# Patient Record
Sex: FEMALE | Race: ASIAN | NOT HISPANIC OR LATINO | URBAN - METROPOLITAN AREA
[De-identification: names, ages, dates, MRNs, and addresses within clinical notes are randomized per-mention and may not be internally consistent; named-entity substitution may affect disease eponyms.]

---

## 2017-02-08 ENCOUNTER — EMERGENCY (EMERGENCY)
Facility: HOSPITAL | Age: 77
LOS: 1 days | Discharge: HOME | End: 2017-02-08
Attending: EMERGENCY MEDICINE | Admitting: EMERGENCY MEDICINE
Payer: MEDICARE

## 2017-02-08 VITALS
HEART RATE: 102 BPM | DIASTOLIC BLOOD PRESSURE: 83 MMHG | TEMPERATURE: 99 F | SYSTOLIC BLOOD PRESSURE: 145 MMHG | OXYGEN SATURATION: 100 % | RESPIRATION RATE: 18 BRPM

## 2017-02-08 VITALS
HEART RATE: 66 BPM | DIASTOLIC BLOOD PRESSURE: 74 MMHG | RESPIRATION RATE: 18 BRPM | OXYGEN SATURATION: 98 % | TEMPERATURE: 101 F | SYSTOLIC BLOOD PRESSURE: 146 MMHG

## 2017-02-08 LAB
ALBUMIN SERPL ELPH-MCNC: 4.3 G/DL — SIGNIFICANT CHANGE UP (ref 3.3–5)
ALP SERPL-CCNC: 67 U/L — SIGNIFICANT CHANGE UP (ref 40–120)
ALT FLD-CCNC: 16 U/L — SIGNIFICANT CHANGE UP (ref 4–33)
APPEARANCE UR: SIGNIFICANT CHANGE UP
APTT BLD: 30.8 SEC — SIGNIFICANT CHANGE UP (ref 27.5–37.4)
AST SERPL-CCNC: 18 U/L — SIGNIFICANT CHANGE UP (ref 4–32)
BACTERIA # UR AUTO: SIGNIFICANT CHANGE UP
BASE EXCESS BLDV CALC-SCNC: 5.6 MMOL/L — SIGNIFICANT CHANGE UP
BASOPHILS # BLD AUTO: 0.08 K/UL — SIGNIFICANT CHANGE UP (ref 0–0.2)
BASOPHILS NFR BLD AUTO: 1.2 % — SIGNIFICANT CHANGE UP (ref 0–2)
BILIRUB SERPL-MCNC: 0.3 MG/DL — SIGNIFICANT CHANGE UP (ref 0.2–1.2)
BILIRUB UR-MCNC: NEGATIVE — SIGNIFICANT CHANGE UP
BLOOD GAS VENOUS - CREATININE: 1.22 MG/DL — SIGNIFICANT CHANGE UP (ref 0.5–1.3)
BLOOD UR QL VISUAL: NEGATIVE — SIGNIFICANT CHANGE UP
BUN SERPL-MCNC: 15 MG/DL — SIGNIFICANT CHANGE UP (ref 7–23)
CALCIUM SERPL-MCNC: 9.5 MG/DL — SIGNIFICANT CHANGE UP (ref 8.4–10.5)
CHLORIDE BLDV-SCNC: 105 MMOL/L — SIGNIFICANT CHANGE UP (ref 96–108)
CHLORIDE SERPL-SCNC: 100 MMOL/L — SIGNIFICANT CHANGE UP (ref 98–107)
CK MB BLD-MCNC: 1 NG/ML — SIGNIFICANT CHANGE UP (ref 1–4.7)
CK MB BLD-MCNC: SIGNIFICANT CHANGE UP (ref 0–2.5)
CK SERPL-CCNC: 38 U/L — SIGNIFICANT CHANGE UP (ref 25–170)
CO2 SERPL-SCNC: 27 MMOL/L — SIGNIFICANT CHANGE UP (ref 22–31)
COLOR SPEC: SIGNIFICANT CHANGE UP
CREAT SERPL-MCNC: 1.22 MG/DL — SIGNIFICANT CHANGE UP (ref 0.5–1.3)
EOSINOPHIL # BLD AUTO: 0.04 K/UL — SIGNIFICANT CHANGE UP (ref 0–0.5)
EOSINOPHIL NFR BLD AUTO: 0.6 % — SIGNIFICANT CHANGE UP (ref 0–6)
GAS PNL BLDV: 136 MMOL/L — SIGNIFICANT CHANGE UP (ref 136–146)
GLUCOSE BLDV-MCNC: 134 — HIGH (ref 70–99)
GLUCOSE SERPL-MCNC: 136 MG/DL — HIGH (ref 70–99)
GLUCOSE UR-MCNC: NEGATIVE — SIGNIFICANT CHANGE UP
HCO3 BLDV-SCNC: 28 MMOL/L — HIGH (ref 20–27)
HCT VFR BLD CALC: 38.7 % — SIGNIFICANT CHANGE UP (ref 34.5–45)
HCT VFR BLDV CALC: 40.5 % — SIGNIFICANT CHANGE UP (ref 34.5–45)
HGB BLD-MCNC: 12.8 G/DL — SIGNIFICANT CHANGE UP (ref 11.5–15.5)
HGB BLDV-MCNC: 13.2 G/DL — SIGNIFICANT CHANGE UP (ref 11.5–15.5)
IMM GRANULOCYTES NFR BLD AUTO: 0.3 % — SIGNIFICANT CHANGE UP (ref 0–1.5)
INR BLD: 1 — SIGNIFICANT CHANGE UP (ref 0.87–1.18)
KETONES UR-MCNC: NEGATIVE — SIGNIFICANT CHANGE UP
LACTATE BLDV-MCNC: 1.9 MMOL/L — SIGNIFICANT CHANGE UP (ref 0.5–2)
LEUKOCYTE ESTERASE UR-ACNC: HIGH
LYMPHOCYTES # BLD AUTO: 1.26 K/UL — SIGNIFICANT CHANGE UP (ref 1–3.3)
LYMPHOCYTES # BLD AUTO: 19.5 % — SIGNIFICANT CHANGE UP (ref 13–44)
MCHC RBC-ENTMCNC: 29.2 PG — SIGNIFICANT CHANGE UP (ref 27–34)
MCHC RBC-ENTMCNC: 33.1 % — SIGNIFICANT CHANGE UP (ref 32–36)
MCV RBC AUTO: 88.4 FL — SIGNIFICANT CHANGE UP (ref 80–100)
MONOCYTES # BLD AUTO: 0.72 K/UL — SIGNIFICANT CHANGE UP (ref 0–0.9)
MONOCYTES NFR BLD AUTO: 11.1 % — SIGNIFICANT CHANGE UP (ref 2–14)
MUCOUS THREADS # UR AUTO: SIGNIFICANT CHANGE UP
NEUTROPHILS # BLD AUTO: 4.35 K/UL — SIGNIFICANT CHANGE UP (ref 1.8–7.4)
NEUTROPHILS NFR BLD AUTO: 67.3 % — SIGNIFICANT CHANGE UP (ref 43–77)
NITRITE UR-MCNC: NEGATIVE — SIGNIFICANT CHANGE UP
PCO2 BLDV: 46 MMHG — SIGNIFICANT CHANGE UP (ref 41–51)
PH BLDV: 7.43 PH — SIGNIFICANT CHANGE UP (ref 7.32–7.43)
PH UR: 7.5 — SIGNIFICANT CHANGE UP (ref 4.6–8)
PLATELET # BLD AUTO: 182 K/UL — SIGNIFICANT CHANGE UP (ref 150–400)
PMV BLD: 11.8 FL — SIGNIFICANT CHANGE UP (ref 7–13)
PO2 BLDV: 29 MMHG — LOW (ref 35–40)
POTASSIUM BLDV-SCNC: 3.5 MMOL/L — SIGNIFICANT CHANGE UP (ref 3.4–4.5)
POTASSIUM SERPL-MCNC: 3.8 MMOL/L — SIGNIFICANT CHANGE UP (ref 3.5–5.3)
POTASSIUM SERPL-SCNC: 3.8 MMOL/L — SIGNIFICANT CHANGE UP (ref 3.5–5.3)
PROT SERPL-MCNC: 7.6 G/DL — SIGNIFICANT CHANGE UP (ref 6–8.3)
PROT UR-MCNC: 20 — SIGNIFICANT CHANGE UP
PROTHROM AB SERPL-ACNC: 11.4 SEC — SIGNIFICANT CHANGE UP (ref 10–13.1)
RBC # BLD: 4.38 M/UL — SIGNIFICANT CHANGE UP (ref 3.8–5.2)
RBC # FLD: 14.1 % — SIGNIFICANT CHANGE UP (ref 10.3–14.5)
RBC CASTS # UR COMP ASSIST: SIGNIFICANT CHANGE UP (ref 0–?)
SAO2 % BLDV: 53.9 % — LOW (ref 60–85)
SODIUM SERPL-SCNC: 143 MMOL/L — SIGNIFICANT CHANGE UP (ref 135–145)
SP GR SPEC: 1.01 — SIGNIFICANT CHANGE UP (ref 1–1.03)
SQUAMOUS # UR AUTO: SIGNIFICANT CHANGE UP
TROPONIN T SERPL-MCNC: < 0.06 NG/ML — SIGNIFICANT CHANGE UP (ref 0–0.06)
TSH SERPL-MCNC: 1.05 UIU/ML — SIGNIFICANT CHANGE UP (ref 0.27–4.2)
UROBILINOGEN FLD QL: 1 E.U. — SIGNIFICANT CHANGE UP (ref 0.1–0.2)
WBC # BLD: 6.47 K/UL — SIGNIFICANT CHANGE UP (ref 3.8–10.5)
WBC # FLD AUTO: 6.47 K/UL — SIGNIFICANT CHANGE UP (ref 3.8–10.5)
WBC UR QL: SIGNIFICANT CHANGE UP (ref 0–?)

## 2017-02-08 PROCEDURE — 72125 CT NECK SPINE W/O DYE: CPT | Mod: 26

## 2017-02-08 PROCEDURE — 71020: CPT | Mod: 26

## 2017-02-08 PROCEDURE — 99285 EMERGENCY DEPT VISIT HI MDM: CPT | Mod: 25

## 2017-02-08 PROCEDURE — 70450 CT HEAD/BRAIN W/O DYE: CPT | Mod: 26

## 2017-02-08 PROCEDURE — 93010 ELECTROCARDIOGRAM REPORT: CPT

## 2017-02-08 RX ORDER — CEPHALEXIN 500 MG
1 CAPSULE ORAL
Qty: 20 | Refills: 0
Start: 2017-02-08 | End: 2017-02-18

## 2017-02-08 RX ORDER — ACETAMINOPHEN 500 MG
650 TABLET ORAL ONCE
Qty: 0 | Refills: 0 | Status: COMPLETED | OUTPATIENT
Start: 2017-02-08 | End: 2017-02-08

## 2017-02-08 RX ORDER — CEFTRIAXONE 500 MG/1
1 INJECTION, POWDER, FOR SOLUTION INTRAMUSCULAR; INTRAVENOUS ONCE
Qty: 0 | Refills: 0 | Status: COMPLETED | OUTPATIENT
Start: 2017-02-08 | End: 2017-02-08

## 2017-02-08 RX ORDER — SODIUM CHLORIDE 9 MG/ML
500 INJECTION INTRAMUSCULAR; INTRAVENOUS; SUBCUTANEOUS ONCE
Qty: 0 | Refills: 0 | Status: COMPLETED | OUTPATIENT
Start: 2017-02-08 | End: 2017-02-08

## 2017-02-08 RX ADMIN — SODIUM CHLORIDE 500 MILLILITER(S): 9 INJECTION INTRAMUSCULAR; INTRAVENOUS; SUBCUTANEOUS at 17:04

## 2017-02-08 RX ADMIN — Medication 650 MILLIGRAM(S): at 17:03

## 2017-02-08 RX ADMIN — CEFTRIAXONE 100 GRAM(S): 500 INJECTION, POWDER, FOR SOLUTION INTRAMUSCULAR; INTRAVENOUS at 18:29

## 2017-02-08 NOTE — ED PROVIDER NOTE - ATTENDING CONTRIBUTION TO CARE
ED Attending (Joellen SPENCER): I have personally performed a face to face bedside history and physical examination of this patient. I have discussed the history, examination, assessment and plan of management with the Physician Assistant. My findings include: 75 y/o F PMH HTN, DM c/o unwitnessed fall  this am after feeling lightheaded, +head trauma, denies LOC. Daughter who is at bedside notes pt was confused after fall, stating she was not answering questions appropriately - currently states back to baseline. Pt currently has b/l mild knee pain s/p fall. Also notes recent urinary frequency. Denies fever, chills, HA, changes in vision, CP, SOB, abdominal pain, N/V/D, dysuria, hematuria, h/o CVA.. On exam, awake alert in no distress, + low grade fever 100.9 MO. Slightly tachycardic. No evidence of head neck or spine injury, lungs clear heart sounds normal abdomen soft non tender No CVAT, skin normal neuro non focal No other significant injury noted. Able to ambulate unassisted. Plan: labs and cultures due to fever, UA CXR EKG CT head. ED Attending (Joellen SPENCER): I have personally performed a face to face bedside history and physical examination of this patient. I have discussed the history, examination, assessment and plan of management with the Physician Assistant. My findings include: 77 y/o F PMH HTN, DM a fib not on AC,  c/o unwitnessed fall  this am after feeling lightheaded, +head trauma, denies LOC. Daughter who is at bedside notes pt was confused after fall, stating she was not answering questions appropriately - currently states back to baseline. Pt currently has b/l mild knee pain s/p fall. Also notes recent urinary frequency. Denies fever, chills, HA, changes in vision, CP, SOB, abdominal pain, N/V/D, dysuria, hematuria, h/o CVA.. On exam, awake alert in no distress, + low grade fever 100.9 MD. Slightly tachycardic. No evidence of head neck or spine injury, lungs clear heart sounds normal abdomen soft non tender No CVAT, skin normal neuro non focal No other significant injury noted. Able to ambulate unassisted. Plan: labs and cultures due to fever, UA CXR EKG CT head.

## 2017-02-08 NOTE — ED ADULT NURSE NOTE - OBJECTIVE STATEMENT
Pt rec'd in 4, A&Ox3, accompanied by family. Pt states she fell this morning while getting up from chair, felt dizzy and fell down, hit head, no LOC. Pt denies headache, denies N/V. Pt c/o pain to left knee, no bruising noted. Per family pt has been acting confused since her fall, not at her baseline. Pt noted to be febrile, denies cough/runny nose, denies urinary complaints

## 2017-02-08 NOTE — ED PROVIDER NOTE - OBJECTIVE STATEMENT
77 y/o F PMH HTN, DM c/o unwitnessed fall from height this am after feeling lightheaded, +head trauma, denies LOC. Daughter who is at bedside notes pt was confused after fall, stating she was not answering questions appropriately - currently states back to baseline. Pt currently has b/l mild knee pain s/p fall. Also notes recent urinary frequency. Denies fever, chills, HA, changes in vision, CP, SOB, abdominal pain, N/V/D, dysuria, hematuria, h/o CVA. 77 y/o F PMH HTN, DM c/o unwitnessed fall  this am after feeling lightheaded, +head trauma, denies LOC. Daughter who is at bedside notes pt was confused after fall, stating she was not answering questions appropriately - currently states back to baseline. Pt currently has b/l mild knee pain s/p fall. Also notes recent urinary frequency. Denies fever, chills, HA, changes in vision, CP, SOB, abdominal pain, N/V/D, dysuria, hematuria, h/o CVA. 77 y/o F PMH HTN, DM, Afib, not on AC, c/o unwitnessed fall  this am after feeling lightheaded, +head trauma, denies LOC. Daughter who is at bedside notes pt was confused after fall, stating she was not answering questions appropriately - currently states back to baseline. Pt currently has b/l mild knee pain s/p fall. Also notes recent urinary frequency. Denies fever, chills, HA, changes in vision, CP, SOB, abdominal pain, N/V/D, dysuria, hematuria, h/o CVA.

## 2017-02-08 NOTE — ED ADULT TRIAGE NOTE - CHIEF COMPLAINT QUOTE
pt presents s/p fall at 5am this morning. per pt's daughter-in law pt was feeling dizzy prior to fall and hit her head and was unable to get up so she tried to crawl into another room for help. pt also c/o left knee pain s/p fall. denies loc, reports taking asa 81mg daily. pt ambulatory into triage. daughter in-law also reports that patient is experiencing episodes of confusion and forgetfulness s/p fall, which is not her baseline. pt appropriately answering questions in triage but appears forgetful. PMHX: dm, htn, arthritis

## 2017-02-08 NOTE — ED PROVIDER NOTE - MEDICAL DECISION MAKING DETAILS
77 y/o F with possible syncope/unwitnessed fall; afib/a flutter on EKG; febrile in ED  -cbc, cmp, CE, TSH, EKG, cxr, CT head/c spine, UA/UCx, blood cx x 2, vbg, tylenol, IVF 75 y/o F with unwitnessed fall; afib/a flutter on EKG; febrile in ED  -cbc, cmp, CE, TSH, EKG, cxr, CT head/c spine, UA/UCx, blood cx x 2, vbg, tylenol, IVF

## 2017-02-08 NOTE — ED PROVIDER NOTE - CARE PLAN
Principal Discharge DX:	UTI (urinary tract infection)  Instructions for follow-up, activity and diet:	Follow up with your PMD within 48-72 hours.  Take Keflex 500 mg 1 tab 2x/day for 10 days.  Take tylenol 650mg every 6 hours as needed for fever. Increase fluids. Worsening pain, new fever, chills, nausea, vomiting return to ER  Secondary Diagnosis:	Fall

## 2017-02-08 NOTE — ED PROVIDER NOTE - MUSCULOSKELETAL, MLM
Spine appears normal, range of motion is not limited, no muscle or joint tenderness; no TTP to b/l knees without swelling, FROM

## 2017-02-09 LAB
SPECIMEN SOURCE: SIGNIFICANT CHANGE UP
SPECIMEN SOURCE: SIGNIFICANT CHANGE UP

## 2017-02-10 LAB
BACTERIA UR CULT: SIGNIFICANT CHANGE UP
SPECIMEN SOURCE: SIGNIFICANT CHANGE UP

## 2017-02-13 LAB
BACTERIA BLD CULT: SIGNIFICANT CHANGE UP
BACTERIA BLD CULT: SIGNIFICANT CHANGE UP

## 2019-06-26 NOTE — ED PROVIDER NOTE - PLAN OF CARE
4 3 Follow up with your PMD within 48-72 hours.  Take Keflex 500 mg 1 tab 2x/day for 10 days.  Take tylenol 650mg every 6 hours as needed for fever. Increase fluids. Worsening pain, new fever, chills, nausea, vomiting return to ER

## 2020-02-26 ENCOUNTER — INPATIENT (INPATIENT)
Facility: HOSPITAL | Age: 80
LOS: 5 days | Discharge: HOME HEALTH | End: 2020-03-03
Attending: INTERNAL MEDICINE | Admitting: INTERNAL MEDICINE
Payer: MEDICARE

## 2020-02-26 VITALS
DIASTOLIC BLOOD PRESSURE: 96 MMHG | HEART RATE: 126 BPM | RESPIRATION RATE: 16 BRPM | OXYGEN SATURATION: 99 % | TEMPERATURE: 99 F | SYSTOLIC BLOOD PRESSURE: 170 MMHG

## 2020-02-26 DIAGNOSIS — R55 SYNCOPE AND COLLAPSE: ICD-10-CM

## 2020-02-26 DIAGNOSIS — E11.9 TYPE 2 DIABETES MELLITUS WITHOUT COMPLICATIONS: ICD-10-CM

## 2020-02-26 DIAGNOSIS — I10 ESSENTIAL (PRIMARY) HYPERTENSION: ICD-10-CM

## 2020-02-26 DIAGNOSIS — I48.91 UNSPECIFIED ATRIAL FIBRILLATION: ICD-10-CM

## 2020-02-26 LAB
ALBUMIN SERPL ELPH-MCNC: 4 G/DL — SIGNIFICANT CHANGE UP (ref 3.3–5)
ALP SERPL-CCNC: 87 U/L — SIGNIFICANT CHANGE UP (ref 40–120)
ALT FLD-CCNC: 23 U/L — SIGNIFICANT CHANGE UP (ref 4–33)
ANION GAP SERPL CALC-SCNC: 13 MMO/L — SIGNIFICANT CHANGE UP (ref 7–14)
ANION GAP SERPL CALC-SCNC: 17 MMO/L — HIGH (ref 7–14)
APPEARANCE UR: SIGNIFICANT CHANGE UP
APTT BLD: 28.6 SEC — SIGNIFICANT CHANGE UP (ref 27.5–36.3)
AST SERPL-CCNC: 51 U/L — HIGH (ref 4–32)
BACTERIA # UR AUTO: HIGH
BASE EXCESS BLDV CALC-SCNC: 4.2 MMOL/L — SIGNIFICANT CHANGE UP
BASOPHILS # BLD AUTO: 0.08 K/UL — SIGNIFICANT CHANGE UP (ref 0–0.2)
BASOPHILS NFR BLD AUTO: 1 % — SIGNIFICANT CHANGE UP (ref 0–2)
BILIRUB SERPL-MCNC: 0.4 MG/DL — SIGNIFICANT CHANGE UP (ref 0.2–1.2)
BILIRUB UR-MCNC: NEGATIVE — SIGNIFICANT CHANGE UP
BLOOD GAS VENOUS - CREATININE: 1.14 MG/DL — SIGNIFICANT CHANGE UP (ref 0.5–1.3)
BLOOD UR QL VISUAL: SIGNIFICANT CHANGE UP
BUN SERPL-MCNC: 14 MG/DL — SIGNIFICANT CHANGE UP (ref 7–23)
BUN SERPL-MCNC: 15 MG/DL — SIGNIFICANT CHANGE UP (ref 7–23)
CALCIUM SERPL-MCNC: 9 MG/DL — SIGNIFICANT CHANGE UP (ref 8.4–10.5)
CALCIUM SERPL-MCNC: 9.9 MG/DL — SIGNIFICANT CHANGE UP (ref 8.4–10.5)
CHLORIDE BLDV-SCNC: 101 MMOL/L — SIGNIFICANT CHANGE UP (ref 96–108)
CHLORIDE SERPL-SCNC: 96 MMOL/L — LOW (ref 98–107)
CHLORIDE SERPL-SCNC: 99 MMOL/L — SIGNIFICANT CHANGE UP (ref 98–107)
CO2 SERPL-SCNC: 21 MMOL/L — LOW (ref 22–31)
CO2 SERPL-SCNC: 22 MMOL/L — SIGNIFICANT CHANGE UP (ref 22–31)
COLOR SPEC: SIGNIFICANT CHANGE UP
CREAT SERPL-MCNC: 1.05 MG/DL — SIGNIFICANT CHANGE UP (ref 0.5–1.3)
CREAT SERPL-MCNC: 1.19 MG/DL — SIGNIFICANT CHANGE UP (ref 0.5–1.3)
EOSINOPHIL # BLD AUTO: 0.09 K/UL — SIGNIFICANT CHANGE UP (ref 0–0.5)
EOSINOPHIL NFR BLD AUTO: 1.2 % — SIGNIFICANT CHANGE UP (ref 0–6)
GAS PNL BLDV: 135 MMOL/L — LOW (ref 136–146)
GLUCOSE BLDC GLUCOMTR-MCNC: 241 MG/DL — HIGH (ref 70–99)
GLUCOSE BLDV-MCNC: 364 MG/DL — HIGH (ref 70–99)
GLUCOSE SERPL-MCNC: 354 MG/DL — HIGH (ref 70–99)
GLUCOSE SERPL-MCNC: 355 MG/DL — HIGH (ref 70–99)
GLUCOSE UR-MCNC: >1000 — HIGH
HCO3 BLDV-SCNC: 27 MMOL/L — SIGNIFICANT CHANGE UP (ref 20–27)
HCT VFR BLD CALC: 45.4 % — HIGH (ref 34.5–45)
HCT VFR BLDV CALC: 45.7 % — HIGH (ref 34.5–45)
HGB BLD-MCNC: 15 G/DL — SIGNIFICANT CHANGE UP (ref 11.5–15.5)
HGB BLDV-MCNC: 14.9 G/DL — SIGNIFICANT CHANGE UP (ref 11.5–15.5)
HYALINE CASTS # UR AUTO: HIGH
IMM GRANULOCYTES NFR BLD AUTO: 0.1 % — SIGNIFICANT CHANGE UP (ref 0–1.5)
INR BLD: 0.98 — SIGNIFICANT CHANGE UP (ref 0.88–1.17)
KETONES UR-MCNC: NEGATIVE — SIGNIFICANT CHANGE UP
LACTATE BLDV-MCNC: 2.1 MMOL/L — HIGH (ref 0.5–2)
LEUKOCYTE ESTERASE UR-ACNC: SIGNIFICANT CHANGE UP
LYMPHOCYTES # BLD AUTO: 1.86 K/UL — SIGNIFICANT CHANGE UP (ref 1–3.3)
LYMPHOCYTES # BLD AUTO: 24 % — SIGNIFICANT CHANGE UP (ref 13–44)
MCHC RBC-ENTMCNC: 27.5 PG — SIGNIFICANT CHANGE UP (ref 27–34)
MCHC RBC-ENTMCNC: 33 % — SIGNIFICANT CHANGE UP (ref 32–36)
MCV RBC AUTO: 83.3 FL — SIGNIFICANT CHANGE UP (ref 80–100)
MONOCYTES # BLD AUTO: 0.61 K/UL — SIGNIFICANT CHANGE UP (ref 0–0.9)
MONOCYTES NFR BLD AUTO: 7.9 % — SIGNIFICANT CHANGE UP (ref 2–14)
NEUTROPHILS # BLD AUTO: 5.1 K/UL — SIGNIFICANT CHANGE UP (ref 1.8–7.4)
NEUTROPHILS NFR BLD AUTO: 65.8 % — SIGNIFICANT CHANGE UP (ref 43–77)
NITRITE UR-MCNC: NEGATIVE — SIGNIFICANT CHANGE UP
NRBC # FLD: 0 K/UL — SIGNIFICANT CHANGE UP (ref 0–0)
PCO2 BLDV: 49 MMHG — SIGNIFICANT CHANGE UP (ref 41–51)
PH BLDV: 7.39 PH — SIGNIFICANT CHANGE UP (ref 7.32–7.43)
PH UR: 7 — SIGNIFICANT CHANGE UP (ref 5–8)
PLATELET # BLD AUTO: 186 K/UL — SIGNIFICANT CHANGE UP (ref 150–400)
PMV BLD: 13.1 FL — HIGH (ref 7–13)
PO2 BLDV: 39 MMHG — SIGNIFICANT CHANGE UP (ref 35–40)
POTASSIUM BLDV-SCNC: 5.3 MMOL/L — HIGH (ref 3.4–4.5)
POTASSIUM SERPL-MCNC: SIGNIFICANT CHANGE UP MMOL/L (ref 3.5–5.3)
POTASSIUM SERPL-MCNC: SIGNIFICANT CHANGE UP MMOL/L (ref 3.5–5.3)
POTASSIUM SERPL-SCNC: SIGNIFICANT CHANGE UP MMOL/L (ref 3.5–5.3)
POTASSIUM SERPL-SCNC: SIGNIFICANT CHANGE UP MMOL/L (ref 3.5–5.3)
PROT SERPL-MCNC: 8.1 G/DL — SIGNIFICANT CHANGE UP (ref 6–8.3)
PROT UR-MCNC: 30 — SIGNIFICANT CHANGE UP
PROTHROM AB SERPL-ACNC: 11.3 SEC — SIGNIFICANT CHANGE UP (ref 9.8–13.1)
RBC # BLD: 5.45 M/UL — HIGH (ref 3.8–5.2)
RBC # FLD: 13 % — SIGNIFICANT CHANGE UP (ref 10.3–14.5)
RBC CASTS # UR COMP ASSIST: SIGNIFICANT CHANGE UP (ref 0–?)
SAO2 % BLDV: 73.2 % — SIGNIFICANT CHANGE UP (ref 60–85)
SODIUM SERPL-SCNC: 134 MMOL/L — LOW (ref 135–145)
SODIUM SERPL-SCNC: 134 MMOL/L — LOW (ref 135–145)
SP GR SPEC: 1.02 — SIGNIFICANT CHANGE UP (ref 1–1.04)
SQUAMOUS # UR AUTO: SIGNIFICANT CHANGE UP
TROPONIN T, HIGH SENSITIVITY: 7 NG/L — SIGNIFICANT CHANGE UP (ref ?–14)
TROPONIN T, HIGH SENSITIVITY: 7 NG/L — SIGNIFICANT CHANGE UP (ref ?–14)
UROBILINOGEN FLD QL: NORMAL — SIGNIFICANT CHANGE UP
WBC # BLD: 7.75 K/UL — SIGNIFICANT CHANGE UP (ref 3.8–10.5)
WBC # FLD AUTO: 7.75 K/UL — SIGNIFICANT CHANGE UP (ref 3.8–10.5)
WBC UR QL: >50 — HIGH (ref 0–?)

## 2020-02-26 PROCEDURE — 71046 X-RAY EXAM CHEST 2 VIEWS: CPT | Mod: 26

## 2020-02-26 PROCEDURE — 99223 1ST HOSP IP/OBS HIGH 75: CPT

## 2020-02-26 RX ORDER — HEPARIN SODIUM 5000 [USP'U]/ML
INJECTION INTRAVENOUS; SUBCUTANEOUS
Qty: 25000 | Refills: 0 | Status: DISCONTINUED | OUTPATIENT
Start: 2020-02-26 | End: 2020-02-27

## 2020-02-26 RX ORDER — HEPARIN SODIUM 5000 [USP'U]/ML
3000 INJECTION INTRAVENOUS; SUBCUTANEOUS EVERY 6 HOURS
Refills: 0 | Status: DISCONTINUED | OUTPATIENT
Start: 2020-02-26 | End: 2020-03-02

## 2020-02-26 RX ORDER — INSULIN LISPRO 100/ML
VIAL (ML) SUBCUTANEOUS
Refills: 0 | Status: DISCONTINUED | OUTPATIENT
Start: 2020-02-26 | End: 2020-02-27

## 2020-02-26 RX ORDER — DEXTROSE 50 % IN WATER 50 %
25 SYRINGE (ML) INTRAVENOUS ONCE
Refills: 0 | Status: DISCONTINUED | OUTPATIENT
Start: 2020-02-26 | End: 2020-03-03

## 2020-02-26 RX ORDER — METOPROLOL TARTRATE 50 MG
25 TABLET ORAL ONCE
Refills: 0 | Status: COMPLETED | OUTPATIENT
Start: 2020-02-26 | End: 2020-02-26

## 2020-02-26 RX ORDER — DEXTROSE 50 % IN WATER 50 %
12.5 SYRINGE (ML) INTRAVENOUS ONCE
Refills: 0 | Status: DISCONTINUED | OUTPATIENT
Start: 2020-02-26 | End: 2020-03-03

## 2020-02-26 RX ORDER — SODIUM CHLORIDE 9 MG/ML
1000 INJECTION INTRAMUSCULAR; INTRAVENOUS; SUBCUTANEOUS ONCE
Refills: 0 | Status: COMPLETED | OUTPATIENT
Start: 2020-02-26 | End: 2020-02-26

## 2020-02-26 RX ORDER — GLUCAGON INJECTION, SOLUTION 0.5 MG/.1ML
1 INJECTION, SOLUTION SUBCUTANEOUS ONCE
Refills: 0 | Status: DISCONTINUED | OUTPATIENT
Start: 2020-02-26 | End: 2020-03-03

## 2020-02-26 RX ORDER — SODIUM CHLORIDE 9 MG/ML
1000 INJECTION, SOLUTION INTRAVENOUS
Refills: 0 | Status: DISCONTINUED | OUTPATIENT
Start: 2020-02-26 | End: 2020-03-03

## 2020-02-26 RX ORDER — INSULIN LISPRO 100/ML
VIAL (ML) SUBCUTANEOUS AT BEDTIME
Refills: 0 | Status: DISCONTINUED | OUTPATIENT
Start: 2020-02-26 | End: 2020-03-03

## 2020-02-26 RX ORDER — HEPARIN SODIUM 5000 [USP'U]/ML
6500 INJECTION INTRAVENOUS; SUBCUTANEOUS EVERY 6 HOURS
Refills: 0 | Status: DISCONTINUED | OUTPATIENT
Start: 2020-02-26 | End: 2020-03-02

## 2020-02-26 RX ORDER — METOPROLOL TARTRATE 50 MG
25 TABLET ORAL
Refills: 0 | Status: DISCONTINUED | OUTPATIENT
Start: 2020-02-27 | End: 2020-03-03

## 2020-02-26 RX ORDER — DEXTROSE 50 % IN WATER 50 %
15 SYRINGE (ML) INTRAVENOUS ONCE
Refills: 0 | Status: DISCONTINUED | OUTPATIENT
Start: 2020-02-26 | End: 2020-03-03

## 2020-02-26 RX ADMIN — HEPARIN SODIUM 1500 UNIT(S)/HR: 5000 INJECTION INTRAVENOUS; SUBCUTANEOUS at 21:22

## 2020-02-26 RX ADMIN — Medication 25 MILLIGRAM(S): at 19:59

## 2020-02-26 RX ADMIN — SODIUM CHLORIDE 1000 MILLILITER(S): 9 INJECTION INTRAMUSCULAR; INTRAVENOUS; SUBCUTANEOUS at 19:40

## 2020-02-26 NOTE — H&P ADULT - NSHPPHYSICALEXAM_GEN_ALL_CORE
T(C): 36.9 (02-26-20 @ 19:55), Max: 37 (02-26-20 @ 12:14)  HR: 94 (02-26-20 @ 21:06) (94 - 126)  BP: 151/96 (02-26-20 @ 21:06) (151/96 - 170/96)  RR: 18 (02-26-20 @ 21:06) (13 - 18)  SpO2: 100% (02-26-20 @ 21:06) (99% - 100%)    GENERAL: No acute distress, well-developed  HEAD:  Atraumatic, Normocephalic  ENT: EOMI, PERRLA, conjunctiva and sclera clear, Neck supple, No JVD, moist mucosa, no pharyngeal erythema, no tonsillar enlargement or exudate  CHEST/LUNG: Clear to auscultation bilaterally; No wheeze, equal breath sounds bilaterally   HEART: Irregularly irregular; No murmurs, rubs, or gallops  ABDOMEN: Soft, Nontender, Nondistended; Bowel sounds present, no organomegaly  EXTREMITIES:  2+ Peripheral Pulses, No clubbing, cyanosis, or edema  PSYCH: AAOx3  NEUROLOGY: non-focal, cranial nerves intact  SKIN: Normal color, No rashes or lesions

## 2020-02-26 NOTE — H&P ADULT - PROBLEM SELECTOR PLAN 1
- Pt presenting with near syncope in setting of A fib with RVR  - Trop neg, no ischemic changes noted on EKG and pt denies any anginal symptoms.   - Monitor on tele  - Continue B blocker  - Started on hep gtt in ED. Will continue hep gtt for now and transition to PO in AM  - Check echo  - cardiology f/u in AM  - Consider EP consult in AM

## 2020-02-26 NOTE — ED PROVIDER NOTE - CLINICAL SUMMARY MEDICAL DECISION MAKING FREE TEXT BOX
77 y/o F PMH HTN, DM, Afib no on AC coming in with episode of dizziness/lightheadedness, found to be tachycardic at PMD and sent in for eval- Will check labs, EKG, xray chest and admit.

## 2020-02-26 NOTE — ED ADULT TRIAGE NOTE - CHIEF COMPLAINT QUOTE
Patient brought to ER by EMS from MD office for dizziness since this morning. . Pt is tachycardic. Pt has hx of afib.

## 2020-02-26 NOTE — H&P ADULT - NSHPLABSRESULTS_GEN_ALL_CORE
.  LABS:                         15.0   7.75  )-----------( 186      ( 26 Feb 2020 15:06 )             45.4     02-26    134<L>  |  99  |  14  ----------------------------<  354<H>  Test not performed SPECIMEN SEVERELY HEMOLYZED   |  22  |  1.05    Ca    9.0      26 Feb 2020 19:34    TPro  8.1  /  Alb  4.0  /  TBili  0.4  /  DBili  x   /  AST  51<H>  /  ALT  23  /  AlkPhos  87  02-26    PT/INR - ( 26 Feb 2020 15:06 )   PT: 11.3 SEC;   INR: 0.98          PTT - ( 26 Feb 2020 15:06 )  PTT:28.6 SEC        EKG reviewed personally: A krista with RVR      RADIOLOGY, EKG & ADDITIONAL TESTS: Reviewed.

## 2020-02-26 NOTE — ED PROVIDER NOTE - OBJECTIVE STATEMENT
75 y/o F PMH HTN, DM, Afib no on AC coming in with episode of dizziness , states she was going down the stairs started to feeling lightheaded and dizzy, sat herself on the stairs and family called EMS. She was feeling better but was told her HR was elevated and told to go be evaluated. was feeling better so went to clinic ( PMD  Nurse) was found to be in Afib and was told to go to the ED. Pt is currently asymptomatic. Denies fevers, chills, chest pain, sob, cough, n/v/d, abdominal pain, urinary symptoms.

## 2020-02-26 NOTE — H&P ADULT - NSHPREVIEWOFSYSTEMS_GEN_ALL_CORE
REVIEW OF SYSTEMS:    CONSTITUTIONAL: No weakness, fevers or chills, no weight loss  EYES/ENT: No visual changes;  No dysphagia or odynophagia, no tinnitus  NECK: No pain or stiffness  RESPIRATORY: No cough, wheezing, hemoptysis; No shortness of breath  CARDIOVASCULAR: No chest pain or palpitations; No lower extremity edema  GASTROINTESTINAL: No abdominal or epigastric pain. No nausea, vomiting, or hematemesis; No diarrhea or constipation. No melena or hematochezia.  MUSCULOSKELETAL: No joint pain, swelling, erythema or warmth, no back pain  GENITOURINARY: No dysuria, frequency or hematuria, no suprapubic pain  NEUROLOGICAL: + dizziness, No numbness or weakness, no headache, no syncope, no gait abnormalities   SKIN: No itching, burning, rashes, or lesions   All other review of systems is negative unless indicated above.

## 2020-02-26 NOTE — H&P ADULT - HISTORY OF PRESENT ILLNESS
77 yo F with h/o HTN, DM presenting with near syncope. Pt poor historian, most of history obtained from son at bedside. This morning while climbing stairs at home, pt started feeling dizzy and then was about to lose consciousness. Her sone was with her and prevented her fall. Pt did not have any chest pain at the time. EMS was called and upon there arrival pt was feeling better but her heart rate was noted to be high so she was advised to go to her doctor. Pt went to her clinic where again her HR was noted to be elevated (family unsure how high) so she was sent to the ED. At baseline pt ambulates on her own. Has not had any recent chest pain, shortness of breath or palpitations.    In ED pt was given Metoprolol 25mg PO, 1L NS and started on hep gtt  VS: 151/96  94  98.6  18  100% on RA

## 2020-02-26 NOTE — H&P ADULT - PROBLEM SELECTOR PLAN 3
- No longer taking insulin, per family  - Check HbA1C - No longer taking insulin, per family. However Glucose persistently elevate >300  - Monitor finger sticks. Start humalog sliding scale  - Check HbA1C in AM

## 2020-02-26 NOTE — ED ADULT NURSE NOTE - INTERVENTIONS DEFINITIONS
Room bathroom lighting operational/Stretcher in lowest position, wheels locked, appropriate side rails in place/Non-slip footwear when patient is off stretcher/Call bell, personal items and telephone within reach/Physically safe environment: no spills, clutter or unnecessary equipment

## 2020-02-26 NOTE — ED PROVIDER NOTE - ATTENDING CONTRIBUTION TO CARE
Attending Statement: I have reviewed and agree with all pertinent clinical information, including history and physical exam and agree with treatment plan of the PA, except as noted.  75yo F hx of HTN, DM, hx of atrial fib not on AC from home  co dizziness. pt was on the stairs felt dizzy almost passed out. no cp or sob. EMS noted atrial fib. Family denied hx of atrial fib. However chart notes hx of atrial fib. Currently pt denies any hx of cp or sob. no nausea or vomit. no fever/chills. no dysuria. no hematuria no trauma. no focal weakness or numbness. pt poor historian  Vital signs noted. +tachycardiac irregular HR, no slurred speech EOMI PERRLA no facial asymmetry No resp distress. able to speak in full and clear sentences. no wheeze, rales or stridor. soft nontender abdomen. no  rebound. no guarding. no sign of trauma. no CVAT nl  bl hands no pedal edema. no calf tenderness. normal pulses bilateral feet.   plan monitor, labs, ekg, cxr, urine, admit

## 2020-02-26 NOTE — ED ADULT NURSE REASSESSMENT NOTE - NS ED NURSE REASSESS COMMENT FT1
Patient's repeat lab work sent. Patient with no complaints of dizziness at this time. Respirations remain even and unlabored, chest rise equal b/l. Patient in NAD. Family at bedside, beds set in lowest position. Will continue to monitor.

## 2020-02-26 NOTE — ED ADULT NURSE NOTE - HOW OFTEN DO YOU HAVE A DRINK CONTAINING ALCOHOL?
Subjective:      Patient ID: Solo Cid is a 64 y.o. male.    Chief Complaint: Hospital Follow Up; Diabetes; and Hypertension    HPI  He reports being admitted to [facility] on 3/14-3/15 due to cp, ACS was r/o with Von and EKG; CP was thought to be 2/2 a combination of his chemo-port, DM2 and colostomy and chemo.     He reports his CP lasted 3 hours, but now has completely resolved and never recurred.     He reports missing his ophthalmology appointment, which was yesterday due to the hospital discharge process and continues to have blurry vision once per week, lasting 15 minutes in his left eye. He notes pain in both eyes when trying to focus on objects close by i.e. reading small print.    He requested to his oncologist, Dr. Siddiqui, to hold chemo for a while.    Past Medical History:   Diagnosis Date    Abnormal LFTs     Arthritis     Back pain     Chronic bronchitis     Diabetes mellitus without complication     Gastrostomy status     Hypertension     Obesity     Peripheral neuropathy 03/08/2017    Noted by NP during preventive visit; oncologist, Dr. Siddiqui, is aware    Rectal cancer     s/p XRT/CTX (Oncologist: Dr. Gonzalez Siddiqui: [phone]/Abdominal Perineal Resection (Proctologist: Dr. Shivam Reynaga)    Visual disturbance 03/08/2017    bilateral blurry vision reported to NP on prevention visit     Past Surgical History:   Procedure Laterality Date    CERVICAL FUSION      COLOSTOMY  2015    MEDIPORT INSERTION, SINGLE  2015     Social History     Social History    Marital status: Single     Spouse name: N/A    Number of children: N/A    Years of education: N/A     Occupational History    Not on file.     Social History Main Topics    Smoking status: Former Smoker     Quit date: 1/21/2015    Smokeless tobacco: Never Used    Alcohol use No    Drug use: No    Sexual activity: Not Currently     Other Topics Concern    Not on file     Social History Narrative     Family History   Problem  "Relation Age of Onset    Hypertension Mother     Diabetes Mother     Kidney disease Father      Dialysis    Hypertension Father     Stroke Father     Hypertension Sister     Heart attack Brother     Heart attack Brother     Cancer Neg Hx        Current Outpatient Prescriptions:     hydrochlorothiazide (HYDRODIURIL) 12.5 MG Tab, Take 1 tablet (12.5 mg total) by mouth once daily., Disp: 30 tablet, Rfl: 11    hydrocodone-acetaminophen 10-325mg (NORCO)  mg Tab, , Disp: , Rfl: 0    metformin (GLUCOPHAGE) 500 MG tablet, Take 1 tablet (500 mg total) by mouth daily with breakfast., Disp: 30 tablet, Rfl: 11    omeprazole (PRILOSEC) 40 MG capsule, , Disp: , Rfl: 3    polyethylene glycol (GLYCOLAX) 17 gram/dose powder, Take 17 g by mouth once daily., Disp: , Rfl:     tizanidine 4 mg Cap, Take 4 mg by mouth., Disp: , Rfl:     Review of patient's allergies indicates:  No Known Allergies    Lab Results   Component Value Date    WBC 3.72 (L) 11/07/2016    HGB 14.1 11/07/2016    HCT 42.5 11/07/2016     11/07/2016    CHOL 153 02/01/2016    TRIG 96 02/01/2016    HDL 46 02/01/2016    ALT 82 (H) 11/07/2016    AST 49 (H) 11/07/2016     11/07/2016    K 4.4 11/07/2016     11/07/2016    CREATININE 1.0 01/13/2017    BUN 13 11/07/2016    CO2 27 11/07/2016    TSH 1.175 02/01/2016    HGBA1C 4.8 08/31/2016     /74 (BP Location: Left arm, Patient Position: Sitting, BP Method: Manual)  Pulse 99  Temp 98.7 °F (37.1 °C) (Tympanic)   Resp 16  Ht 5' 4" (1.626 m)  Wt 106.8 kg (235 lb 7.2 oz)  SpO2 97%  BMI 40.42 kg/m2    Review of Systems   Constitutional: Negative.   Cardiovascular: Negative.   Respiratory: Negative.   Gastrointestinal: Negative.   Genitourinary: Negative.   Musculoskeletal: Negative.   Derm: Negative.  Allergic/Immunologic: Negative.   Endocrine: Negative.   Hematological: Negative.   Neurological: Negative.   Psychiatric/Behavioral: Negative.     Objective:     Physical " Exam  GEN: A&O fully, NAD  PSYC: Normal affect  HEENT: OP: Clear, no LAD, no thyroid masses  CV: RRR, no M/G/R; chest port in place (bandade removed and replaced): c/d/i.  PULM: CTA bilaterally, no wheezes, rales  GI: New colostomy bag in place. No apparent abnormality. Small amount of fecal material. No gross blood.  EXT: No C/C/E  SKIN: No rash.    Lab Results   Component Value Date    HGBA1C 4.8 08/31/2016       Assessment:      1. Visual disturbance: Will have ophtho visit rescheduled today.    2. Rectal cancer: Holding chemo for now. Will obtain records of latest treatment plan from [facility].   3. Diabetes mellitus without complication: Perfectly controlled. Unlikely contributing to his now resolved cp. Will decrease dose of his metformin from 500 mg BID to QD.    4. Gastrostomy status:    5. Other chest pain    6. Fatigue, unspecified type    7. Urinary retention    8. Vitamin D deficiency    9. Morbid obesity due to excess calories    10. Encounter for screening for malignant neoplasm of prostate      Plan:   Visual disturbance  -     Ambulatory Referral to Ophthalmology    Rectal cancer  -     CBC auto differential; Future; Expected date: 3/17/17  -     Comprehensive metabolic panel; Future; Expected date: 3/17/17  -     TSH; Future; Expected date: 3/17/17  -     Vitamin D; Future; Expected date: 3/17/17    Diabetes mellitus without complication  -     metformin (GLUCOPHAGE) 500 MG tablet; Take 1 tablet (500 mg total) by mouth daily with breakfast.  Dispense: 30 tablet; Refill: 11  -     Hemoglobin A1c; Future; Expected date: 3/17/17    Gastrostomy status    Other chest pain  -     CBC auto differential; Future; Expected date: 3/17/17  -     Comprehensive metabolic panel; Future; Expected date: 3/17/17    Fatigue, unspecified type  -     CBC auto differential; Future; Expected date: 3/17/17  -     Comprehensive metabolic panel; Future; Expected date: 3/17/17  -     TSH; Future; Expected date: 3/17/17  -      Vitamin D; Future; Expected date: 3/17/17    Urinary retention  -     PSA, Screening; Future; Expected date: 3/17/17    Vitamin D deficiency  -     Vitamin D; Future; Expected date: 3/17/17    Morbid obesity due to excess calories  -     PSA, Screening; Future; Expected date: 3/17/17    Encounter for screening for malignant neoplasm of prostate  -     PSA, Screening; Future; Expected date: 3/17/17      RTC in 2 month for annual    Never

## 2020-02-26 NOTE — ED ADULT NURSE NOTE - OBJECTIVE STATEMENT
Pt presents to rm 18, A&Ox2-3 (as per family at bedside this is patients baseline), ambulatory w/o assistance, pmhx of early- onset dementia, HTN and DM, here for evaluation of dizziness and new onset afib. Denies chest pain, shortness of breath, palpitations, diaphoresis, headaches, fevers, nausea, vomiting, diarrhea, or urinary symptoms at this time. IV established in right arm with a 20G, labs drawn and sent, call bell in reach, warm blanket provided, bed in lowest position, side rails up x2, MD evaluation in progress, pt on telemetry- afib. Will continue to monitor.

## 2020-02-27 DIAGNOSIS — R55 SYNCOPE AND COLLAPSE: ICD-10-CM

## 2020-02-27 DIAGNOSIS — I10 ESSENTIAL (PRIMARY) HYPERTENSION: ICD-10-CM

## 2020-02-27 LAB
ALBUMIN SERPL ELPH-MCNC: 3.8 G/DL — SIGNIFICANT CHANGE UP (ref 3.3–5)
ALP SERPL-CCNC: 81 U/L — SIGNIFICANT CHANGE UP (ref 40–120)
ALT FLD-CCNC: 13 U/L — SIGNIFICANT CHANGE UP (ref 4–33)
ANION GAP SERPL CALC-SCNC: 12 MMO/L — SIGNIFICANT CHANGE UP (ref 7–14)
APTT BLD: 30.8 SEC — SIGNIFICANT CHANGE UP (ref 27.5–36.3)
APTT BLD: > 200 SEC — CRITICAL HIGH (ref 27.5–36.3)
AST SERPL-CCNC: 13 U/L — SIGNIFICANT CHANGE UP (ref 4–32)
BASOPHILS # BLD AUTO: 0.09 K/UL — SIGNIFICANT CHANGE UP (ref 0–0.2)
BASOPHILS NFR BLD AUTO: 1.1 % — SIGNIFICANT CHANGE UP (ref 0–2)
BILIRUB SERPL-MCNC: 0.7 MG/DL — SIGNIFICANT CHANGE UP (ref 0.2–1.2)
BUN SERPL-MCNC: 13 MG/DL — SIGNIFICANT CHANGE UP (ref 7–23)
CALCIUM SERPL-MCNC: 9.2 MG/DL — SIGNIFICANT CHANGE UP (ref 8.4–10.5)
CHLORIDE SERPL-SCNC: 102 MMOL/L — SIGNIFICANT CHANGE UP (ref 98–107)
CO2 SERPL-SCNC: 24 MMOL/L — SIGNIFICANT CHANGE UP (ref 22–31)
CREAT SERPL-MCNC: 1.05 MG/DL — SIGNIFICANT CHANGE UP (ref 0.5–1.3)
EOSINOPHIL # BLD AUTO: 0.24 K/UL — SIGNIFICANT CHANGE UP (ref 0–0.5)
EOSINOPHIL NFR BLD AUTO: 3 % — SIGNIFICANT CHANGE UP (ref 0–6)
GLUCOSE BLDC GLUCOMTR-MCNC: 101 MG/DL — HIGH (ref 70–99)
GLUCOSE BLDC GLUCOMTR-MCNC: 156 MG/DL — HIGH (ref 70–99)
GLUCOSE BLDC GLUCOMTR-MCNC: 172 MG/DL — HIGH (ref 70–99)
GLUCOSE BLDC GLUCOMTR-MCNC: 178 MG/DL — HIGH (ref 70–99)
GLUCOSE BLDC GLUCOMTR-MCNC: 218 MG/DL — HIGH (ref 70–99)
GLUCOSE BLDC GLUCOMTR-MCNC: 258 MG/DL — HIGH (ref 70–99)
GLUCOSE BLDC GLUCOMTR-MCNC: 69 MG/DL — LOW (ref 70–99)
GLUCOSE BLDC GLUCOMTR-MCNC: 69 MG/DL — LOW (ref 70–99)
GLUCOSE BLDC GLUCOMTR-MCNC: 72 MG/DL — SIGNIFICANT CHANGE UP (ref 70–99)
GLUCOSE SERPL-MCNC: 288 MG/DL — HIGH (ref 70–99)
HBA1C BLD-MCNC: 10.5 % — HIGH (ref 4–5.6)
HCT VFR BLD CALC: 41.1 % — SIGNIFICANT CHANGE UP (ref 34.5–45)
HCT VFR BLD CALC: 42.4 % — SIGNIFICANT CHANGE UP (ref 34.5–45)
HCT VFR BLD CALC: 42.8 % — SIGNIFICANT CHANGE UP (ref 34.5–45)
HGB BLD-MCNC: 13.6 G/DL — SIGNIFICANT CHANGE UP (ref 11.5–15.5)
HGB BLD-MCNC: 13.6 G/DL — SIGNIFICANT CHANGE UP (ref 11.5–15.5)
HGB BLD-MCNC: 14.1 G/DL — SIGNIFICANT CHANGE UP (ref 11.5–15.5)
IMM GRANULOCYTES NFR BLD AUTO: 0.1 % — SIGNIFICANT CHANGE UP (ref 0–1.5)
LYMPHOCYTES # BLD AUTO: 2.48 K/UL — SIGNIFICANT CHANGE UP (ref 1–3.3)
LYMPHOCYTES # BLD AUTO: 30.6 % — SIGNIFICANT CHANGE UP (ref 13–44)
MAGNESIUM SERPL-MCNC: 1.9 MG/DL — SIGNIFICANT CHANGE UP (ref 1.6–2.6)
MCHC RBC-ENTMCNC: 27.4 PG — SIGNIFICANT CHANGE UP (ref 27–34)
MCHC RBC-ENTMCNC: 27.7 PG — SIGNIFICANT CHANGE UP (ref 27–34)
MCHC RBC-ENTMCNC: 27.9 PG — SIGNIFICANT CHANGE UP (ref 27–34)
MCHC RBC-ENTMCNC: 32.1 % — SIGNIFICANT CHANGE UP (ref 32–36)
MCHC RBC-ENTMCNC: 32.9 % — SIGNIFICANT CHANGE UP (ref 32–36)
MCHC RBC-ENTMCNC: 33.1 % — SIGNIFICANT CHANGE UP (ref 32–36)
MCV RBC AUTO: 83.7 FL — SIGNIFICANT CHANGE UP (ref 80–100)
MCV RBC AUTO: 84.8 FL — SIGNIFICANT CHANGE UP (ref 80–100)
MCV RBC AUTO: 85.3 FL — SIGNIFICANT CHANGE UP (ref 80–100)
MONOCYTES # BLD AUTO: 0.8 K/UL — SIGNIFICANT CHANGE UP (ref 0–0.9)
MONOCYTES NFR BLD AUTO: 9.9 % — SIGNIFICANT CHANGE UP (ref 2–14)
NEUTROPHILS # BLD AUTO: 4.48 K/UL — SIGNIFICANT CHANGE UP (ref 1.8–7.4)
NEUTROPHILS NFR BLD AUTO: 55.3 % — SIGNIFICANT CHANGE UP (ref 43–77)
NRBC # FLD: 0 K/UL — SIGNIFICANT CHANGE UP (ref 0–0)
PHOSPHATE SERPL-MCNC: 3.1 MG/DL — SIGNIFICANT CHANGE UP (ref 2.5–4.5)
PLATELET # BLD AUTO: 116 K/UL — LOW (ref 150–400)
PLATELET # BLD AUTO: 122 K/UL — LOW (ref 150–400)
PLATELET # BLD AUTO: 175 K/UL — SIGNIFICANT CHANGE UP (ref 150–400)
PMV BLD: 12.8 FL — SIGNIFICANT CHANGE UP (ref 7–13)
PMV BLD: 13.3 FL — HIGH (ref 7–13)
PMV BLD: 13.8 FL — HIGH (ref 7–13)
POTASSIUM SERPL-MCNC: 4.4 MMOL/L — SIGNIFICANT CHANGE UP (ref 3.5–5.3)
POTASSIUM SERPL-SCNC: 4.4 MMOL/L — SIGNIFICANT CHANGE UP (ref 3.5–5.3)
PROT SERPL-MCNC: 7 G/DL — SIGNIFICANT CHANGE UP (ref 6–8.3)
RBC # BLD: 4.91 M/UL — SIGNIFICANT CHANGE UP (ref 3.8–5.2)
RBC # BLD: 4.97 M/UL — SIGNIFICANT CHANGE UP (ref 3.8–5.2)
RBC # BLD: 5.05 M/UL — SIGNIFICANT CHANGE UP (ref 3.8–5.2)
RBC # FLD: 12.7 % — SIGNIFICANT CHANGE UP (ref 10.3–14.5)
RBC # FLD: 13 % — SIGNIFICANT CHANGE UP (ref 10.3–14.5)
RBC # FLD: 13.1 % — SIGNIFICANT CHANGE UP (ref 10.3–14.5)
SODIUM SERPL-SCNC: 138 MMOL/L — SIGNIFICANT CHANGE UP (ref 135–145)
WBC # BLD: 7.44 K/UL — SIGNIFICANT CHANGE UP (ref 3.8–10.5)
WBC # BLD: 8 K/UL — SIGNIFICANT CHANGE UP (ref 3.8–10.5)
WBC # BLD: 8.1 K/UL — SIGNIFICANT CHANGE UP (ref 3.8–10.5)
WBC # FLD AUTO: 7.44 K/UL — SIGNIFICANT CHANGE UP (ref 3.8–10.5)
WBC # FLD AUTO: 8 K/UL — SIGNIFICANT CHANGE UP (ref 3.8–10.5)
WBC # FLD AUTO: 8.1 K/UL — SIGNIFICANT CHANGE UP (ref 3.8–10.5)

## 2020-02-27 PROCEDURE — 70450 CT HEAD/BRAIN W/O DYE: CPT | Mod: 26

## 2020-02-27 RX ORDER — DIVALPROEX SODIUM 500 MG/1
125 TABLET, DELAYED RELEASE ORAL
Refills: 0 | Status: DISCONTINUED | OUTPATIENT
Start: 2020-02-27 | End: 2020-03-03

## 2020-02-27 RX ORDER — HEPARIN SODIUM 5000 [USP'U]/ML
1200 INJECTION INTRAVENOUS; SUBCUTANEOUS
Qty: 25000 | Refills: 0 | Status: DISCONTINUED | OUTPATIENT
Start: 2020-02-27 | End: 2020-02-28

## 2020-02-27 RX ORDER — INFLUENZA VIRUS VACCINE 15; 15; 15; 15 UG/.5ML; UG/.5ML; UG/.5ML; UG/.5ML
0.5 SUSPENSION INTRAMUSCULAR ONCE
Refills: 0 | Status: DISCONTINUED | OUTPATIENT
Start: 2020-02-27 | End: 2020-03-03

## 2020-02-27 RX ORDER — INSULIN LISPRO 100/ML
VIAL (ML) SUBCUTANEOUS
Refills: 0 | Status: DISCONTINUED | OUTPATIENT
Start: 2020-02-27 | End: 2020-03-03

## 2020-02-27 RX ORDER — INSULIN LISPRO 100/ML
6 VIAL (ML) SUBCUTANEOUS
Refills: 0 | Status: DISCONTINUED | OUTPATIENT
Start: 2020-02-27 | End: 2020-02-29

## 2020-02-27 RX ORDER — INSULIN GLARGINE 100 [IU]/ML
10 INJECTION, SOLUTION SUBCUTANEOUS AT BEDTIME
Refills: 0 | Status: DISCONTINUED | OUTPATIENT
Start: 2020-02-27 | End: 2020-02-29

## 2020-02-27 RX ADMIN — Medication 25 MILLIGRAM(S): at 05:50

## 2020-02-27 RX ADMIN — HEPARIN SODIUM 1200 UNIT(S)/HR: 5000 INJECTION INTRAVENOUS; SUBCUTANEOUS at 18:32

## 2020-02-27 RX ADMIN — HEPARIN SODIUM 0 UNIT(S)/HR: 5000 INJECTION INTRAVENOUS; SUBCUTANEOUS at 06:35

## 2020-02-27 RX ADMIN — Medication 6 UNIT(S): at 10:04

## 2020-02-27 RX ADMIN — Medication 6 UNIT(S): at 14:14

## 2020-02-27 RX ADMIN — Medication 2: at 10:03

## 2020-02-27 RX ADMIN — Medication 1: at 14:13

## 2020-02-27 RX ADMIN — INSULIN GLARGINE 10 UNIT(S): 100 INJECTION, SOLUTION SUBCUTANEOUS at 21:54

## 2020-02-27 RX ADMIN — HEPARIN SODIUM 1200 UNIT(S)/HR: 5000 INJECTION INTRAVENOUS; SUBCUTANEOUS at 17:07

## 2020-02-27 RX ADMIN — Medication 25 MILLIGRAM(S): at 18:32

## 2020-02-27 NOTE — CONSULT NOTE ADULT - ATTENDING COMMENTS
Patient seen and examined.  Agree with above.   Admitted with new onset AF  CTH negative  Pt. with ? dementia - neuro eval called  Hep gtt for cva prevention  Check TTE    Yosef Bush MD

## 2020-02-27 NOTE — CONSULT NOTE ADULT - ASSESSMENT
Assessment  DMT2: 79y Female with DM T2 with hyperglycemia, A1C 10.5%, blood sugars running high, no hypoglycemic episode,  eating meals, compliant with low carb diet.  Syncope: Being worked up, no chest pain, stable, monitored.  HTN: Controlled,  on antihypertensive medications.        Abhi Becker MD  Cell: [phone]  Office: [phone]

## 2020-02-27 NOTE — CONSULT NOTE ADULT - SUBJECTIVE AND OBJECTIVE BOX
HPI:  75 yo F with h/o HTN, DM presenting with near syncope. Pt poor historian, most of history obtained from son at bedside. This morning while climbing stairs at home, pt started feeling dizzy and then was about to lose consciousness. Her sone was with her and prevented her fall. Pt did not have any chest pain at the time. EMS was called and upon there arrival pt was feeling better but her heart rate was noted to be high so she was advised to go to her doctor. Pt went to her clinic where again her HR was noted to be elevated (family unsure how high) so she was sent to the ED. At baseline pt ambulates on her own. Has not had any recent chest pain, shortness of breath or palpitations.    In ED pt was given Metoprolol 25mg PO, 1L NS and started on hep gtt  VS: 151/96  94  98.6  18  100% on RA (26 Feb 2020 22:14)  Patient has history of diabetes, poor historian, no recent hypoglycemic episodes, no polyuria polydipsia. Patient follows up with PCP for diabetes management.    PAST MEDICAL & SURGICAL HISTORY:  Benign Hypertension  Diabetes Mellitus  No significant past surgical history      FAMILY HISTORY:  No pertinent family history in first degree relatives      Social History:    Outpatient Medications:    MEDICATIONS  (STANDING):  dextrose 5%. 1000 milliLiter(s) (50 mL/Hr) IV Continuous <Continuous>  dextrose 50% Injectable 12.5 Gram(s) IV Push once  dextrose 50% Injectable 25 Gram(s) IV Push once  dextrose 50% Injectable 25 Gram(s) IV Push once  heparin  Infusion.  Unit(s)/Hr (15 mL/Hr) IV Continuous <Continuous>  insulin glargine Injectable (LANTUS) 10 Unit(s) SubCutaneous at bedtime  insulin lispro (HumaLOG) corrective regimen sliding scale   SubCutaneous three times a day before meals  insulin lispro (HumaLOG) corrective regimen sliding scale   SubCutaneous at bedtime  insulin lispro Injectable (HumaLOG) 6 Unit(s) SubCutaneous three times a day before meals  metoprolol tartrate 25 milliGRAM(s) Oral two times a day    MEDICATIONS  (PRN):  dextrose 40% Gel 15 Gram(s) Oral once PRN Blood Glucose LESS THAN 70 milliGRAM(s)/deciliter  glucagon  Injectable 1 milliGRAM(s) IntraMuscular once PRN Glucose LESS THAN 70 milligrams/deciliter  heparin  Injectable 6500 Unit(s) IV Push every 6 hours PRN For aPTT less than 40  heparin  Injectable 3000 Unit(s) IV Push every 6 hours PRN For aPTT between 40 - 57      Allergies    No Known Allergies    Intolerances      Review of Systems:  Constitutional: No fever, no chills  Eyes: No blurry vision  Neuro: No tremors  HEENT: No pain, no neck swelling  Cardiovascular: No chest pain, no palpitations  Respiratory: No SOB, no cough  GI: No nausea, vomiting, abdominal pain  : No dysuria  Skin: no rash  MSK: Has leg swelling.  Psych: no depression  Endocrine: no polyuria, polydipsia    UNABLE TO OBTAIN    ALL OTHER SYSTEMS REVIEWED AND NEGATIVE        PHYSICAL EXAM:  VITALS: T(C): 36.7 (02-27-20 @ 06:58)  T(F): 98 (02-27-20 @ 06:58), Max: 98.6 (02-26-20 @ 12:14)  HR: 88 (02-27-20 @ 06:58) (82 - 126)  BP: 145/56 (02-27-20 @ 06:58) (145/56 - 170/96)  RR:  (13 - 18)  SpO2:  (99% - 100%)  Wt(kg): --  GENERAL: NAD, well-groomed, well-developed  EYES: No proptosis, no lid lag  HEENT:  Atraumatic, Normocephalic  THYROID: Normal size, no palpable nodules  RESPIRATORY: Clear to auscultation bilaterally; No rales, rhonchi, wheezing  CARDIOVASCULAR: Si S2, No murmurs;  GI: Soft, non distended, normal bowel sounds  SKIN: Dry, intact, No rashes or lesions  MUSCULOSKELETAL: Has BL lower extremity edema.  NEURO:  no tremor, sensation decreased in feet BL,    POCT Blood Glucose.: 218 mg/dL (02-27-20 @ 09:34)  POCT Blood Glucose.: 258 mg/dL (02-27-20 @ 05:38)  POCT Blood Glucose.: 241 mg/dL (02-26-20 @ 23:00)                            13.6   8.10  )-----------( 122      ( 27 Feb 2020 05:37 )             41.1       02-27    138  |  102  |  13  ----------------------------<  288<H>  4.4   |  24  |  1.05    EGFR if : 58  EGFR if non : 50    Ca    9.2      02-27  Mg     1.9     02-27  Phos  3.1     02-27    TPro  7.0  /  Alb  3.8  /  TBili  0.7  /  DBili  x   /  AST  13  /  ALT  13  /  AlkPhos  81  02-27      Thyroid Function Tests:      Hemoglobin A1C, Whole Blood: 10.5 % <H> [4.0 - 5.6] (02-27-20 @ 05:37)          Radiology:

## 2020-02-27 NOTE — CONSULT NOTE ADULT - SUBJECTIVE AND OBJECTIVE BOX
HISTORY OF PRESENT ILLNESS: HPI:  77 yo F with h/o HTN, DM presenting with near syncope and found with new onset AFib    Pt poor historian, most of history obtained from chart.  This morning while climbing stairs at home, pt started feeling dizzy and then was about to lose consciousness. Her son was with her and prevented her fall. At baseline pt ambulates on her own. Has not had any recent chest pain, shortness of breath or palpitations. Per son, she is more confused than her baselione    PAST MEDICAL & SURGICAL HISTORY:  Benign Hypertension  Diabetes Mellitus  No significant past surgical history      MEDICATIONS  (STANDING):  dextrose 5%. 1000 milliLiter(s) (50 mL/Hr) IV Continuous <Continuous>  dextrose 50% Injectable 12.5 Gram(s) IV Push once  dextrose 50% Injectable 25 Gram(s) IV Push once  dextrose 50% Injectable 25 Gram(s) IV Push once  heparin  Infusion.  Unit(s)/Hr (15 mL/Hr) IV Continuous <Continuous>  insulin glargine Injectable (LANTUS) 10 Unit(s) SubCutaneous at bedtime  insulin lispro (HumaLOG) corrective regimen sliding scale   SubCutaneous three times a day before meals  insulin lispro (HumaLOG) corrective regimen sliding scale   SubCutaneous at bedtime  insulin lispro Injectable (HumaLOG) 6 Unit(s) SubCutaneous three times a day before meals  metoprolol tartrate 25 milliGRAM(s) Oral two times a day      Allergies  No Known Allergies    FAMILY HISTORY:  No pertinent family history in first degree relatives  Noncontributory for premature coronary disease or sudden cardiac death    SOCIAL HISTORY:    [x ] Non-smoker  [ ] Smoker  [ ] Alcohol    FLU VACCINE THIS YEAR STARTS IN AUGUST:  [ ] Yes    [ ] No    IF OVER 65 HAVE YOU EVER HAD A PNA VACCINE:  [ ] Yes    [ ] No       [ ] N/A      REVIEW OF SYSTEMS:  [ ]chest pain  [  ]shortness of breath  [ x ]palpitations  [  ]syncope  [x ]near syncope [ ]upper extremity weakness   [ ] lower extremity weakness  [  ]diplopia  [  ]altered mental status   [  ]fevers  [ ]chills [ ]nausea  [ ]vomitting  [  ]dysphagia    [ ]abdominal pain  [ ]melena  [ ]BRBPR    [  ]epistaxis  [  ]rash    [ ]lower extremity edema        [x ] All others negative	  [ ] Unable to obtain      LABS:	 	    CARDIAC MARKERS:    Trop T 7,7  TSH pending                        13.6   8.10  )-----------( 122      ( 27 Feb 2020 05:37 )             41.1     138  |  102  |  13  ----------------------------<  288<H>  4.4   |  24  |  1.05    Ca    9.2      27 Feb 2020 05:37  Phos  3.1     02-27  Mg     1.9     02-27    TPro  7.0  /  Alb  3.8  /  TBili  0.7  /  DBili  x   /  AST  13  /  ALT  13  /  AlkPhos  81  02-27    Creatinine Trend: 1.05<--, 1.05<--, 1.19<--    Coags:  PTT - ( 27 Feb 2020 04:50 )  PTT:> 200.0 SEC  HgA1c: Hemoglobin A1C, Whole Blood: 10.5 % (02-27 @ 05:37)      PHYSICAL EXAM:  T(C): 36.5 (02-27-20 @ 14:35), Max: 36.9 (02-26-20 @ 19:55)  HR: 78 (02-27-20 @ 14:35) (78 - 115)  BP: 110/96 (02-27-20 @ 14:35) (110/96 - 161/95)  RR: 18 (02-27-20 @ 14:35) (16 - 18)  SpO2: 100% (02-27-20 @ 14:35) (99% - 100%)    Gen: Appears well in NAD  HEENT:  (-)icterus (-)pallor  CV: N S1 S2 1/6 GEORGES (+)2 Pulses B/l  Resp:  Clear to ausculatation B/L, normal effort  GI: (+) BS Soft, NT, ND  Lymph:  (-)Edema, (-)obvious lymphadenopathy  Skin: Warm to touch, Normal turgor  Psych: Appropriate mood and affect	      ECG:  	AF, RVR, vrate 120bpm    < from: CT Head No Cont (02.27.20 @ 11:41) >  Impression: This exam somewhat limited by motion though grosslyunremarkable    < end of copied text >      ASSESSMENT/PLAN: 77 yo F with h/o HTN, DM, ?dementia presenting with near syncope and found with new onset AFib, increased confusion    --ACS ruled out  --CTH negative  --Neuro eval called  --Endo eval called for A1C of 10.5  --given elevated [id number], Heparin GTT started  --Check TTE  --will need to discuss GOC with son pending above results

## 2020-02-27 NOTE — CONSULT NOTE ADULT - SUBJECTIVE AND OBJECTIVE BOX
[facility]        Patient is a 79y old  Female who presents with a chief complaint of Near syncope (2020 16:02)    Excerpt from H&P,75 yo F with h/o HTN, DM presenting with near syncope. Pt poor historian, most of history obtained from son at bedside. This morning while climbing stairs at home, pt started feeling dizzy and then was about to lose consciousness. Her sone was with her and prevented her fall. Pt did not have any chest pain at the time. EMS was called and upon there arrival pt was feeling better but her heart rate was noted to be high so she was advised to go to her doctor. Pt went to her clinic where again her HR was noted to be elevated (family unsure how high) so she was sent to the ED. At baseline pt ambulates on her own. Has not had any recent chest pain, shortness of breath or palpitations.    In ED pt was given Metoprolol 25mg PO, 1L NS and started on hep gtt  VS: 151/96  94  98.6  18  100% on RA (2020 22:14)           *****PAST MEDICAL / Surgical  HISTORY:  PAST MEDICAL & SURGICAL HISTORY:  Benign Hypertension  Diabetes Mellitus  No significant past surgical history           *****FAMILY HISTORY:  FAMILY HISTORY:  No pertinent family history in first degree relatives           *****SOCIAL HISTORY:  Alcohol: None  Smoking: None         *****ALLERGIES:   Allergies    No Known Allergies    Intolerances             *****MEDICATIONS: current medication reviewed and documented.   MEDICATIONS  (STANDING):  dextrose 5%. 1000 milliLiter(s) (50 mL/Hr) IV Continuous <Continuous>  dextrose 50% Injectable 12.5 Gram(s) IV Push once  dextrose 50% Injectable 25 Gram(s) IV Push once  dextrose 50% Injectable 25 Gram(s) IV Push once  heparin  Infusion. 1200 Unit(s)/Hr (12 mL/Hr) IV Continuous <Continuous>  influenza   Vaccine 0.5 milliLiter(s) IntraMuscular once  insulin glargine Injectable (LANTUS) 10 Unit(s) SubCutaneous at bedtime  insulin lispro (HumaLOG) corrective regimen sliding scale   SubCutaneous three times a day before meals  insulin lispro (HumaLOG) corrective regimen sliding scale   SubCutaneous at bedtime  insulin lispro Injectable (HumaLOG) 6 Unit(s) SubCutaneous three times a day before meals  metoprolol tartrate 25 milliGRAM(s) Oral two times a day    MEDICATIONS  (PRN):  artificial tears (preservative free) Ophthalmic Solution 1 Drop(s) Both EYES two times a day PRN Dry Eyes  dextrose 40% Gel 15 Gram(s) Oral once PRN Blood Glucose LESS THAN 70 milliGRAM(s)/deciliter  diVALproex  milliGRAM(s) Oral two times a day PRN agitation  glucagon  Injectable 1 milliGRAM(s) IntraMuscular once PRN Glucose LESS THAN 70 milligrams/deciliter  heparin  Injectable 6500 Unit(s) IV Push every 6 hours PRN For aPTT less than 40  heparin  Injectable 3000 Unit(s) IV Push every 6 hours PRN For aPTT between 40 - 57           *****REVIEW OF SYSTEM:  GEN: no fever, no chills, no pain  RESP: no SOB, no cough, no sputum  CVS: no chest pain, no palpitations, no edema  GI: no abdominal pain, no nausea, no vomiting, no constipation, no diarrhea  : no dysurea, no frequency, no hematurea  Neuro: no headache, no dizziness  PSYCH: no anxiety, no depression  Derm : no itching, no rash         *****VITAL SIGNS:  T(C): 36.3 (20 @ 05:36), Max: 36.7 (20 @ 18:52)  HR: 80 (20 @ 05:36) (77 - 89)  BP: 118/65 (20 @ 05:36) (110/96 - 133/75)  RR: 18 (20 @ 05:36) (16 - 18)  SpO2: 100% (20 @ 05:36) (100% - 100%)  Wt(kg): --           *****PHYSICAL EXAM:   Alert oriented x1 limited exam as pt is not cooperative   seems to be responding to external stimuli at times  speech is somewhat incomprehensible   unable to tell me the name or relation of family member at bedside.   daughter in law reports that mental status is baseline      EOMI fundi not visualized,  VFF to confrontration  No facial asymmetry   Tongue is midline   Palate elevates symmetrically   Moving all 4 ext symmetrically no pronator drift   Reflexes are symmetric throughout   sensation is grossly symmetric  Gait : not assessed.  B/L down going toes               *****LAB AND IMAGIN.2   7.19  )-----------( 153      ( 2020 01:35 )             39.1                   136  |  100  |  14  ----------------------------<  114<H>  4.0   |  24  |  0.94    Ca    8.7      2020 01:36  Phos  3.7       Mg     1.9         TPro  7.0  /  Alb  3.8  /  TBili  0.7  /  DBili  x   /  AST  13  /  ALT  13  /  AlkPhos  81      PT/INR - ( 2020 15:06 )   PT: 11.3 SEC;   INR: 0.98          PTT - ( 2020 01:36 )  PTT:40.7 SEC                        Urinalysis Basic - ( 2020 23:20 )    Color: LIGHT ORANGE / Appearance: Lt TURBID / S.018 / pH: 7.0  Gluc: >1000 / Ketone: NEGATIVE  / Bili: NEGATIVE / Urobili: NORMAL   Blood: TRACE / Protein: 30 / Nitrite: NEGATIVE   Leuk Esterase: LARGE / RBC: 3-5 / WBC >50   Sq Epi: MANY / Non Sq Epi: x / Bacteria: MANY        [All pertinent recent Imaging reports reviewed]         *****A S S E S S M E N T   A N D   P L A N :        Excerpt from H&P,75 yo F with h/o HTN, DM presenting with near syncope. Pt poor historian, most of history obtained from son at bedside. This morning while climbing stairs at home, pt started feeling dizzy and then was about to lose consciousness. Her sone was with her and prevented her fall. Pt did not have any chest pain at the time. EMS was called and upon there arrival pt was feeling better but her heart rate was noted to be high so she was advised to go to her doctor. Pt went to her clinic where again her HR was noted to be elevated (family unsure how high) so she was sent to the ED. At baseline pt ambulates on her own. Has not had any recent chest pain, shortness of breath or palpitations.    Problem/Recommendations 1:   agitation Dementia with delirium   will give depakote 125 for sleep augmentation and agitation       Problem/Recommendations 2: fall of unclear etiology   possibly related to hyperglycemia hyponatremai   pt eval   would get orthostatic bp        ___________________________  Will follow with you.  Thank you,  Eugenie Shaikh MD  Diplomate of the American Board of Neurology and Psychiatry.  Diplomate of the American Board of Vascular Neurology.   [facility] ([facility]), [facility]   Ph: [phone]    Differential diagnosis and plan of care discussed with patient after the evaluation.   Advanced care planning options discussed.   Pain assessed and judicious use of narcotics when appropriate was discussed.  Importance of Fall prevention discussed.  Counseling on Smoking and Alcohol cessation was offered when appropriate.  Counseling on Diet, exercise, and medication compliance was done.   83 minutes spent on the total encounter;  more than 50 % of the visit was spent on counseling  and or coordinating care by the attending physician.    Thank you for allowing me to participate in the care of this dhruv patient. Please do not hesitate to call me if you have any questions.     This and subsequent notes were partially created using voice recognition software and will  inherently be subject to errors including those of syntax and sound alike substitutions which may escape proofreading. In such instances original meaning may be extrapolated by contextual derivation.

## 2020-02-27 NOTE — PHARMACOTHERAPY INTERVENTION NOTE - COMMENTS
Medication history is incomplete. Unable to verify patient's medication list with two sources. Discrepancies noted in provider handoff. Please call spectra [phone] if you have any questions.

## 2020-02-28 LAB
ANION GAP SERPL CALC-SCNC: 12 MMO/L — SIGNIFICANT CHANGE UP (ref 7–14)
APTT BLD: 40.7 SEC — HIGH (ref 27.5–36.3)
APTT BLD: > 200 SEC — CRITICAL HIGH (ref 27.5–36.3)
APTT BLD: > 200 SEC — CRITICAL HIGH (ref 27.5–36.3)
BUN SERPL-MCNC: 14 MG/DL — SIGNIFICANT CHANGE UP (ref 7–23)
CALCIUM SERPL-MCNC: 8.7 MG/DL — SIGNIFICANT CHANGE UP (ref 8.4–10.5)
CHLORIDE SERPL-SCNC: 100 MMOL/L — SIGNIFICANT CHANGE UP (ref 98–107)
CO2 SERPL-SCNC: 24 MMOL/L — SIGNIFICANT CHANGE UP (ref 22–31)
CREAT SERPL-MCNC: 0.94 MG/DL — SIGNIFICANT CHANGE UP (ref 0.5–1.3)
GLUCOSE BLDC GLUCOMTR-MCNC: 105 MG/DL — HIGH (ref 70–99)
GLUCOSE BLDC GLUCOMTR-MCNC: 110 MG/DL — HIGH (ref 70–99)
GLUCOSE BLDC GLUCOMTR-MCNC: 117 MG/DL — HIGH (ref 70–99)
GLUCOSE BLDC GLUCOMTR-MCNC: 135 MG/DL — HIGH (ref 70–99)
GLUCOSE BLDC GLUCOMTR-MCNC: 215 MG/DL — HIGH (ref 70–99)
GLUCOSE BLDC GLUCOMTR-MCNC: 62 MG/DL — LOW (ref 70–99)
GLUCOSE SERPL-MCNC: 114 MG/DL — HIGH (ref 70–99)
HCT VFR BLD CALC: 39.1 % — SIGNIFICANT CHANGE UP (ref 34.5–45)
HCT VFR BLD CALC: 45.6 % — HIGH (ref 34.5–45)
HGB BLD-MCNC: 13.2 G/DL — SIGNIFICANT CHANGE UP (ref 11.5–15.5)
HGB BLD-MCNC: 14.3 G/DL — SIGNIFICANT CHANGE UP (ref 11.5–15.5)
MAGNESIUM SERPL-MCNC: 1.9 MG/DL — SIGNIFICANT CHANGE UP (ref 1.6–2.6)
MCHC RBC-ENTMCNC: 27.2 PG — SIGNIFICANT CHANGE UP (ref 27–34)
MCHC RBC-ENTMCNC: 28 PG — SIGNIFICANT CHANGE UP (ref 27–34)
MCHC RBC-ENTMCNC: 31.4 % — LOW (ref 32–36)
MCHC RBC-ENTMCNC: 33.8 % — SIGNIFICANT CHANGE UP (ref 32–36)
MCV RBC AUTO: 83 FL — SIGNIFICANT CHANGE UP (ref 80–100)
MCV RBC AUTO: 86.9 FL — SIGNIFICANT CHANGE UP (ref 80–100)
NRBC # FLD: 0 K/UL — SIGNIFICANT CHANGE UP (ref 0–0)
NRBC # FLD: 0 K/UL — SIGNIFICANT CHANGE UP (ref 0–0)
PHOSPHATE SERPL-MCNC: 3.7 MG/DL — SIGNIFICANT CHANGE UP (ref 2.5–4.5)
PLATELET # BLD AUTO: 153 K/UL — SIGNIFICANT CHANGE UP (ref 150–400)
PLATELET # BLD AUTO: 173 K/UL — SIGNIFICANT CHANGE UP (ref 150–400)
PMV BLD: 12.1 FL — SIGNIFICANT CHANGE UP (ref 7–13)
PMV BLD: 12.8 FL — SIGNIFICANT CHANGE UP (ref 7–13)
POTASSIUM SERPL-MCNC: 4 MMOL/L — SIGNIFICANT CHANGE UP (ref 3.5–5.3)
POTASSIUM SERPL-SCNC: 4 MMOL/L — SIGNIFICANT CHANGE UP (ref 3.5–5.3)
RBC # BLD: 4.71 M/UL — SIGNIFICANT CHANGE UP (ref 3.8–5.2)
RBC # BLD: 5.25 M/UL — HIGH (ref 3.8–5.2)
RBC # FLD: 12.9 % — SIGNIFICANT CHANGE UP (ref 10.3–14.5)
RBC # FLD: 13 % — SIGNIFICANT CHANGE UP (ref 10.3–14.5)
SODIUM SERPL-SCNC: 136 MMOL/L — SIGNIFICANT CHANGE UP (ref 135–145)
WBC # BLD: 7.19 K/UL — SIGNIFICANT CHANGE UP (ref 3.8–10.5)
WBC # BLD: 9.67 K/UL — SIGNIFICANT CHANGE UP (ref 3.8–10.5)
WBC # FLD AUTO: 7.19 K/UL — SIGNIFICANT CHANGE UP (ref 3.8–10.5)
WBC # FLD AUTO: 9.67 K/UL — SIGNIFICANT CHANGE UP (ref 3.8–10.5)

## 2020-02-28 PROCEDURE — 93306 TTE W/DOPPLER COMPLETE: CPT | Mod: 26

## 2020-02-28 RX ORDER — HEPARIN SODIUM 5000 [USP'U]/ML
1100 INJECTION INTRAVENOUS; SUBCUTANEOUS
Qty: 25000 | Refills: 0 | Status: DISCONTINUED | OUTPATIENT
Start: 2020-02-28 | End: 2020-03-02

## 2020-02-28 RX ADMIN — HEPARIN SODIUM 1400 UNIT(S)/HR: 5000 INJECTION INTRAVENOUS; SUBCUTANEOUS at 03:28

## 2020-02-28 RX ADMIN — HEPARIN SODIUM 0 UNIT(S)/HR: 5000 INJECTION INTRAVENOUS; SUBCUTANEOUS at 22:40

## 2020-02-28 RX ADMIN — Medication 25 MILLIGRAM(S): at 17:58

## 2020-02-28 RX ADMIN — Medication 6 UNIT(S): at 17:58

## 2020-02-28 RX ADMIN — Medication 6 UNIT(S): at 13:25

## 2020-02-28 RX ADMIN — HEPARIN SODIUM 3000 UNIT(S): 5000 INJECTION INTRAVENOUS; SUBCUTANEOUS at 03:31

## 2020-02-28 RX ADMIN — HEPARIN SODIUM 800 UNIT(S)/HR: 5000 INJECTION INTRAVENOUS; SUBCUTANEOUS at 23:48

## 2020-02-28 RX ADMIN — Medication 2: at 13:25

## 2020-02-28 RX ADMIN — Medication 25 MILLIGRAM(S): at 05:38

## 2020-02-28 RX ADMIN — HEPARIN SODIUM 1100 UNIT(S)/HR: 5000 INJECTION INTRAVENOUS; SUBCUTANEOUS at 13:53

## 2020-02-28 NOTE — PROGRESS NOTE ADULT - ASSESSMENT
Assessment  DMT2: 79y Female with DM T2 with hyperglycemia, A1C 10.5%, blood sugars trending down, no new hypoglycemic episode,  eating meals, compliant with low carb diet.  Syncope: Being worked up, no chest pain, stable, monitored.  HTN: Controlled,  on antihypertensive medications.        Abhi Becker MD  Cell: [phone]  Office: [phone] Assessment  DMT2: 79y Female with DM T2 with hyperglycemia, A1C 10.5%, blood sugars trending down, had hypoglycemic episode in evening,  eating meals, compliant with low carb diet.  Syncope: Being worked up, no chest pain, stable, monitored.  HTN: Controlled,  on antihypertensive medications.        Abhi Becker MD  Cell: [phone]  Office: [phone]

## 2020-02-28 NOTE — PROGRESS NOTE ADULT - SUBJECTIVE AND OBJECTIVE BOX
Patient denies chest pain or shortness of breath.   Review of systems otherwise (-)  Patient's daughter in law at bedside. 	    MEDICATIONS  (STANDING):    heparin  Infusion. 1100 Unit(s)/Hr (11 mL/Hr) IV Continuous <Continuous>  influenza   Vaccine 0.5 milliLiter(s) IntraMuscular once  insulin glargine Injectable (LANTUS) 10 Unit(s) SubCutaneous at bedtime  insulin lispro (HumaLOG) corrective regimen sliding scale   SubCutaneous three times a day before meals  insulin lispro (HumaLOG) corrective regimen sliding scale   SubCutaneous at bedtime  insulin lispro Injectable (HumaLOG) 6 Unit(s) SubCutaneous three times a day before meals  metoprolol tartrate 25 milliGRAM(s) Oral two times a day      LABS:	 	                          13.2   7.19  )-----------( 153      ( 28 Feb 2020 01:35 )             39.1     Hemoglobin: 13.2 g/dL (02-28 @ 01:35)  Hemoglobin: 14.1 g/dL (02-27 @ 14:50)  Hemoglobin: 13.6 g/dL (02-27 @ 05:37)  Hemoglobin: 13.6 g/dL (02-27 @ 03:49)  Hemoglobin: 15.0 g/dL (02-26 @ 15:06)    02-28    136  |  100  |  14  ----------------------------<  114<H>  4.0   |  24  |  0.94    Ca    8.7      28 Feb 2020 01:36  Phos  3.7     02-28  Mg     1.9     02-28    TPro  7.0  /  Alb  3.8  /  TBili  0.7  /  DBili  x   /  AST  13  /  ALT  13  /  AlkPhos  81  02-27    Creatinine Trend: 0.94<--, 1.05<--, 1.05<--, 1.19<--  COAGS:   PTT - ( 28 Feb 2020 10:50 )  PTT:> 200.0 SEC    proBNP:   Lipid Profile:   HgA1c:   TSH: Thyroid Stimulating Hormone, Serum: 1.05 uIU/mL (02.08.17 @ 16:10)        PHYSICAL EXAM:  T(C): 36.6 (02-28-20 @ 11:57), Max: 36.7 (02-27-20 @ 18:52)  HR: 76 (02-28-20 @ 11:57) (76 - 85)  BP: 133/83 (02-28-20 @ 11:57) (118/65 - 133/83)  RR: 17 (02-28-20 @ 11:57) (16 - 18)  SpO2: 98% (02-28-20 @ 11:57) (98% - 100%)  Wt(kg): --  I&O's Summary    Height (cm): 167.6 (02-27 @ 18:52)  Weight (kg): 97.7 (02-27 @ 18:52)  BMI (kg/m2): 34.8 (02-27 @ 18:52)  BSA (m2): 2.06 (02-27 @ 18:52)    Gen: Appears well in NAD  HEENT:  (-)icterus (-)pallor  CV: N S1 S2 1/6 GEORGES (+)2 Pulses B/l  Resp:  Clear to ausculatation B/L, normal effort  GI: (+) BS Soft, NT, ND  Lymph:  (-)Edema, (-)obvious lymphadenopathy  Skin: Warm to touch, Normal turgor  Psych: Appropriate mood and affect      TELEMETRY: 	  AF 70s    DATA  < from: CT Head No Cont (02.27.20 @ 11:41) >  Multiple axial sections were performed from base of skull to vertex for contrast enhancement. Coronal and sagittal instructions were performed as well.    This exam is compared with prior noncontrast head CT performed on February 8, 2017.    This exam somewhat limited by motion.    Parenchymal volume loss and chronic microvascular ischemic changes are identified    Grossly, there is no evidence acute hemorrhage mass or mass effect in the posterior fossa or supratentorial region.    Evaluation of the osseous structures with the appropriate window appears unremarkable.    Impression: This exam somewhat limited by motion though grosslyunremarkable    < end of copied text >      < from: Transthoracic Echocardiogram (02.28.20 @ 08:37) >  Ejection Fraction (Teicholtz): 67 %  ------------------------------------------------------------------------  OBSERVATIONS:  Mitral Valve: Mitral annular calcification, otherwise  normal mitral valve. Mild-moderate mitral regurgitation.  Aortic Root: Normal aortic root.  Aortic Valve: Calcified trileaflet aortic valve with normal  opening. Moderate aortic regurgitation.  Left Atrium: Moderately dilated left atrium.  LA volume  index = 43 cc/m2.  Left Ventricle: Endocardium not well visualized; grossly  normal left ventricular systolic function. Normal left  ventricular internal dimensions and wall thicknesses.  Right Heart: Normal right atrium. The right ventricle is  not well visualized; grossly normal right ventricular  systolic function. Normal tricuspid valve. Mild tricuspid  regurgitation. Normal pulmonic valve. Minimal pulmonic  regurgitation.  Pericardium/PleuraNormal pericardium with no pericardial  effusion.  ------------------------------------------------------------------------  CONCLUSIONS:  1. Mitral annular calcification, otherwise normal mitral  valve. Mild-moderate mitral regurgitation.  2. Calcified trileaflet aortic valve with normal opening.  Moderate aortic regurgitation.  3. Moderately dilated left atrium.  LA volume index = 43  cc/m2.  4. Normal left ventricular internal dimensions and wall  thicknesses.  5. Endocardium not well visualized; grossly normal left  ventricular systolic function.  6. The right ventricle is not well visualized; grossly  normal right ventricular systolic function.    < end of copied text >    ASSESSMENT/PLAN: 	75 yo F with h/o HTN, DM, ?dementia presenting with near syncope and found with new onset AFib, increased confusion    -- The patient has ruled out for acute coronary syndrome with negative serial cardiac enzymes  --TTE noted - normal LV function mod AI;  mild -mod MR  --Neuro appreciated -  CT head unremarkable - dementia with occasional delirium     - orthostatic BP pending  -- AF - rate controlled - c/w hep gtt for now for CVA prevention  ; TSH WNL  -- d/w patient's son Jose () who will come in tomorrow to discuss long term AC/GOC   --PT appreciated - home with home PT  -- endo appreciated   -- final recs pending above

## 2020-02-28 NOTE — PROGRESS NOTE ADULT - SUBJECTIVE AND OBJECTIVE BOX
Chief complaint  Patient is a 79y old  Female who presents with a chief complaint of Near syncope (27 Feb 2020 16:02)   Review of systems  Patient in bed, looks comfortable, no fever, no hypoglycemia.    Labs and Fingersticks  CAPILLARY BLOOD GLUCOSE      POCT Blood Glucose.: 215 mg/dL (28 Feb 2020 12:36)  POCT Blood Glucose.: 135 mg/dL (28 Feb 2020 08:36)  POCT Blood Glucose.: 172 mg/dL (27 Feb 2020 21:20)  POCT Blood Glucose.: 156 mg/dL (27 Feb 2020 19:35)  POCT Blood Glucose.: 101 mg/dL (27 Feb 2020 19:08)  POCT Blood Glucose.: 72 mg/dL (27 Feb 2020 18:51)  POCT Blood Glucose.: 69 mg/dL (27 Feb 2020 18:36)  POCT Blood Glucose.: 69 mg/dL (27 Feb 2020 18:33)      Anion Gap, Serum: 12 (02-28 @ 01:36)  Anion Gap, Serum: 12 (02-27 @ 05:37)  Anion Gap, Serum: 13 (02-26 @ 19:34)    Hemoglobin A1C, Whole Blood: 10.5 <H> (02-27 @ 05:37)    Calcium, Total Serum: 8.7 (02-28 @ 01:36)  Calcium, Total Serum: 9.2 (02-27 @ 05:37)  Calcium, Total Serum: 9.0 (02-26 @ 19:34)  Albumin, Serum: 3.8 (02-27 @ 05:37)    Alanine Aminotransferase (ALT/SGPT): 13 (02-27 @ 05:37)  Alkaline Phosphatase, Serum: 81 (02-27 @ 05:37)  Aspartate Aminotransferase (AST/SGOT): 13 (02-27 @ 05:37)        02-28    136  |  100  |  14  ----------------------------<  114<H>  4.0   |  24  |  0.94    Ca    8.7      28 Feb 2020 01:36  Phos  3.7     02-28  Mg     1.9     02-28    TPro  7.0  /  Alb  3.8  /  TBili  0.7  /  DBili  x   /  AST  13  /  ALT  13  /  AlkPhos  81  02-27                        13.2   7.19  )-----------( 153      ( 28 Feb 2020 01:35 )             39.1     Medications  MEDICATIONS  (STANDING):  dextrose 5%. 1000 milliLiter(s) (50 mL/Hr) IV Continuous <Continuous>  dextrose 50% Injectable 12.5 Gram(s) IV Push once  dextrose 50% Injectable 25 Gram(s) IV Push once  dextrose 50% Injectable 25 Gram(s) IV Push once  heparin  Infusion. 1100 Unit(s)/Hr (11 mL/Hr) IV Continuous <Continuous>  influenza   Vaccine 0.5 milliLiter(s) IntraMuscular once  insulin glargine Injectable (LANTUS) 10 Unit(s) SubCutaneous at bedtime  insulin lispro (HumaLOG) corrective regimen sliding scale   SubCutaneous three times a day before meals  insulin lispro (HumaLOG) corrective regimen sliding scale   SubCutaneous at bedtime  insulin lispro Injectable (HumaLOG) 6 Unit(s) SubCutaneous three times a day before meals  metoprolol tartrate 25 milliGRAM(s) Oral two times a day      Physical Exam  General: Patient comfortable in bed  Vital Signs Last 12 Hrs  T(F): 97.8 (02-28-20 @ 11:57), Max: 97.8 (02-28-20 @ 11:57)  HR: 76 (02-28-20 @ 11:57) (76 - 80)  BP: 133/83 (02-28-20 @ 11:57) (118/65 - 133/83)  BP(mean): --  RR: 17 (02-28-20 @ 11:57) (17 - 18)  SpO2: 98% (02-28-20 @ 11:57) (98% - 100%)  Neck: No palpable thyroid nodules.  CVS: S1S2, No murmurs  Respiratory: No wheezing, no crepitations  GI: Abdomen soft, bowel sounds positive  Musculoskeletal:  edema lower extremities.   Skin: No skin rashes, no ecchymosis    Diagnostics Chief complaint  Patient is a 79y old  Female who presents with a chief complaint of Near syncope (27 Feb 2020 16:02)   Review of systems  Patient in bed, looks comfortable, no fever, had hypoglycemia.    Labs and Fingersticks  CAPILLARY BLOOD GLUCOSE      POCT Blood Glucose.: 215 mg/dL (28 Feb 2020 12:36)  POCT Blood Glucose.: 135 mg/dL (28 Feb 2020 08:36)  POCT Blood Glucose.: 172 mg/dL (27 Feb 2020 21:20)  POCT Blood Glucose.: 156 mg/dL (27 Feb 2020 19:35)  POCT Blood Glucose.: 101 mg/dL (27 Feb 2020 19:08)  POCT Blood Glucose.: 72 mg/dL (27 Feb 2020 18:51)  POCT Blood Glucose.: 69 mg/dL (27 Feb 2020 18:36)  POCT Blood Glucose.: 69 mg/dL (27 Feb 2020 18:33)      Anion Gap, Serum: 12 (02-28 @ 01:36)  Anion Gap, Serum: 12 (02-27 @ 05:37)  Anion Gap, Serum: 13 (02-26 @ 19:34)    Hemoglobin A1C, Whole Blood: 10.5 <H> (02-27 @ 05:37)    Calcium, Total Serum: 8.7 (02-28 @ 01:36)  Calcium, Total Serum: 9.2 (02-27 @ 05:37)  Calcium, Total Serum: 9.0 (02-26 @ 19:34)  Albumin, Serum: 3.8 (02-27 @ 05:37)    Alanine Aminotransferase (ALT/SGPT): 13 (02-27 @ 05:37)  Alkaline Phosphatase, Serum: 81 (02-27 @ 05:37)  Aspartate Aminotransferase (AST/SGOT): 13 (02-27 @ 05:37)        02-28    136  |  100  |  14  ----------------------------<  114<H>  4.0   |  24  |  0.94    Ca    8.7      28 Feb 2020 01:36  Phos  3.7     02-28  Mg     1.9     02-28    TPro  7.0  /  Alb  3.8  /  TBili  0.7  /  DBili  x   /  AST  13  /  ALT  13  /  AlkPhos  81  02-27                        13.2   7.19  )-----------( 153      ( 28 Feb 2020 01:35 )             39.1     Medications  MEDICATIONS  (STANDING):  dextrose 5%. 1000 milliLiter(s) (50 mL/Hr) IV Continuous <Continuous>  dextrose 50% Injectable 12.5 Gram(s) IV Push once  dextrose 50% Injectable 25 Gram(s) IV Push once  dextrose 50% Injectable 25 Gram(s) IV Push once  heparin  Infusion. 1100 Unit(s)/Hr (11 mL/Hr) IV Continuous <Continuous>  influenza   Vaccine 0.5 milliLiter(s) IntraMuscular once  insulin glargine Injectable (LANTUS) 10 Unit(s) SubCutaneous at bedtime  insulin lispro (HumaLOG) corrective regimen sliding scale   SubCutaneous three times a day before meals  insulin lispro (HumaLOG) corrective regimen sliding scale   SubCutaneous at bedtime  insulin lispro Injectable (HumaLOG) 6 Unit(s) SubCutaneous three times a day before meals  metoprolol tartrate 25 milliGRAM(s) Oral two times a day      Physical Exam  General: Patient comfortable in bed  Vital Signs Last 12 Hrs  T(F): 97.8 (02-28-20 @ 11:57), Max: 97.8 (02-28-20 @ 11:57)  HR: 76 (02-28-20 @ 11:57) (76 - 80)  BP: 133/83 (02-28-20 @ 11:57) (118/65 - 133/83)  BP(mean): --  RR: 17 (02-28-20 @ 11:57) (17 - 18)  SpO2: 98% (02-28-20 @ 11:57) (98% - 100%)  Neck: No palpable thyroid nodules.  CVS: S1S2, No murmurs  Respiratory: No wheezing, no crepitations  GI: Abdomen soft, bowel sounds positive  Musculoskeletal:  edema lower extremities.   Skin: No skin rashes, no ecchymosis    Diagnostics

## 2020-02-28 NOTE — PROGRESS NOTE ADULT - ATTENDING COMMENTS
Patient seen and examined.  Agree with above.   Admitted with new onset Afib  TTE with normal LV function and moderate AI and mild-mod MR  Pt. with no clinical heart failure or anginal symptoms  Discussed with patient's son and daughter in law at length - family reports that the patient has advanced dementia and they want conservative care  Given above, no further inpatient cardiac workup needed at this time.   Start whichever NOAC is covered by insurance  PT shanell Bush MD

## 2020-02-28 NOTE — PHYSICAL THERAPY INITIAL EVALUATION ADULT - DISCHARGE DISPOSITION, PT EVAL
home w/ home PT Home with Home Physical Therapy to address current impairments and improve functional mobility.

## 2020-02-28 NOTE — PHYSICAL THERAPY INITIAL EVALUATION ADULT - CRITERIA FOR SKILLED THERAPEUTIC INTERVENTIONS
predicted duration of therapy intervention/anticipated discharge recommendation/rehab potential/therapy frequency/impairments found/functional limitations in following categories

## 2020-02-28 NOTE — PHYSICAL THERAPY INITIAL EVALUATION ADULT - ADDITIONAL COMMENTS
Pt unable to provide information, social history, and previous level of function secondary cognitive status. Per documentation, pt lives in a house with her son and family. Pt needs assistance for basic care needs. Pt's family looking into options for aide.

## 2020-02-28 NOTE — PROVIDER CONTACT NOTE (MEDICATION) - ACTION/TREATMENT ORDERED:
4oz of apple juice given as per protocol. Pt's repeat FS is 105, 117. Pt then ate 4oz of salmon. Family educated on the importance of lantus; will continue to monitor

## 2020-02-29 LAB
ANION GAP SERPL CALC-SCNC: 13 MMO/L — SIGNIFICANT CHANGE UP (ref 7–14)
APTT BLD: 144.3 SEC — CRITICAL HIGH (ref 27.5–36.3)
APTT BLD: 49.1 SEC — HIGH (ref 27.5–36.3)
APTT BLD: > 200 SEC — CRITICAL HIGH (ref 27.5–36.3)
BUN SERPL-MCNC: 18 MG/DL — SIGNIFICANT CHANGE UP (ref 7–23)
CALCIUM SERPL-MCNC: 8.9 MG/DL — SIGNIFICANT CHANGE UP (ref 8.4–10.5)
CHLORIDE SERPL-SCNC: 102 MMOL/L — SIGNIFICANT CHANGE UP (ref 98–107)
CO2 SERPL-SCNC: 23 MMOL/L — SIGNIFICANT CHANGE UP (ref 22–31)
CREAT SERPL-MCNC: 1.18 MG/DL — SIGNIFICANT CHANGE UP (ref 0.5–1.3)
GLUCOSE BLDC GLUCOMTR-MCNC: 145 MG/DL — HIGH (ref 70–99)
GLUCOSE BLDC GLUCOMTR-MCNC: 152 MG/DL — HIGH (ref 70–99)
GLUCOSE BLDC GLUCOMTR-MCNC: 154 MG/DL — HIGH (ref 70–99)
GLUCOSE BLDC GLUCOMTR-MCNC: 257 MG/DL — HIGH (ref 70–99)
GLUCOSE SERPL-MCNC: 187 MG/DL — HIGH (ref 70–99)
HCT VFR BLD CALC: 40 % — SIGNIFICANT CHANGE UP (ref 34.5–45)
HGB BLD-MCNC: 13.4 G/DL — SIGNIFICANT CHANGE UP (ref 11.5–15.5)
MAGNESIUM SERPL-MCNC: 1.8 MG/DL — SIGNIFICANT CHANGE UP (ref 1.6–2.6)
MCHC RBC-ENTMCNC: 28 PG — SIGNIFICANT CHANGE UP (ref 27–34)
MCHC RBC-ENTMCNC: 33.5 % — SIGNIFICANT CHANGE UP (ref 32–36)
MCV RBC AUTO: 83.5 FL — SIGNIFICANT CHANGE UP (ref 80–100)
NRBC # FLD: 0 K/UL — SIGNIFICANT CHANGE UP (ref 0–0)
PLATELET # BLD AUTO: 166 K/UL — SIGNIFICANT CHANGE UP (ref 150–400)
PMV BLD: 12.8 FL — SIGNIFICANT CHANGE UP (ref 7–13)
POTASSIUM SERPL-MCNC: 4.3 MMOL/L — SIGNIFICANT CHANGE UP (ref 3.5–5.3)
POTASSIUM SERPL-SCNC: 4.3 MMOL/L — SIGNIFICANT CHANGE UP (ref 3.5–5.3)
RBC # BLD: 4.79 M/UL — SIGNIFICANT CHANGE UP (ref 3.8–5.2)
RBC # FLD: 13 % — SIGNIFICANT CHANGE UP (ref 10.3–14.5)
SODIUM SERPL-SCNC: 138 MMOL/L — SIGNIFICANT CHANGE UP (ref 135–145)
WBC # BLD: 7.48 K/UL — SIGNIFICANT CHANGE UP (ref 3.8–10.5)
WBC # FLD AUTO: 7.48 K/UL — SIGNIFICANT CHANGE UP (ref 3.8–10.5)

## 2020-02-29 RX ADMIN — Medication 25 MILLIGRAM(S): at 17:19

## 2020-02-29 RX ADMIN — HEPARIN SODIUM 500 UNIT(S)/HR: 5000 INJECTION INTRAVENOUS; SUBCUTANEOUS at 08:47

## 2020-02-29 RX ADMIN — Medication 1: at 13:03

## 2020-02-29 RX ADMIN — Medication 6 UNIT(S): at 09:09

## 2020-02-29 RX ADMIN — HEPARIN SODIUM 200 UNIT(S)/HR: 5000 INJECTION INTRAVENOUS; SUBCUTANEOUS at 17:14

## 2020-02-29 RX ADMIN — HEPARIN SODIUM 0 UNIT(S)/HR: 5000 INJECTION INTRAVENOUS; SUBCUTANEOUS at 16:09

## 2020-02-29 RX ADMIN — Medication 25 MILLIGRAM(S): at 05:05

## 2020-02-29 RX ADMIN — Medication 1: at 09:09

## 2020-02-29 RX ADMIN — Medication 2: at 21:00

## 2020-02-29 RX ADMIN — HEPARIN SODIUM 400 UNIT(S)/HR: 5000 INJECTION INTRAVENOUS; SUBCUTANEOUS at 23:47

## 2020-02-29 RX ADMIN — HEPARIN SODIUM 0 UNIT(S)/HR: 5000 INJECTION INTRAVENOUS; SUBCUTANEOUS at 07:46

## 2020-02-29 NOTE — PROVIDER CONTACT NOTE (OTHER) - BACKGROUND
(Admit Diagnosis) Syncope and collapse  (PMH) Benign Hypertension  (PMH) Diabetes Mellitus  (Principal DC/DX) Near syncope

## 2020-02-29 NOTE — PROGRESS NOTE ADULT - SUBJECTIVE AND OBJECTIVE BOX
Patient denies chest pain or shortness of breath.   Review of systems otherwise (-)  Patient's daughter in law at bedside. 	      artificial tears (preservative free) Ophthalmic Solution 1 Drop(s) Both EYES two times a day PRN  dextrose 40% Gel 15 Gram(s) Oral once PRN  dextrose 5%. 1000 milliLiter(s) IV Continuous <Continuous>  dextrose 50% Injectable 12.5 Gram(s) IV Push once  dextrose 50% Injectable 25 Gram(s) IV Push once  dextrose 50% Injectable 25 Gram(s) IV Push once  diVALproex  milliGRAM(s) Oral two times a day PRN  glucagon  Injectable 1 milliGRAM(s) IntraMuscular once PRN  heparin  Infusion. 1100 Unit(s)/Hr IV Continuous <Continuous>  heparin  Injectable 6500 Unit(s) IV Push every 6 hours PRN  heparin  Injectable 3000 Unit(s) IV Push every 6 hours PRN  influenza   Vaccine 0.5 milliLiter(s) IntraMuscular once  insulin glargine Injectable (LANTUS) 10 Unit(s) SubCutaneous at bedtime  insulin lispro (HumaLOG) corrective regimen sliding scale   SubCutaneous three times a day before meals  insulin lispro (HumaLOG) corrective regimen sliding scale   SubCutaneous at bedtime  insulin lispro Injectable (HumaLOG) 6 Unit(s) SubCutaneous three times a day before meals  metoprolol tartrate 25 milliGRAM(s) Oral two times a day                            13.4   7.48  )-----------( 166      ( 29 Feb 2020 06:00 )             40.0       Hemoglobin: 13.4 g/dL (02-29 @ 06:00)  Hemoglobin: 14.3 g/dL (02-28 @ 21:00)  Hemoglobin: 13.2 g/dL (02-28 @ 01:35)  Hemoglobin: 14.1 g/dL (02-27 @ 14:50)  Hemoglobin: 13.6 g/dL (02-27 @ 05:37)      02-29    138  |  102  |  18  ----------------------------<  187<H>  4.3   |  23  |  1.18    Ca    8.9      29 Feb 2020 06:00  Phos  3.7     02-28  Mg     1.8     02-29      Creatinine Trend: 1.18<--, 0.94<--, 1.05<--, 1.05<--, 1.19<--    COAGS: PTT - ( 29 Feb 2020 06:00 )  PTT:> 200.0 SEC      PHYSICAL EXAM:  Vital Signs last 24 Hours   T(C): 36.6 (02-29-20 @ 05:16), Max: 36.6 (02-29-20 @ 05:16)  HR: 82 (02-29-20 @ 05:16) (82 - 88)  BP: 135/78 (02-29-20 @ 05:16) (119/74 - 135/78)  RR: 16 (02-29-20 @ 05:16) (16 - 18)  SpO2: 100% (02-29-20 @ 05:16) (95% - 100%)  Wt(kg): --    I&O's Summary    28 Feb 2020 07:01  -  29 Feb 2020 07:00  --------------------------------------------------------  IN: 500 mL / OUT: 0 mL / NET: 500 mL    29 Feb 2020 07:01  -  29 Feb 2020 12:40  --------------------------------------------------------  IN: 245 mL / OUT: 0 mL / NET: 245 mL        Gen: Appears well in NAD  HEENT:  (-)icterus (-)pallor  CV: N S1 S2 1/6 GEORGES (+)2 Pulses B/l  Resp:  Clear to auscultation B/L, normal effort  GI: (+) BS Soft, NT, ND  Lymph:  (-)Edema, (-)obvious lymphadenopathy  Skin: Warm to touch, Normal turgor  Psych: Appropriate mood and affect      TELEMETRY: 	  AF 70s    DATA  < from: CT Head No Cont (02.27.20 @ 11:41) >  Multiple axial sections were performed from base of skull to vertex for contrast enhancement. Coronal and sagittal instructions were performed as well.    This exam is compared with prior noncontrast head CT performed on February 8, 2017.    This exam somewhat limited by motion.    Parenchymal volume loss and chronic microvascular ischemic changes are identified    Grossly, there is no evidence acute hemorrhage mass or mass effect in the posterior fossa or supratentorial region.    Evaluation of the osseous structures with the appropriate window appears unremarkable.    Impression: This exam somewhat limited by motion though grosslyunremarkable    < end of copied text >      < from: Transthoracic Echocardiogram (02.28.20 @ 08:37) >  Ejection Fraction (Teicholtz): 67 %  ------------------------------------------------------------------------  OBSERVATIONS:  Mitral Valve: Mitral annular calcification, otherwise  normal mitral valve. Mild-moderate mitral regurgitation.  Aortic Root: Normal aortic root.  Aortic Valve: Calcified trileaflet aortic valve with normal  opening. Moderate aortic regurgitation.  Left Atrium: Moderately dilated left atrium.  LA volume  index = 43 cc/m2.  Left Ventricle: Endocardium not well visualized; grossly  normal left ventricular systolic function. Normal left  ventricular internal dimensions and wall thicknesses.  Right Heart: Normal right atrium. The right ventricle is  not well visualized; grossly normal right ventricular  systolic function. Normal tricuspid valve. Mild tricuspid  regurgitation. Normal pulmonic valve. Minimal pulmonic  regurgitation.  Pericardium/PleuraNormal pericardium with no pericardial  effusion.  ------------------------------------------------------------------------  CONCLUSIONS:  1. Mitral annular calcification, otherwise normal mitral  valve. Mild-moderate mitral regurgitation.  2. Calcified trileaflet aortic valve with normal opening.  Moderate aortic regurgitation.  3. Moderately dilated left atrium.  LA volume index = 43  cc/m2.  4. Normal left ventricular internal dimensions and wall  thicknesses.  5. Endocardium not well visualized; grossly normal left  ventricular systolic function.  6. The right ventricle is not well visualized; grossly  normal right ventricular systolic function.    < end of copied text >    ASSESSMENT/PLAN: 	75 yo F with h/o HTN, DM, ?dementia presenting with near syncope and found with new onset AFib, increased confusion    -- The patient has ruled out for acute coronary syndrome with negative serial cardiac enzymes  --TTE noted - normal LV function mod AI;  mild -mod MR  --Neuro appreciated -  CT head unremarkable - dementia with occasional delirium   --orthostatics are negative, hemodynamically stable   -- AF - rate controlled - c/w hep gtt for now for CVA prevention  ; TSH WNL  --d/w patient's son Jose () who will come in tomorrow to discuss long term AC   --PT appreciated - home with home PT  -- endo appreciated   -- final recs pending above

## 2020-02-29 NOTE — PROGRESS NOTE ADULT - SUBJECTIVE AND OBJECTIVE BOX
Chief complaint  Patient is a 79y old  Female who presents with a chief complaint of Near syncope (29 Feb 2020 12:39)   Review of systems  Patient in bed, looks comfortable, no fever, had hypoglycemia.    Labs and Fingersticks  CAPILLARY BLOOD GLUCOSE      POCT Blood Glucose.: 152 mg/dL (29 Feb 2020 12:52)  POCT Blood Glucose.: 154 mg/dL (29 Feb 2020 09:04)  POCT Blood Glucose.: 117 mg/dL (28 Feb 2020 22:15)  POCT Blood Glucose.: 105 mg/dL (28 Feb 2020 21:59)  POCT Blood Glucose.: 62 mg/dL (28 Feb 2020 21:32)  POCT Blood Glucose.: 110 mg/dL (28 Feb 2020 17:32)      Anion Gap, Serum: 13 (02-29 @ 06:00)  Anion Gap, Serum: 12 (02-28 @ 01:36)      Calcium, Total Serum: 8.9 (02-29 @ 06:00)  Calcium, Total Serum: 8.7 (02-28 @ 01:36)          02-29    138  |  102  |  18  ----------------------------<  187<H>  4.3   |  23  |  1.18    Ca    8.9      29 Feb 2020 06:00  Phos  3.7     02-28  Mg     1.8     02-29                          13.4   7.48  )-----------( 166      ( 29 Feb 2020 06:00 )             40.0     Medications  MEDICATIONS  (STANDING):  dextrose 5%. 1000 milliLiter(s) (50 mL/Hr) IV Continuous <Continuous>  dextrose 50% Injectable 12.5 Gram(s) IV Push once  dextrose 50% Injectable 25 Gram(s) IV Push once  dextrose 50% Injectable 25 Gram(s) IV Push once  heparin  Infusion. 1100 Unit(s)/Hr (11 mL/Hr) IV Continuous <Continuous>  influenza   Vaccine 0.5 milliLiter(s) IntraMuscular once  insulin lispro (HumaLOG) corrective regimen sliding scale   SubCutaneous three times a day before meals  insulin lispro (HumaLOG) corrective regimen sliding scale   SubCutaneous at bedtime  metoprolol tartrate 25 milliGRAM(s) Oral two times a day      Physical Exam  General: Patient comfortable in bed  Vital Signs Last 12 Hrs  T(F): 97.3 (02-29-20 @ 14:32), Max: 97.8 (02-29-20 @ 05:16)  HR: 74 (02-29-20 @ 14:32) (74 - 82)  BP: 129/77 (02-29-20 @ 14:32) (129/77 - 135/78)  BP(mean): --  RR: 16 (02-29-20 @ 14:32) (16 - 16)  SpO2: 96% (02-29-20 @ 14:32) (96% - 100%)  Neck: No palpable thyroid nodules.  CVS: S1S2, No murmurs  Respiratory: No wheezing, no crepitations  GI: Abdomen soft, bowel sounds positive  Musculoskeletal:  edema lower extremities.   Skin: No skin rashes, no ecchymosis    Diagnostics    Thyroid Stimulating Hormone, Serum: AM Sched. Collection: 01-Mar-2020 04:00 (02-29 @ 09:50)  Free Thyroxine, Serum: AM Sched. Collection: 01-Mar-2020 04:00 (02-29 @ 09:50)

## 2020-02-29 NOTE — PROGRESS NOTE ADULT - ASSESSMENT
Assessment  DMT2: 79y Female with DM T2 with hyperglycemia, A1C 10.5%, blood sugars improving, had hypoglycemic,  FS in acceptable range, eating meals, compliant with low carb diet.  Syncope: Being worked up, no chest pain, stable, monitored.  HTN: Controlled,  on antihypertensive medications.        Abhi Becker MD  Cell: [phone]  Office: [phone]

## 2020-02-29 NOTE — PROGRESS NOTE ADULT - SUBJECTIVE AND OBJECTIVE BOX
[facility]      Seen earlier today, and examined.  - Today, patient is without complaints.           *****MEDICATIONS: Current medication reviewed and documented.    MEDICATIONS  (STANDING):  dextrose 5%. 1000 milliLiter(s) (50 mL/Hr) IV Continuous <Continuous>  dextrose 50% Injectable 12.5 Gram(s) IV Push once  dextrose 50% Injectable 25 Gram(s) IV Push once  dextrose 50% Injectable 25 Gram(s) IV Push once  heparin  Infusion. 1100 Unit(s)/Hr (11 mL/Hr) IV Continuous <Continuous>  influenza   Vaccine 0.5 milliLiter(s) IntraMuscular once  insulin lispro (HumaLOG) corrective regimen sliding scale   SubCutaneous three times a day before meals  insulin lispro (HumaLOG) corrective regimen sliding scale   SubCutaneous at bedtime  metoprolol tartrate 25 milliGRAM(s) Oral two times a day    MEDICATIONS  (PRN):  artificial tears (preservative free) Ophthalmic Solution 1 Drop(s) Both EYES two times a day PRN Dry Eyes  dextrose 40% Gel 15 Gram(s) Oral once PRN Blood Glucose LESS THAN 70 milliGRAM(s)/deciliter  diVALproex  milliGRAM(s) Oral two times a day PRN agitation  glucagon  Injectable 1 milliGRAM(s) IntraMuscular once PRN Glucose LESS THAN 70 milligrams/deciliter  heparin  Injectable 6500 Unit(s) IV Push every 6 hours PRN For aPTT less than 40  heparin  Injectable 3000 Unit(s) IV Push every 6 hours PRN For aPTT between 40 - 57          ***** VITAL SIGNS:  T(F): 98.1 (20 @ 20:27), Max: 98.1 (20 @ 20:27)  HR: 78 (20 @ 20:27) (74 - 89)  BP: 111/79 (20 @ 20:27) (111/79 - 135/78)  RR: 16 (20 @ 20:27) (16 - 16)  SpO2: 95% (20 @ 20:27) (95% - 100%)  Wt(kg): --  ,   I&O's Summary    2020 07:01  -  2020 07:00  --------------------------------------------------------  IN: 500 mL / OUT: 0 mL / NET: 500 mL    2020 07:01  -  2020 22:55  --------------------------------------------------------  IN: 630 mL / OUT: 0 mL / NET: 630 mL             *****PHYSICAL EXAM: Alert oriented x1 limited exam as pt is not cooperative      speech is better  does not recognize son at bedside.      EOMI fundi not visualized,  VFF to confrontration  No facial asymmetry   Tongue is midline   Palate elevates symmetrically   Moving all 4 ext symmetrically antigravity      sensation is grossly symmetric  Gait : not assessed.  B/L down going toes          *****LAB AND IMAGIN.4   7.48  )-----------( 166      ( 2020 06:00 )             40.0               02-    138  |  102  |  18  ----------------------------<  187<H>  4.3   |  23  |  1.18    Ca    8.9      2020 06:00  Phos  3.7     02-  Mg     1.8     -      PTT - ( 2020 14:30 )  PTT:144.3 SEC                     [All pertinent recent Imaging/Reports reviewed]           *****A S S E S S M E N T   A N D   P L A N :        Excerpt from H&P,75 yo F with h/o HTN, DM presenting with near syncope. Pt poor historian, most of history obtained from son at bedside. This morning while climbing stairs at home, pt started feeling dizzy and then was about to lose consciousness. Her sone was with her and prevented her fall. Pt did not have any chest pain at the time. EMS was called and upon there arrival pt was feeling better but her heart rate was noted to be high so she was advised to go to her doctor. Pt went to her clinic where again her HR was noted to be elevated (family unsure how high) so she was sent to the ED. At baseline pt ambulates on her own. Has not had any recent chest pain, shortness of breath or palpitations.    Problem/Recommendations 1:   agitation Dementia with delirium   will give depakote 125 for sleep augmentation and agitation       Problem/Recommendations 2: fall of unclear etiology   possibly related to hyperglycemia hyponatremia   pt eval    Thank you for allowing me to participate in the care of this patient. Please do not hesitate to call me if you have any  questions.        ________________  Eugenie Shaikh MD  [facility] ([facility]  [phone]      33 minutes spent on total encounter; more than 50 % of the visit was  spent counseling about plan of care, compliance to diet/exercise and medication regimen and or  coordinating care by the attending physician.      It is advised that stroke patients follow up with NP Jovanna Jimenez @ [phone] in 1- 2 weeks.   Others please follow up with Dr. Michael Nissenbaum [phone]

## 2020-02-29 NOTE — PROVIDER CONTACT NOTE (OTHER) - SITUATION
son reports mother's glucose dropped to the 60's at night when she received pre meal insulin for dinner yesterday

## 2020-02-29 NOTE — PROVIDER CONTACT NOTE (CRITICAL VALUE NOTIFICATION) - BACKGROUND
Patient on heparin gtt.
female, dm2, Afib
(Admit Diagnosis) Syncope and collapse  (PMH) Benign Hypertension  (PMH) Diabetes Mellitus  (Principal DC/DX) Near syncope

## 2020-02-29 NOTE — PROVIDER CONTACT NOTE (CRITICAL VALUE NOTIFICATION) - SITUATION
Patient's aPTT is greater than 200.
ptt elevated, on heparin drip for A Fib
increased aptt greater than 200

## 2020-02-29 NOTE — PROVIDER CONTACT NOTE (CRITICAL VALUE NOTIFICATION) - ASSESSMENT
pt asymptomatic; no sign/symptom of bleeding
Patient asymptomatic, no signs of bleeding present.
alert, confused
vitals stable   no acute distress noted  fall safety mainlined

## 2020-02-29 NOTE — PROVIDER CONTACT NOTE (OTHER) - ACTION/TREATMENT ORDERED:
4oz of apple juice given as per protocol. Pt's repeat FS is 105, 117. Pt then ate 4oz of salmon. JUANI Goldstein made aware; will continue to monitor
currently eating dinner, FS now stable.
follow nomogram
md will cancel pre meal insulin
repeat fingerstick      currently eating dinner
repeat fingerstick   15grams of carbohydrates given plus    currently eating dinner
repeat fingerstick   apple juice given   currently eating dinner

## 2020-03-01 ENCOUNTER — TRANSCRIPTION ENCOUNTER (OUTPATIENT)
Age: 80
End: 2020-03-01

## 2020-03-01 LAB
ANION GAP SERPL CALC-SCNC: 14 MMO/L — SIGNIFICANT CHANGE UP (ref 7–14)
APTT BLD: 47.1 SEC — HIGH (ref 27.5–36.3)
APTT BLD: 59.6 SEC — HIGH (ref 27.5–36.3)
APTT BLD: 74.2 SEC — HIGH (ref 27.5–36.3)
BUN SERPL-MCNC: 16 MG/DL — SIGNIFICANT CHANGE UP (ref 7–23)
CALCIUM SERPL-MCNC: 8.8 MG/DL — SIGNIFICANT CHANGE UP (ref 8.4–10.5)
CHLORIDE SERPL-SCNC: 103 MMOL/L — SIGNIFICANT CHANGE UP (ref 98–107)
CO2 SERPL-SCNC: 22 MMOL/L — SIGNIFICANT CHANGE UP (ref 22–31)
CREAT SERPL-MCNC: 1.04 MG/DL — SIGNIFICANT CHANGE UP (ref 0.5–1.3)
GLUCOSE BLDC GLUCOMTR-MCNC: 141 MG/DL — HIGH (ref 70–99)
GLUCOSE BLDC GLUCOMTR-MCNC: 161 MG/DL — HIGH (ref 70–99)
GLUCOSE BLDC GLUCOMTR-MCNC: 198 MG/DL — HIGH (ref 70–99)
GLUCOSE BLDC GLUCOMTR-MCNC: 242 MG/DL — HIGH (ref 70–99)
GLUCOSE SERPL-MCNC: 154 MG/DL — HIGH (ref 70–99)
HCT VFR BLD CALC: 41.8 % — SIGNIFICANT CHANGE UP (ref 34.5–45)
HGB BLD-MCNC: 13.9 G/DL — SIGNIFICANT CHANGE UP (ref 11.5–15.5)
MAGNESIUM SERPL-MCNC: 1.8 MG/DL — SIGNIFICANT CHANGE UP (ref 1.6–2.6)
MCHC RBC-ENTMCNC: 28 PG — SIGNIFICANT CHANGE UP (ref 27–34)
MCHC RBC-ENTMCNC: 33.3 % — SIGNIFICANT CHANGE UP (ref 32–36)
MCV RBC AUTO: 84.1 FL — SIGNIFICANT CHANGE UP (ref 80–100)
NRBC # FLD: 0 K/UL — SIGNIFICANT CHANGE UP (ref 0–0)
PHOSPHATE SERPL-MCNC: 3.6 MG/DL — SIGNIFICANT CHANGE UP (ref 2.5–4.5)
PLATELET # BLD AUTO: 165 K/UL — SIGNIFICANT CHANGE UP (ref 150–400)
PMV BLD: 13 FL — SIGNIFICANT CHANGE UP (ref 7–13)
POTASSIUM SERPL-MCNC: 4.3 MMOL/L — SIGNIFICANT CHANGE UP (ref 3.5–5.3)
POTASSIUM SERPL-SCNC: 4.3 MMOL/L — SIGNIFICANT CHANGE UP (ref 3.5–5.3)
RBC # BLD: 4.97 M/UL — SIGNIFICANT CHANGE UP (ref 3.8–5.2)
RBC # FLD: 13.2 % — SIGNIFICANT CHANGE UP (ref 10.3–14.5)
SODIUM SERPL-SCNC: 139 MMOL/L — SIGNIFICANT CHANGE UP (ref 135–145)
T4 FREE SERPL-MCNC: 1.37 NG/DL — SIGNIFICANT CHANGE UP (ref 0.9–1.8)
TSH SERPL-MCNC: 1.6 UIU/ML — SIGNIFICANT CHANGE UP (ref 0.27–4.2)
WBC # BLD: 5.77 K/UL — SIGNIFICANT CHANGE UP (ref 3.8–10.5)
WBC # FLD AUTO: 5.77 K/UL — SIGNIFICANT CHANGE UP (ref 3.8–10.5)

## 2020-03-01 RX ORDER — APIXABAN 2.5 MG/1
1 TABLET, FILM COATED ORAL
Qty: 60 | Refills: 0
Start: 2020-03-01 | End: 2020-03-30

## 2020-03-01 RX ORDER — RIVAROXABAN 15 MG-20MG
1 KIT ORAL
Qty: 30 | Refills: 0
Start: 2020-03-01 | End: 2020-03-30

## 2020-03-01 RX ADMIN — HEPARIN SODIUM 600 UNIT(S)/HR: 5000 INJECTION INTRAVENOUS; SUBCUTANEOUS at 07:41

## 2020-03-01 RX ADMIN — HEPARIN SODIUM 600 UNIT(S)/HR: 5000 INJECTION INTRAVENOUS; SUBCUTANEOUS at 22:44

## 2020-03-01 RX ADMIN — Medication 25 MILLIGRAM(S): at 17:34

## 2020-03-01 RX ADMIN — HEPARIN SODIUM 600 UNIT(S)/HR: 5000 INJECTION INTRAVENOUS; SUBCUTANEOUS at 14:39

## 2020-03-01 RX ADMIN — Medication 25 MILLIGRAM(S): at 05:58

## 2020-03-01 RX ADMIN — Medication 1: at 12:47

## 2020-03-01 RX ADMIN — HEPARIN SODIUM 3000 UNIT(S): 5000 INJECTION INTRAVENOUS; SUBCUTANEOUS at 07:42

## 2020-03-01 RX ADMIN — Medication 2: at 17:34

## 2020-03-01 NOTE — DISCHARGE NOTE PROVIDER - NSDCFUADDAPPT_GEN_ALL_CORE_FT
follow up with Dr Rose on 3/12 at 11:15 am at [address] [place] [address], [place], [place],  [phone]

## 2020-03-01 NOTE — DIETITIAN INITIAL EVALUATION ADULT. - PERTINENT MEDS FT
MEDICATIONS  (STANDING):  dextrose 5%. 1000 milliLiter(s) (50 mL/Hr) IV Continuous <Continuous>  dextrose 50% Injectable 12.5 Gram(s) IV Push once  dextrose 50% Injectable 25 Gram(s) IV Push once  dextrose 50% Injectable 25 Gram(s) IV Push once  heparin  Infusion. 1100 Unit(s)/Hr (11 mL/Hr) IV Continuous <Continuous>  influenza   Vaccine 0.5 milliLiter(s) IntraMuscular once  insulin lispro (HumaLOG) corrective regimen sliding scale   SubCutaneous three times a day before meals  insulin lispro (HumaLOG) corrective regimen sliding scale   SubCutaneous at bedtime  metoprolol tartrate 25 milliGRAM(s) Oral two times a day

## 2020-03-01 NOTE — DISCHARGE NOTE PROVIDER - CARE PROVIDER_API CALL
Tirso Rodrigues)  Cardiovascular Disease  [address] [facility] [address]  [place] [address]  Phone: [phone]  Fax: [phone]  Follow Up Time: Abhi Becker)  EndocrinologyMetabDiabetes; Internal Medicine  [id number] [place] [address]  Phone: [phone]  Fax: [phone]  Follow Up Time:

## 2020-03-01 NOTE — DISCHARGE NOTE PROVIDER - NSDCMRMEDTOKEN_GEN_ALL_CORE_FT
Eliquis 5 mg oral tablet: 1 tab(s) orally 2 times     FOR PRICING ONLY DO NOT FILL   rivaroxaban 20 mg oral tablet: 1 tab(s) orally once a day     FOR PRICING PURPOSE ONLY    DO NOT FILL metoprolol tartrate 25 mg oral tablet: 1 tab(s) orally 2 times a day  rivaroxaban 20 mg oral tablet: 1 tab(s) orally once a day     Tradjenta 5 mg oral tablet: 1 tab(s) orally once a day

## 2020-03-01 NOTE — DIETITIAN INITIAL EVALUATION ADULT. - OTHER INFO
80 y/o F, A&Ox2 and confused, admitted with near syncope and found to be in afib with RVR. Pt is not a candidate for education at this time. Hx DM2 with HbA1c 10.5 and not taking insulin at home. Pt is eating well. No GI distress (nausea/vomiting/diarrhea/constipation.) No chewing or swallowing difficulties at this time. NKFA per chart. Unable to obtain intake/weight history.     Type 2 Diabetes Nutrition Handout left at bedside for pt's caregivers.

## 2020-03-01 NOTE — DISCHARGE NOTE PROVIDER - NSDCCPCAREPLAN_GEN_ALL_CORE_FT
PRINCIPAL DISCHARGE DIAGNOSIS  Diagnosis: Atrial fibrillation with rapid ventricular response  Assessment and Plan of Treatment: You were admittied after you had lost conscienous. This was due ot an arrhytmia known as atrial fibrilation. You are now on medication that has helped to normalize your heart rhythm. Please continue to take Metoprolol as prescribed to keep your heart rhythm under control. Also please continue to take _____ as well. This medication is a blood thinner which helps to prevent blood clots and strokes that can be caused by atrial fibrillation. Please follow up with your cardiologist in 1-2 weeks for a post discharge follow up. If you have chest pain, shortness of breath, dizziness, or faint, call your doctor or go to the emergency room to be evaluated PRINCIPAL DISCHARGE DIAGNOSIS  Diagnosis: Atrial fibrillation with rapid ventricular response  Assessment and Plan of Treatment: You were admittied after you had lost conscienous. This was possibly due to an arrhytmia known as atrial fibrilation. You are now on medication that has helped to normalize your heart rhythm. Please continue to take Metoprolol as prescribed to keep your heart rhythm under control. Also please continue to take xarelto as well. This medication is a blood thinner which helps to prevent blood clots and strokes that can be caused by atrial fibrillation. Please follow up with your cardiologist in 1-2 weeks for a post discharge follow up. If you have chest pain, shortness of breath, dizziness, or faint, call your doctor or go to the emergency room to be evaluated      SECONDARY DISCHARGE DIAGNOSES  Diagnosis: Type 2 diabetes mellitus without complication, without long-term current use of insulin  Assessment and Plan of Treatment: Monitor finger sticks pre-meal and bedtime, carbohydrate diet, minimize glucose intake. You were started on a medication called Tradjenta 5mg daily to help control your blood sugars. please follow up with your PCP or Endocrinologist Dr. Becker in 1-2 weeks.       Diagnosis: Benign Hypertension  Assessment and Plan of Treatment: Low sodium diet, continue anti-hypertensive medications Metoprolol, and follow up with primary care physician.

## 2020-03-01 NOTE — DIETITIAN INITIAL EVALUATION ADULT. - PERTINENT LABORATORY DATA
03-01 Na 139 mmol/L Glu 154 mg/dL<H> K+ 4.3 mmol/L Cr 1.04 mg/dL BUN 16 mg/dL Phos 3.6 mg/dL  02-27-20 HbA1c 10.5 %  03-01 @ 08:27 POCT 141 mg/dL  02-29 @ 20:53 POCT 257 mg/dL  02-29 @ 17:14 POCT 145 mg/dL  02-29 @ 12:52 POCT 152 mg/dL

## 2020-03-01 NOTE — DISCHARGE NOTE PROVIDER - HOSPITAL COURSE
76 y.o. female with HTN, DM2 admitted for new onset atrial fibrillation with RVR. The patient achieved rhythm control with amiodarone load and is now maintained on metoprolol  25mg bid. Intially was on a heparin gtt for anticoagulation given high CHADSVASC score, the patient was then transitioned to ________ on discharge. 76 y.o. female with HTN, DM2 admitted for new onset atrial fibrillation with RVR. The patient achieved rhythm control with amiodarone load and is now maintained on metoprolol  25mg bid.         The patient has ruled out for acute coronary syndrome with negative serial cardiac enzymes    --TTE noted - normal LV function mod AI;  mild -mod MR    --Neuro appreciated -  CT head unremarkable - dementia with occasional delirium     --orthostatics are negative, hemodynamically stable         Atrial fibrillation     - rate controlled - TSH WNL - c/w Lopressor . Started on heparin drip for now for CVA prevention - start whichever NOAC is covered by insuranc            - Patient will need lifelong anticoagulation .    --Cardiology has discussed patient's son Jose with whom she lives at bedside at length; he agrees to NOAC for CVA prevention ; otherwise as the patient has advanced dementia, he requests conservative care     --PT appreciated - home with home PT    -- follow up with Dr Rose at our [id number] [facility]  location in 1-2 weeks 76 y.o. female with HTN, DM2 admitted for new onset atrial fibrillation with RVR. The patient achieved rhythm control on metoprolol  25mg bid.         The patient has ruled out for acute coronary syndrome with negative serial cardiac enzymes    --TTE noted - normal LV function mod AI;  mild -mod MR    --Neuro appreciated -  CT head unremarkable - dementia with occasional delirium     --orthostatics are negative, hemodynamically stable         Atrial fibrillation     - rate controlled - TSH WNL - c/w Lopressor . Started on heparin drip for now for CVA prevention - Patient was started on Xarelto 20mg qd for oral anticoagulation.             - Patient will need lifelong anticoagulation .    --Cardiology has discussed patient's son Jose with whom she lives at bedside at length; he agrees to NOAC for CVA prevention ; otherwise as the patient has advanced dementia, he requests conservative care     --PT appreciated - home with home PT    -- follow up with Dr Rose on 3/12 at 11:15 am at [address] [place] [address],  [phone]         Uncontrolled DM type 2     -Patient was noted to have a A1C 10.5 on admission and endo c/s was obtained, fingerstick was monitored and insulin was held in setting of hypoglycemic episode. Now for discharge patient was started on Tradjenta 5mg daily.         Patient stable cleared for discharge d/w Cardiologist Dr. Bush.

## 2020-03-01 NOTE — DIETITIAN INITIAL EVALUATION ADULT. - PROBLEM SELECTOR PLAN 3
- No longer taking insulin, per family. However Glucose persistently elevate >300  - Monitor finger sticks. Start humalog sliding scale  - Check HbA1C in AM

## 2020-03-01 NOTE — PROGRESS NOTE ADULT - SUBJECTIVE AND OBJECTIVE BOX
Patient denies chest pain or shortness of breath.   Review of systems otherwise (-)        MEDICATIONS  (STANDING):  dextrose 5%. 1000 milliLiter(s) (50 mL/Hr) IV Continuous <Continuous>  dextrose 50% Injectable 12.5 Gram(s) IV Push once  dextrose 50% Injectable 25 Gram(s) IV Push once  dextrose 50% Injectable 25 Gram(s) IV Push once  heparin  Infusion. 1100 Unit(s)/Hr (11 mL/Hr) IV Continuous <Continuous>  influenza   Vaccine 0.5 milliLiter(s) IntraMuscular once  insulin lispro (HumaLOG) corrective regimen sliding scale   SubCutaneous three times a day before meals  insulin lispro (HumaLOG) corrective regimen sliding scale   SubCutaneous at bedtime  metoprolol tartrate 25 milliGRAM(s) Oral two times a day    MEDICATIONS  (PRN):  artificial tears (preservative free) Ophthalmic Solution 1 Drop(s) Both EYES two times a day PRN Dry Eyes  dextrose 40% Gel 15 Gram(s) Oral once PRN Blood Glucose LESS THAN 70 milliGRAM(s)/deciliter  diVALproex  milliGRAM(s) Oral two times a day PRN agitation  glucagon  Injectable 1 milliGRAM(s) IntraMuscular once PRN Glucose LESS THAN 70 milligrams/deciliter  heparin  Injectable 6500 Unit(s) IV Push every 6 hours PRN For aPTT less than 40  heparin  Injectable 3000 Unit(s) IV Push every 6 hours PRN For aPTT between 40 - 57      LABS:                            13.9   5.77  )-----------( 165      ( 01 Mar 2020 06:20 )             41.8     Hemoglobin: 13.9 g/dL (03-01 @ 06:20)  Hemoglobin: 13.4 g/dL (02-29 @ 06:00)  Hemoglobin: 14.3 g/dL (02-28 @ 21:00)  Hemoglobin: 13.2 g/dL (02-28 @ 01:35)  Hemoglobin: 14.1 g/dL (02-27 @ 14:50)    03-01    139  |  103  |  16  ----------------------------<  154<H>  4.3   |  22  |  1.04    Ca    8.8      01 Mar 2020 06:20  Phos  3.6     03-01  Mg     1.8     03-01      Creatinine Trend: 1.04<--, 1.18<--, 0.94<--, 1.05<--, 1.05<--, 1.19<--   PTT - ( 01 Mar 2020 06:20 )  PTT:47.1 SEC        PHYSICAL EXAM  Vital Signs Last 24 Hrs  T(C): 36.7 (29 Feb 2020 20:27), Max: 36.7 (29 Feb 2020 20:27)  T(F): 98.1 (29 Feb 2020 20:27), Max: 98.1 (29 Feb 2020 20:27)  HR: 81 (01 Mar 2020 05:51) (74 - 89)  BP: 138/80 (01 Mar 2020 05:51) (111/79 - 138/80)  BP(mean): --  RR: 17 (01 Mar 2020 05:51) (16 - 17)  SpO2: 96% (01 Mar 2020 05:51) (95% - 96%)      Gen: Appears well in NAD  HEENT:  (-)icterus (-)pallor  CV: N S1 S2 1/6 GEORGES (+)2 Pulses B/l  Resp:  Clear to auscultation B/L, normal effort  GI: (+) BS Soft, NT, ND  Lymph:  (-)Edema, (-)obvious lymphadenopathy  Skin: Warm to touch, Normal turgor  Psych: Appropriate mood and affect      TELEMETRY: 	  AF 70s    DATA  < from: CT Head No Cont (02.27.20 @ 11:41) >  Multiple axial sections were performed from base of skull to vertex for contrast enhancement. Coronal and sagittal instructions were performed as well.    This exam is compared with prior noncontrast head CT performed on February 8, 2017.    This exam somewhat limited by motion.    Parenchymal volume loss and chronic microvascular ischemic changes are identified    Grossly, there is no evidence acute hemorrhage mass or mass effect in the posterior fossa or supratentorial region.    Evaluation of the osseous structures with the appropriate window appears unremarkable.    Impression: This exam somewhat limited by motion though grosslyunremarkable    < end of copied text >      < from: Transthoracic Echocardiogram (02.28.20 @ 08:37) >  Ejection Fraction (Teicholtz): 67 %  ------------------------------------------------------------------------  OBSERVATIONS:  Mitral Valve: Mitral annular calcification, otherwise  normal mitral valve. Mild-moderate mitral regurgitation.  Aortic Root: Normal aortic root.  Aortic Valve: Calcified trileaflet aortic valve with normal  opening. Moderate aortic regurgitation.  Left Atrium: Moderately dilated left atrium.  LA volume  index = 43 cc/m2.  Left Ventricle: Endocardium not well visualized; grossly  normal left ventricular systolic function. Normal left  ventricular internal dimensions and wall thicknesses.  Right Heart: Normal right atrium. The right ventricle is  not well visualized; grossly normal right ventricular  systolic function. Normal tricuspid valve. Mild tricuspid  regurgitation. Normal pulmonic valve. Minimal pulmonic  regurgitation.  Pericardium/PleuraNormal pericardium with no pericardial  effusion.  ------------------------------------------------------------------------  CONCLUSIONS:  1. Mitral annular calcification, otherwise normal mitral  valve. Mild-moderate mitral regurgitation.  2. Calcified trileaflet aortic valve with normal opening.  Moderate aortic regurgitation.  3. Moderately dilated left atrium.  LA volume index = 43  cc/m2.  4. Normal left ventricular internal dimensions and wall  thicknesses.  5. Endocardium not well visualized; grossly normal left  ventricular systolic function.  6. The right ventricle is not well visualized; grossly  normal right ventricular systolic function.    < end of copied text >    ASSESSMENT/PLAN: 	77 yo F with h/o HTN, DM, ?dementia presenting with near syncope and found with new onset AFib, increased confusion    -- The patient has ruled out for acute coronary syndrome with negative serial cardiac enzymes  --TTE noted - normal LV function mod AI;  mild -mod MR  --Neuro appreciated -  CT head unremarkable - dementia with occasional delirium   --orthostatics are negative, hemodynamically stable   -- AF - rate controlled - c/w hep gtt for now for CVA prevention - start whichever NOAC is covered by insurance          -  TSH WNL  --Per Dr Bush previous d/w  patient's son and daughter in law,  the patient has advanced dementia and they want conservative care   --PT appreciated - home with home PT  -- endo appreciated   -- dc planning pending above Patient denies chest pain or shortness of breath.   Review of systems otherwise (-)        MEDICATIONS  (STANDING):  dextrose 5%. 1000 milliLiter(s) (50 mL/Hr) IV Continuous <Continuous>  dextrose 50% Injectable 12.5 Gram(s) IV Push once  dextrose 50% Injectable 25 Gram(s) IV Push once  dextrose 50% Injectable 25 Gram(s) IV Push once  heparin  Infusion. 1100 Unit(s)/Hr (11 mL/Hr) IV Continuous <Continuous>  influenza   Vaccine 0.5 milliLiter(s) IntraMuscular once  insulin lispro (HumaLOG) corrective regimen sliding scale   SubCutaneous three times a day before meals  insulin lispro (HumaLOG) corrective regimen sliding scale   SubCutaneous at bedtime  metoprolol tartrate 25 milliGRAM(s) Oral two times a day    MEDICATIONS  (PRN):  artificial tears (preservative free) Ophthalmic Solution 1 Drop(s) Both EYES two times a day PRN Dry Eyes  dextrose 40% Gel 15 Gram(s) Oral once PRN Blood Glucose LESS THAN 70 milliGRAM(s)/deciliter  diVALproex  milliGRAM(s) Oral two times a day PRN agitation  glucagon  Injectable 1 milliGRAM(s) IntraMuscular once PRN Glucose LESS THAN 70 milligrams/deciliter  heparin  Injectable 6500 Unit(s) IV Push every 6 hours PRN For aPTT less than 40  heparin  Injectable 3000 Unit(s) IV Push every 6 hours PRN For aPTT between 40 - 57      LABS:                            13.9   5.77  )-----------( 165      ( 01 Mar 2020 06:20 )             41.8     Hemoglobin: 13.9 g/dL (03-01 @ 06:20)  Hemoglobin: 13.4 g/dL (02-29 @ 06:00)  Hemoglobin: 14.3 g/dL (02-28 @ 21:00)  Hemoglobin: 13.2 g/dL (02-28 @ 01:35)  Hemoglobin: 14.1 g/dL (02-27 @ 14:50)    03-01    139  |  103  |  16  ----------------------------<  154<H>  4.3   |  22  |  1.04    Ca    8.8      01 Mar 2020 06:20  Phos  3.6     03-01  Mg     1.8     03-01      Creatinine Trend: 1.04<--, 1.18<--, 0.94<--, 1.05<--, 1.05<--, 1.19<--   PTT - ( 01 Mar 2020 06:20 )  PTT:47.1 SEC        PHYSICAL EXAM  Vital Signs Last 24 Hrs  T(C): 36.7 (29 Feb 2020 20:27), Max: 36.7 (29 Feb 2020 20:27)  T(F): 98.1 (29 Feb 2020 20:27), Max: 98.1 (29 Feb 2020 20:27)  HR: 81 (01 Mar 2020 05:51) (74 - 89)  BP: 138/80 (01 Mar 2020 05:51) (111/79 - 138/80)  BP(mean): --  RR: 17 (01 Mar 2020 05:51) (16 - 17)  SpO2: 96% (01 Mar 2020 05:51) (95% - 96%)      Gen: Appears well in NAD  HEENT:  (-)icterus (-)pallor  CV: N S1 S2 1/6 GEORGES (+)2 Pulses B/l  Resp:  Clear to auscultation B/L, normal effort  GI: (+) BS Soft, NT, ND  Lymph:  (-)Edema, (-)obvious lymphadenopathy  Skin: Warm to touch, Normal turgor  Psych: Appropriate mood and affect      TELEMETRY: 	  AF 70s    DATA  < from: CT Head No Cont (02.27.20 @ 11:41) >  Multiple axial sections were performed from base of skull to vertex for contrast enhancement. Coronal and sagittal instructions were performed as well.    This exam is compared with prior noncontrast head CT performed on February 8, 2017.    This exam somewhat limited by motion.    Parenchymal volume loss and chronic microvascular ischemic changes are identified    Grossly, there is no evidence acute hemorrhage mass or mass effect in the posterior fossa or supratentorial region.    Evaluation of the osseous structures with the appropriate window appears unremarkable.    Impression: This exam somewhat limited by motion though grosslyunremarkable    < end of copied text >      < from: Transthoracic Echocardiogram (02.28.20 @ 08:37) >  Ejection Fraction (Teicholtz): 67 %  ------------------------------------------------------------------------  OBSERVATIONS:  Mitral Valve: Mitral annular calcification, otherwise  normal mitral valve. Mild-moderate mitral regurgitation.  Aortic Root: Normal aortic root.  Aortic Valve: Calcified trileaflet aortic valve with normal  opening. Moderate aortic regurgitation.  Left Atrium: Moderately dilated left atrium.  LA volume  index = 43 cc/m2.  Left Ventricle: Endocardium not well visualized; grossly  normal left ventricular systolic function. Normal left  ventricular internal dimensions and wall thicknesses.  Right Heart: Normal right atrium. The right ventricle is  not well visualized; grossly normal right ventricular  systolic function. Normal tricuspid valve. Mild tricuspid  regurgitation. Normal pulmonic valve. Minimal pulmonic  regurgitation.  Pericardium/PleuraNormal pericardium with no pericardial  effusion.  ------------------------------------------------------------------------  CONCLUSIONS:  1. Mitral annular calcification, otherwise normal mitral  valve. Mild-moderate mitral regurgitation.  2. Calcified trileaflet aortic valve with normal opening.  Moderate aortic regurgitation.  3. Moderately dilated left atrium.  LA volume index = 43  cc/m2.  4. Normal left ventricular internal dimensions and wall  thicknesses.  5. Endocardium not well visualized; grossly normal left  ventricular systolic function.  6. The right ventricle is not well visualized; grossly  normal right ventricular systolic function.    < end of copied text >    ASSESSMENT/PLAN: 	77 yo F with h/o HTN, DM, ?dementia presenting with near syncope and found with new onset AFib, increased confusion    -- The patient has ruled out for acute coronary syndrome with negative serial cardiac enzymes  --TTE noted - normal LV function mod AI;  mild -mod MR  --Neuro appreciated -  CT head unremarkable - dementia with occasional delirium   --orthostatics are negative, hemodynamically stable   -- AF - rate controlled - TSH WNL - c/w Lopressor           - c/w hep gtt for now for CVA prevention - start whichever NOAC is covered by insuranc          -  d/w ACP team covering  --d/w patient's son Jose with whom she lives at bedside at length; he agrees to NOAC for CVA prevention ; otherwise as the patient has advanced dementia, he requests conservative care   --PT appreciated - home with home PT  -- endo appreciated   -- follow up with Dr Rose at our [id number] [facility]  location in 1-2 weeks   -- dc planning pending above

## 2020-03-02 LAB
ANION GAP SERPL CALC-SCNC: 12 MMO/L — SIGNIFICANT CHANGE UP (ref 7–14)
APTT BLD: 70.6 SEC — HIGH (ref 27.5–36.3)
BASOPHILS NFR SPEC: 1.8 % — SIGNIFICANT CHANGE UP (ref 0–2)
BLASTS # FLD: 0 % — SIGNIFICANT CHANGE UP (ref 0–0)
BUN SERPL-MCNC: 16 MG/DL — SIGNIFICANT CHANGE UP (ref 7–23)
CALCIUM SERPL-MCNC: 8.9 MG/DL — SIGNIFICANT CHANGE UP (ref 8.4–10.5)
CHLORIDE SERPL-SCNC: 103 MMOL/L — SIGNIFICANT CHANGE UP (ref 98–107)
CO2 SERPL-SCNC: 22 MMOL/L — SIGNIFICANT CHANGE UP (ref 22–31)
CREAT SERPL-MCNC: 1.05 MG/DL — SIGNIFICANT CHANGE UP (ref 0.5–1.3)
EOSINOPHIL NFR FLD: 7.1 % — HIGH (ref 0–6)
GIANT PLATELETS BLD QL SMEAR: PRESENT — SIGNIFICANT CHANGE UP
GLUCOSE BLDC GLUCOMTR-MCNC: 149 MG/DL — HIGH (ref 70–99)
GLUCOSE BLDC GLUCOMTR-MCNC: 193 MG/DL — HIGH (ref 70–99)
GLUCOSE BLDC GLUCOMTR-MCNC: 228 MG/DL — HIGH (ref 70–99)
GLUCOSE BLDC GLUCOMTR-MCNC: 287 MG/DL — HIGH (ref 70–99)
GLUCOSE SERPL-MCNC: 195 MG/DL — HIGH (ref 70–99)
HCT VFR BLD CALC: 41.7 % — SIGNIFICANT CHANGE UP (ref 34.5–45)
HCT VFR BLD CALC: 42.1 % — SIGNIFICANT CHANGE UP (ref 34.5–45)
HGB BLD-MCNC: 13.4 G/DL — SIGNIFICANT CHANGE UP (ref 11.5–15.5)
HGB BLD-MCNC: 13.4 G/DL — SIGNIFICANT CHANGE UP (ref 11.5–15.5)
LYMPHOCYTES NFR SPEC AUTO: 23 % — SIGNIFICANT CHANGE UP (ref 13–44)
MACROCYTES BLD QL: SLIGHT — SIGNIFICANT CHANGE UP
MAGNESIUM SERPL-MCNC: 1.8 MG/DL — SIGNIFICANT CHANGE UP (ref 1.6–2.6)
MCHC RBC-ENTMCNC: 27.2 PG — SIGNIFICANT CHANGE UP (ref 27–34)
MCHC RBC-ENTMCNC: 27.7 PG — SIGNIFICANT CHANGE UP (ref 27–34)
MCHC RBC-ENTMCNC: 31.8 % — LOW (ref 32–36)
MCHC RBC-ENTMCNC: 32.1 % — SIGNIFICANT CHANGE UP (ref 32–36)
MCV RBC AUTO: 85.6 FL — SIGNIFICANT CHANGE UP (ref 80–100)
MCV RBC AUTO: 86.2 FL — SIGNIFICANT CHANGE UP (ref 80–100)
METAMYELOCYTES # FLD: 0 % — SIGNIFICANT CHANGE UP (ref 0–1)
MICROCYTES BLD QL: SIGNIFICANT CHANGE UP
MONOCYTES NFR BLD: 6.2 % — SIGNIFICANT CHANGE UP (ref 2–9)
MYELOCYTES NFR BLD: 0 % — SIGNIFICANT CHANGE UP (ref 0–0)
NEUTROPHIL AB SER-ACNC: 47.8 % — SIGNIFICANT CHANGE UP (ref 43–77)
NEUTS BAND # BLD: 0 % — SIGNIFICANT CHANGE UP (ref 0–6)
NRBC # FLD: 0 K/UL — SIGNIFICANT CHANGE UP (ref 0–0)
NRBC # FLD: 0 K/UL — SIGNIFICANT CHANGE UP (ref 0–0)
OTHER - HEMATOLOGY %: 0 — SIGNIFICANT CHANGE UP
OVALOCYTES BLD QL SMEAR: SLIGHT — SIGNIFICANT CHANGE UP
PHOSPHATE SERPL-MCNC: 3.7 MG/DL — SIGNIFICANT CHANGE UP (ref 2.5–4.5)
PLATELET # BLD AUTO: 179 K/UL — SIGNIFICANT CHANGE UP (ref 150–400)
PLATELET # BLD AUTO: 99 K/UL — LOW (ref 150–400)
PLATELET COUNT - ESTIMATE: SIGNIFICANT CHANGE UP
PMV BLD: 12.6 FL — SIGNIFICANT CHANGE UP (ref 7–13)
PMV BLD: 13.5 FL — HIGH (ref 7–13)
POTASSIUM SERPL-MCNC: 4.3 MMOL/L — SIGNIFICANT CHANGE UP (ref 3.5–5.3)
POTASSIUM SERPL-SCNC: 4.3 MMOL/L — SIGNIFICANT CHANGE UP (ref 3.5–5.3)
PROMYELOCYTES # FLD: 0 % — SIGNIFICANT CHANGE UP (ref 0–0)
RBC # BLD: 4.84 M/UL — SIGNIFICANT CHANGE UP (ref 3.8–5.2)
RBC # BLD: 4.92 M/UL — SIGNIFICANT CHANGE UP (ref 3.8–5.2)
RBC # FLD: 12.9 % — SIGNIFICANT CHANGE UP (ref 10.3–14.5)
RBC # FLD: 13.1 % — SIGNIFICANT CHANGE UP (ref 10.3–14.5)
SMUDGE CELLS # BLD: PRESENT — SIGNIFICANT CHANGE UP
SODIUM SERPL-SCNC: 137 MMOL/L — SIGNIFICANT CHANGE UP (ref 135–145)
VARIANT LYMPHS # BLD: 14.1 % — SIGNIFICANT CHANGE UP
WBC # BLD: 5.51 K/UL — SIGNIFICANT CHANGE UP (ref 3.8–10.5)
WBC # BLD: 6.16 K/UL — SIGNIFICANT CHANGE UP (ref 3.8–10.5)
WBC # FLD AUTO: 5.51 K/UL — SIGNIFICANT CHANGE UP (ref 3.8–10.5)
WBC # FLD AUTO: 6.16 K/UL — SIGNIFICANT CHANGE UP (ref 3.8–10.5)

## 2020-03-02 RX ORDER — RIVAROXABAN 15 MG-20MG
20 KIT ORAL
Refills: 0 | Status: DISCONTINUED | OUTPATIENT
Start: 2020-03-02 | End: 2020-03-03

## 2020-03-02 RX ADMIN — Medication 25 MILLIGRAM(S): at 05:17

## 2020-03-02 RX ADMIN — RIVAROXABAN 20 MILLIGRAM(S): KIT at 14:26

## 2020-03-02 RX ADMIN — Medication 1: at 09:33

## 2020-03-02 RX ADMIN — Medication 3: at 18:56

## 2020-03-02 RX ADMIN — Medication 25 MILLIGRAM(S): at 18:56

## 2020-03-02 RX ADMIN — Medication 2: at 13:02

## 2020-03-02 RX ADMIN — HEPARIN SODIUM 600 UNIT(S)/HR: 5000 INJECTION INTRAVENOUS; SUBCUTANEOUS at 08:30

## 2020-03-02 NOTE — PROGRESS NOTE ADULT - ASSESSMENT
Assessment  DMT2: 79y Female with DM T2 with hyperglycemia, A1C 10.5%, blood sugars improving, no new hypoglycemic episodes,  FS in acceptable range, eating meals, compliant with low carb diet, family at bed side.  Syncope: Being worked up, no chest pain, stable, monitored.  HTN: Controlled,  on antihypertensive medications.        Abhi Becker MD  Cell: [phone]  Office: [phone]

## 2020-03-02 NOTE — PROGRESS NOTE ADULT - SUBJECTIVE AND OBJECTIVE BOX
[facility]      Seen earlier today, and examined.  - Today, patient is without complaints.           *****MEDICATIONS: Current medication reviewed and documented.    MEDICATIONS  (STANDING):  dextrose 5%. 1000 milliLiter(s) (50 mL/Hr) IV Continuous <Continuous>  dextrose 50% Injectable 12.5 Gram(s) IV Push once  dextrose 50% Injectable 25 Gram(s) IV Push once  dextrose 50% Injectable 25 Gram(s) IV Push once  influenza   Vaccine 0.5 milliLiter(s) IntraMuscular once  insulin lispro (HumaLOG) corrective regimen sliding scale   SubCutaneous three times a day before meals  insulin lispro (HumaLOG) corrective regimen sliding scale   SubCutaneous at bedtime  metoprolol tartrate 25 milliGRAM(s) Oral two times a day  rivaroxaban 20 milliGRAM(s) Oral with dinner    MEDICATIONS  (PRN):  artificial tears (preservative free) Ophthalmic Solution 1 Drop(s) Both EYES two times a day PRN Dry Eyes  dextrose 40% Gel 15 Gram(s) Oral once PRN Blood Glucose LESS THAN 70 milliGRAM(s)/deciliter  diVALproex  milliGRAM(s) Oral two times a day PRN agitation  glucagon  Injectable 1 milliGRAM(s) IntraMuscular once PRN Glucose LESS THAN 70 milligrams/deciliter          ***** VITAL SIGNS:  T(F): 97.8 (20 @ 04:06), Max: 97.8 (20 @ 21:03)  HR: 70 (20 @ 04:06) (70 - 86)  BP: 145/80 (20 @ 04:06) (130/85 - 153/86)  RR: 18 (20 @ 04:06) (17 - 18)  SpO2: 98% (20 @ 04:06) (96% - 100%)  Wt(kg): --  ,   I&O's Summary    01 Mar 2020 07:01  -  02 Mar 2020 07:00  --------------------------------------------------------  IN: 312 mL / OUT: 0 mL / NET: 312 mL    02 Mar 2020 07:01  -  03 Mar 2020 05:05  --------------------------------------------------------  IN: 480 mL / OUT: 350 mL / NET: 130 mL             *****PHYSICAL EXAM: Alert oriented x1 limited exam as pt is not cooperative      speech is better  does not recognize son at bedside.      EOMI fundi not visualized,  VFF to confrontration  No facial asymmetry   Tongue is midline   Palate elevates symmetrically   Moving all 4 ext symmetrically antigravity      sensation is grossly symmetric  Gait : not assessed.  B/L down going toes          *****LAB AND IMAGIN.4   6.16  )-----------( 179      ( 02 Mar 2020 17:29 )             41.7               03-02    137  |  103  |  16  ----------------------------<  195<H>  4.3   |  22  |  1.05    Ca    8.9      02 Mar 2020 07:03  Phos  3.7     03-02  Mg     1.8     03-02      PTT - ( 02 Mar 2020 07:03 )  PTT:70.6 SEC       < from: CT Head No Cont (20 @ 11:41) >  Parenchymal volume loss and chronic microvascular ischemic changes are identified    Grossly, there is no evidence acute hemorrhage mass or mass effect in the posterior fossa or supratentorial region.    Evaluation of the osseous structures with the appropriate window appears unremarkable.    Impression: This exam somewhat limited by motion though grosslyunremarkable        < end of copied text >                [All pertinent recent Imaging/Reports reviewed]           *****A S S E S S M E N T   A N D   P L A N :        Excerpt from H&P,75 yo F with h/o HTN, DM presenting with near syncope. Pt poor historian, most of history obtained from son at bedside. This morning while climbing stairs at home, pt started feeling dizzy and then was about to lose consciousness. Her sone was with her and prevented her fall. Pt did not have any chest pain at the time. EMS was called and upon there arrival pt was feeling better but her heart rate was noted to be high so she was advised to go to her doctor. Pt went to her clinic where again her HR was noted to be elevated (family unsure how high) so she was sent to the ED. At baseline pt ambulates on her own. Has not had any recent chest pain, shortness of breath or palpitations.    Problem/Recommendations 1:   agitation resolved.   continue depakote 125 for sleep augmentation and agitation as needed.       Problem/Recommendations 2: fall of unclear etiology   possibly related to hyperglycemia hyponatremia   pt shanell  appreciated home with home care     Thank you for allowing me to participate in the care of this patient. Please do not hesitate to call me if you have any  questions.        ________________  Eugenie Shaikh MD  [facility] ([facility]  [phone]      33 minutes spent on total encounter; more than 50 % of the visit was  spent counseling about plan of care, compliance to diet/exercise and medication regimen and or  coordinating care by the attending physician.      It is advised that stroke patients follow up with TREVON Jimenez @ [phone] in 1- 2 weeks.   Others please follow up with Dr. Michael Nissenbaum [phone]

## 2020-03-02 NOTE — PROGRESS NOTE ADULT - ATTENDING COMMENTS
Patient seen and examined.  Agree with above.   Repeat CBC to assess plt count  Lifelong ac if no contraindications    Yosef Bush MD

## 2020-03-02 NOTE — PROGRESS NOTE ADULT - SUBJECTIVE AND OBJECTIVE BOX
Chief complaint  Patient is a 79y old  Female who presents with a chief complaint of Near syncope (02 Mar 2020 13:13)   Review of systems  Patient in bed, looks comfortable, no fever, no hypoglycemia.    Labs and Fingersticks  CAPILLARY BLOOD GLUCOSE      POCT Blood Glucose.: 228 mg/dL (02 Mar 2020 12:28)  POCT Blood Glucose.: 193 mg/dL (02 Mar 2020 08:49)  POCT Blood Glucose.: 161 mg/dL (01 Mar 2020 22:43)  POCT Blood Glucose.: 242 mg/dL (01 Mar 2020 17:18)      Anion Gap, Serum: 12 (03-02 @ 07:03)  Anion Gap, Serum: 14 (03-01 @ 06:20)      Calcium, Total Serum: 8.9 (03-02 @ 07:03)  Calcium, Total Serum: 8.8 (03-01 @ 06:20)          03-02    137  |  103  |  16  ----------------------------<  195<H>  4.3   |  22  |  1.05    Ca    8.9      02 Mar 2020 07:03  Phos  3.7     03-02  Mg     1.8     03-02                          13.4   5.51  )-----------( 99       ( 02 Mar 2020 07:03 )             42.1     Medications  MEDICATIONS  (STANDING):  dextrose 5%. 1000 milliLiter(s) (50 mL/Hr) IV Continuous <Continuous>  dextrose 50% Injectable 12.5 Gram(s) IV Push once  dextrose 50% Injectable 25 Gram(s) IV Push once  dextrose 50% Injectable 25 Gram(s) IV Push once  influenza   Vaccine 0.5 milliLiter(s) IntraMuscular once  insulin lispro (HumaLOG) corrective regimen sliding scale   SubCutaneous three times a day before meals  insulin lispro (HumaLOG) corrective regimen sliding scale   SubCutaneous at bedtime  metoprolol tartrate 25 milliGRAM(s) Oral two times a day  rivaroxaban 20 milliGRAM(s) Oral with dinner      Physical Exam  General: Patient comfortable in bed  Vital Signs Last 12 Hrs  T(F): 97.5 (03-02-20 @ 12:20), Max: 97.5 (03-02-20 @ 12:20)  HR: 86 (03-02-20 @ 12:20) (79 - 86)  BP: 130/85 (03-02-20 @ 12:20) (130/85 - 132/73)  BP(mean): --  RR: 18 (03-02-20 @ 12:20) (17 - 18)  SpO2: 100% (03-02-20 @ 12:20) (96% - 100%)  Neck: No palpable thyroid nodules.  CVS: S1S2, No murmurs  Respiratory: No wheezing, no crepitations  GI: Abdomen soft, bowel sounds positive  Musculoskeletal:  edema lower extremities.   Skin: No skin rashes, no ecchymosis    Diagnostics    Thyroid Stimulating Hormone, Serum: AM Sched. Collection: 01-Mar-2020 04:00 (02-29 @ 09:50)  Free Thyroxine, Serum: AM Sched. Collection: 01-Mar-2020 04:00 (02-29 @ 09:50)

## 2020-03-02 NOTE — PROGRESS NOTE ADULT - SUBJECTIVE AND OBJECTIVE BOX
Patient denies chest pain or shortness of breath.   Review of systems otherwise (-)        artificial tears (preservative free) Ophthalmic Solution 1 Drop(s) Both EYES two times a day PRN  dextrose 40% Gel 15 Gram(s) Oral once PRN  dextrose 5%. 1000 milliLiter(s) IV Continuous <Continuous>  dextrose 50% Injectable 12.5 Gram(s) IV Push once  dextrose 50% Injectable 25 Gram(s) IV Push once  dextrose 50% Injectable 25 Gram(s) IV Push once  diVALproex  milliGRAM(s) Oral two times a day PRN  glucagon  Injectable 1 milliGRAM(s) IntraMuscular once PRN  heparin  Infusion. 1100 Unit(s)/Hr IV Continuous <Continuous>  heparin  Injectable 6500 Unit(s) IV Push every 6 hours PRN  heparin  Injectable 3000 Unit(s) IV Push every 6 hours PRN  influenza   Vaccine 0.5 milliLiter(s) IntraMuscular once  insulin lispro (HumaLOG) corrective regimen sliding scale   SubCutaneous three times a day before meals  insulin lispro (HumaLOG) corrective regimen sliding scale   SubCutaneous at bedtime  metoprolol tartrate 25 milliGRAM(s) Oral two times a day                          13.4   5.51  )-----------( 99       ( 02 Mar 2020 07:03 )             42.1       Hemoglobin: 13.4 g/dL (03-02 @ 07:03)  Hemoglobin: 13.9 g/dL (03-01 @ 06:20)  Hemoglobin: 13.4 g/dL (02-29 @ 06:00)  Hemoglobin: 14.3 g/dL (02-28 @ 21:00)  Hemoglobin: 13.2 g/dL (02-28 @ 01:35)      03-02    137  |  103  |  16  ----------------------------<  195<H>  4.3   |  22  |  1.05    Ca    8.9      02 Mar 2020 07:03  Phos  3.7     03-02  Mg     1.8     03-02      Creatinine Trend: 1.05<--, 1.04<--, 1.18<--, 0.94<--, 1.05<--, 1.05<--    COAGS: PTT - ( 02 Mar 2020 07:03 )  PTT:70.6 SEC        PHYSICAL EXAM:  Vital Signs last 24 Hours   T(C): 36.4 (03-02-20 @ 12:20), Max: 36.4 (03-02-20 @ 12:20)  HR: 86 (03-02-20 @ 12:20) (79 - 89)  BP: 130/85 (03-02-20 @ 12:20) (115/62 - 132/73)  RR: 18 (03-02-20 @ 12:20) (17 - 18)  SpO2: 100% (03-02-20 @ 12:20) (95% - 100%)  Wt(kg): --    I&O's Summary    01 Mar 2020 07:01  -  02 Mar 2020 07:00  --------------------------------------------------------  IN: 312 mL / OUT: 0 mL / NET: 312 mL        Gen: Appears well in NAD  HEENT:  (-)icterus (-)pallor  CV: N S1 S2 1/6 GEORGES (+)2 Pulses B/l  Resp:  Clear to auscultation B/L, normal effort  GI: (+) BS Soft, NT, ND  Lymph:  (-)Edema, (-)obvious lymphadenopathy  Skin: Warm to touch, Normal turgor  Psych: Appropriate mood and affect      TELEMETRY: 	  AF 70s    DATA  < from: CT Head No Cont (02.27.20 @ 11:41) >  Multiple axial sections were performed from base of skull to vertex for contrast enhancement. Coronal and sagittal instructions were performed as well.    This exam is compared with prior noncontrast head CT performed on February 8, 2017.    This exam somewhat limited by motion.    Parenchymal volume loss and chronic microvascular ischemic changes are identified    Grossly, there is no evidence acute hemorrhage mass or mass effect in the posterior fossa or supratentorial region.    Evaluation of the osseous structures with the appropriate window appears unremarkable.    Impression: This exam somewhat limited by motion though grosslyunremarkable    < end of copied text >      < from: Transthoracic Echocardiogram (02.28.20 @ 08:37) >  Ejection Fraction (Teicholtz): 67 %  ------------------------------------------------------------------------  OBSERVATIONS:  Mitral Valve: Mitral annular calcification, otherwise  normal mitral valve. Mild-moderate mitral regurgitation.  Aortic Root: Normal aortic root.  Aortic Valve: Calcified trileaflet aortic valve with normal  opening. Moderate aortic regurgitation.  Left Atrium: Moderately dilated left atrium.  LA volume  index = 43 cc/m2.  Left Ventricle: Endocardium not well visualized; grossly  normal left ventricular systolic function. Normal left  ventricular internal dimensions and wall thicknesses.  Right Heart: Normal right atrium. The right ventricle is  not well visualized; grossly normal right ventricular  systolic function. Normal tricuspid valve. Mild tricuspid  regurgitation. Normal pulmonic valve. Minimal pulmonic  regurgitation.  Pericardium/PleuraNormal pericardium with no pericardial  effusion.  ------------------------------------------------------------------------  CONCLUSIONS:  1. Mitral annular calcification, otherwise normal mitral  valve. Mild-moderate mitral regurgitation.  2. Calcified trileaflet aortic valve with normal opening.  Moderate aortic regurgitation.  3. Moderately dilated left atrium.  LA volume index = 43  cc/m2.  4. Normal left ventricular internal dimensions and wall  thicknesses.  5. Endocardium not well visualized; grossly normal left  ventricular systolic function.  6. The right ventricle is not well visualized; grossly  normal right ventricular systolic function.    < end of copied text >      ASSESSMENT/PLAN: 	77 yo F with h/o HTN, DM, ?dementia presenting with near syncope and found with new onset AFib, increased confusion    -- The patient has ruled out for acute coronary syndrome with negative serial cardiac enzymes  --TTE noted - normal LV function mod AI;  mild -mod MR  --Neuro appreciated -  CT head unremarkable - dementia with occasional delirium   --orthostatics are negative, hemodynamically stable   -- AF c/w hep gtt for now for CVA prevention - start whichever NOAC is covered by insurance (pt has deductible, PA to speak to song Garcia re findings)   --pts song Garcia with whom she lives is aware of need for AC; was educated on NOAC for CVA prevention and is in agreement;       otherwise as the patient has advanced dementia, he requests conservative care   --PT appreciated - home with home PT  -- endo appreciated   -- follow up with Dr Rose at our [phone] [facility]  location    -- dc planning pending above Patient denies chest pain or shortness of breath.   Review of systems otherwise (-)        artificial tears (preservative free) Ophthalmic Solution 1 Drop(s) Both EYES two times a day PRN  dextrose 40% Gel 15 Gram(s) Oral once PRN  dextrose 5%. 1000 milliLiter(s) IV Continuous <Continuous>  dextrose 50% Injectable 12.5 Gram(s) IV Push once  dextrose 50% Injectable 25 Gram(s) IV Push once  dextrose 50% Injectable 25 Gram(s) IV Push once  diVALproex  milliGRAM(s) Oral two times a day PRN  glucagon  Injectable 1 milliGRAM(s) IntraMuscular once PRN  heparin  Infusion. 1100 Unit(s)/Hr IV Continuous <Continuous>  heparin  Injectable 6500 Unit(s) IV Push every 6 hours PRN  heparin  Injectable 3000 Unit(s) IV Push every 6 hours PRN  influenza   Vaccine 0.5 milliLiter(s) IntraMuscular once  insulin lispro (HumaLOG) corrective regimen sliding scale   SubCutaneous three times a day before meals  insulin lispro (HumaLOG) corrective regimen sliding scale   SubCutaneous at bedtime  metoprolol tartrate 25 milliGRAM(s) Oral two times a day                          13.4   5.51  )-----------( 99       ( 02 Mar 2020 07:03 )             42.1       Hemoglobin: 13.4 g/dL (03-02 @ 07:03)  Hemoglobin: 13.9 g/dL (03-01 @ 06:20)  Hemoglobin: 13.4 g/dL (02-29 @ 06:00)  Hemoglobin: 14.3 g/dL (02-28 @ 21:00)  Hemoglobin: 13.2 g/dL (02-28 @ 01:35)      03-02    137  |  103  |  16  ----------------------------<  195<H>  4.3   |  22  |  1.05    Ca    8.9      02 Mar 2020 07:03  Phos  3.7     03-02  Mg     1.8     03-02      Creatinine Trend: 1.05<--, 1.04<--, 1.18<--, 0.94<--, 1.05<--, 1.05<--    COAGS: PTT - ( 02 Mar 2020 07:03 )  PTT:70.6 SEC        PHYSICAL EXAM:  Vital Signs last 24 Hours   T(C): 36.4 (03-02-20 @ 12:20), Max: 36.4 (03-02-20 @ 12:20)  HR: 86 (03-02-20 @ 12:20) (79 - 89)  BP: 130/85 (03-02-20 @ 12:20) (115/62 - 132/73)  RR: 18 (03-02-20 @ 12:20) (17 - 18)  SpO2: 100% (03-02-20 @ 12:20) (95% - 100%)  Wt(kg): --    I&O's Summary    01 Mar 2020 07:01  -  02 Mar 2020 07:00  --------------------------------------------------------  IN: 312 mL / OUT: 0 mL / NET: 312 mL        Gen: Appears well in NAD  HEENT:  (-)icterus (-)pallor  CV: N S1 S2 1/6 GEORGES (+)2 Pulses B/l  Resp:  Clear to auscultation B/L, normal effort  GI: (+) BS Soft, NT, ND  Lymph:  (-)Edema, (-)obvious lymphadenopathy  Skin: Warm to touch, Normal turgor  Psych: Appropriate mood and affect      TELEMETRY: 	  AF 70s    DATA  < from: CT Head No Cont (02.27.20 @ 11:41) >  Multiple axial sections were performed from base of skull to vertex for contrast enhancement. Coronal and sagittal instructions were performed as well.    This exam is compared with prior noncontrast head CT performed on February 8, 2017.    This exam somewhat limited by motion.    Parenchymal volume loss and chronic microvascular ischemic changes are identified    Grossly, there is no evidence acute hemorrhage mass or mass effect in the posterior fossa or supratentorial region.    Evaluation of the osseous structures with the appropriate window appears unremarkable.    Impression: This exam somewhat limited by motion though grosslyunremarkable    < end of copied text >      < from: Transthoracic Echocardiogram (02.28.20 @ 08:37) >  Ejection Fraction (Teicholtz): 67 %  ------------------------------------------------------------------------  OBSERVATIONS:  Mitral Valve: Mitral annular calcification, otherwise  normal mitral valve. Mild-moderate mitral regurgitation.  Aortic Root: Normal aortic root.  Aortic Valve: Calcified trileaflet aortic valve with normal  opening. Moderate aortic regurgitation.  Left Atrium: Moderately dilated left atrium.  LA volume  index = 43 cc/m2.  Left Ventricle: Endocardium not well visualized; grossly  normal left ventricular systolic function. Normal left  ventricular internal dimensions and wall thicknesses.  Right Heart: Normal right atrium. The right ventricle is  not well visualized; grossly normal right ventricular  systolic function. Normal tricuspid valve. Mild tricuspid  regurgitation. Normal pulmonic valve. Minimal pulmonic  regurgitation.  Pericardium/PleuraNormal pericardium with no pericardial  effusion.  ------------------------------------------------------------------------  CONCLUSIONS:  1. Mitral annular calcification, otherwise normal mitral  valve. Mild-moderate mitral regurgitation.  2. Calcified trileaflet aortic valve with normal opening.  Moderate aortic regurgitation.  3. Moderately dilated left atrium.  LA volume index = 43  cc/m2.  4. Normal left ventricular internal dimensions and wall  thicknesses.  5. Endocardium not well visualized; grossly normal left  ventricular systolic function.  6. The right ventricle is not well visualized; grossly  normal right ventricular systolic function.    < end of copied text >      ASSESSMENT/PLAN: 	75 yo F with h/o HTN, DM, ?dementia presenting with near syncope and found with new onset AFib, increased confusion    -- The patient has ruled out for acute coronary syndrome with negative serial cardiac enzymes  --TTE noted - normal LV function mod AI;  mild -mod MR  --Neuro appreciated -  CT head unremarkable - dementia with occasional delirium   --orthostatics are negative, hemodynamically stable   -- AF c/w hep gtt for now for CVA prevention - start whichever NOAC is covered by insurance (pt has deductible, PA to speak to song Garcia re findings)   --pts song Garcia with whom she lives is aware of need for AC; was educated on NOAC for CVA prevention and is in agreement;       otherwise as the patient has advanced dementia, he requests conservative care   --PT appreciated - home with home PT  -- endo appreciated   -- follow up with Dr Rose at our [phone] [facility]  location    -- dc planning pending above    ADDENDUM:  Pt to start Xarelto for Afib. Patient denies chest pain or shortness of breath.   Review of systems otherwise (-)        artificial tears (preservative free) Ophthalmic Solution 1 Drop(s) Both EYES two times a day PRN  dextrose 40% Gel 15 Gram(s) Oral once PRN  dextrose 5%. 1000 milliLiter(s) IV Continuous <Continuous>  dextrose 50% Injectable 12.5 Gram(s) IV Push once  dextrose 50% Injectable 25 Gram(s) IV Push once  dextrose 50% Injectable 25 Gram(s) IV Push once  diVALproex  milliGRAM(s) Oral two times a day PRN  glucagon  Injectable 1 milliGRAM(s) IntraMuscular once PRN  heparin  Infusion. 1100 Unit(s)/Hr IV Continuous <Continuous>  heparin  Injectable 6500 Unit(s) IV Push every 6 hours PRN  heparin  Injectable 3000 Unit(s) IV Push every 6 hours PRN  influenza   Vaccine 0.5 milliLiter(s) IntraMuscular once  insulin lispro (HumaLOG) corrective regimen sliding scale   SubCutaneous three times a day before meals  insulin lispro (HumaLOG) corrective regimen sliding scale   SubCutaneous at bedtime  metoprolol tartrate 25 milliGRAM(s) Oral two times a day                          13.4   5.51  )-----------( 99       ( 02 Mar 2020 07:03 )             42.1       Hemoglobin: 13.4 g/dL (03-02 @ 07:03)  Hemoglobin: 13.9 g/dL (03-01 @ 06:20)  Hemoglobin: 13.4 g/dL (02-29 @ 06:00)  Hemoglobin: 14.3 g/dL (02-28 @ 21:00)  Hemoglobin: 13.2 g/dL (02-28 @ 01:35)      03-02    137  |  103  |  16  ----------------------------<  195<H>  4.3   |  22  |  1.05    Ca    8.9      02 Mar 2020 07:03  Phos  3.7     03-02  Mg     1.8     03-02      Creatinine Trend: 1.05<--, 1.04<--, 1.18<--, 0.94<--, 1.05<--, 1.05<--    COAGS: PTT - ( 02 Mar 2020 07:03 )  PTT:70.6 SEC        PHYSICAL EXAM:  Vital Signs last 24 Hours   T(C): 36.4 (03-02-20 @ 12:20), Max: 36.4 (03-02-20 @ 12:20)  HR: 86 (03-02-20 @ 12:20) (79 - 89)  BP: 130/85 (03-02-20 @ 12:20) (115/62 - 132/73)  RR: 18 (03-02-20 @ 12:20) (17 - 18)  SpO2: 100% (03-02-20 @ 12:20) (95% - 100%)  Wt(kg): --    I&O's Summary    01 Mar 2020 07:01  -  02 Mar 2020 07:00  --------------------------------------------------------  IN: 312 mL / OUT: 0 mL / NET: 312 mL        Gen: Appears well in NAD  HEENT:  (-)icterus (-)pallor  CV: N S1 S2 1/6 GEORGES (+)2 Pulses B/l  Resp:  Clear to auscultation B/L, normal effort  GI: (+) BS Soft, NT, ND  Lymph:  (-)Edema, (-)obvious lymphadenopathy  Skin: Warm to touch, Normal turgor  Psych: Appropriate mood and affect      TELEMETRY: 	  AF 70s    DATA  < from: CT Head No Cont (02.27.20 @ 11:41) >  Multiple axial sections were performed from base of skull to vertex for contrast enhancement. Coronal and sagittal instructions were performed as well.    This exam is compared with prior noncontrast head CT performed on February 8, 2017.    This exam somewhat limited by motion.    Parenchymal volume loss and chronic microvascular ischemic changes are identified    Grossly, there is no evidence acute hemorrhage mass or mass effect in the posterior fossa or supratentorial region.    Evaluation of the osseous structures with the appropriate window appears unremarkable.    Impression: This exam somewhat limited by motion though grosslyunremarkable    < end of copied text >      < from: Transthoracic Echocardiogram (02.28.20 @ 08:37) >  Ejection Fraction (Teicholtz): 67 %  ------------------------------------------------------------------------  OBSERVATIONS:  Mitral Valve: Mitral annular calcification, otherwise  normal mitral valve. Mild-moderate mitral regurgitation.  Aortic Root: Normal aortic root.  Aortic Valve: Calcified trileaflet aortic valve with normal  opening. Moderate aortic regurgitation.  Left Atrium: Moderately dilated left atrium.  LA volume  index = 43 cc/m2.  Left Ventricle: Endocardium not well visualized; grossly  normal left ventricular systolic function. Normal left  ventricular internal dimensions and wall thicknesses.  Right Heart: Normal right atrium. The right ventricle is  not well visualized; grossly normal right ventricular  systolic function. Normal tricuspid valve. Mild tricuspid  regurgitation. Normal pulmonic valve. Minimal pulmonic  regurgitation.  Pericardium/PleuraNormal pericardium with no pericardial  effusion.  ------------------------------------------------------------------------  CONCLUSIONS:  1. Mitral annular calcification, otherwise normal mitral  valve. Mild-moderate mitral regurgitation.  2. Calcified trileaflet aortic valve with normal opening.  Moderate aortic regurgitation.  3. Moderately dilated left atrium.  LA volume index = 43  cc/m2.  4. Normal left ventricular internal dimensions and wall  thicknesses.  5. Endocardium not well visualized; grossly normal left  ventricular systolic function.  6. The right ventricle is not well visualized; grossly  normal right ventricular systolic function.    < end of copied text >      ASSESSMENT/PLAN: 	77 yo F with h/o HTN, DM, ?dementia presenting with near syncope and found with new onset AFib, increased confusion    -- The patient has ruled out for acute coronary syndrome with negative serial cardiac enzymes  --TTE noted - normal LV function mod AI;  mild -mod MR  --Neuro appreciated -  CT head unremarkable - dementia with occasional delirium   --orthostatics are negative, hemodynamically stable   -- AF c/w hep gtt for now for CVA prevention - start whichever NOAC Xarelto covered by insurance (pt has deductible, PA to speak to song Garcia re findings)   --pts son Jose with whom she lives is aware of need for AC; was educated on NOAC for CVA prevention and is in agreement;       otherwise as the patient has advanced dementia, he requests conservative care   --PT appreciated - home with home PT  -- endo appreciated   -- follow up with Dr Rose on 3/12 at 11:15 am at [address] [place] [address],  [phone]       ADDENDUM:  Pt to start Xarelto for Afib.

## 2020-03-03 ENCOUNTER — TRANSCRIPTION ENCOUNTER (OUTPATIENT)
Age: 80
End: 2020-03-03

## 2020-03-03 VITALS
TEMPERATURE: 98 F | DIASTOLIC BLOOD PRESSURE: 78 MMHG | RESPIRATION RATE: 16 BRPM | SYSTOLIC BLOOD PRESSURE: 131 MMHG | HEART RATE: 74 BPM | OXYGEN SATURATION: 96 %

## 2020-03-03 LAB
ANION GAP SERPL CALC-SCNC: 12 MMO/L — SIGNIFICANT CHANGE UP (ref 7–14)
APTT BLD: 35.6 SEC — SIGNIFICANT CHANGE UP (ref 27.5–36.3)
BUN SERPL-MCNC: 15 MG/DL — SIGNIFICANT CHANGE UP (ref 7–23)
CALCIUM SERPL-MCNC: 9.5 MG/DL — SIGNIFICANT CHANGE UP (ref 8.4–10.5)
CHLORIDE SERPL-SCNC: 102 MMOL/L — SIGNIFICANT CHANGE UP (ref 98–107)
CO2 SERPL-SCNC: 22 MMOL/L — SIGNIFICANT CHANGE UP (ref 22–31)
CREAT SERPL-MCNC: 1.07 MG/DL — SIGNIFICANT CHANGE UP (ref 0.5–1.3)
GLUCOSE BLDC GLUCOMTR-MCNC: 175 MG/DL — HIGH (ref 70–99)
GLUCOSE BLDC GLUCOMTR-MCNC: 263 MG/DL — HIGH (ref 70–99)
GLUCOSE SERPL-MCNC: 164 MG/DL — HIGH (ref 70–99)
HCT VFR BLD CALC: 45 % — SIGNIFICANT CHANGE UP (ref 34.5–45)
HGB BLD-MCNC: 14.9 G/DL — SIGNIFICANT CHANGE UP (ref 11.5–15.5)
MAGNESIUM SERPL-MCNC: 1.9 MG/DL — SIGNIFICANT CHANGE UP (ref 1.6–2.6)
MCHC RBC-ENTMCNC: 28.1 PG — SIGNIFICANT CHANGE UP (ref 27–34)
MCHC RBC-ENTMCNC: 33.1 % — SIGNIFICANT CHANGE UP (ref 32–36)
MCV RBC AUTO: 84.9 FL — SIGNIFICANT CHANGE UP (ref 80–100)
NRBC # FLD: 0 K/UL — SIGNIFICANT CHANGE UP (ref 0–0)
PHOSPHATE SERPL-MCNC: 4 MG/DL — SIGNIFICANT CHANGE UP (ref 2.5–4.5)
PLATELET # BLD AUTO: 143 K/UL — LOW (ref 150–400)
PMV BLD: 12.7 FL — SIGNIFICANT CHANGE UP (ref 7–13)
POTASSIUM SERPL-MCNC: 4.2 MMOL/L — SIGNIFICANT CHANGE UP (ref 3.5–5.3)
POTASSIUM SERPL-SCNC: 4.2 MMOL/L — SIGNIFICANT CHANGE UP (ref 3.5–5.3)
RBC # BLD: 5.3 M/UL — HIGH (ref 3.8–5.2)
RBC # FLD: 13.3 % — SIGNIFICANT CHANGE UP (ref 10.3–14.5)
SODIUM SERPL-SCNC: 136 MMOL/L — SIGNIFICANT CHANGE UP (ref 135–145)
WBC # BLD: 7.03 K/UL — SIGNIFICANT CHANGE UP (ref 3.8–10.5)
WBC # FLD AUTO: 7.03 K/UL — SIGNIFICANT CHANGE UP (ref 3.8–10.5)

## 2020-03-03 PROCEDURE — 99238 HOSP IP/OBS DSCHRG MGMT 30/<: CPT | Mod: GC

## 2020-03-03 RX ORDER — METOPROLOL TARTRATE 50 MG
1 TABLET ORAL
Qty: 60 | Refills: 0
Start: 2020-03-03 | End: 2020-04-01

## 2020-03-03 RX ORDER — METOPROLOL TARTRATE 50 MG
1 TABLET ORAL
Qty: 0 | Refills: 0 | DISCHARGE
Start: 2020-03-03 | End: 2020-04-01

## 2020-03-03 RX ORDER — RIVAROXABAN 15 MG-20MG
1 KIT ORAL
Qty: 30 | Refills: 0
Start: 2020-03-03 | End: 2020-04-01

## 2020-03-03 RX ORDER — LINAGLIPTIN 5 MG/1
1 TABLET, FILM COATED ORAL
Qty: 30 | Refills: 0
Start: 2020-03-03 | End: 2020-04-01

## 2020-03-03 RX ADMIN — Medication 25 MILLIGRAM(S): at 04:10

## 2020-03-03 RX ADMIN — Medication 1: at 08:44

## 2020-03-03 RX ADMIN — Medication 3: at 12:48

## 2020-03-03 NOTE — PROGRESS NOTE ADULT - SUBJECTIVE AND OBJECTIVE BOX
Patient denies chest pain or shortness of breath.   Review of systems otherwise (-)        MEDICATIONS  (STANDING):  dextrose 5%. 1000 milliLiter(s) (50 mL/Hr) IV Continuous <Continuous>  dextrose 50% Injectable 12.5 Gram(s) IV Push once  dextrose 50% Injectable 25 Gram(s) IV Push once  dextrose 50% Injectable 25 Gram(s) IV Push once  influenza   Vaccine 0.5 milliLiter(s) IntraMuscular once  insulin lispro (HumaLOG) corrective regimen sliding scale   SubCutaneous three times a day before meals  insulin lispro (HumaLOG) corrective regimen sliding scale   SubCutaneous at bedtime  metoprolol tartrate 25 milliGRAM(s) Oral two times a day  rivaroxaban 20 milliGRAM(s) Oral with dinner    MEDICATIONS  (PRN):  artificial tears (preservative free) Ophthalmic Solution 1 Drop(s) Both EYES two times a day PRN Dry Eyes  dextrose 40% Gel 15 Gram(s) Oral once PRN Blood Glucose LESS THAN 70 milliGRAM(s)/deciliter  diVALproex  milliGRAM(s) Oral two times a day PRN agitation  glucagon  Injectable 1 milliGRAM(s) IntraMuscular once PRN Glucose LESS THAN 70 milligrams/deciliter      LABS:                            14.9   7.03  )-----------( 143      ( 03 Mar 2020 06:00 )             45.0     Hemoglobin: 14.9 g/dL (03-03 @ 06:00)  Hemoglobin: 13.4 g/dL (03-02 @ 17:29)  Hemoglobin: 13.4 g/dL (03-02 @ 07:03)  Hemoglobin: 13.9 g/dL (03-01 @ 06:20)  Hemoglobin: 13.4 g/dL (02-29 @ 06:00)    03-03    136  |  102  |  15  ----------------------------<  164<H>  4.2   |  22  |  1.07    Ca    9.5      03 Mar 2020 06:00  Phos  4.0     03-03  Mg     1.9     03-03      Creatinine Trend: 1.07<--, 1.05<--, 1.04<--, 1.18<--, 0.94<--, 1.05<--   PTT - ( 03 Mar 2020 06:00 )  PTT:35.6 SEC        PHYSICAL EXAM  Vital Signs Last 24 Hrs  T(C): 36.4 (03 Mar 2020 12:07), Max: 36.6 (02 Mar 2020 21:03)  T(F): 97.5 (03 Mar 2020 12:07), Max: 97.8 (02 Mar 2020 21:03)  HR: 74 (03 Mar 2020 12:07) (70 - 80)  BP: 131/78 (03 Mar 2020 12:07) (131/78 - 153/86)  BP(mean): --  RR: 16 (03 Mar 2020 12:07) (16 - 18)  SpO2: 96% (03 Mar 2020 12:07) (96% - 99%)        Gen: Appears well in NAD  HEENT:  (-)icterus (-)pallor  CV: N S1 S2 1/6 GEORGES (+)2 Pulses B/l  Resp:  Clear to auscultation B/L, normal effort  GI: (+) BS Soft, NT, ND  Lymph:  (-)Edema, (-)obvious lymphadenopathy  Skin: Warm to touch, Normal turgor  Psych: Appropriate mood and affect      TELEMETRY: 	  AF 70s    DATA  < from: CT Head No Cont (02.27.20 @ 11:41) >  Multiple axial sections were performed from base of skull to vertex for contrast enhancement. Coronal and sagittal instructions were performed as well.    This exam is compared with prior noncontrast head CT performed on February 8, 2017.    This exam somewhat limited by motion.    Parenchymal volume loss and chronic microvascular ischemic changes are identified    Grossly, there is no evidence acute hemorrhage mass or mass effect in the posterior fossa or supratentorial region.    Evaluation of the osseous structures with the appropriate window appears unremarkable.    Impression: This exam somewhat limited by motion though grosslyunremarkable    < end of copied text >      < from: Transthoracic Echocardiogram (02.28.20 @ 08:37) >  Ejection Fraction (Teicholtz): 67 %  ------------------------------------------------------------------------  OBSERVATIONS:  Mitral Valve: Mitral annular calcification, otherwise  normal mitral valve. Mild-moderate mitral regurgitation.  Aortic Root: Normal aortic root.  Aortic Valve: Calcified trileaflet aortic valve with normal  opening. Moderate aortic regurgitation.  Left Atrium: Moderately dilated left atrium.  LA volume  index = 43 cc/m2.  Left Ventricle: Endocardium not well visualized; grossly  normal left ventricular systolic function. Normal left  ventricular internal dimensions and wall thicknesses.  Right Heart: Normal right atrium. The right ventricle is  not well visualized; grossly normal right ventricular  systolic function. Normal tricuspid valve. Mild tricuspid  regurgitation. Normal pulmonic valve. Minimal pulmonic  regurgitation.  Pericardium/PleuraNormal pericardium with no pericardial  effusion.  ------------------------------------------------------------------------  CONCLUSIONS:  1. Mitral annular calcification, otherwise normal mitral  valve. Mild-moderate mitral regurgitation.  2. Calcified trileaflet aortic valve with normal opening.  Moderate aortic regurgitation.  3. Moderately dilated left atrium.  LA volume index = 43  cc/m2.  4. Normal left ventricular internal dimensions and wall  thicknesses.  5. Endocardium not well visualized; grossly normal left  ventricular systolic function.  6. The right ventricle is not well visualized; grossly  normal right ventricular systolic function.    < end of copied text >      ASSESSMENT/PLAN: 	77 yo F with h/o HTN, DM, ?dementia presenting with near syncope and found with new onset AFib, increased confusion    -- The patient has ruled out for acute coronary syndrome with negative serial cardiac enzymes  --TTE noted - normal LV function mod AI;  mild -mod MR  --Neuro appreciated -  CT head unremarkable - dementia with occasional delirium   --orthostatics are negative, hemodynamically stable   -- AF - covered for Xarelto - continue lifelong AC for CVA prevention      - son Jose with whom she lives is aware of need for AC; was educated on NOAC for CVA prevention and is in agreement;       otherwise as the patient has advanced dementia, he requests conservative care   --PT appreciated - home with home PT  -- endo appreciated   -- follow up with Dr Rose on 3/12 at 11:15 am at [address] [place] [address],  [phone]   -- dc home today

## 2020-03-03 NOTE — DISCHARGE NOTE NURSING/CASE MANAGEMENT/SOCIAL WORK - NSDCFUADDAPPT_GEN_ALL_CORE_FT
follow up with Dr Rose on 3/12 at 11:15 am at [address] [place] [address], [place], [place],  [phone]

## 2020-03-03 NOTE — PROGRESS NOTE ADULT - SUBJECTIVE AND OBJECTIVE BOX
[facility]      Seen earlier today, and examined.  - Today, patient is without complaints.           *****MEDICATIONS: Current medication reviewed and documented.    MEDICATIONS  (STANDING):  dextrose 5%. 1000 milliLiter(s) (50 mL/Hr) IV Continuous <Continuous>  dextrose 50% Injectable 12.5 Gram(s) IV Push once  dextrose 50% Injectable 25 Gram(s) IV Push once  dextrose 50% Injectable 25 Gram(s) IV Push once  influenza   Vaccine 0.5 milliLiter(s) IntraMuscular once  insulin lispro (HumaLOG) corrective regimen sliding scale   SubCutaneous three times a day before meals  insulin lispro (HumaLOG) corrective regimen sliding scale   SubCutaneous at bedtime  metoprolol tartrate 25 milliGRAM(s) Oral two times a day  rivaroxaban 20 milliGRAM(s) Oral with dinner    MEDICATIONS  (PRN):  artificial tears (preservative free) Ophthalmic Solution 1 Drop(s) Both EYES two times a day PRN Dry Eyes  dextrose 40% Gel 15 Gram(s) Oral once PRN Blood Glucose LESS THAN 70 milliGRAM(s)/deciliter  diVALproex  milliGRAM(s) Oral two times a day PRN agitation  glucagon  Injectable 1 milliGRAM(s) IntraMuscular once PRN Glucose LESS THAN 70 milligrams/deciliter          ***** VITAL SIGNS:  T(F): 97.5 (20 @ 12:07), Max: 97.8 (20 @ 21:03)  HR: 74 (20 @ 12:07) (70 - 80)  BP: 131/78 (20 @ 12:07) (131/78 - 153/86)  RR: 16 (20 @ 12:07) (16 - 18)  SpO2: 96% (20 @ 12:07) (96% - 99%)  Wt(kg): --  ,   I&O's Summary    02 Mar 2020 07:01  -  03 Mar 2020 07:00  --------------------------------------------------------  IN: 480 mL / OUT: 350 mL / NET: 130 mL             *****PHYSICAL EXAM:   Alert oriented x1 limited exam as pt is not cooperative      speech is better  does not recognize son at bedside.      EOMI fundi not visualized,  VFF to confrontration  No facial asymmetry   Tongue is midline   Palate elevates symmetrically   Moving all 4 ext symmetrically antigravity      sensation is grossly symmetric  Gait : not assessed.  B/L down going toes            *****LAB AND IMAGIN.9   7.03  )-----------( 143      ( 03 Mar 2020 06:00 )             45.0               03-03    136  |  102  |  15  ----------------------------<  164<H>  4.2   |  22  |  1.07    Ca    9.5      03 Mar 2020 06:00  Phos  4.0     03-03  Mg     1.9     03-03      PTT - ( 03 Mar 2020 06:00 )  PTT:35.6 SEC                     [All pertinent recent Imaging/Reports reviewed]           *****A S S E S S M E N T   A N D   P L A N :      Excerpt from H&P,75 yo F with h/o HTN, DM presenting with near syncope. Pt poor historian, most of history obtained from son at bedside. This morning while climbing stairs at home, pt started feeling dizzy and then was about to lose consciousness. Her sone was with her and prevented her fall. Pt did not have any chest pain at the time. EMS was called and upon there arrival pt was feeling better but her heart rate was noted to be high so she was advised to go to her doctor. Pt went to her clinic where again her HR was noted to be elevated (family unsure how high) so she was sent to the ED. At baseline pt ambulates on her own. Has not had any recent chest pain, shortness of breath or palpitations.    Problem/Recommendations 1:   agitation resolved.   continue depakote 125 for sleep augmentation and agitation as needed.       Problem/Recommendations 2: fall of unclear etiology   possibly related to hyperglycemia hyponatremia   pt shanell  appreciated home with home care       Thank you for allowing me to participate in the care of this patient. Please do not hesitate to call me if you have any  questions.        ________________  Eugenie Shaikh MD  [facility] ([facility]  [phone]      33 minutes spent on total encounter; more than 50 % of the visit was  spent counseling about plan of care, compliance to diet/exercise and medication regimen and or  coordinating care by the attending physician.      It is advised that stroke patients follow up with TREVON Jimenez @ [phone] in 1- 2 weeks.   Others please follow up with Dr. Michael Nissenbaum [phone]

## 2020-03-03 NOTE — PROGRESS NOTE ADULT - REASON FOR ADMISSION
Near syncope

## 2020-03-03 NOTE — DISCHARGE NOTE NURSING/CASE MANAGEMENT/SOCIAL WORK - PATIENT PORTAL LINK FT
You can access the FollowMyHealth Patient Portal offered by [facility] by registering at the following website: [email or web address]. By joining [facility]’s FollowMyHealth portal, you will also be able to view your health information using other applications (apps) compatible with our system.

## 2020-03-03 NOTE — DISCHARGE NOTE NURSING/CASE MANAGEMENT/SOCIAL WORK - NSSCNAMETXT_GEN_ALL_CORE
[facility]. This agency will send a nurse to your home to evaluate your care.

## 2020-03-03 NOTE — PROGRESS NOTE ADULT - ATTENDING COMMENTS
Patient seen and examined.  Agree with above.   No further inpatient cardiac workup needed at this time.   DC planning    Yosef Bush MD

## 2020-05-19 ENCOUNTER — EMERGENCY (EMERGENCY)
Facility: HOSPITAL | Age: 80
LOS: 1 days | Discharge: HOME | End: 2020-05-19
Attending: EMERGENCY MEDICINE | Admitting: EMERGENCY MEDICINE
Payer: MEDICARE

## 2020-05-19 VITALS
DIASTOLIC BLOOD PRESSURE: 86 MMHG | RESPIRATION RATE: 18 BRPM | HEART RATE: 78 BPM | OXYGEN SATURATION: 98 % | SYSTOLIC BLOOD PRESSURE: 133 MMHG | TEMPERATURE: 98 F

## 2020-05-19 VITALS
HEART RATE: 84 BPM | TEMPERATURE: 98 F | OXYGEN SATURATION: 100 % | DIASTOLIC BLOOD PRESSURE: 68 MMHG | RESPIRATION RATE: 16 BRPM | SYSTOLIC BLOOD PRESSURE: 178 MMHG

## 2020-05-19 LAB
ALBUMIN SERPL ELPH-MCNC: 4.1 G/DL — SIGNIFICANT CHANGE UP (ref 3.3–5)
ALP SERPL-CCNC: 88 U/L — SIGNIFICANT CHANGE UP (ref 40–120)
ALT FLD-CCNC: 24 U/L — SIGNIFICANT CHANGE UP (ref 4–33)
ANION GAP SERPL CALC-SCNC: 12 MMO/L — SIGNIFICANT CHANGE UP (ref 7–14)
APPEARANCE UR: CLEAR — SIGNIFICANT CHANGE UP
AST SERPL-CCNC: 36 U/L — HIGH (ref 4–32)
BASE EXCESS BLDV CALC-SCNC: 3.5 MMOL/L — SIGNIFICANT CHANGE UP
BASOPHILS # BLD AUTO: 0.1 K/UL — SIGNIFICANT CHANGE UP (ref 0–0.2)
BASOPHILS NFR BLD AUTO: 1.4 % — SIGNIFICANT CHANGE UP (ref 0–2)
BILIRUB SERPL-MCNC: 0.3 MG/DL — SIGNIFICANT CHANGE UP (ref 0.2–1.2)
BILIRUB UR-MCNC: NEGATIVE — SIGNIFICANT CHANGE UP
BLOOD GAS VENOUS - CREATININE: 1.03 MG/DL — SIGNIFICANT CHANGE UP (ref 0.5–1.3)
BLOOD GAS VENOUS - FIO2: 21 — SIGNIFICANT CHANGE UP
BLOOD UR QL VISUAL: NEGATIVE — SIGNIFICANT CHANGE UP
BUN SERPL-MCNC: 18 MG/DL — SIGNIFICANT CHANGE UP (ref 7–23)
CALCIUM SERPL-MCNC: 9.4 MG/DL — SIGNIFICANT CHANGE UP (ref 8.4–10.5)
CHLORIDE BLDV-SCNC: 103 MMOL/L — SIGNIFICANT CHANGE UP (ref 96–108)
CHLORIDE SERPL-SCNC: 101 MMOL/L — SIGNIFICANT CHANGE UP (ref 98–107)
CO2 SERPL-SCNC: 25 MMOL/L — SIGNIFICANT CHANGE UP (ref 22–31)
COLOR SPEC: COLORLESS — SIGNIFICANT CHANGE UP
CREAT SERPL-MCNC: 0.99 MG/DL — SIGNIFICANT CHANGE UP (ref 0.5–1.3)
EOSINOPHIL # BLD AUTO: 0.2 K/UL — SIGNIFICANT CHANGE UP (ref 0–0.5)
EOSINOPHIL NFR BLD AUTO: 2.8 % — SIGNIFICANT CHANGE UP (ref 0–6)
GAS PNL BLDV: 140 MMOL/L — SIGNIFICANT CHANGE UP (ref 136–146)
GLUCOSE BLDV-MCNC: 172 MG/DL — HIGH (ref 70–99)
GLUCOSE SERPL-MCNC: 180 MG/DL — HIGH (ref 70–99)
GLUCOSE UR-MCNC: NEGATIVE — SIGNIFICANT CHANGE UP
HCO3 BLDV-SCNC: 25 MMOL/L — SIGNIFICANT CHANGE UP (ref 20–27)
HCT VFR BLD CALC: 38.5 % — SIGNIFICANT CHANGE UP (ref 34.5–45)
HCT VFR BLDV CALC: 42.1 % — SIGNIFICANT CHANGE UP (ref 34.5–45)
HGB BLD-MCNC: 12.8 G/DL — SIGNIFICANT CHANGE UP (ref 11.5–15.5)
HGB BLDV-MCNC: 13.7 G/DL — SIGNIFICANT CHANGE UP (ref 11.5–15.5)
IMM GRANULOCYTES NFR BLD AUTO: 0.1 % — SIGNIFICANT CHANGE UP (ref 0–1.5)
KETONES UR-MCNC: NEGATIVE — SIGNIFICANT CHANGE UP
LACTATE BLDV-MCNC: 1.5 MMOL/L — SIGNIFICANT CHANGE UP (ref 0.5–2)
LEUKOCYTE ESTERASE UR-ACNC: NEGATIVE — SIGNIFICANT CHANGE UP
LYMPHOCYTES # BLD AUTO: 1.8 K/UL — SIGNIFICANT CHANGE UP (ref 1–3.3)
LYMPHOCYTES # BLD AUTO: 25.6 % — SIGNIFICANT CHANGE UP (ref 13–44)
MAGNESIUM SERPL-MCNC: 1.8 MG/DL — SIGNIFICANT CHANGE UP (ref 1.6–2.6)
MCHC RBC-ENTMCNC: 28 PG — SIGNIFICANT CHANGE UP (ref 27–34)
MCHC RBC-ENTMCNC: 33.2 % — SIGNIFICANT CHANGE UP (ref 32–36)
MCV RBC AUTO: 84.2 FL — SIGNIFICANT CHANGE UP (ref 80–100)
MONOCYTES # BLD AUTO: 0.68 K/UL — SIGNIFICANT CHANGE UP (ref 0–0.9)
MONOCYTES NFR BLD AUTO: 9.7 % — SIGNIFICANT CHANGE UP (ref 2–14)
NEUTROPHILS # BLD AUTO: 4.25 K/UL — SIGNIFICANT CHANGE UP (ref 1.8–7.4)
NEUTROPHILS NFR BLD AUTO: 60.4 % — SIGNIFICANT CHANGE UP (ref 43–77)
NITRITE UR-MCNC: NEGATIVE — SIGNIFICANT CHANGE UP
NRBC # FLD: 0 K/UL — SIGNIFICANT CHANGE UP (ref 0–0)
PCO2 BLDV: 55 MMHG — HIGH (ref 41–51)
PH BLDV: 7.34 PH — SIGNIFICANT CHANGE UP (ref 7.32–7.43)
PH UR: 6.5 — SIGNIFICANT CHANGE UP (ref 5–8)
PHOSPHATE SERPL-MCNC: 2.9 MG/DL — SIGNIFICANT CHANGE UP (ref 2.5–4.5)
PLATELET # BLD AUTO: 153 K/UL — SIGNIFICANT CHANGE UP (ref 150–400)
PMV BLD: 12.7 FL — SIGNIFICANT CHANGE UP (ref 7–13)
PO2 BLDV: 27 MMHG — LOW (ref 35–40)
POTASSIUM BLDV-SCNC: 4.5 MMOL/L — SIGNIFICANT CHANGE UP (ref 3.4–4.5)
POTASSIUM SERPL-MCNC: 4.8 MMOL/L — SIGNIFICANT CHANGE UP (ref 3.5–5.3)
POTASSIUM SERPL-SCNC: 4.8 MMOL/L — SIGNIFICANT CHANGE UP (ref 3.5–5.3)
PROT SERPL-MCNC: 8 G/DL — SIGNIFICANT CHANGE UP (ref 6–8.3)
PROT UR-MCNC: NEGATIVE — SIGNIFICANT CHANGE UP
RBC # BLD: 4.57 M/UL — SIGNIFICANT CHANGE UP (ref 3.8–5.2)
RBC # FLD: 13.4 % — SIGNIFICANT CHANGE UP (ref 10.3–14.5)
SAO2 % BLDV: 44.1 % — LOW (ref 60–85)
SODIUM SERPL-SCNC: 138 MMOL/L — SIGNIFICANT CHANGE UP (ref 135–145)
SP GR SPEC: 1 — LOW (ref 1–1.04)
UROBILINOGEN FLD QL: NORMAL — SIGNIFICANT CHANGE UP
WBC # BLD: 7.04 K/UL — SIGNIFICANT CHANGE UP (ref 3.8–10.5)
WBC # FLD AUTO: 7.04 K/UL — SIGNIFICANT CHANGE UP (ref 3.8–10.5)

## 2020-05-19 PROCEDURE — 99283 EMERGENCY DEPT VISIT LOW MDM: CPT | Mod: GC

## 2020-05-19 PROCEDURE — 71045 X-RAY EXAM CHEST 1 VIEW: CPT | Mod: 26

## 2020-05-19 RX ORDER — SODIUM CHLORIDE 9 MG/ML
1000 INJECTION INTRAMUSCULAR; INTRAVENOUS; SUBCUTANEOUS ONCE
Refills: 0 | Status: DISCONTINUED | OUTPATIENT
Start: 2020-05-19 | End: 2020-05-19

## 2020-05-19 NOTE — ED PROVIDER NOTE - NSFOLLOWUPINSTRUCTIONS_ED_ALL_ED_FT
Continue to monitor your blood sugar and hypertension. Follow up with your primary care doctor.     You may also benefit from seeing a geriatric neurologist.

## 2020-05-19 NOTE — ED ADULT NURSE NOTE - CHIEF COMPLAINT QUOTE
brought from precinct after found wandering for several hours outside by a bystander. Ems called to evaluate patient at station and found patient to be hypertensive with fs 384 in the field. As per family patient is altered at baseline and recognizes only family members. Patient is smiling and pleasant in triage and calm.  Hx HTN, DM Son - gemini [phone]

## 2020-05-19 NOTE — ED ADULT NURSE NOTE - CAS EDN DISCHARGE INTERVENTIONS
Is This A New Presentation, Or A Follow-Up?: Basal Cell Carcinoma
Location From Outside Provider (Will Override Previously Chosen Location): R upper back/ posterior shoulder
When Was Basal Cell Biopsied? (Optional): 12/27/2018
Accession # (Optional): [id number]
IV discontinued, cath removed intact

## 2020-05-19 NOTE — ED PROVIDER NOTE - OBJECTIVE STATEMENT
79F Dementia, DM on metformin, Afib on xarelto, HTN, brought in by EMS after found wandering outside. Per son Jose ([phone]), patient has dementia and lives at home with family. Patient normally independent with some ADLs (toileting, feeding) but requires supervision. Patient left home while unsupervised and was found by police. Per report, FS >300, BP>180 systolic in field. Patient AAOx1-2 at baseline per son. Patient has been in normal state of health with remote follow up with primary care provider. 79F Dementia, DM on metformin, Afib on xarelto, HTN, brought in by EMS after found wandering outside. Per son Jose ([phone]), patient has dementia and lives at home with family. Patient normally independent with some ADLs (toileting, feeding) but requires supervision. Patient left home while unsupervised and was found by police. Per report, FS >300, BP>180 systolic in field. Patient AAOx1-2 at baseline per son. Patient has been in normal state of health with remote follow up with primary care provider. Patient without cough/fever/chills per son, son notes whole family has been quarantining and denies sick contacts, COVID+ contacts.

## 2020-05-19 NOTE — ED PROVIDER NOTE - CLINICAL SUMMARY MEDICAL DECISION MAKING FREE TEXT BOX
79F dementia DM HTN BIB police EMS found wandering, labs WNL, at mental baseline, will release to son (retirement caregiver)

## 2020-05-19 NOTE — ED PROVIDER NOTE - PATIENT PORTAL LINK FT
You can access the FollowMyHealth Patient Portal offered by [facility] by registering at the following website: [email or web address]. By joining [facility]’s FollowMyHealth portal, you will also be able to view your health information using other applications (apps) compatible with our system.

## 2020-05-19 NOTE — ED ADULT TRIAGE NOTE - CHIEF COMPLAINT QUOTE
brought from precinct after found wandering for several hours outside by a bystander. Ems called to evaluate patient at station and found patient to be hypertensive with fs 384 in the field. As per family patient is altered at baseline and recognizes only family members. Patient is smiling and pleasant in triage and calm.  Hx HTN, DM Son - gemini [phone]

## 2020-05-19 NOTE — ED PROVIDER NOTE - ATTENDING CONTRIBUTION TO CARE
Dr. Romero:  I have personally performed a face to face bedside history and physical examination of this patient. I have discussed the history, examination, review of systems, assessment and plan of management with the resident. I have reviewed the electronic medical record and amended it to reflect my history, review of systems, physical exam, assessment and plan.    79F h/o HTN, DM, afib on Xarelto, dementia, escaped from home and was found wandering outside.  Picked up by police/EMS.  's with EMS.  Per family, mental status is actually at baseline a&o x 1.  Pt with no acute complaints    Exam:  - nad  - rrr  - ctab  - abd soft ntnd  - no focal neuro deficits    A/P  - found wandering, dementia, per family at baseline mental status  - basic labs, vbg, ua, cxr

## 2020-05-19 NOTE — ED PROVIDER NOTE - PHYSICAL EXAMINATION
PHYSICAL EXAM:  GENERAL: NAD, lying in bed comfortably  HEAD:  Atraumatic, Normocephalic  EYES: EOMI, PERRLA, conjunctiva and sclera clear  NECK: Supple, No JVD  CHEST/LUNG: Clear to auscultation bilaterally; No rales, rhonchi, wheezing, or rubs. Unlabored respirations  HEART: Regular rate and rhythm; No murmurs, rubs, or gallops  ABDOMEN: Bowel sounds present; Soft, Nontender, Nondistended. No hepatomegally  EXTREMITIES:  + Peripheral Pulses, 1+ GHAZALA LE edema   NERVOUS SYSTEM:  Alert & Oriented X1   MSK: FROM all 4 extremities, full and equal strength  SKIN: No rashes or lesions

## 2020-05-19 NOTE — ED PROVIDER NOTE - CARE PROVIDER_API CALL
Anirudh Arreaga  NEUROLOGY  [address] [place] [address]  Phone: [phone]  Fax: [phone]  Follow Up Time: Routine

## 2020-05-20 LAB
CULTURE RESULTS: SIGNIFICANT CHANGE UP
SPECIMEN SOURCE: SIGNIFICANT CHANGE UP

## 2020-11-30 NOTE — PROVIDER CONTACT NOTE (OTHER) - ASSESSMENT
pt asymptomatic
pleasantly confused female with dm2
vitals stable   no acut edistress ntoed
vitals stable   no acute distress noted   fall safety maintained
vitals stable   no acute distress noted   fall safety maintained  previous FS was 69, 72, then 101. FS is now stable
no

## 2021-01-22 ENCOUNTER — INPATIENT (INPATIENT)
Facility: HOSPITAL | Age: 81
LOS: 13 days | Discharge: NURSING, REHAB OR LONG-TERM CARE | DRG: 871 | End: 2021-02-05
Attending: HOSPITALIST | Admitting: STUDENT IN AN ORGANIZED HEALTH CARE EDUCATION/TRAINING PROGRAM
Payer: COMMERCIAL

## 2021-01-22 VITALS
DIASTOLIC BLOOD PRESSURE: 85 MMHG | WEIGHT: 160.06 LBS | HEART RATE: 55 BPM | SYSTOLIC BLOOD PRESSURE: 120 MMHG | RESPIRATION RATE: 18 BRPM | OXYGEN SATURATION: 99 %

## 2021-01-22 LAB
ALBUMIN SERPL ELPH-MCNC: 4 G/DL — SIGNIFICANT CHANGE UP (ref 3.3–5)
ALP SERPL-CCNC: 123 U/L — HIGH (ref 40–120)
ALT FLD-CCNC: 43 U/L — SIGNIFICANT CHANGE UP (ref 10–45)
ANION GAP SERPL CALC-SCNC: 11 MMOL/L — SIGNIFICANT CHANGE UP (ref 5–17)
ANISOCYTOSIS BLD QL: SLIGHT — SIGNIFICANT CHANGE UP
APPEARANCE UR: CLEAR — SIGNIFICANT CHANGE UP
APTT BLD: 58 SEC — HIGH (ref 27.5–35.5)
AST SERPL-CCNC: 51 U/L — HIGH (ref 10–40)
BACTERIA # UR AUTO: NEGATIVE — SIGNIFICANT CHANGE UP
BASE EXCESS BLDV CALC-SCNC: 0.4 MMOL/L — SIGNIFICANT CHANGE UP (ref -2–2)
BASE EXCESS BLDV CALC-SCNC: 2.6 MMOL/L — HIGH (ref -2–2)
BASOPHILS # BLD AUTO: 0.05 K/UL — SIGNIFICANT CHANGE UP (ref 0–0.2)
BASOPHILS NFR BLD AUTO: 0.9 % — SIGNIFICANT CHANGE UP (ref 0–2)
BILIRUB SERPL-MCNC: 0.5 MG/DL — SIGNIFICANT CHANGE UP (ref 0.2–1.2)
BILIRUB UR-MCNC: NEGATIVE — SIGNIFICANT CHANGE UP
BUN SERPL-MCNC: 30 MG/DL — HIGH (ref 7–23)
CA-I SERPL-SCNC: 1.15 MMOL/L — SIGNIFICANT CHANGE UP (ref 1.12–1.3)
CA-I SERPL-SCNC: 1.24 MMOL/L — SIGNIFICANT CHANGE UP (ref 1.12–1.3)
CALCIUM SERPL-MCNC: 10.2 MG/DL — SIGNIFICANT CHANGE UP (ref 8.4–10.5)
CHLORIDE BLDV-SCNC: 105 MMOL/L — SIGNIFICANT CHANGE UP (ref 96–108)
CHLORIDE BLDV-SCNC: 108 MMOL/L — SIGNIFICANT CHANGE UP (ref 96–108)
CHLORIDE SERPL-SCNC: 99 MMOL/L — SIGNIFICANT CHANGE UP (ref 96–108)
CO2 BLDV-SCNC: 28 MMOL/L — SIGNIFICANT CHANGE UP (ref 22–30)
CO2 BLDV-SCNC: 33 MMOL/L — HIGH (ref 22–30)
CO2 SERPL-SCNC: 27 MMOL/L — SIGNIFICANT CHANGE UP (ref 22–31)
COLOR SPEC: SIGNIFICANT CHANGE UP
CREAT SERPL-MCNC: 1.26 MG/DL — SIGNIFICANT CHANGE UP (ref 0.5–1.3)
DIFF PNL FLD: ABNORMAL
EOSINOPHIL # BLD AUTO: 0 K/UL — SIGNIFICANT CHANGE UP (ref 0–0.5)
EOSINOPHIL NFR BLD AUTO: 0 % — SIGNIFICANT CHANGE UP (ref 0–6)
EPI CELLS # UR: 1 /HPF — SIGNIFICANT CHANGE UP
GAS PNL BLDV: 135 MMOL/L — SIGNIFICANT CHANGE UP (ref 135–145)
GAS PNL BLDV: 138 MMOL/L — SIGNIFICANT CHANGE UP (ref 135–145)
GAS PNL BLDV: SIGNIFICANT CHANGE UP
GAS PNL BLDV: SIGNIFICANT CHANGE UP
GLUCOSE BLDV-MCNC: 140 MG/DL — HIGH (ref 70–99)
GLUCOSE BLDV-MCNC: 189 MG/DL — HIGH (ref 70–99)
GLUCOSE SERPL-MCNC: 140 MG/DL — HIGH (ref 70–99)
GLUCOSE UR QL: NEGATIVE — SIGNIFICANT CHANGE UP
HCO3 BLDV-SCNC: 26 MMOL/L — SIGNIFICANT CHANGE UP (ref 21–29)
HCO3 BLDV-SCNC: 31 MMOL/L — HIGH (ref 21–29)
HCT VFR BLD CALC: 43.4 % — SIGNIFICANT CHANGE UP (ref 34.5–45)
HCT VFR BLDA CALC: 36 % — LOW (ref 39–50)
HCT VFR BLDA CALC: 46 % — SIGNIFICANT CHANGE UP (ref 39–50)
HGB BLD CALC-MCNC: 11.8 G/DL — SIGNIFICANT CHANGE UP (ref 11.5–15.5)
HGB BLD CALC-MCNC: 14.8 G/DL — SIGNIFICANT CHANGE UP (ref 11.5–15.5)
HGB BLD-MCNC: 14.9 G/DL — SIGNIFICANT CHANGE UP (ref 11.5–15.5)
HYALINE CASTS # UR AUTO: 3 /LPF — HIGH (ref 0–2)
HYPOCHROMIA BLD QL: SLIGHT — SIGNIFICANT CHANGE UP
INR BLD: 2.29 RATIO — HIGH (ref 0.88–1.16)
KETONES UR-MCNC: SIGNIFICANT CHANGE UP
LACTATE BLDV-MCNC: 2.4 MMOL/L — HIGH (ref 0.7–2)
LACTATE BLDV-MCNC: 2.8 MMOL/L — HIGH (ref 0.7–2)
LEUKOCYTE ESTERASE UR-ACNC: NEGATIVE — SIGNIFICANT CHANGE UP
LYMPHOCYTES # BLD AUTO: 0.4 K/UL — LOW (ref 1–3.3)
LYMPHOCYTES # BLD AUTO: 6.9 % — LOW (ref 13–44)
MACROCYTES BLD QL: SLIGHT — SIGNIFICANT CHANGE UP
MANUAL SMEAR VERIFICATION: SIGNIFICANT CHANGE UP
MCHC RBC-ENTMCNC: 28.3 PG — SIGNIFICANT CHANGE UP (ref 27–34)
MCHC RBC-ENTMCNC: 34.3 GM/DL — SIGNIFICANT CHANGE UP (ref 32–36)
MCV RBC AUTO: 82.5 FL — SIGNIFICANT CHANGE UP (ref 80–100)
METAMYELOCYTES # FLD: 0.9 % — HIGH (ref 0–0)
MONOCYTES # BLD AUTO: 0.2 K/UL — SIGNIFICANT CHANGE UP (ref 0–0.9)
MONOCYTES NFR BLD AUTO: 3.5 % — SIGNIFICANT CHANGE UP (ref 2–14)
MYELOCYTES NFR BLD: 0.9 % — HIGH (ref 0–0)
NEUTROPHILS # BLD AUTO: 4.86 K/UL — SIGNIFICANT CHANGE UP (ref 1.8–7.4)
NEUTROPHILS NFR BLD AUTO: 81.7 % — HIGH (ref 43–77)
NEUTS BAND # BLD: 2.6 % — SIGNIFICANT CHANGE UP (ref 0–8)
NITRITE UR-MCNC: NEGATIVE — SIGNIFICANT CHANGE UP
NRBC # BLD: 2 /100 — HIGH (ref 0–0)
OTHER CELLS CSF MANUAL: 14 ML/DL — LOW (ref 18–22)
OVALOCYTES BLD QL SMEAR: SLIGHT — SIGNIFICANT CHANGE UP
PCO2 BLDV: 52 MMHG — HIGH (ref 35–50)
PCO2 BLDV: 68 MMHG — HIGH (ref 35–50)
PH BLDV: 7.28 — LOW (ref 7.35–7.45)
PH BLDV: 7.33 — LOW (ref 7.35–7.45)
PH UR: 5.5 — SIGNIFICANT CHANGE UP (ref 5–8)
PLAT MORPH BLD: ABNORMAL
PLATELET # BLD AUTO: 126 K/UL — LOW (ref 150–400)
PO2 BLDV: 32 MMHG — SIGNIFICANT CHANGE UP (ref 25–45)
PO2 BLDV: 53 MMHG — HIGH (ref 25–45)
POIKILOCYTOSIS BLD QL AUTO: SLIGHT — SIGNIFICANT CHANGE UP
POTASSIUM BLDV-SCNC: 4.4 MMOL/L — SIGNIFICANT CHANGE UP (ref 3.5–5.3)
POTASSIUM BLDV-SCNC: 4.8 MMOL/L — SIGNIFICANT CHANGE UP (ref 3.5–5.3)
POTASSIUM SERPL-MCNC: 4.8 MMOL/L — SIGNIFICANT CHANGE UP (ref 3.5–5.3)
POTASSIUM SERPL-SCNC: 4.8 MMOL/L — SIGNIFICANT CHANGE UP (ref 3.5–5.3)
PROT SERPL-MCNC: 8 G/DL — SIGNIFICANT CHANGE UP (ref 6–8.3)
PROT UR-MCNC: ABNORMAL
PROTHROM AB SERPL-ACNC: 26.4 SEC — HIGH (ref 10.6–13.6)
RBC # BLD: 5.26 M/UL — HIGH (ref 3.8–5.2)
RBC # FLD: 14.7 % — HIGH (ref 10.3–14.5)
RBC BLD AUTO: ABNORMAL
RBC CASTS # UR COMP ASSIST: 4 /HPF — SIGNIFICANT CHANGE UP (ref 0–4)
SAO2 % BLDV: 51 % — LOW (ref 67–88)
SAO2 % BLDV: 86 % — SIGNIFICANT CHANGE UP (ref 67–88)
SODIUM SERPL-SCNC: 137 MMOL/L — SIGNIFICANT CHANGE UP (ref 135–145)
SP GR SPEC: 1.01 — SIGNIFICANT CHANGE UP (ref 1.01–1.02)
TARGETS BLD QL SMEAR: SLIGHT — SIGNIFICANT CHANGE UP
UROBILINOGEN FLD QL: NEGATIVE — SIGNIFICANT CHANGE UP
VARIANT LYMPHS # BLD: 2.6 % — SIGNIFICANT CHANGE UP (ref 0–6)
WBC # BLD: 5.77 K/UL — SIGNIFICANT CHANGE UP (ref 3.8–10.5)
WBC # FLD AUTO: 5.77 K/UL — SIGNIFICANT CHANGE UP (ref 3.8–10.5)
WBC UR QL: 0 /HPF — SIGNIFICANT CHANGE UP (ref 0–5)

## 2021-01-22 PROCEDURE — 71045 X-RAY EXAM CHEST 1 VIEW: CPT | Mod: 26

## 2021-01-22 PROCEDURE — 99291 CRITICAL CARE FIRST HOUR: CPT

## 2021-01-22 PROCEDURE — 93010 ELECTROCARDIOGRAM REPORT: CPT

## 2021-01-22 RX ORDER — PIPERACILLIN AND TAZOBACTAM 4; .5 G/20ML; G/20ML
3.38 INJECTION, POWDER, LYOPHILIZED, FOR SOLUTION INTRAVENOUS EVERY 6 HOURS
Refills: 0 | Status: DISCONTINUED | OUTPATIENT
Start: 2021-01-22 | End: 2021-01-22

## 2021-01-22 RX ORDER — SODIUM CHLORIDE 9 MG/ML
2000 INJECTION, SOLUTION INTRAVENOUS ONCE
Refills: 0 | Status: COMPLETED | OUTPATIENT
Start: 2021-01-22 | End: 2021-01-22

## 2021-01-22 RX ORDER — NOREPINEPHRINE BITARTRATE/D5W 8 MG/250ML
0.05 PLASTIC BAG, INJECTION (ML) INTRAVENOUS
Qty: 8 | Refills: 0 | Status: DISCONTINUED | OUTPATIENT
Start: 2021-01-22 | End: 2021-01-23

## 2021-01-22 RX ORDER — SODIUM CHLORIDE 9 MG/ML
500 INJECTION INTRAMUSCULAR; INTRAVENOUS; SUBCUTANEOUS ONCE
Refills: 0 | Status: COMPLETED | OUTPATIENT
Start: 2021-01-22 | End: 2021-01-22

## 2021-01-22 RX ORDER — VANCOMYCIN HCL 1 G
1000 VIAL (EA) INTRAVENOUS ONCE
Refills: 0 | Status: COMPLETED | OUTPATIENT
Start: 2021-01-22 | End: 2021-01-22

## 2021-01-22 RX ORDER — PIPERACILLIN AND TAZOBACTAM 4; .5 G/20ML; G/20ML
3.38 INJECTION, POWDER, LYOPHILIZED, FOR SOLUTION INTRAVENOUS ONCE
Refills: 0 | Status: COMPLETED | OUTPATIENT
Start: 2021-01-22 | End: 2021-01-22

## 2021-01-22 RX ADMIN — Medication 1000 MILLIGRAM(S): at 23:11

## 2021-01-22 RX ADMIN — PIPERACILLIN AND TAZOBACTAM 200 GRAM(S): 4; .5 INJECTION, POWDER, LYOPHILIZED, FOR SOLUTION INTRAVENOUS at 21:08

## 2021-01-22 RX ADMIN — SODIUM CHLORIDE 2000 MILLILITER(S): 9 INJECTION, SOLUTION INTRAVENOUS at 22:55

## 2021-01-22 RX ADMIN — SODIUM CHLORIDE 2000 MILLILITER(S): 9 INJECTION, SOLUTION INTRAVENOUS at 23:36

## 2021-01-22 RX ADMIN — SODIUM CHLORIDE 500 MILLILITER(S): 9 INJECTION INTRAMUSCULAR; INTRAVENOUS; SUBCUTANEOUS at 21:08

## 2021-01-22 RX ADMIN — SODIUM CHLORIDE 500 MILLILITER(S): 9 INJECTION INTRAMUSCULAR; INTRAVENOUS; SUBCUTANEOUS at 22:55

## 2021-01-22 RX ADMIN — PIPERACILLIN AND TAZOBACTAM 3.38 GRAM(S): 4; .5 INJECTION, POWDER, LYOPHILIZED, FOR SOLUTION INTRAVENOUS at 22:55

## 2021-01-22 RX ADMIN — Medication 6.81 MICROGRAM(S)/KG/MIN: at 23:14

## 2021-01-22 RX ADMIN — Medication 250 MILLIGRAM(S): at 22:26

## 2021-01-22 NOTE — ED PROCEDURE NOTE - ATTENDING CONTRIBUTION TO CARE
I have participated in and supervised all key portions of the above procedures and agree with the above documentation. - Jesus SPENCER

## 2021-01-22 NOTE — ED PROVIDER NOTE - PROGRESS NOTE DETAILS
Milo, PGY2: spoke with son Jose who is health care proxy [phone] discussed about code status. was not ready make a decision on DNR/DNI, will talk with family members and readdress during admission. For now full code Milo, PGY2: called in room by RN for low BP. US of extremity with arm showed small fluid in arm suggesting IV line blew. will pull line and put in other arm. Repeat BP in low 80s systolic. Will bolus fluid and reassess. Milo, PGY2: micu consulted for hypotension and hypothermia. agree with plan will see in ED Attending Masom:  Evaluated pt. received s/o pt. hypothermic, hypotensive, being rewarmed and treated for sepsis, vanc zosyn given, pt given 2400 of fluid which is roughly 30 cc/kg, now on levophed bp 105, w/ maps of 65, no crackles on physical exam, 97% on 02. No tongue swelling or edema Milo, PGY2: attempted to wean pressors and blood pressure dropped to 60s systolic. up to 0.07 levo now MAP at 65.

## 2021-01-22 NOTE — ED ADULT NURSE REASSESSMENT NOTE - NS ED NURSE REASSESS COMMENT FT1
MD notified and aware about hypotensive BP as per MD grimm to start norepi at 0.05mg/kg/min. Pt pressure responding well most recent pressure at 102/69. pt received about 2200cc of fluid total. as per MD stop LR.

## 2021-01-22 NOTE — ED PROVIDER NOTE - ATTENDING CONTRIBUTION TO CARE
Patient is an 81 yo F with history of DM, HTN, afib on xarelto, dementia here for evaluation of altered mental status. Per EMS, patient has been altered for 2 days. Patient is unable to provide any history but does not appear toxic and laughs during evaluation. Per collateral information obtained by Dr. Pedraza, patient's son states that patient has had decreased appetite over the past 2 weeks, has not been able to stand or ambulate.     VS noted - 88.7 rectally, cool to touch  Gen. no acute distress, Non toxic   HEENT: EOMI, mmm  Lungs: CTAB/L no C/ W /R   CVS: irregularly irregular  Abd; Soft non tender, non distended   Ext: no edema  Skin: no rash  Neuro: awake, can move all extremities, limited exam  a/p: AMS, hypothermic - plan for cristina hugger, sepsis protocol, plan for vanc/ zosyn, cultures, CT Head, CT A/P. Will continue to monitor in the ED. Patient may need MICU evaluation.   - Jesus SPENCER

## 2021-01-22 NOTE — ED PROVIDER NOTE - OBJECTIVE STATEMENT
81y/o F w/ h/o dementia p/w AMS. Pt has h/o dementia as per EMS unknown baseline mental status. Pt cooperative following commands intermittently but non verbal. Unable to obtain rectal temperature orally and rectally x2. 81y/o F w/ h/o dementia (baseline AAOx1), T2DM, Afib (on Xeralto), HTN p/w AMS. Pt has h/o dementia as per EMS unknown baseline mental status. Pt cooperative following commands intermittently but non verbal. Unable to obtain rectal temperature orally and rectally x2.     @8:47pm additional history obtained: over the past 2 wks, has been more sedentary, appetite decreased, this AM woke up disoriented, speech slurred unable to stand or ambulate, normally can ambulate without assistance. Pt lives w/ son who has had hard time taking care of mother.     PMD: [facility]  Nurse

## 2021-01-22 NOTE — ED ADULT NURSE REASSESSMENT NOTE - NS ED NURSE REASSESS COMMENT FT1
Pt remains in the ED. Resting comfortably in bed. Pt awake. Breathing spontaneously and unlabored. temperature martinez placed. Sterile technique maintained. 2 RN present at the bedside. Urine collected and sent to lab. Tolerated well. PT core temp 88.8 F. Pt on blanketrol and bear hugger. Pt safety maintained. Will continue to monitor. Awaiting dispo. Pt remains in the ED. Resting comfortably in bed. Pt awake. Breathing spontaneously and unlabored. temperature martinez placed. Sterile technique maintained. 2 RN present at the bedside. Urine collected and sent to lab. Tolerated well. PT core temp 88.8 F. Pt on blanketrol and bear hugger. Pt given warm fluids. Pt safety maintained. Will continue to monitor. Awaiting dispo.

## 2021-01-22 NOTE — ED ADULT NURSE NOTE - NSIMPLEMENTINTERV_GEN_ALL_ED
Implemented All Fall with Harm Risk Interventions:  [place] to call system. Call bell, personal items and telephone within reach. Instruct patient to call for assistance. Room bathroom lighting operational. Non-slip footwear when patient is off stretcher. Physically safe environment: no spills, clutter or unnecessary equipment. Stretcher in lowest position, wheels locked, appropriate side rails in place. Provide visual cue, wrist band, yellow gown, etc. Monitor gait and stability. Monitor for mental status changes and reorient to person, place, and time. Review medications for side effects contributing to fall risk. Reinforce activity limits and safety measures with patient and family. Provide visual clues: red socks.

## 2021-01-22 NOTE — ED ADULT NURSE REASSESSMENT NOTE - NS ED NURSE REASSESS COMMENT FT1
pt became hypotensive Md notified and aware, Md ordered 2000 cc of lactated ringers. will continue to reassess BP and update status to MD

## 2021-01-22 NOTE — ED PROVIDER NOTE - CLINICAL SUMMARY MEDICAL DECISION MAKING FREE TEXT BOX
Elderly F w/ baseline dementia (unknown baseline) BIBEMS for AMS, following commands, feels cold. Will r/o organic causes of AMS sepsis, electrolyte abnormalities, TSH. Admission for AMS. Elderly F w/ dementia (AAOx1 baseline), AFib (on AC), T2DM BIBEMS for AMS, following commands, feels cold. DDx possible stroke 2wks ago and sepsis from aspiration pna?. Will r/o organic causes of AMS, sepsis, electrolyte abnormalities, TSH, troponin, CTAP, CTH, and admit.

## 2021-01-22 NOTE — ED ADULT NURSE REASSESSMENT NOTE - NS ED NURSE REASSESS COMMENT FT1
Lab called to confirm blood cultures, troponin and BNP, were received. Per Tarik, all lab work received and will show as received in sunrise momentarily.

## 2021-01-22 NOTE — ED ADULT NURSE NOTE - OBJECTIVE STATEMENT
81y/o female history of dementia and Afib on Xarelto presents to the ED via EMS from home as per EMS worsening AMS x2 days and increased weakness. as per EMS pt ambulates at home and was able to walk to stretcher with unsteady gait. Pt is AOx1, skins is cool to touch, patent airway, soft no tender abdomen, strong peripheral pulses, skin is warm dry and intact. as per EMS pt had no complaints. Patient safety provided with call bell within reach and bed in the lowest position. upon exam with MD at bedside we were unable to get a rectal temperature, pt was hypothermic to the point the thermometer wouldn't register the temperature. pt placed on bear hugger and temperature sensing Donohue catheter

## 2021-01-23 DIAGNOSIS — N17.9 ACUTE KIDNEY FAILURE, UNSPECIFIED: ICD-10-CM

## 2021-01-23 DIAGNOSIS — G93.40 ENCEPHALOPATHY, UNSPECIFIED: ICD-10-CM

## 2021-01-23 DIAGNOSIS — E11.9 TYPE 2 DIABETES MELLITUS WITHOUT COMPLICATIONS: ICD-10-CM

## 2021-01-23 DIAGNOSIS — I48.91 UNSPECIFIED ATRIAL FIBRILLATION: ICD-10-CM

## 2021-01-23 DIAGNOSIS — A41.9 SEPSIS, UNSPECIFIED ORGANISM: ICD-10-CM

## 2021-01-23 DIAGNOSIS — K52.9 NONINFECTIVE GASTROENTERITIS AND COLITIS, UNSPECIFIED: ICD-10-CM

## 2021-01-23 DIAGNOSIS — I10 ESSENTIAL (PRIMARY) HYPERTENSION: ICD-10-CM

## 2021-01-23 DIAGNOSIS — Z29.9 ENCOUNTER FOR PROPHYLACTIC MEASURES, UNSPECIFIED: ICD-10-CM

## 2021-01-23 DIAGNOSIS — T68.XXXA HYPOTHERMIA, INITIAL ENCOUNTER: ICD-10-CM

## 2021-01-23 LAB
-  STAPHYLOCOCCUS EPIDERMIDIS: SIGNIFICANT CHANGE UP
A1C WITH ESTIMATED AVERAGE GLUCOSE RESULT: 7.8 % — HIGH (ref 4–5.6)
ANION GAP SERPL CALC-SCNC: 11 MMOL/L — SIGNIFICANT CHANGE UP (ref 5–17)
APTT BLD: 47.2 SEC — HIGH (ref 27.5–35.5)
BUN SERPL-MCNC: 22 MG/DL — SIGNIFICANT CHANGE UP (ref 7–23)
BUN SERPL-MCNC: 27 MG/DL — HIGH (ref 7–23)
BUN SERPL-MCNC: 27 MG/DL — HIGH (ref 7–23)
CALCIUM SERPL-MCNC: 8.4 MG/DL — SIGNIFICANT CHANGE UP (ref 8.4–10.5)
CALCIUM SERPL-MCNC: 8.6 MG/DL — SIGNIFICANT CHANGE UP (ref 8.4–10.5)
CALCIUM SERPL-MCNC: 8.7 MG/DL — SIGNIFICANT CHANGE UP (ref 8.4–10.5)
CHLORIDE SERPL-SCNC: 100 MMOL/L — SIGNIFICANT CHANGE UP (ref 96–108)
CHLORIDE SERPL-SCNC: 102 MMOL/L — SIGNIFICANT CHANGE UP (ref 96–108)
CHLORIDE SERPL-SCNC: 103 MMOL/L — SIGNIFICANT CHANGE UP (ref 96–108)
CK MB BLD-MCNC: 6.3 % — HIGH (ref 0–3.5)
CK MB CFR SERPL CALC: 6.7 NG/ML — HIGH (ref 0–3.8)
CK SERPL-CCNC: 106 U/L — SIGNIFICANT CHANGE UP (ref 25–170)
CO2 SERPL-SCNC: 24 MMOL/L — SIGNIFICANT CHANGE UP (ref 22–31)
CO2 SERPL-SCNC: 24 MMOL/L — SIGNIFICANT CHANGE UP (ref 22–31)
CO2 SERPL-SCNC: 25 MMOL/L — SIGNIFICANT CHANGE UP (ref 22–31)
CREAT SERPL-MCNC: 1.33 MG/DL — HIGH (ref 0.5–1.3)
CREAT SERPL-MCNC: 1.41 MG/DL — HIGH (ref 0.5–1.3)
CREAT SERPL-MCNC: 1.55 MG/DL — HIGH (ref 0.5–1.3)
CULTURE RESULTS: SIGNIFICANT CHANGE UP
ESTIMATED AVERAGE GLUCOSE: 177 MG/DL — HIGH (ref 68–114)
GLUCOSE BLDC GLUCOMTR-MCNC: 119 MG/DL — HIGH (ref 70–99)
GLUCOSE BLDC GLUCOMTR-MCNC: 130 MG/DL — HIGH (ref 70–99)
GLUCOSE BLDC GLUCOMTR-MCNC: 147 MG/DL — HIGH (ref 70–99)
GLUCOSE BLDC GLUCOMTR-MCNC: 220 MG/DL — HIGH (ref 70–99)
GLUCOSE SERPL-MCNC: 137 MG/DL — HIGH (ref 70–99)
GLUCOSE SERPL-MCNC: 144 MG/DL — HIGH (ref 70–99)
GLUCOSE SERPL-MCNC: 178 MG/DL — HIGH (ref 70–99)
GRAM STN FLD: SIGNIFICANT CHANGE UP
HCT VFR BLD CALC: 36.2 % — SIGNIFICANT CHANGE UP (ref 34.5–45)
HCT VFR BLD CALC: 37 % — SIGNIFICANT CHANGE UP (ref 34.5–45)
HGB BLD-MCNC: 12.8 G/DL — SIGNIFICANT CHANGE UP (ref 11.5–15.5)
HGB BLD-MCNC: 12.9 G/DL — SIGNIFICANT CHANGE UP (ref 11.5–15.5)
INR BLD: 1.87 RATIO — HIGH (ref 0.88–1.16)
LACTATE SERPL-SCNC: 1.4 MMOL/L — SIGNIFICANT CHANGE UP (ref 0.7–2)
LEGIONELLA AG UR QL: NEGATIVE — SIGNIFICANT CHANGE UP
MAGNESIUM SERPL-MCNC: 1.6 MG/DL — SIGNIFICANT CHANGE UP (ref 1.6–2.6)
MAGNESIUM SERPL-MCNC: 1.7 MG/DL — SIGNIFICANT CHANGE UP (ref 1.6–2.6)
MCHC RBC-ENTMCNC: 27.9 PG — SIGNIFICANT CHANGE UP (ref 27–34)
MCHC RBC-ENTMCNC: 28.5 PG — SIGNIFICANT CHANGE UP (ref 27–34)
MCHC RBC-ENTMCNC: 34.6 GM/DL — SIGNIFICANT CHANGE UP (ref 32–36)
MCHC RBC-ENTMCNC: 35.6 GM/DL — SIGNIFICANT CHANGE UP (ref 32–36)
MCV RBC AUTO: 79.9 FL — LOW (ref 80–100)
MCV RBC AUTO: 80.8 FL — SIGNIFICANT CHANGE UP (ref 80–100)
METHOD TYPE: SIGNIFICANT CHANGE UP
MRSA PCR RESULT.: SIGNIFICANT CHANGE UP
NRBC # BLD: 0 /100 WBCS — SIGNIFICANT CHANGE UP (ref 0–0)
NRBC # BLD: 0 /100 WBCS — SIGNIFICANT CHANGE UP (ref 0–0)
PHOSPHATE SERPL-MCNC: 2.6 MG/DL — SIGNIFICANT CHANGE UP (ref 2.5–4.5)
PHOSPHATE SERPL-MCNC: 2.8 MG/DL — SIGNIFICANT CHANGE UP (ref 2.5–4.5)
PLATELET # BLD AUTO: 113 K/UL — LOW (ref 150–400)
PLATELET # BLD AUTO: 116 K/UL — LOW (ref 150–400)
POTASSIUM SERPL-MCNC: 5 MMOL/L — SIGNIFICANT CHANGE UP (ref 3.5–5.3)
POTASSIUM SERPL-MCNC: 5.8 MMOL/L — HIGH (ref 3.5–5.3)
POTASSIUM SERPL-MCNC: 6.3 MMOL/L — CRITICAL HIGH (ref 3.5–5.3)
POTASSIUM SERPL-SCNC: 5 MMOL/L — SIGNIFICANT CHANGE UP (ref 3.5–5.3)
POTASSIUM SERPL-SCNC: 5.8 MMOL/L — HIGH (ref 3.5–5.3)
POTASSIUM SERPL-SCNC: 6.3 MMOL/L — CRITICAL HIGH (ref 3.5–5.3)
PROCALCITONIN SERPL-MCNC: 0.05 NG/ML — SIGNIFICANT CHANGE UP (ref 0.02–0.1)
PROTHROM AB SERPL-ACNC: 21.8 SEC — HIGH (ref 10.6–13.6)
RBC # BLD: 4.53 M/UL — SIGNIFICANT CHANGE UP (ref 3.8–5.2)
RBC # BLD: 4.58 M/UL — SIGNIFICANT CHANGE UP (ref 3.8–5.2)
RBC # FLD: 14.2 % — SIGNIFICANT CHANGE UP (ref 10.3–14.5)
RBC # FLD: 14.5 % — SIGNIFICANT CHANGE UP (ref 10.3–14.5)
S AUREUS DNA NOSE QL NAA+PROBE: SIGNIFICANT CHANGE UP
SARS-COV-2 RNA SPEC QL NAA+PROBE: SIGNIFICANT CHANGE UP
SARS-COV-2 RNA SPEC QL NAA+PROBE: SIGNIFICANT CHANGE UP
SODIUM SERPL-SCNC: 136 MMOL/L — SIGNIFICANT CHANGE UP (ref 135–145)
SODIUM SERPL-SCNC: 137 MMOL/L — SIGNIFICANT CHANGE UP (ref 135–145)
SODIUM SERPL-SCNC: 138 MMOL/L — SIGNIFICANT CHANGE UP (ref 135–145)
SPECIMEN SOURCE: SIGNIFICANT CHANGE UP
SPECIMEN SOURCE: SIGNIFICANT CHANGE UP
TROPONIN T, HIGH SENSITIVITY RESULT: 18 NG/L — SIGNIFICANT CHANGE UP (ref 0–51)
TROPONIN T, HIGH SENSITIVITY RESULT: 26 NG/L — SIGNIFICANT CHANGE UP (ref 0–51)
TROPONIN T, HIGH SENSITIVITY RESULT: 90 NG/L — HIGH (ref 0–51)
TSH SERPL-MCNC: 1.75 UIU/ML — SIGNIFICANT CHANGE UP (ref 0.27–4.2)
WBC # BLD: 4.29 K/UL — SIGNIFICANT CHANGE UP (ref 3.8–10.5)
WBC # BLD: 4.68 K/UL — SIGNIFICANT CHANGE UP (ref 3.8–10.5)
WBC # FLD AUTO: 4.29 K/UL — SIGNIFICANT CHANGE UP (ref 3.8–10.5)
WBC # FLD AUTO: 4.68 K/UL — SIGNIFICANT CHANGE UP (ref 3.8–10.5)

## 2021-01-23 PROCEDURE — 99291 CRITICAL CARE FIRST HOUR: CPT

## 2021-01-23 PROCEDURE — 70450 CT HEAD/BRAIN W/O DYE: CPT | Mod: 26

## 2021-01-23 PROCEDURE — 71045 X-RAY EXAM CHEST 1 VIEW: CPT | Mod: 26

## 2021-01-23 PROCEDURE — 93010 ELECTROCARDIOGRAM REPORT: CPT

## 2021-01-23 PROCEDURE — 99233 SBSQ HOSP IP/OBS HIGH 50: CPT | Mod: GC

## 2021-01-23 PROCEDURE — 74177 CT ABD & PELVIS W/CONTRAST: CPT | Mod: 26

## 2021-01-23 RX ORDER — METOPROLOL TARTRATE 50 MG
12.5 TABLET ORAL EVERY 8 HOURS
Refills: 0 | Status: DISCONTINUED | OUTPATIENT
Start: 2021-01-23 | End: 2021-02-05

## 2021-01-23 RX ORDER — SENNA PLUS 8.6 MG/1
2 TABLET ORAL AT BEDTIME
Refills: 0 | Status: DISCONTINUED | OUTPATIENT
Start: 2021-01-23 | End: 2021-01-23

## 2021-01-23 RX ORDER — INSULIN LISPRO 100/ML
VIAL (ML) SUBCUTANEOUS EVERY 6 HOURS
Refills: 0 | Status: DISCONTINUED | OUTPATIENT
Start: 2021-01-23 | End: 2021-01-25

## 2021-01-23 RX ORDER — POLYETHYLENE GLYCOL 3350 17 G/17G
17 POWDER, FOR SOLUTION ORAL
Refills: 0 | Status: DISCONTINUED | OUTPATIENT
Start: 2021-01-23 | End: 2021-01-23

## 2021-01-23 RX ORDER — PIPERACILLIN AND TAZOBACTAM 4; .5 G/20ML; G/20ML
3.38 INJECTION, POWDER, LYOPHILIZED, FOR SOLUTION INTRAVENOUS EVERY 8 HOURS
Refills: 0 | Status: DISCONTINUED | OUTPATIENT
Start: 2021-01-23 | End: 2021-01-26

## 2021-01-23 RX ORDER — ENOXAPARIN SODIUM 100 MG/ML
40 INJECTION SUBCUTANEOUS DAILY
Refills: 0 | Status: DISCONTINUED | OUTPATIENT
Start: 2021-01-23 | End: 2021-01-23

## 2021-01-23 RX ORDER — VANCOMYCIN HCL 1 G
1000 VIAL (EA) INTRAVENOUS EVERY 24 HOURS
Refills: 0 | Status: DISCONTINUED | OUTPATIENT
Start: 2021-01-23 | End: 2021-01-26

## 2021-01-23 RX ORDER — POLYETHYLENE GLYCOL 3350 17 G/17G
17 POWDER, FOR SOLUTION ORAL
Refills: 0 | Status: DISCONTINUED | OUTPATIENT
Start: 2021-01-23 | End: 2021-01-31

## 2021-01-23 RX ORDER — SENNA PLUS 8.6 MG/1
2 TABLET ORAL AT BEDTIME
Refills: 0 | Status: DISCONTINUED | OUTPATIENT
Start: 2021-01-23 | End: 2021-01-31

## 2021-01-23 RX ORDER — SODIUM CHLORIDE 9 MG/ML
1000 INJECTION, SOLUTION INTRAVENOUS
Refills: 0 | Status: DISCONTINUED | OUTPATIENT
Start: 2021-01-23 | End: 2021-01-24

## 2021-01-23 RX ORDER — AZITHROMYCIN 500 MG/1
500 TABLET, FILM COATED ORAL ONCE
Refills: 0 | Status: COMPLETED | OUTPATIENT
Start: 2021-01-23 | End: 2021-01-23

## 2021-01-23 RX ORDER — RIVAROXABAN 15 MG-20MG
15 KIT ORAL
Refills: 0 | Status: DISCONTINUED | OUTPATIENT
Start: 2021-01-23 | End: 2021-02-05

## 2021-01-23 RX ORDER — AZITHROMYCIN 500 MG/1
TABLET, FILM COATED ORAL
Refills: 0 | Status: DISCONTINUED | OUTPATIENT
Start: 2021-01-23 | End: 2021-01-23

## 2021-01-23 RX ORDER — POLYETHYLENE GLYCOL 3350 17 G/17G
17 POWDER, FOR SOLUTION ORAL DAILY
Refills: 0 | Status: DISCONTINUED | OUTPATIENT
Start: 2021-01-23 | End: 2021-01-23

## 2021-01-23 RX ORDER — SODIUM CHLORIDE 9 MG/ML
1000 INJECTION INTRAMUSCULAR; INTRAVENOUS; SUBCUTANEOUS ONCE
Refills: 0 | Status: COMPLETED | OUTPATIENT
Start: 2021-01-23 | End: 2021-01-23

## 2021-01-23 RX ADMIN — PIPERACILLIN AND TAZOBACTAM 25 GRAM(S): 4; .5 INJECTION, POWDER, LYOPHILIZED, FOR SOLUTION INTRAVENOUS at 10:22

## 2021-01-23 RX ADMIN — Medication 10 MILLIGRAM(S): at 06:00

## 2021-01-23 RX ADMIN — SENNA PLUS 2 TABLET(S): 8.6 TABLET ORAL at 22:05

## 2021-01-23 RX ADMIN — POLYETHYLENE GLYCOL 3350 17 GRAM(S): 17 POWDER, FOR SOLUTION ORAL at 18:24

## 2021-01-23 RX ADMIN — PIPERACILLIN AND TAZOBACTAM 25 GRAM(S): 4; .5 INJECTION, POWDER, LYOPHILIZED, FOR SOLUTION INTRAVENOUS at 03:07

## 2021-01-23 RX ADMIN — PIPERACILLIN AND TAZOBACTAM 25 GRAM(S): 4; .5 INJECTION, POWDER, LYOPHILIZED, FOR SOLUTION INTRAVENOUS at 18:28

## 2021-01-23 RX ADMIN — RIVAROXABAN 15 MILLIGRAM(S): KIT at 18:28

## 2021-01-23 RX ADMIN — AZITHROMYCIN 500 MILLIGRAM(S): 500 TABLET, FILM COATED ORAL at 02:04

## 2021-01-23 RX ADMIN — SODIUM CHLORIDE 333.33 MILLILITER(S): 9 INJECTION INTRAMUSCULAR; INTRAVENOUS; SUBCUTANEOUS at 08:09

## 2021-01-23 RX ADMIN — Medication 250 MILLIGRAM(S): at 22:05

## 2021-01-23 RX ADMIN — SODIUM CHLORIDE 75 MILLILITER(S): 9 INJECTION, SOLUTION INTRAVENOUS at 18:58

## 2021-01-23 RX ADMIN — Medication 12.5 MILLIGRAM(S): at 22:05

## 2021-01-23 NOTE — PROGRESS NOTE ADULT - ASSESSMENT
79y/o F w/ h/o dementia (baseline AAOx1), T2DM, Afib (on Xeralto), HTN p/w AMS, found to be hypothermic to 88, w/ possible TIA at home, CXR showing possible LLL atelectasis vs PNA, hypotensive s/p warming blanket requiring levo, admitted to MICU for further monitoring.      NEURO  #TIA  - Questionable TIA episode at home   - AxOx1 at baseline, now more nonverbal but responding to few commands  - f/u CT head  - Aspiration precautions    RESPIRATORY  #LML opacity  - concerning for possible PNA vs atelectasis  - saturating well on RA  - Monitor on zosyn, azithro  - f/u urine legionella    CARDIO  #Shock  - Hypotensive possibly 2/2 vasodilatory effects from warming from profound hypothermia but also 2/2 to sepsis vs hypothyroidism, adrenal insufficiency  - c/w warming until temperature normalized  - Titrate down on levo  - Monitor on broad spectrum abx  - f/u TSH, f/u Cortisol  #Atrial Fibrillation  - currently rate controlled, in Afib  - C/w Xarelto  - hold metoprolol in setting of hypotension    GI  - Aspiration precaution  - Speech and swallow eval  - Stercoral colitis noted on CT Abd/pelvis, s/p manual disimpaction  - Will give suppository and enema from below. Stool softeners from above  - NPO except meds for now    RENAL  - GFR low for weight and age possibly 2/2 to shock state but voiding appropriately for now  - Trend BMP and urine outputs    ENDO  - H/o T2DM  - Monitor on ISS    ID  #Sepsis  - Concern for sepsis 2/2 to possible Asp PNA in setting of recent ?TIA, vs stercoral colitis given high stool burden in CTAP  - Monitor on zosyn for now, azithro until legionella neg  - f/u Legionella Ag  - f/u RVP  - encourage bowel regimen w/ miralax, senna, dulculax prn for constipation  - f/u Bcx, Ucx,    HEME  - no active issues for now  - lovenox sqd    GOC  - Full Code, spoke w/ son

## 2021-01-23 NOTE — H&P ADULT - NSHPPHYSICALEXAM_GEN_ALL_CORE
Vital Signs (24 Hrs):  T(C): 35.1 (01-23-21 @ 00:08), Max: 35.1 (01-23-21 @ 00:08)  HR: 78 (01-23-21 @ 00:08) (53 - 78)  BP: 89/60 (01-23-21 @ 00:08) (60/44 - 126/80)  RR: 12 (01-23-21 @ 00:08) (12 - 25)  SpO2: 98% (01-23-21 @ 00:08) (96% - 100%)  Wt(kg): --  PHYSICAL EXAM:  GENERAL: NAD, lying in bed comfortably  HEAD:  Atraumatic, Normocephalic  EYES: EOMI, PERRLA, conjunctiva and sclera clear  ENT: Moist mucous membranes  NECK: Supple, No JVD  CHEST/LUNG: Clear to auscultation bilaterally; No rales, rhonchi, wheezing, or rubs. Unlabored respirations  HEART: Regular rate and rhythm; No murmurs, rubs, or gallops  ABDOMEN: Bowel sounds present; Soft, Nontender, Nondistended   EXTREMITIES:  2+ Peripheral Pulses, brisk capillary refill. No clubbing, cyanosis, or edema  NERVOUS SYSTEM:  AxO x0, but responsive to vocal stimuli, follows some commands   MSK: FROM all 4 extremities, full and equal strength  SKIN: No rashes or lesions

## 2021-01-23 NOTE — PROGRESS NOTE ADULT - PROBLEM SELECTOR PLAN 1
- pt presented with hypothermia and tachycardia with hypotension unresponsive to fluids which required brief use of pressors w/ LORIE and elevated lactate  - likely 2/2 to aspiration PNA (as seen on x-ray) vs stercoral colitis (as seen on CTAP)  - s/p vancomycin and azithromycin  - currently on zosyn  - f/u Bcx/Ucx

## 2021-01-23 NOTE — PROGRESS NOTE ADULT - PROBLEM SELECTOR PLAN 5
- pt w/ hx of atrial fibrillation w/ xarelto at home  - c/w xarelto   - start on 12.5mg lopressor bid - pt w/ hx of atrial fibrillation w/ xarelto at home  - c/w xarelto   - start on 12.5mg lopressor tid

## 2021-01-23 NOTE — CHART NOTE - NSCHARTNOTEFT_GEN_A_CORE
[facility] MICU TRANSFER NOTE    Please refer to MICU transfer note for full details.    Briefly, this is a 80-yo F w/ PMH of dementia (baseline A&Ox1), T2DM, Afib on Xarelto, and HTN, p/w AMS, found to have hypothermia and hypotension, admitted to MICU. S/p IVF for SBP 60s. Started on Levo. Found to have stercoral colitis on CTAP, s/p manual disimpaction. S/p vanc/Zosyn, currently on Zosyn only. Pressor was weaned off. Had 2 BMs today. Currently hemodynamically stable for floor transfer.      Vital Signs Last 24 Hrs  T(C): 36.9 (23 Jan 2021 12:00), Max: 36.9 (23 Jan 2021 01:40)  T(F): 98.4 (23 Jan 2021 12:00), Max: 98.5 (23 Jan 2021 01:40)  HR: 99 (23 Jan 2021 15:00) (53 - 123)  BP: 126/57 (23 Jan 2021 15:00) (60/44 - 171/75)  BP(mean): 82 (23 Jan 2021 15:00) (7 - 108)  RR: 13 (23 Jan 2021 15:00) (7 - 25)  SpO2: 98% (23 Jan 2021 15:00) (96% - 100%)  I&O's Summary    22 Jan 2021 07:01  -  23 Jan 2021 07:00  --------------------------------------------------------  IN: 410 mL / OUT: 155 mL / NET: 255 mL    23 Jan 2021 07:01  -  23 Jan 2021 15:33  --------------------------------------------------------  IN: 1228 mL / OUT: 235 mL / NET: 993 mL      Allergies    No Known Allergies    Intolerances      MEDICATIONS  (STANDING):  insulin lispro (ADMELOG) corrective regimen sliding scale   SubCutaneous every 6 hours  metoprolol tartrate 12.5 milliGRAM(s) Oral every 8 hours  piperacillin/tazobactam IVPB.. 3.375 Gram(s) IV Intermittent every 8 hours  polyethylene glycol 3350 17 Gram(s) Oral two times a day  rivaroxaban 15 milliGRAM(s) Oral with dinner  senna 2 Tablet(s) Oral at bedtime    MEDICATIONS  (PRN):  bisacodyl Suppository 10 milliGRAM(s) Rectal daily PRN Constipation        CARDIAC MARKERS ( 23 Jan 2021 03:03 )  x     / x     / 106 U/L / x     / 6.7 ng/mL                            12.9   4.68  )-----------( 113      ( 23 Jan 2021 10:51 )             36.2     01-23    138  |  103  |  22  ----------------------------<  137<H>  5.0   |  24  |  1.55<H>    Ca    8.4      23 Jan 2021 10:51  Phos  2.6     01-23  Mg     1.6     01-23    TPro  8.0  /  Alb  4.0  /  TBili  0.5  /  DBili  x   /  AST  51<H>  /  ALT  43  /  AlkPhos  123<H>  01-22    PT/INR - ( 23 Jan 2021 03:03 )   PT: 21.8 sec;   INR: 1.87 ratio         PTT - ( 23 Jan 2021 03:03 )  PTT:47.2 sec        ASSESSMENT & PLAN:   80-yo F w/ PMH of dementia (baseline A&Ox1), T2DM, Afib on Xarelto, and HTN, p/w AMS, found to have hypothermia and hypotension, admitted to MICU, s/p weaned off IV pressors, found to have stercoral colitis on CTAP, s/p manual disimpaction, LML opacity on CXR, currently on Zosyn.    NEURO  #TIA  - Questionable TIA episode at home   - AxOx1 at baseline, now more nonverbal but responding to few commands  - f/u CT head  - Aspiration precautions    RESPIRATORY  #LML opacity  - concerning for possible PNA vs atelectasis  - saturating well on RA  - Monitor on zosyn     CARDIO  #Shock  - Hypotensive possibly 2/2 vasodilatory effects from warming from profound hypothermia but also 2/2 to sepsis vs hypothyroidism, adrenal insufficiency  - c/w warming until temperature normalized  - Titrate down on levo  - Monitor on broad spectrum abx  - f/u TSH, f/u Cortisol  #Atrial Fibrillation  - currently rate controlled, in Afib  - BB restarted  - C/w Xarelto    GI  - Aspiration precaution  - Speech and swallow eval  - Stercoral colitis noted on CT Abd/pelvis, s/p manual disimpaction  - Will give suppository and enema from below. Stool softeners from above  - NPO except meds for now, NGT placed    RENAL  - GFR low for weight and age possibly 2/2 to shock state but voiding appropriately for now  - Trend BMP and urine outputs    ENDO  - H/o T2DM  - Monitor on ISS    ID  #Sepsis  - Concern for sepsis 2/2 to possible Asp PNA in setting of recent ?TIA, vs stercoral colitis given high stool burden in CTAP  - Monitor on zosyn for now, azithro until legionella neg  - encourage bowel regimen w/ miralax, senna, dulcolax prn for constipation  - f/u Bcx, Ucx    HEME  - lovenox sqd    GOC  - Full Code, spoke w/ son    FOR FOLLOW UP:  [ ] aggressive bowel regimen  [ ] c/w zosyn.  [ ] F/u BCx, UCx, COVID, MRSA swab  [ ] F/u AM cortisol. If low, may pursue AI workup. [facility] MICU TRANSFER NOTE    Please refer to MICU transfer note for full details.    Briefly, this is a 80-yo F w/ PMH of dementia (baseline A&Ox1), T2DM, Afib on Xarelto, and HTN, p/w AMS, found to have hypothermia and hypotension, admitted to MICU. S/p IVF for SBP 60s. Started on Levo. Found to have stercoral colitis on CTAP, s/p manual disimpaction. S/p vanc/Zosyn, currently on Zosyn only. Pressor was weaned off. Had 2 BMs today. Currently hemodynamically stable for floor transfer.      Vital Signs Last 24 Hrs  T(C): 36.9 (23 Jan 2021 12:00), Max: 36.9 (23 Jan 2021 01:40)  T(F): 98.4 (23 Jan 2021 12:00), Max: 98.5 (23 Jan 2021 01:40)  HR: 99 (23 Jan 2021 15:00) (53 - 123)  BP: 126/57 (23 Jan 2021 15:00) (60/44 - 171/75)  BP(mean): 82 (23 Jan 2021 15:00) (7 - 108)  RR: 13 (23 Jan 2021 15:00) (7 - 25)  SpO2: 98% (23 Jan 2021 15:00) (96% - 100%)  I&O's Summary    22 Jan 2021 07:01  -  23 Jan 2021 07:00  --------------------------------------------------------  IN: 410 mL / OUT: 155 mL / NET: 255 mL    23 Jan 2021 07:01  -  23 Jan 2021 15:33  --------------------------------------------------------  IN: 1228 mL / OUT: 235 mL / NET: 993 mL      Allergies    No Known Allergies    Intolerances      MEDICATIONS  (STANDING):  insulin lispro (ADMELOG) corrective regimen sliding scale   SubCutaneous every 6 hours  metoprolol tartrate 12.5 milliGRAM(s) Oral every 8 hours  piperacillin/tazobactam IVPB.. 3.375 Gram(s) IV Intermittent every 8 hours  polyethylene glycol 3350 17 Gram(s) Oral two times a day  rivaroxaban 15 milliGRAM(s) Oral with dinner  senna 2 Tablet(s) Oral at bedtime    MEDICATIONS  (PRN):  bisacodyl Suppository 10 milliGRAM(s) Rectal daily PRN Constipation        CARDIAC MARKERS ( 23 Jan 2021 03:03 )  x     / x     / 106 U/L / x     / 6.7 ng/mL                            12.9   4.68  )-----------( 113      ( 23 Jan 2021 10:51 )             36.2     01-23    138  |  103  |  22  ----------------------------<  137<H>  5.0   |  24  |  1.55<H>    Ca    8.4      23 Jan 2021 10:51  Phos  2.6     01-23  Mg     1.6     01-23    TPro  8.0  /  Alb  4.0  /  TBili  0.5  /  DBili  x   /  AST  51<H>  /  ALT  43  /  AlkPhos  123<H>  01-22    PT/INR - ( 23 Jan 2021 03:03 )   PT: 21.8 sec;   INR: 1.87 ratio         PTT - ( 23 Jan 2021 03:03 )  PTT:47.2 sec        ASSESSMENT & PLAN:   80-yo F w/ PMH of dementia (baseline A&Ox1), T2DM, Afib on Xarelto, and HTN, p/w AMS, found to have hypothermia and hypotension, admitted to MICU, s/p weaned off IV pressors, found to have stercoral colitis on CTAP, s/p manual disimpaction, LML opacity on CXR, currently on Zosyn.    NEURO  #TIA  - Questionable TIA episode at home   - AxOx1 at baseline, now more nonverbal but responding to few commands  - f/u CT head  - Aspiration precautions    RESPIRATORY  #LML opacity  - concerning for possible PNA vs atelectasis  - saturating well on RA  - Monitor on zosyn     CARDIO  #Shock  - Hypotensive possibly 2/2 vasodilatory effects from warming from profound hypothermia but also 2/2 to sepsis vs hypothyroidism, adrenal insufficiency  - c/w warming until temperature normalized  - Titrate down on levo  - Monitor on broad spectrum abx  - f/u TSH, f/u Cortisol  #Atrial Fibrillation  - currently rate controlled, in Afib  - BB restarted  - C/w Xarelto    GI  - Aspiration precaution  - Speech and swallow eval  - Stercoral colitis noted on CT Abd/pelvis, s/p manual disimpaction  - Will give suppository and enema from below. Stool softeners from above  - NPO except meds for now, NGT placed    RENAL  - GFR low for weight and age possibly 2/2 to shock state but voiding appropriately for now  - Trend BMP and urine outputs    ENDO  - H/o T2DM  - Monitor on ISS    ID  #Sepsis  - Concern for sepsis 2/2 to possible Asp PNA in setting of recent ?TIA, vs stercoral colitis given high stool burden in CTAP  - Monitor on zosyn for now, azithro until legionella neg  - encourage bowel regimen w/ miralax, senna, dulcolax prn for constipation  - f/u Bcx, Ucx    HEME  - Xarelto via NGT    GOC  - Full Code, spoke w/ son    FOR FOLLOW UP:  [ ] aggressive bowel regimen  [ ] c/w zosyn.  [ ] F/u BCx, UCx, COVID, MRSA swab  [ ] F/u AM cortisol. If low, may pursue AI workup. [facility] MICU TRANSFER NOTE    Please refer to MICU transfer note for full details.    Briefly, this is a 80-yo F w/ PMH of dementia (baseline A&Ox1), T2DM, Afib on Xarelto, and HTN, p/w AMS, found to have hypothermia and hypotension, admitted to MICU. S/p IVF for SBP 60s. Started on Levo. Found to have stercoral colitis on CTAP, s/p manual disimpaction. CXR w/ LML opacity - infection vs. atelectasis. S/p vanc/Zosyn, currently on Zosyn only. Pressor was weaned off. Had 2 BMs today. Currently hemodynamically stable for floor transfer.      Vital Signs Last 24 Hrs  T(C): 36.9 (23 Jan 2021 12:00), Max: 36.9 (23 Jan 2021 01:40)  T(F): 98.4 (23 Jan 2021 12:00), Max: 98.5 (23 Jan 2021 01:40)  HR: 99 (23 Jan 2021 15:00) (53 - 123)  BP: 126/57 (23 Jan 2021 15:00) (60/44 - 171/75)  BP(mean): 82 (23 Jan 2021 15:00) (7 - 108)  RR: 13 (23 Jan 2021 15:00) (7 - 25)  SpO2: 98% (23 Jan 2021 15:00) (96% - 100%)  I&O's Summary    22 Jan 2021 07:01  -  23 Jan 2021 07:00  --------------------------------------------------------  IN: 410 mL / OUT: 155 mL / NET: 255 mL    23 Jan 2021 07:01  -  23 Jan 2021 15:33  --------------------------------------------------------  IN: 1228 mL / OUT: 235 mL / NET: 993 mL      Allergies    No Known Allergies    Intolerances      MEDICATIONS  (STANDING):  insulin lispro (ADMELOG) corrective regimen sliding scale   SubCutaneous every 6 hours  metoprolol tartrate 12.5 milliGRAM(s) Oral every 8 hours  piperacillin/tazobactam IVPB.. 3.375 Gram(s) IV Intermittent every 8 hours  polyethylene glycol 3350 17 Gram(s) Oral two times a day  rivaroxaban 15 milliGRAM(s) Oral with dinner  senna 2 Tablet(s) Oral at bedtime    MEDICATIONS  (PRN):  bisacodyl Suppository 10 milliGRAM(s) Rectal daily PRN Constipation        CARDIAC MARKERS ( 23 Jan 2021 03:03 )  x     / x     / 106 U/L / x     / 6.7 ng/mL                            12.9   4.68  )-----------( 113      ( 23 Jan 2021 10:51 )             36.2     01-23    138  |  103  |  22  ----------------------------<  137<H>  5.0   |  24  |  1.55<H>    Ca    8.4      23 Jan 2021 10:51  Phos  2.6     01-23  Mg     1.6     01-23    TPro  8.0  /  Alb  4.0  /  TBili  0.5  /  DBili  x   /  AST  51<H>  /  ALT  43  /  AlkPhos  123<H>  01-22    PT/INR - ( 23 Jan 2021 03:03 )   PT: 21.8 sec;   INR: 1.87 ratio         PTT - ( 23 Jan 2021 03:03 )  PTT:47.2 sec        ASSESSMENT & PLAN:   80-yo F w/ PMH of dementia (baseline A&Ox1), T2DM, Afib on Xarelto, and HTN, p/w AMS, found to have hypothermia and hypotension, admitted to MICU, s/p weaned off IV pressors, found to have stercoral colitis on CTAP, s/p manual disimpaction, LML opacity on CXR, currently on Zosyn.    NEURO  #TIA  - Questionable TIA episode at home   - AxOx1 at baseline, now more nonverbal but responding to few commands  - f/u CT head  - Aspiration precautions    RESPIRATORY  #LML opacity  - concerning for possible PNA vs atelectasis  - saturating well on RA  - Monitor on zosyn     CARDIO  #Shock  - Hypotensive possibly 2/2 vasodilatory effects from warming from profound hypothermia but also 2/2 to sepsis vs hypothyroidism, adrenal insufficiency  - c/w warming until temperature normalized  - Titrate down on levo  - Monitor on broad spectrum abx  - f/u TSH, f/u Cortisol  #Atrial Fibrillation  - currently rate controlled, in Afib  - BB restarted  - C/w Xarelto    GI  - Aspiration precaution  - Speech and swallow eval  - Stercoral colitis noted on CT Abd/pelvis, s/p manual disimpaction  - Will give suppository and enema from below. Stool softeners from above  - NPO except meds for now, NGT placed    RENAL  - GFR low for weight and age possibly 2/2 to shock state but voiding appropriately for now  - Trend BMP and urine outputs    ENDO  - H/o T2DM  - Monitor on ISS    ID  #Sepsis  - Concern for sepsis 2/2 to possible Asp PNA in setting of recent ?TIA, vs stercoral colitis given high stool burden in CTAP  - Monitor on zosyn for now, azithro until legionella neg  - encourage bowel regimen w/ miralax, senna, dulcolax prn for constipation  - f/u Bcx, Ucx    HEME  - Xarelto via NGT    GOC  - Full Code, spoke w/ son    FOR FOLLOW UP:  [ ] Aggressive bowel regimen  [ ] C/w zosyn.  [ ] F/u BCx, UCx, COVID, MRSA swab, urine legionella  [ ] F/u AM cortisol. If low, may pursue AI workup.

## 2021-01-23 NOTE — H&P ADULT - ASSESSMENT
81y/o F w/ h/o dementia (baseline AAOx1), T2DM, Afib (on Xeralto), HTN p/w AMS, found to be hypothermic to 88, w/ possible TIA at home, CXR showing possible L middle lobe atelectasis vs PNA, hypotensive s/p warming blanket requiring levo, admitted to MICU for further monitoring.      NEURO  #TIA  - Questionable TIA episode at home   - AxOx1 at baseline, now more nonverbal but responding to few commands  - f/u CT head  - Aspiration precautions    RESPIRATORY  #LML opacity  - concerning for possible PNA vs atelectasis  - saturating well on RA  - Monitor on zosyn    CARDIO  #Shock  - Hypotensive possibly 2/2 vasodilatory effects from warming from profound hypothermia but also 2/2 to sepsis vs hypothyroidism, adrenal insufficiency  - c/w warming until temperature normalized  - Titrate down on levo  - Monitor on broad spectrum abx  - f/u TSH, f/u Cortisol  #Atrial Fibrillation  - currently rate controlled, in Afib  - C/w Xarelto  - hold metoprolol in setting of hypotension    GI  - Aspiration precaution  - Speech and swallow eval  - NPO except meds for now    RENAL  - GFR low for weight and age possibly 2/2 to shock state but voiding appropriately for now  - Trend BMP and urine outputs    ENDO  - H/o T2DM  - Monitor on ISS    ID  #Sepsis  - Concern for sepsis 2/2 to possible Asp PNA in setting of recent ?TIA  - Monitor on zosyn for now, azithro until legionella neg  - f/u Legionella Ag  - f/u RVP  - f/u CTAP for other infectious sources    HEME  - no active issues for now    GOC  - Full Code, spoke w/ son         79y/o F w/ h/o dementia (baseline AAOx1), T2DM, Afib (on Xeralto), HTN p/w AMS, found to be hypothermic to 88, w/ possible TIA at home, CXR showing possible L middle lobe atelectasis vs PNA, hypotensive s/p warming blanket requiring levo, admitted to MICU for further monitoring.      NEURO  #TIA  - Questionable TIA episode at home   - AxOx1 at baseline, now more nonverbal but responding to few commands  - f/u CT head  - Aspiration precautions    RESPIRATORY  #LML opacity  - concerning for possible PNA vs atelectasis  - saturating well on RA  - Monitor on zosyn    CARDIO  #Shock  - Hypotensive possibly 2/2 vasodilatory effects from warming from profound hypothermia but also 2/2 to sepsis vs hypothyroidism, adrenal insufficiency  - c/w warming until temperature normalized  - Titrate down on levo  - Monitor on broad spectrum abx  - f/u TSH, f/u Cortisol  #Atrial Fibrillation  - currently rate controlled, in Afib  - C/w Xarelto  - hold metoprolol in setting of hypotension    GI  - Aspiration precaution  - Speech and swallow eval  - NPO except meds for now    RENAL  - GFR low for weight and age possibly 2/2 to shock state but voiding appropriately for now  - Trend BMP and urine outputs    ENDO  - H/o T2DM  - Monitor on ISS    ID  #Sepsis  - Concern for sepsis 2/2 to possible Asp PNA in setting of recent ?TIA  - Monitor on zosyn for now, azithro until legionella neg  - f/u Legionella Ag  - f/u RVP  - f/u CTAP for other infectious sources  - f/u Bcx, Ucx, procal    HEME  - no active issues for now    GOC  - Full Code, spoke w/ son         81y/o F w/ h/o dementia (baseline AAOx1), T2DM, Afib (on Xeralto), HTN p/w AMS, found to be hypothermic to 88, w/ possible TIA at home, CXR showing possible L middle lobe atelectasis vs PNA, hypotensive s/p warming blanket requiring levo, admitted to MICU for further monitoring.      NEURO  #TIA  - Questionable TIA episode at home   - AxOx1 at baseline, now more nonverbal but responding to few commands  - f/u CT head  - Aspiration precautions    RESPIRATORY  #LML opacity  - concerning for possible PNA vs atelectasis  - saturating well on RA  - Monitor on zosyn    CARDIO  #Shock  - Hypotensive possibly 2/2 vasodilatory effects from warming from profound hypothermia but also 2/2 to sepsis vs hypothyroidism, adrenal insufficiency  - c/w warming until temperature normalized  - Titrate down on levo  - Monitor on broad spectrum abx  - f/u TSH, f/u Cortisol  #Atrial Fibrillation  - currently rate controlled, in Afib  - C/w Xarelto  - hold metoprolol in setting of hypotension    GI  - Aspiration precaution  - Speech and swallow eval  - NPO except meds for now    RENAL  - GFR low for weight and age possibly 2/2 to shock state but voiding appropriately for now  - Trend BMP and urine outputs    ENDO  - H/o T2DM  - Monitor on ISS    ID  #Sepsis  - Concern for sepsis 2/2 to possible Asp PNA in setting of recent ?TIA, vs sterocolitis given high stool burden in CTAP  - Monitor on zosyn for now, azithro until legionella neg  - f/u Legionella Ag  - f/u RVP  - encourage bowel regimen w/ miralax, senna, dulculax prn for constipation  - f/u Bcx, Ucx,    HEME  - no active issues for now    GOC  - Full Code, spoke w/ son         81y/o F w/ h/o dementia (baseline AAOx1), T2DM, Afib (on Xeralto), HTN p/w AMS, found to be hypothermic to 88, w/ possible TIA at home, CXR showing possible L middle lobe atelectasis vs PNA, hypotensive s/p warming blanket requiring levo, admitted to MICU for further monitoring.      NEURO  #TIA  - Questionable TIA episode at home   - AxOx1 at baseline, now more nonverbal but responding to few commands  - f/u CT head  - Aspiration precautions    RESPIRATORY  #LML opacity  - concerning for possible PNA vs atelectasis  - saturating well on RA  - Monitor on zosyn    CARDIO  #Shock  - Hypotensive possibly 2/2 vasodilatory effects from warming from profound hypothermia but also 2/2 to sepsis vs hypothyroidism, adrenal insufficiency  - c/w warming until temperature normalized  - Titrate down on levo  - Monitor on broad spectrum abx  - f/u TSH, f/u Cortisol  #Atrial Fibrillation  - currently rate controlled, in Afib  - C/w Xarelto  - hold metoprolol in setting of hypotension    GI  - Aspiration precaution  - Speech and swallow eval  - Stercoral colitis noted on CT Abd/pelvis, s/p manual disimpaction  - Will give suppository and enema from below. Stool softeners from above  - NPO except meds for now    RENAL  - GFR low for weight and age possibly 2/2 to shock state but voiding appropriately for now  - Trend BMP and urine outputs    ENDO  - H/o T2DM  - Monitor on ISS    ID  #Sepsis  - Concern for sepsis 2/2 to possible Asp PNA in setting of recent ?TIA, vs sterocolitis given high stool burden in CTAP  - Monitor on zosyn for now, azithro until legionella neg  - f/u Legionella Ag  - f/u RVP  - encourage bowel regimen w/ miralax, senna, dulculax prn for constipation  - f/u Bcx, Ucx,    HEME  - no active issues for now  - lovenox sqd    GOC  - Full Code, spoke w/ son         81y/o F w/ h/o dementia (baseline AAOx1), T2DM, Afib (on Xeralto), HTN p/w AMS, found to be hypothermic to 88, w/ possible TIA at home, CXR showing possible LLL atelectasis vs PNA, hypotensive s/p warming blanket requiring levo, admitted to MICU for further monitoring.      NEURO  #TIA  - Questionable TIA episode at home   - AxOx1 at baseline, now more nonverbal but responding to few commands  - f/u CT head  - Aspiration precautions    RESPIRATORY  #LML opacity  - concerning for possible PNA vs atelectasis  - saturating well on RA  - Monitor on zosyn, azithro    CARDIO  #Shock  - Hypotensive possibly 2/2 vasodilatory effects from warming from profound hypothermia but also 2/2 to sepsis vs hypothyroidism, adrenal insufficiency  - c/w warming until temperature normalized  - Titrate down on levo  - Monitor on broad spectrum abx  - f/u TSH, f/u Cortisol  #Atrial Fibrillation  - currently rate controlled, in Afib  - C/w Xarelto  - hold metoprolol in setting of hypotension    GI  - Aspiration precaution  - Speech and swallow eval  - Stercoral colitis noted on CT Abd/pelvis, s/p manual disimpaction  - Will give suppository and enema from below. Stool softeners from above  - NPO except meds for now    RENAL  - GFR low for weight and age possibly 2/2 to shock state but voiding appropriately for now  - Trend BMP and urine outputs    ENDO  - H/o T2DM  - Monitor on ISS    ID  #Sepsis  - Concern for sepsis 2/2 to possible Asp PNA in setting of recent ?TIA, vs stercoral colitis given high stool burden in CTAP  - Monitor on zosyn for now, azithro until legionella neg  - f/u Legionella Ag  - f/u RVP  - encourage bowel regimen w/ miralax, senna, dulculax prn for constipation  - f/u Bcx, Ucx,    HEME  - no active issues for now  - lovenox sqd    GOC  - Full Code, spoke w/ son

## 2021-01-23 NOTE — PHYSICAL THERAPY INITIAL EVALUATION ADULT - ADDITIONAL COMMENTS
1/23 CT HEAD: Mild chronic microvascular changes without evidence of an acute transcortical infarction or hemorrhage.  XR CHEST: Linear opacity in the left lower medial lung may be atelectasis or pneumonia. 1/23 CT HEAD: Mild chronic microvascular changes without evidence of an acute transcortical infarction or hemorrhage.  XR CHEST: Linear opacity in the left lower medial lung may be atelectasis or pneumonia.    Pt is a poor historian as per CM note: Pt son states pt was semi-independent in ADLs prior to hospitalization.

## 2021-01-23 NOTE — H&P ADULT - NSHPSOCIALHISTORY_GEN_ALL_CORE
Denies smoking drinking, recreactional drug use, other toxic habits, lives w/ son at home, requires some assistance of ADLs Denies smoking drinking, recreational drug use, other toxic habits, lives w/ son at home, requires some assistance of ADLs

## 2021-01-23 NOTE — H&P ADULT - HISTORY OF PRESENT ILLNESS
79y/o F w/ h/o dementia (baseline AAOx1), T2DM, Afib (on Xeralto), HTN p/w AMS. Pt has h/o dementia    At the ED, pt was found to have rectal temp of 88, HR of 55, BP of 120/80, saturating well on RA, CBC and CMP wnl, lactate elevated to 2.8, pH 7.28. Pt started on warming blanket, BP dropped to 60s systolic, pt started on fluids, received 2.6L total, started on levo, s/p vanc and zosyn, Bcx drawn, UA negative. Pt admitted to MICU for further monitoring 81y/o F w/ h/o dementia (baseline AAOx1), T2DM, Afib (on Xarelto) and HTN p/w AMS. Pt has h/o dementia not on any medications. Per patient's son (Jose), she has had 2 week history of progressively worsening sedation and decreased PO intake. Son reports yesterday, lethargy increased and patient displayed garbled speech which prompted ED evaluation. She has endorsed chills to him at home. He denies her expressing shortness of breath, fevers, nausea, vomiting, diarrhea, dysuria and cough. She requires assistance with feeding and medications at home. She dresses and uses the restroom by herself.    At the ED, pt was found to have rectal temp of 88, HR of 55, BP of 120/80, saturating well on RA, CBC and CMP wnl, lactate elevated to 2.8, pH 7.28. Pt started on warming blanket, BP dropped to 60s systolic, pt started on fluids, received 2.6L total, started on levo, s/p vanc and zosyn, Bcx drawn, UA negative. Pt admitted to MICU for further monitoring

## 2021-01-23 NOTE — PHYSICAL THERAPY INITIAL EVALUATION ADULT - PRECAUTIONS/LIMITATIONS, REHAB EVAL
cardiac precautions/fall precautions/sternal precautions At the ED, pt was found to have rectal temp of 88, HR of 55, BP of 120/80, saturating well on RA, CBC and CMP wnl, lactate elevated to 2.8, pH 7.28. Pt started on warming blanket, BP dropped to 60s systolic, pt started on fluids, received 2.6L total, started on levo, s/p vanc and zosyn, Bcx drawn, UA negative. Pt admitted to MICU briefly, now transferred to floor. CXR 1/22: Linear opacity in the left lower medial lung may be atelectasis or pneumonia. CXR 1/23: The enteric tube tip and sidehole are in the stomach. CT Head 1/23: Mild chronic microvascular changes without evidence of an acute transcortical infarction or hemorrhage. CTAP 1/23: (-) bowel obstruction. Moderate distention of the rectum with stool with mild rectal wall thickening. Correlate for stercoral colitis./fall precautions At the ED, pt was found to have rectal temp of 88, HR of 55, BP of 120/80, saturating well on RA, CBC and CMP wnl, lactate elevated to 2.8, pH 7.28. Pt started on warming blanket, BP dropped to 60s systolic, pt started on fluids, received 2.6L total, started on levo, s/p vanc and zosyn, Bcx drawn, UA negative. Pt admitted to MICU briefly, now transferred to floor. CXR 1/22: Linear opacity in the left lower medial lung may be atelectasis or pneumonia. CXR 1/23: The enteric tube tip and sidehole are in the stomach. CT Head 1/23: Mild chronic microvascular changes without evidence of an acute transcortical infarction or hemorrhage. CTAP 1/23: (-) bowel obstruction. Moderate distention of the rectum with stool with mild rectal wall thickening. Correlate for stercoral colitis. MRI BRAIN: No acute/early subacute infarct or MRI evidence of blood products. No cerebral edema, mass effect, or evidence of a mass lesion. No abnormal intracranial enhancement.Minimal chronic ischemic changes in the frontoparietal white matter.MRA HEAD: Poor evaluation of the skull base carotid arteries and intradural vertebral arteries due to artifact. Otherwise, grossly patent proximal vessels, however tapering of the distal A2 segment of the right anterior cerebral artery at the level of the vessel bifurcation. 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 These findings should be further evaluated with CTA of the head/neck.MRA NECK:Poor evaluation of the cervical internal carotid arteries and proximal and distal cervical vertebral arteries secondary to artifact. Grossly patent cervical carotid arteries and V2 segments of the vertebral arteries. CTA of the head and neck should be considered to better evaluate the cervical vasculature/fall precautions

## 2021-01-23 NOTE — PROGRESS NOTE ADULT - ATTENDING COMMENTS
overall, 81y/o F w/ h/o dementia (baseline AAOx1), T2DM, Afib (on Xarelto), HTN p/w increasing lethargy x 2 weeks, at baseline able to walk independently, eat independently, daughter who is nurse found that her temperature was low, p/w acute encephalopathy with low temperature and some low Bps, briefly on levophed. CTH neg, CXR possible infiltrate in LLL. CT abd pelvis w stercoral colitis  overall, on my exam, patient does not follow commands, grimaces to noxious stimuli, is not answering questions.    acute metabolic v septic encephalopathy  sirs criteria on admission-> two possible sources- stercoral colitis ? asp pna- septic shock as was on pressors  c/w IV zosyn, follow up cultures  CTH neg, would get MRI head r/o CVA given a fib  c/w tx of ? asp PNA- zosyn IV  may need LP/ ID c/s if MRI is unrevealing  tsh wnl    LORIE on CKD stage III  martinez in place  CT no hydro  monitor ins and outs  NGT  IVF   monitor     rest of plan as above  prognosis is guarded  GOC--> full code- needs re eval in few days

## 2021-01-23 NOTE — PHYSICAL THERAPY INITIAL EVALUATION ADULT - PERTINENT HX OF CURRENT PROBLEM, REHAB EVAL
80F w/ PMHx of dementia (baseline AAOx1), T2DM, Afib (on Xarelto) and HTN p/w AMS. Pt has h/o dementia not on any meds. Per pt's son (Jose), she has had 2 week history of progressively worsening sedation and decreased PO intake. Son reports yesterday, lethargy increased and pt displayed garbled speech which prompted ED evaluation. She has endorsed chills to him at home. She requires assistance with feeding and medications at home. She dresses and uses the restroom by herself. CONT'D BELOW:

## 2021-01-23 NOTE — CHART NOTE - NSCHARTNOTEFT_GEN_A_CORE
MICU Transfer Note    Transfer from: MICU    Transfer to: ( x ) Medicine    (  ) Telemetry     (   ) RCU        (    ) Palliative         (   ) Stroke Unit          (   ) __________________    Accepting Physician:  Signout given to:     MICU COURSE:  79y/o F w/ h/o dementia (baseline AAOx1), T2DM, Afib (on Xarelto) and HTN p/w AMS. Pt has h/o dementia not on any medications. Per patient's son (Jose), she has had 2 week history of progressively worsening sedation and decreased PO intake. Son reports yesterday, lethargy increased and patient displayed garbled speech which prompted ED evaluation. She has endorsed chills to him at home. He denies her expressing shortness of breath, fevers, nausea, vomiting, diarrhea, dysuria and cough. She requires assistance with feeding and medications at home. She dresses and uses the restroom by herself.    At the ED, pt was found to have rectal temp of 88, HR of 55, BP of 120/80, saturating well on RA, CBC and CMP wnl, lactate elevated to 2.8, pH 7.28. Pt started on warming blanket, BP dropped to 60s systolic, pt started on fluids, received 2.6L total, started on levo, s/p vanc and zosyn, Bcx drawn, UA negative. Pt admitted to MICU for further monitoring      ASSESSMENT & PLAN:   79y/o F w/ h/o dementia (baseline AAOx1), T2DM, Afib (on Xeralto), HTN p/w AMS, found to be hypothermic to 88, w/ possible TIA at home, CXR showing possible LLL atelectasis vs PNA, hypotensive s/p warming blanket requiring levo, admitted to MICU for further monitoring.      NEURO  #TIA  - Questionable TIA episode at home   - AxOx1 at baseline, now more nonverbal but responding to few commands  - f/u CT head  - Aspiration precautions    RESPIRATORY  #LML opacity  - concerning for possible PNA vs atelectasis  - saturating well on RA  - Monitor on zosyn, azithro  - f/u urine legionella    CARDIO  #Shock  - Hypotensive possibly 2/2 vasodilatory effects from warming from profound hypothermia but also 2/2 to sepsis vs hypothyroidism, adrenal insufficiency  - c/w warming until temperature normalized  - Titrate down on levo  - Monitor on broad spectrum abx  - f/u TSH, f/u Cortisol  #Atrial Fibrillation  - currently rate controlled, in Afib  - BB restarted  - C/w Xarelto    GI  - Aspiration precaution  - Speech and swallow eval  - Stercoral colitis noted on CT Abd/pelvis, s/p manual disimpaction  - Will give suppository and enema from below. Stool softeners from above  - NPO except meds for now    RENAL  - GFR low for weight and age possibly 2/2 to shock state but voiding appropriately for now  - Trend BMP and urine outputs    ENDO  - H/o T2DM  - Monitor on ISS    ID  #Sepsis  - Concern for sepsis 2/2 to possible Asp PNA in setting of recent ?TIA, vs stercoral colitis given high stool burden in CTAP  - Monitor on zosyn for now, azithro until legionella neg  - f/u Legionella Ag  - f/u RVP  - encourage bowel regimen w/ miralax, senna, dulcolax prn for constipation  - f/u Bcx, Ucx    HEME  - lovenox sqd    GOC  - Full Code, spoke w/ son    FOR FOLLOW UP:  []  f/u urine legionella, can d/c azithromycin if negative  [] aggressive bowel regimen MICU Transfer Note    Transfer from: MICU    Transfer to: ( x ) Medicine    (  ) Telemetry     (   ) RCU        (    ) Palliative         (   ) Stroke Unit          (   ) __________________    Accepting Physician:  Signout given to:     MICU COURSE:  79y/o F w/ h/o dementia (baseline AAOx1), T2DM, Afib (on Xarelto) and HTN p/w AMS. Pt has h/o dementia not on any medications. Per patient's son (Jose), she has had 2 week history of progressively worsening sedation and decreased PO intake. Son reports yesterday, lethargy increased and patient displayed garbled speech which prompted ED evaluation. She has endorsed chills to him at home. He denies her expressing shortness of breath, fevers, nausea, vomiting, diarrhea, dysuria and cough. She requires assistance with feeding and medications at home. She dresses and uses the restroom by herself.    At the ED, pt was found to have rectal temp of 88, HR of 55, BP of 120/80, saturating well on RA, CBC and CMP wnl, lactate elevated to 2.8, pH 7.28. Pt started on warming blanket, BP dropped to 60s systolic, pt started on fluids, received 2.6L total, started on levo, s/p vanc and zosyn, Bcx drawn, UA negative. Pt admitted to MICU for further monitoring      ASSESSMENT & PLAN:   79y/o F w/ h/o dementia (baseline AAOx1), T2DM, Afib (on Xeralto), HTN p/w AMS, found to be hypothermic to 88, w/ possible TIA at home, CXR showing possible LLL atelectasis vs PNA, hypotensive s/p warming blanket requiring levo, admitted to MICU for further monitoring.      NEURO  #TIA  - Questionable TIA episode at home   - AxOx1 at baseline, now more nonverbal but responding to few commands  - f/u CT head  - Aspiration precautions    RESPIRATORY  #LML opacity  - concerning for possible PNA vs atelectasis  - saturating well on RA  - Monitor on zosyn     CARDIO  #Shock  - Hypotensive possibly 2/2 vasodilatory effects from warming from profound hypothermia but also 2/2 to sepsis vs hypothyroidism, adrenal insufficiency  - c/w warming until temperature normalized  - Titrate down on levo  - Monitor on broad spectrum abx  - f/u TSH, f/u Cortisol  #Atrial Fibrillation  - currently rate controlled, in Afib  - BB restarted  - C/w Xarelto    GI  - Aspiration precaution  - Speech and swallow eval  - Stercoral colitis noted on CT Abd/pelvis, s/p manual disimpaction  - Will give suppository and enema from below. Stool softeners from above  - NPO except meds for now, NGT placed    RENAL  - GFR low for weight and age possibly 2/2 to shock state but voiding appropriately for now  - Trend BMP and urine outputs    ENDO  - H/o T2DM  - Monitor on ISS    ID  #Sepsis  - Concern for sepsis 2/2 to possible Asp PNA in setting of recent ?TIA, vs stercoral colitis given high stool burden in CTAP  - Monitor on zosyn for now, azithro until legionella neg  - encourage bowel regimen w/ miralax, senna, dulcolax prn for constipation  - f/u Bcx, Ucx    HEME  - lovenox sqd    GOC  - Full Code, spoke w/ son    FOR FOLLOW UP:  [] aggressive bowel regimen  [] c/w zosyn MICU Transfer Note    Transfer from: MICU    Transfer to: ( x ) Medicine    (  ) Telemetry     (   ) RCU        (    ) Palliative         (   ) Stroke Unit          (   ) __________________    Accepting Physician: Dr. Francis  Signout given to:     MICU COURSE:  81y/o F w/ h/o dementia (baseline AAOx1), T2DM, Afib (on Xarelto) and HTN p/w AMS. Pt has h/o dementia not on any medications. Per patient's son (Jose), she has had 2 week history of progressively worsening sedation and decreased PO intake. Son reports yesterday, lethargy increased and patient displayed garbled speech which prompted ED evaluation. She has endorsed chills to him at home. He denies her expressing shortness of breath, fevers, nausea, vomiting, diarrhea, dysuria and cough. She requires assistance with feeding and medications at home. She dresses and uses the restroom by herself.    At the ED, pt was found to have rectal temp of 88, HR of 55, BP of 120/80, saturating well on RA, CBC and CMP wnl, lactate elevated to 2.8, pH 7.28. Pt started on warming blanket, BP dropped to 60s systolic, pt started on fluids, received 2.6L total, started on levo, s/p vanc and zosyn, Bcx drawn, UA negative. Pt admitted to MICU for further monitoring. Pt weaned off pressors, continued on zosyn. She was on legionella for questionable PNA on CXR but that was stopped once legionella remained negative. Pt had NGT placed for oral medications and TFs, pending swallow evaluation when less lethargic. Currently lethargic but easily arousable, AAOx0. She is currently HDS.    ASSESSMENT & PLAN:   81y/o F w/ h/o dementia (baseline AAOx1), T2DM, Afib (on Xeralto), HTN p/w AMS, found to be hypothermic to 88, w/ possible TIA at home, CXR showing possible LLL atelectasis vs PNA, hypotensive s/p warming blanket requiring levo, admitted to MICU for further monitoring.      NEURO  #TIA  - Questionable TIA episode at home   - AxOx1 at baseline, now more nonverbal but responding to few commands  - f/u CT head  - Aspiration precautions    RESPIRATORY  #LML opacity  - concerning for possible PNA vs atelectasis  - saturating well on RA  - Monitor on zosyn     CARDIO  #Shock  - Hypotensive possibly 2/2 vasodilatory effects from warming from profound hypothermia but also 2/2 to sepsis vs hypothyroidism, adrenal insufficiency  - c/w warming until temperature normalized  - off levo  - Monitor on broad spectrum abx    #Atrial Fibrillation  - currently rate controlled, in Afib  - BB restarted  - C/w Xarelto    GI  - Aspiration precaution  - Speech and swallow eval  - Stercoral colitis noted on CT Abd/pelvis, s/p manual disimpaction  - Will give suppository and enema from below. Stool softeners from above  - NGT placed, started on TFs    RENAL  - GFR low for weight and age possibly 2/2 to shock state   - Trend BMP and urine outputs  - has indwelling martinez    ENDO  - H/o T2DM  - Monitor on ISS    ID  #Sepsis  - Concern for sepsis 2/2 to possible Asp PNA in setting of recent ?TIA, vs stercoral colitis given high stool burden in CTAP  - Monitor on zosyn for now   - encourage bowel regimen w/ miralax, senna, dulcolax prn for constipation  - f/u Bcx, Ucx    HEME  - lovenox sqd    GOC  - Full Code, spoke w/ son    FOR FOLLOW UP:  [] aggressive bowel regimen  [] c/w zosyn  [] monitor daily Cr  [] f/u Bcx  [] Swallow eval when less lethargic

## 2021-01-23 NOTE — PHYSICAL THERAPY INITIAL EVALUATION ADULT - PASSIVE RANGE OF MOTION EXAMINATION, REHAB EVAL
LLE difficult to asses secondary to command follow/Left UE Passive ROM was WNL (within normal limits)/Right UE Passive ROM was WNL (within normal limits)/Right LE Passive ROM was WNL (within normal limits)

## 2021-01-23 NOTE — H&P ADULT - NSHPLABSRESULTS_GEN_ALL_CORE
LABS: Personally reviewed labs, imaging, and ECG                          14.9   5.77  )-----------( 126      ( 2021 20:45 )             43.4           137  |  99  |  30<H>  ----------------------------<  140<H>  4.8   |  27  |  1.26    Ca    10.2      2021 20:45    TPro  8.0  /  Alb  4.0  /  TBili  0.5  /  DBili  x   /  AST  51<H>  /  ALT  43  /  AlkPhos  123<H>         LIVER FUNCTIONS - ( 2021 20:45 )  Alb: 4.0 g/dL / Pro: 8.0 g/dL / ALK PHOS: 123 U/L / ALT: 43 U/L / AST: 51 U/L / GGT: x                    Urinalysis Basic - ( 2021 21:07 )    Color: Light Yellow / Appearance: Clear / S.014 / pH: x  Gluc: x / Ketone: Trace  / Bili: Negative / Urobili: Negative   Blood: x / Protein: Trace / Nitrite: Negative   Leuk Esterase: Negative / RBC: 4 /hpf / WBC 0 /HPF   Sq Epi: x / Non Sq Epi: 1 /hpf / Bacteria: Negative        PT/INR - ( 2021 20:45 )   PT: 26.4 sec;   INR: 2.29 ratio         PTT - ( 2021 20:45 )  PTT:58.0 sec    Lactate Trend    CAPILLARY BLOOD GLUCOSE    RADIOLOGY & ADDITIONAL TESTS:     < from: Xray Chest 1 View- PORTABLE-Urgent (21 @ 21:31) >    INTERPRETATION:  Linear retrocardic opacity in the left lower medial lung may be atelectasis or pneumonia.    < end of copied text >

## 2021-01-23 NOTE — PROGRESS NOTE ADULT - SUBJECTIVE AND OBJECTIVE BOX
INTERVAL HPI/OVERNIGHT EVENTS:  SUBJECTIVE: Patient lethargic this am, AAOx0. Currently off pressors.    OBJECTIVE:  VITAL SIGNS:  ICU Vital Signs Last 24 Hrs  T(C): 36.1 (2021 08:00), Max: 36.9 (2021 01:40)  T(F): 97 (2021 08:00), Max: 98.5 (2021 01:40)  HR: 114 (2021 11:30) (53 - 123)  BP: 106/58 (2021 11:30) (60/44 - 171/75)  BP(mean): 77 (2021 11:30) (7 - 108)  ABP: --  ABP(mean): --  RR: 20 (2021 11:30) (7 - 25)  SpO2: 98% (2021 11:30) (96% - 100%)     @ 07: @ 07:00  --------------------------------------------------------  IN: 410 mL / OUT: 155 mL / NET: 255 mL     @ 07: @ 12:13  --------------------------------------------------------  IN: 708 mL / OUT: 80 mL / NET: 628 mL    CAPILLARY BLOOD GLUCOSE  POCT Blood Glucose.: 147 mg/dL (2021 05:50)    PHYSICAL EXAM:  General: NAD  HEENT: NCAT, PERRL, clear conjunctiva   Neck: supple   Respiratory: CTAB, normal respiratory effort  Cardiovascular: RRR, S1S2, no murmurs   Abdomen: soft, nontender, nondistended, normal bowel sounds  Extremities: 2+ peripheral pulses b/l, no edema   Skin: normal color and turgor; no rash  Neurological: AOx0, nonfocal    MEDICATIONS:  MEDICATIONS  (STANDING):  azithromycin  IVPB      insulin lispro (ADMELOG) corrective regimen sliding scale   SubCutaneous every 6 hours  metoprolol tartrate 12.5 milliGRAM(s) Oral every 8 hours  norepinephrine Infusion 0.05 MICROgram(s)/kG/Min (6.81 mL/Hr) IV Continuous <Continuous>  piperacillin/tazobactam IVPB.. 3.375 Gram(s) IV Intermittent every 8 hours  polyethylene glycol 3350 17 Gram(s) Oral two times a day  rivaroxaban 15 milliGRAM(s) Oral with dinner  senna 2 Tablet(s) Oral at bedtime    MEDICATIONS  (PRN):  bisacodyl Suppository 10 milliGRAM(s) Rectal daily PRN Constipation    ALLERGIES:  Allergies  No Known Allergies  Intolerances    LABS:                        12.9   4.68  )-----------( 113      ( 2021 10:51 )             36.2         138  |  103  |  22  ----------------------------<  137<H>  5.0   |  24  |  1.55<H>    Ca    8.4      2021 10:51  Phos  2.6       Mg     1.6         TPro  8.0  /  Alb  4.0  /  TBili  0.5  /  DBili  x   /  AST  51<H>  /  ALT  43  /  AlkPhos  123<H>      PT/INR - ( 2021 03:03 )   PT: 21.8 sec;   INR: 1.87 ratio       PTT - ( 2021 03:03 )  PTT:47.2 sec  Urinalysis Basic - ( 2021 21:07 )    Color: Light Yellow / Appearance: Clear / S.014 / pH: x  Gluc: x / Ketone: Trace  / Bili: Negative / Urobili: Negative   Blood: x / Protein: Trace / Nitrite: Negative   Leuk Esterase: Negative / RBC: 4 /hpf / WBC 0 /HPF   Sq Epi: x / Non Sq Epi: 1 /hpf / Bacteria: Negative    RADIOLOGY & ADDITIONAL TESTS: Reviewed.

## 2021-01-23 NOTE — PROGRESS NOTE ADULT - ASSESSMENT
79y/o F w/ h/o dementia (baseline AAOx1), T2DM, Afib (on Xeralto), HTN p/w AMS, found to be hypothermic to 88, w/ possible TIA at home, CXR showing possible LLL atelectasis vs PNA, hypotensive s/p warming blanket requiring levo, who was admitted to MICU briefly and is now being transferred to floor.

## 2021-01-23 NOTE — PROGRESS NOTE ADULT - ATTENDING COMMENTS
Pt seen and examined. 80 F with medical hx as noted above, now with septic shock 2/2 stercoral colitis + bacterial PNA. Cont ABx coverage with Zosyn and Azithro. FUP Cxs, urine legionella antigen and serum procal. Now having BMs following manual disimpaction and enema. Titrated off pressor support, keep MAP > 65. Oropharyngeal dysphagia, start tube feeds once NGT placement confirmed. Prognosis guarded.

## 2021-01-23 NOTE — PROGRESS NOTE ADULT - SUBJECTIVE AND OBJECTIVE BOX
Rubens Avery  PGY1/Medicine/[id number]/[phone]    BRIGETTE ALANIS  80y  Female      Patient is a 80y old  Female who presents with a chief complaint of Hypotension (2021 12:13)      INTERVAL HPI/OVERNIGHT EVENTS/ROS:     Vital Signs Last 24 Hrs  T(C): 36.9 (2021 12:00), Max: 36.9 (2021 01:40)  T(F): 98.4 (2021 12:00), Max: 98.5 (2021 01:40)  HR: 101 (2021 16:30) (53 - 123)  BP: 117/62 (2021 16:30) (60/44 - 171/75)  BP(mean): 85 (2021 16:30) (7 - 108)  RR: 11 (2021 16:30) (7 - 25)  SpO2: 100% (2021 16:30) (96% - 100%)    PHYSICAL EXAM:  GENERAL: NAD, well-groomed, well-developed  HEAD:  Atraumatic, Normocephalic  EYES: EOMI, PERRLA, conjunctiva and sclera clear  ENMT: Moist mucous membranes, Good dentition, No lesions  NECK: Supple, No JVD, Normal thyroid  NERVOUS SYSTEM:  Alert & Oriented X3, Good concentration; Motor Strength 5/5 B/L upper and lower extremities; DTRs 2+ intact and symmetric  CHEST/LUNG: Clear to auscultation bilaterally; No rales, rhonchi, wheezing, or rubs  HEART: Regular rate and rhythm; No murmurs, rubs, or gallops  ABDOMEN: Soft, Nontender, Nondistended; Bowel sounds present  EXTREMITIES:  2+ Peripheral Pulses, No clubbing, cyanosis, or edema  LYMPH: No lymphadenopathy noted  SKIN: No rashes or lesions    Consultant(s) Notes Reviewed:  [x ] YES  [ ] NO  Care Discussed with Consultants/Other Providers [ x] YES  [ ] NO    LABS:                        12.9   4.68  )-----------( 113      ( 2021 10:51 )             36.2     01-    138  |  103  |  22  ----------------------------<  137<H>  5.0   |  24  |  1.55<H>    Ca    8.4      2021 10:51  Phos  2.6       Mg     1.6         TPro  8.0  /  Alb  4.0  /  TBili  0.5  /  DBili  x   /  AST  51<H>  /  ALT  43  /  AlkPhos  123<H>      PT/INR - ( 2021 03:03 )   PT: 21.8 sec;   INR: 1.87 ratio         PTT - ( 2021 03:03 )  PTT:47.2 sec  Urinalysis Basic - ( 2021 21:07 )    Color: Light Yellow / Appearance: Clear / S.014 / pH: x  Gluc: x / Ketone: Trace  / Bili: Negative / Urobili: Negative   Blood: x / Protein: Trace / Nitrite: Negative   Leuk Esterase: Negative / RBC: 4 /hpf / WBC 0 /HPF   Sq Epi: x / Non Sq Epi: 1 /hpf / Bacteria: Negative        CAPILLARY BLOOD GLUCOSE      POCT Blood Glucose.: 119 mg/dL (2021 12:32)  POCT Blood Glucose.: 147 mg/dL (2021 05:50)      RADIOLOGY & ADDITIONAL TESTS:    Imaging Personally Reviewed:  [ ] YES  [ ] NO   Rubens Avery  [facility]/[id number]/[phone]    BRIGETTE ALANIS  80y  Female      Patient is a 80y old  Female who presents with a chief complaint of Hypotension (2021 12:13)    HPI: 81y/o F w/ h/o dementia (baseline AAOx1), T2DM, Afib (on Xarelto) and HTN p/w AMS. Pt has h/o dementia not on any medications. Per patient's son (Jose), she has had 2 week history of progressively worsening sedation and decreased PO intake. Son reports yesterday, lethargy increased and patient displayed garbled speech which prompted ED evaluation. She has endorsed chills to him at home. He denies her expressing shortness of breath, fevers, nausea, vomiting, diarrhea, dysuria and cough. She requires assistance with feeding and medications at home. She dresses and uses the restroom by herself. Pt also endorses some pain at her dentures and some nonspecific headache on Thursday.    At the ED, pt was found to have rectal temp of 88, HR of 55, BP of 120/80, saturating well on RA, CBC and CMP wnl, lactate elevated to 2.8, pH 7.28. Pt started on warming blanket, BP dropped to 60s systolic, pt started on fluids, received 2.6L total, started on levo, s/p vanc and zosyn, Bcx drawn, UA negative. Pt admitted to MICU for further monitoring. Was put on levo briefly in micu. Currently normotensive and normothermic.     All: NKA  Meds: xarelto 15mg qd, lopressor 25mg qd, metformin 500mg qd  PMHx: HTN/DMII, Afib, Glaucoma, Dementia  SH: Lives with son and his fiance/child. Independent for ADLs. Mobile without assistive devices. Can eat/drink/defecate/urinate on own. Used to be a social drinker. No smoking or drug use history. No HHA.    INTERVAL HPI/OVERNIGHT EVENTS/ROS: No acute events. Unable to assess review of systems given mental status.     Vital Signs Last 24 Hrs  T(C): 36.9 (2021 12:00), Max: 36.9 (2021 01:40)  T(F): 98.4 (2021 12:00), Max: 98.5 (2021 01:40)  HR: 101 (2021 16:30) (53 - 123)  BP: 117/62 (2021 16:30) (60/44 - 171/75)  BP(mean): 85 (2021 16:30) (7 - 108)  RR: 11 (2021 16:30) (7 - 25)  SpO2: 100% (2021 16:30) (96% - 100%)    PHYSICAL EXAM:  GENERAL: NAD, well-groomed, well-developed  HEAD:  Atraumatic, Normocephalic  EYES: PERRLA, conjunctiva and sclera clear  ENMT: Moist mucous membranes, poor dentition  NECK: no JVD  NERVOUS SYSTEM:  Alert & Oriented X0, withdraws to pain upper extremities, screams to pain at lower extremities but does not withdraw, doesn't follow command   CHEST/LUNG: limited exam due to mental status and habitus but appears clear ACW;   HEART: irregular and tachycardic   ABDOMEN: Soft, Nontender, Nondistended; Bowel sounds present  EXTREMITIES:  2+ Peripheral Pulses, No clubbing, cyanosis, or edema  LYMPH: No lymphadenopathy noted  SKIN: No rashes or lesions    Consultant(s) Notes Reviewed:  [x ] YES  [ ] NO  Care Discussed with Consultants/Other Providers [ x] YES  [ ] NO    LABS:                        12.9   4.68  )-----------( 113      ( 2021 10:51 )             36.2         138  |  103  |  22  ----------------------------<  137<H>  5.0   |  24  |  1.55<H>    Ca    8.4      2021 10:51  Phos  2.6       Mg     1.6         TPro  8.0  /  Alb  4.0  /  TBili  0.5  /  DBili  x   /  AST  51<H>  /  ALT  43  /  AlkPhos  123<H>      PT/INR - ( 2021 03:03 )   PT: 21.8 sec;   INR: 1.87 ratio         PTT - ( 2021 03:03 )  PTT:47.2 sec  Urinalysis Basic - ( 2021 21:07 )    Color: Light Yellow / Appearance: Clear / S.014 / pH: x  Gluc: x / Ketone: Trace  / Bili: Negative / Urobili: Negative   Blood: x / Protein: Trace / Nitrite: Negative   Leuk Esterase: Negative / RBC: 4 /hpf / WBC 0 /HPF   Sq Epi: x / Non Sq Epi: 1 /hpf / Bacteria: Negative        CAPILLARY BLOOD GLUCOSE      POCT Blood Glucose.: 119 mg/dL (2021 12:32)  POCT Blood Glucose.: 147 mg/dL (2021 05:50)      RADIOLOGY & ADDITIONAL TESTS:    < from: CT Head No Cont (21 @ 01:36) >  FINDINGS:  Thereis periventricular and subcortical white matter hypodensity without mass effect, nonspecific, likely representing mild chronic microvascular ischemic changes. There is no compelling evidence for an acute transcortical infarction. There is no evidenceof mass, mass effect, midline shift or extra-axial fluid collection. The lateral ventricles and cortical sulci are age-appropriate in size and configuration. Moderate circumferential polypoid mucosal thickening in the left maxillary sinus. The orbits, mastoid air cells and remaining visualized paranasal sinuses are unremarkable. The calvarium is intact. Consider MRI as clinically warranted.    IMPRESSION:  Mild chronic microvascular changes without evidence of an acute transcortical infarction orhemorrhage.    < end of copied text >    < from: Xray Chest 1 View- PORTABLE-Urgent (Xray Chest 1 View- PORTABLE-Urgent .) (21 @ 10:21) >  FINDINGS:    The enteric tube tip and sidehole are in the stomach.  The lungs are clear.  No pleural effusion or pneumothorax.  Heart size appears enlarged but cannot be accurately assessed this projection.  No acute abnormality within visible osseous structures.      IMPRESSION:    The enteric tube tip and sidehole are in the stomach.    < end of copied text >

## 2021-01-23 NOTE — PROGRESS NOTE ADULT - PROBLEM SELECTOR PLAN 2
- patient presented w/ acutely altered mental status compared to her baseline as described in HPI  - likely 2/2 infectious vs primary neurological vs metabolic (less likely) event  - CTH negative for acute pathology  - will c/w antibiotics as above  - f/u bcx/ucx as above  - will get MRI/MRA of head/neck  - if not significant, might require LP

## 2021-01-23 NOTE — H&P ADULT - ATTENDING COMMENTS
I have personally provided 40 minutes of critical care time concurrently with the resident/fellow.  This time excludes time spent on separate procedures and time spent teaching.  I have reviewed the resident/fellow's documentation and I agree with the assessment and plan of care.     Patient seen and examined.  Agree with resident note as above.  Patient with hx as noted including dementia who presents altered and hypothermic.  During warming, patient developed IVF-refractory hypoTN, and so now is accepted to MICU for shock.  Shock likely sepsis due to stercoral colitis, will r/o endocrinopathy.   Will give Levophed to maintain MAP>65, follow cx, broad abx for gut vs lung.  Rest of plan as above and I have edited as appropriate.

## 2021-01-23 NOTE — PROGRESS NOTE ADULT - PROBLEM SELECTOR PLAN 4
- presented with LORIE; 1.26-->1.55  - likely pre-renal i/s/o shock and decreased PO intake  - will c/w IVF  - c/w martinez  - monitor I&O and avoid nephrotoxic agents

## 2021-01-24 ENCOUNTER — TRANSCRIPTION ENCOUNTER (OUTPATIENT)
Age: 81
End: 2021-01-24

## 2021-01-24 DIAGNOSIS — T68.XXXA HYPOTHERMIA, INITIAL ENCOUNTER: ICD-10-CM

## 2021-01-24 LAB
ALBUMIN SERPL ELPH-MCNC: 3 G/DL — LOW (ref 3.3–5)
ALP SERPL-CCNC: 98 U/L — SIGNIFICANT CHANGE UP (ref 40–120)
ALT FLD-CCNC: 29 U/L — SIGNIFICANT CHANGE UP (ref 10–45)
ANION GAP SERPL CALC-SCNC: 11 MMOL/L — SIGNIFICANT CHANGE UP (ref 5–17)
AST SERPL-CCNC: 31 U/L — SIGNIFICANT CHANGE UP (ref 10–40)
BASE EXCESS BLDV CALC-SCNC: 2.6 MMOL/L — HIGH (ref -2–2)
BILIRUB SERPL-MCNC: 0.5 MG/DL — SIGNIFICANT CHANGE UP (ref 0.2–1.2)
BUN SERPL-MCNC: 26 MG/DL — HIGH (ref 7–23)
CA-I SERPL-SCNC: 1.21 MMOL/L — SIGNIFICANT CHANGE UP (ref 1.12–1.3)
CALCIUM SERPL-MCNC: 9.2 MG/DL — SIGNIFICANT CHANGE UP (ref 8.4–10.5)
CHLORIDE BLDV-SCNC: 106 MMOL/L — SIGNIFICANT CHANGE UP (ref 96–108)
CHLORIDE SERPL-SCNC: 103 MMOL/L — SIGNIFICANT CHANGE UP (ref 96–108)
CO2 BLDV-SCNC: 30 MMOL/L — SIGNIFICANT CHANGE UP (ref 22–30)
CO2 SERPL-SCNC: 25 MMOL/L — SIGNIFICANT CHANGE UP (ref 22–31)
CREAT SERPL-MCNC: 1.59 MG/DL — HIGH (ref 0.5–1.3)
CULTURE RESULTS: SIGNIFICANT CHANGE UP
FOLATE SERPL-MCNC: 6 NG/ML — SIGNIFICANT CHANGE UP
GAS PNL BLDV: 137 MMOL/L — SIGNIFICANT CHANGE UP (ref 135–145)
GAS PNL BLDV: SIGNIFICANT CHANGE UP
GLUCOSE BLDC GLUCOMTR-MCNC: 117 MG/DL — HIGH (ref 70–99)
GLUCOSE BLDC GLUCOMTR-MCNC: 149 MG/DL — HIGH (ref 70–99)
GLUCOSE BLDC GLUCOMTR-MCNC: 161 MG/DL — HIGH (ref 70–99)
GLUCOSE BLDC GLUCOMTR-MCNC: 173 MG/DL — HIGH (ref 70–99)
GLUCOSE BLDV-MCNC: 179 MG/DL — HIGH (ref 70–99)
GLUCOSE SERPL-MCNC: 174 MG/DL — HIGH (ref 70–99)
GRAM STN FLD: SIGNIFICANT CHANGE UP
HCO3 BLDV-SCNC: 28 MMOL/L — SIGNIFICANT CHANGE UP (ref 21–29)
HCT VFR BLD CALC: 36 % — SIGNIFICANT CHANGE UP (ref 34.5–45)
HCT VFR BLDA CALC: 39 % — SIGNIFICANT CHANGE UP (ref 39–50)
HGB BLD CALC-MCNC: 12.7 G/DL — SIGNIFICANT CHANGE UP (ref 11.5–15.5)
HGB BLD-MCNC: 12.3 G/DL — SIGNIFICANT CHANGE UP (ref 11.5–15.5)
LACTATE BLDV-MCNC: 2.4 MMOL/L — HIGH (ref 0.7–2)
LACTATE BLDV-MCNC: 2.4 MMOL/L — HIGH (ref 0.7–2)
MAGNESIUM SERPL-MCNC: 1.8 MG/DL — SIGNIFICANT CHANGE UP (ref 1.6–2.6)
MCHC RBC-ENTMCNC: 27.8 PG — SIGNIFICANT CHANGE UP (ref 27–34)
MCHC RBC-ENTMCNC: 34.2 GM/DL — SIGNIFICANT CHANGE UP (ref 32–36)
MCV RBC AUTO: 81.4 FL — SIGNIFICANT CHANGE UP (ref 80–100)
NRBC # BLD: 0 /100 WBCS — SIGNIFICANT CHANGE UP (ref 0–0)
ORGANISM # SPEC MICROSCOPIC CNT: SIGNIFICANT CHANGE UP
ORGANISM # SPEC MICROSCOPIC CNT: SIGNIFICANT CHANGE UP
OTHER CELLS CSF MANUAL: 15 ML/DL — LOW (ref 18–22)
PCO2 BLDV: 52 MMHG — HIGH (ref 35–50)
PH BLDV: 7.36 — SIGNIFICANT CHANGE UP (ref 7.35–7.45)
PHOSPHATE SERPL-MCNC: 3.3 MG/DL — SIGNIFICANT CHANGE UP (ref 2.5–4.5)
PLATELET # BLD AUTO: 99 K/UL — LOW (ref 150–400)
PO2 BLDV: 51 MMHG — HIGH (ref 25–45)
POTASSIUM BLDV-SCNC: 4.5 MMOL/L — SIGNIFICANT CHANGE UP (ref 3.5–5.3)
POTASSIUM SERPL-MCNC: 4.8 MMOL/L — SIGNIFICANT CHANGE UP (ref 3.5–5.3)
POTASSIUM SERPL-SCNC: 4.8 MMOL/L — SIGNIFICANT CHANGE UP (ref 3.5–5.3)
PROT SERPL-MCNC: 6.1 G/DL — SIGNIFICANT CHANGE UP (ref 6–8.3)
RBC # BLD: 4.42 M/UL — SIGNIFICANT CHANGE UP (ref 3.8–5.2)
RBC # FLD: 14.6 % — HIGH (ref 10.3–14.5)
SAO2 % BLDV: 86 % — SIGNIFICANT CHANGE UP (ref 67–88)
SARS-COV-2 IGG SERPL QL IA: NEGATIVE — SIGNIFICANT CHANGE UP
SARS-COV-2 IGM SERPL IA-ACNC: <0.1 INDEX — SIGNIFICANT CHANGE UP
SODIUM SERPL-SCNC: 139 MMOL/L — SIGNIFICANT CHANGE UP (ref 135–145)
SPECIMEN SOURCE: SIGNIFICANT CHANGE UP
VIT B12 SERPL-MCNC: 491 PG/ML — SIGNIFICANT CHANGE UP (ref 232–1245)
WBC # BLD: 7.51 K/UL — SIGNIFICANT CHANGE UP (ref 3.8–10.5)
WBC # FLD AUTO: 7.51 K/UL — SIGNIFICANT CHANGE UP (ref 3.8–10.5)

## 2021-01-24 PROCEDURE — 70548 MR ANGIOGRAPHY NECK W/DYE: CPT | Mod: 26

## 2021-01-24 PROCEDURE — 70544 MR ANGIOGRAPHY HEAD W/O DYE: CPT | Mod: 26,59

## 2021-01-24 PROCEDURE — 70553 MRI BRAIN STEM W/O & W/DYE: CPT | Mod: 26

## 2021-01-24 PROCEDURE — 99233 SBSQ HOSP IP/OBS HIGH 50: CPT | Mod: GC

## 2021-01-24 RX ORDER — SODIUM CHLORIDE 9 MG/ML
1000 INJECTION, SOLUTION INTRAVENOUS
Refills: 0 | Status: DISCONTINUED | OUTPATIENT
Start: 2021-01-24 | End: 2021-01-25

## 2021-01-24 RX ORDER — SODIUM CHLORIDE 9 MG/ML
500 INJECTION, SOLUTION INTRAVENOUS ONCE
Refills: 0 | Status: COMPLETED | OUTPATIENT
Start: 2021-01-24 | End: 2021-01-24

## 2021-01-24 RX ADMIN — Medication 2: at 00:06

## 2021-01-24 RX ADMIN — SODIUM CHLORIDE 75 MILLILITER(S): 9 INJECTION, SOLUTION INTRAVENOUS at 18:11

## 2021-01-24 RX ADMIN — POLYETHYLENE GLYCOL 3350 17 GRAM(S): 17 POWDER, FOR SOLUTION ORAL at 06:11

## 2021-01-24 RX ADMIN — SENNA PLUS 2 TABLET(S): 8.6 TABLET ORAL at 21:56

## 2021-01-24 RX ADMIN — Medication 1: at 06:21

## 2021-01-24 RX ADMIN — Medication 12.5 MILLIGRAM(S): at 06:11

## 2021-01-24 RX ADMIN — Medication 250 MILLIGRAM(S): at 21:56

## 2021-01-24 RX ADMIN — RIVAROXABAN 15 MILLIGRAM(S): KIT at 18:07

## 2021-01-24 RX ADMIN — PIPERACILLIN AND TAZOBACTAM 25 GRAM(S): 4; .5 INJECTION, POWDER, LYOPHILIZED, FOR SOLUTION INTRAVENOUS at 11:41

## 2021-01-24 RX ADMIN — PIPERACILLIN AND TAZOBACTAM 25 GRAM(S): 4; .5 INJECTION, POWDER, LYOPHILIZED, FOR SOLUTION INTRAVENOUS at 21:55

## 2021-01-24 RX ADMIN — POLYETHYLENE GLYCOL 3350 17 GRAM(S): 17 POWDER, FOR SOLUTION ORAL at 18:07

## 2021-01-24 RX ADMIN — PIPERACILLIN AND TAZOBACTAM 25 GRAM(S): 4; .5 INJECTION, POWDER, LYOPHILIZED, FOR SOLUTION INTRAVENOUS at 02:29

## 2021-01-24 RX ADMIN — SODIUM CHLORIDE 1000 MILLILITER(S): 9 INJECTION, SOLUTION INTRAVENOUS at 20:55

## 2021-01-24 NOTE — PROVIDER CONTACT NOTE (CHANGE IN STATUS NOTIFICATION) - RECOMMENDATIONS
No heating blanket available in entire hospital at this moment. multiple attempts made to locate a heating blanket.

## 2021-01-24 NOTE — DISCHARGE NOTE PROVIDER - NSDCFUADDINST_GEN_ALL_CORE_FT
Podiatry Discharge Instructions:  Follow up: Please follow up with Dr. Armenta within 1 week of discharge from the hospital, please call [phone] for appointment and discuss that you recently were seen in the hospital.  Wound Care: Please apply betadine to L heel wound daily. Redress with allyven pad   Weight bearing: Offloading boots to be worn to b/l feet at all times while resting in bed

## 2021-01-24 NOTE — PROGRESS NOTE ADULT - PROBLEM SELECTOR PLAN 5
- pt w/ hx of atrial fibrillation w/ xarelto at home  - c/w xarelto   - start on 12.5mg lopressor tid

## 2021-01-24 NOTE — PROGRESS NOTE ADULT - ATTENDING COMMENTS
81 yo female w/pmh dementia, afib, p/w AMS, septic shock, originally admitted to MICU for pressors for <24h, now HDS on general medical floors. Found to be bacteremic with multiple blood culture bottles growing Staph epi. Also receiving tx for aspiration pneumonia.    -zosyn for aspiration pneumonia  -cont vancomycin for S. epi bacteremia, obtain TTE  -repeat blood cx  -obtain MRI brain for persistent AMS - today, can open her eyes and track, pulled out her NGT with her left hand, but right arm is limp, doesn't follow commands, poor comprehension, but laughed and said "I'm good" without any prompts  -trend Cr

## 2021-01-24 NOTE — PROGRESS NOTE ADULT - PROBLEM SELECTOR PLAN 1
- pt presented with hypothermia and tachycardia with hypotension unresponsive to fluids which required brief use of pressors w/ LORIE and elevated lactate  - likely 2/2 to aspiration PNA (as seen on x-ray) vs stercoral colitis (as seen on CTAP)  - s/p vancomycin and azithromycin  - currently on zosyn  - f/u Bcx--> gram positive cocci in all bottles, started on renally dosed vanc  - f/u Ucx--> NGTD

## 2021-01-24 NOTE — DISCHARGE NOTE PROVIDER - CARE PROVIDER_API CALL
Nurse, Angelita TAPIA)  Internal Medicine  [id number] [place] [address]  Phone: [phone]  Fax: [phone]  Follow Up Time: 2 weeks

## 2021-01-24 NOTE — PROVIDER CONTACT NOTE (CHANGE IN STATUS NOTIFICATION) - ACTION/TREATMENT ORDERED:
MD aware. multiple hot packs placed all over patient. IV fluids lactated ringers 500ml bolus ordered. will continue to monitor patient.

## 2021-01-24 NOTE — DISCHARGE NOTE PROVIDER - HOSPITAL COURSE
79y/o F w/ h/o dementia (baseline AAOx1), T2DM, Afib (on Xarelto) and HTN p/w AMS. Pt has h/o dementia not on any medications. Per patient's son (Jose), she has had 2 week history of progressively worsening sedation and decreased PO intake. Son reports yesterday, lethargy increased and patient displayed garbled speech which prompted ED evaluation. She has endorsed chills to him at home. He denies her expressing shortness of breath, fevers, nausea, vomiting, diarrhea, dysuria and cough. She requires assistance with feeding and medications at home. She dresses and uses the restroom by herself. Pt also endorses some pain at her dentures and some nonspecific headache on Thursday.    At the ED, pt was found to have rectal temp of 88, HR of 55, BP of 120/80, saturating well on RA, CBC and CMP wnl, lactate elevated to 2.8, pH 7.28. Pt started on warming blanket, BP dropped to 60s systolic, pt started on fluids, received 2.6L total, started on levo, s/p vanc and zosyn, Bcx drawn, UA negative. Pt admitted to MICU for further monitoring. Was put on levo briefly in micu. S/p disimpaction.   On floor, initially was completely unresponsive except to aggressive sternal rub. Withdrew hands to pain but not legs. A&Ox0. This AM hypothermic and no shelby hugger available so given hot packs with some recovery. Pulled out her NGT. When visited evening time (1/24). Pt was alert to verbal stimuli, tracking, and moving extremities. Somewhat following commands. Makes verbal noises but not words. MRI/MRA head done but not diagnostic. All blood cultures grew gram positive cocci/staph epi. Cause infectious (PNA vs stercoral colitis) vs primary neuro. Treated with vanc/zosyn. Repeat cultures showed____.  Bedside SS passed but delayed swallowing so started on dysphagia diet. 81y/o F w/ h/o dementia (baseline AAOx1), T2DM, Afib (on Xarelto) and HTN p/w AMS. Pt has h/o dementia not on any medications. Per patient's son (Jose), she has had 2 week history of progressively worsening sedation and decreased PO intake. Son reports yesterday, lethargy increased and patient displayed garbled speech which prompted ED evaluation. She has endorsed chills to him at home. He denies her expressing shortness of breath, fevers, nausea, vomiting, diarrhea, dysuria and cough. She requires assistance with feeding and medications at home. She dresses and uses the restroom by herself. Pt also endorses some pain at her dentures and some nonspecific headache on Thursday.    At the ED, pt was found to have rectal temp of 88, HR of 55, BP of 120/80, saturating well on RA, CBC and CMP wnl, lactate elevated to 2.8, pH 7.28. Pt started on warming blanket, BP dropped to 60s systolic, pt started on fluids, received 2.6L total, started on levo, s/p vanc and zosyn, Bcx drawn, UA negative. Pt admitted to MICU for further monitoring. Was put on levo briefly in micu. S/p disimpaction.   On floor, initially was completely unresponsive except to aggressive sternal rub. Withdrew hands to pain but not legs. A&Ox0. Pulled out her NGT. Pt was alert to verbal stimuli, tracking, and moving extremities. Somewhat following commands. Makes verbal noises but not words. MRI/MRA head done but not diagnostic. All blood cultures grew gram positive cocci/staph epi. Cause infectious (PNA vs stercoral colitis) vs primary neuro. Treated with vanc/zosyn. Repeat cultures showed____.  Bedside SS passed but delayed swallowing so started on dysphagia diet. 81y/o F w/ h/o dementia (baseline AAOx1), T2DM, Afib (on Xarelto) and HTN p/w AMS. Pt has h/o dementia not on any medications. Per patient's son (Jose), she has had 2 week history of progressively worsening sedation and decreased PO intake. Son reports yesterday, lethargy increased and patient displayed garbled speech which prompted ED evaluation. She has endorsed chills to him at home. He denies her expressing shortness of breath, fevers, nausea, vomiting, diarrhea, dysuria and cough. She requires assistance with feeding and medications at home. She dresses and uses the restroom by herself. Pt also endorses some pain at her dentures and some nonspecific headache on Thursday.  At the ED, pt was found to have rectal temp of 88, HR of 55, BP of 120/80, saturating well on RA, CBC and CMP wnl, lactate elevated to 2.8, pH 7.28. Pt started on warming blanket, BP dropped to 60s systolic, pt started on fluids, received 2.6L total, started on levo, s/p vanc and zosyn, Bcx drawn, UA negative. Pt admitted to MICU for further monitoring. Was put on levo briefly in micu. S/p disimpaction.   On floor, initially was completely unresponsive except to aggressive sternal rub. Withdrew hands to pain but not legs. A&Ox0. Pulled out her NGT. Pt was alert to verbal stimuli, tracking, and moving extremities. Makes verbal noises but not words.    Pt's mental status waxing and waning. FAST 7a-7b dementia. Possible hospital delirium vs. TIA vs. progression of dementia. Acute change in mental status on 1/22 per son; previous baseline able to shower and eat by herself, some words, able to follow commands. On discharge, pt's mental status AAOx1, able to answer some questions appropriately but still speaking incoherently, laughing inappropriately. Awake, alert, NAD. Pt moving all 4 extremities spontaneously. Pt not able to follow commands.  Pt still intermittently hypothermic but improved with hyperthermia blanket. For infectious workup, blood cultures grew staph epidermidis in 2 of 4 vials, likely contaminant. Repeat blood cultures negative. CXR with possible aspiration PNA. CTAP w/ large stool burden, possible stercoral colitis. Treated with 4 days of empiric antibiotics. Less likely meningitis due to waxing and waning MS. Unlikely primary neuro cause, CTH negative for acute pathology, MRI/MRA of head/neck - poor study, minimal chronic ischemic changes in the frontoparietal white matter, small aneurysm R ICA. Unlikely to be endocrine etiology, TSH 1.75, AM cortisol 11.3.  Pt also with urinary retention, martinez placed. Will have pt follow up with urology. Pt with positive UA but unable to communicate symptoms. Pt treated with 3 days of ciprofloxacin.  Pt found to have a 4ahm4py L heel ulcer. Pt seen by wound care RN and podiatry. Podiatry recommended ____.  Pt discharged to [facility].      < from: CT Abdomen and Pelvis w/ IV Cont (01.23.21 @ 01:24) >    IMPRESSION:    No bowel obstruction. Moderate distention of the rectum with stool with mild rectal wall thickening. Correlate for stercoral colitis.    < end of copied text >      < from: MR Angio Neck w/ IV Cont (01.24.21 @ 16:48) >    IMPRESSION:    MRI BRAIN: No acute/early subacute infarct or MRI evidence of blood products. No cerebral edema, mass effect, or evidence of a masslesion. No abnormal intracranial enhancement.    Minimal chronic ischemic changes in the frontoparietal white matter.    MRA HEAD: Poor evaluation of the skull base carotid arteries and intradural vertebral arteries due to artifact. Otherwise, grossly patent proximal vessels, however tapering of the distal A2 segment of the right anterior cerebral artery at the level of the vessel bifurcation. 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 These findings should be further evaluated with CTA of the head/neck.    MRA NECK:    Poor evaluation of the cervical internal carotid arteries and proximal and distal cervical vertebral arteries secondary to artifact. Grossly patent cervical carotid arteries and V2 segments of the vertebral arteries. CTA of the head and neck should be considered to better evaluate the cervical vasculature.      < end of copied text >     79y/o F w/ h/o dementia (baseline AAOx1), T2DM, Afib (on Xarelto) and HTN p/w AMS. Pt has h/o dementia not on any medications. Per patient's son (Jose), she has had 2 week history of progressively worsening sedation and decreased PO intake. Son reports yesterday, lethargy increased and patient displayed garbled speech which prompted ED evaluation. She has endorsed chills to him at home. He denies her expressing shortness of breath, fevers, nausea, vomiting, diarrhea, dysuria and cough. She requires assistance with feeding and medications at home. She dresses and uses the restroom by herself. Pt also endorses some pain at her dentures and some nonspecific headache on Thursday.  At the ED, pt was found to have rectal temp of 88, HR of 55, BP of 120/80, saturating well on RA, CBC and CMP wnl, lactate elevated to 2.8, pH 7.28. Pt started on warming blanket, BP dropped to 60s systolic, pt started on fluids, received 2.6L total, started on levo, s/p vanc and zosyn, Bcx drawn, UA negative. Pt admitted to MICU for further monitoring. Was put on levo briefly in micu. S/p disimpaction.   On floor, initially was completely unresponsive except to aggressive sternal rub. Withdrew hands to pain but not legs. A&Ox0. Pulled out her NGT. Pt was alert to verbal stimuli, tracking, and moving extremities. Makes verbal noises but not words.    Pt's mental status waxing and waning. FAST 7a-7b dementia. Possible hospital delirium vs. TIA vs. progression of dementia. Acute change in mental status on 1/22 per son; previous baseline able to shower and eat by herself, some words, able to follow commands. On discharge, pt's mental status AAOx1, able to answer some questions appropriately but still speaking incoherently, laughing inappropriately. Awake, alert, NAD. Pt moving all 4 extremities spontaneously. Pt not able to follow commands.  Pt still intermittently hypothermic but improved with hyperthermia blanket. For infectious workup, blood cultures grew staph epidermidis in 2 of 4 vials, likely contaminant. Repeat blood cultures negative. CXR with possible aspiration PNA. CTAP w/ large stool burden, possible stercoral colitis. Treated with 4 days of empiric antibiotics. Less likely meningitis due to waxing and waning MS. Unlikely primary neuro cause, CTH negative for acute pathology, MRI/MRA of head/neck - poor study, minimal chronic ischemic changes in the frontoparietal white matter, small aneurysm R ICA. Unlikely to be endocrine etiology, TSH 1.75, AM cortisol 11.3.  Pt also with urinary retention, martinez placed. Will have pt follow up with urology. Pt with positive UA but unable to communicate symptoms. Pt treated with 3 days of ciprofloxacin.  Pt found to have a [id number] L posterior heel partial thickness eschar, no open lesions, no clinical signs of infection Pt seen by wound care RN and podiatry. Per podiatry, no acute interventions needed. GAURAV: mild arterial flow limitation in both lower extremities localizing to the infrapopliteal circulation, worse on the left.  Pt discharged to [facility].      < from: CT Abdomen and Pelvis w/ IV Cont (01.23.21 @ 01:24) >    IMPRESSION:    No bowel obstruction. Moderate distention of the rectum with stool with mild rectal wall thickening. Correlate for stercoral colitis.    < end of copied text >      < from: MR Angio Neck w/ IV Cont (01.24.21 @ 16:48) >    IMPRESSION:    MRI BRAIN: No acute/early subacute infarct or MRI evidence of blood products. No cerebral edema, mass effect, or evidence of a masslesion. No abnormal intracranial enhancement.    Minimal chronic ischemic changes in the frontoparietal white matter.    MRA HEAD: Poor evaluation of the skull base carotid arteries and intradural vertebral arteries due to artifact. Otherwise, grossly patent proximal vessels, however tapering of the distal A2 segment of the right anterior cerebral artery at the level of the vessel bifurcation. 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 These findings should be further evaluated with CTA of the head/neck.    MRA NECK:    Poor evaluation of the cervical internal carotid arteries and proximal and distal cervical vertebral arteries secondary to artifact. Grossly patent cervical carotid arteries and V2 segments of the vertebral arteries. CTA of the head and neck should be considered to better evaluate the cervical vasculature.      < end of copied text >

## 2021-01-24 NOTE — PROGRESS NOTE ADULT - SUBJECTIVE AND OBJECTIVE BOX
Rubens Avery  [id number]/Medicine/[phone]    BRIGETTE ALANIS  80y  Female      Patient is a 80y old  Female who presents with a chief complaint of Hypotension (2021 17:24)      INTERVAL HPI/OVERNIGHT EVENTS/ROS: Pt was hypothermic overnight to 94.7. Sherrie hugger not available. Hot packs was used. Went up to 95.3. Unable to assess ROS.     Vital Signs Last 24 Hrs  T(C): 35.2 (2021 09:00), Max: 36.9 (2021 12:00)  T(F): 95.3 (2021 09:00), Max: 98.4 (2021 12:00)  HR: 80 (2021 09:00) (75 - 120)  BP: 116/79 (2021 09:00) (92/62 - 149/77)  BP(mean): 85 (2021 16:30) (67 - 85)  RR: 18 (2021 09:00) (10 - 25)  SpO2: 99% (2021 09:00) (98% - 100%)    PHYSICAL EXAM:  GENERAL: NAD, well-groomed, well-developed  HEAD:  Atraumatic, Normocephalic  EYES: PERRLA, conjunctiva and sclera clear  ENMT: Moist mucous membranes, poor dentition  NECK: no JVD  NERVOUS SYSTEM:  Alert & Oriented X0, not withdrawing to pain upper extremities, minimally responsive to pain at lower extremities but does not withdraw, doesn't follow command   CHEST/LUNG: limited exam due to mental status and habitus but appears clear ACW;   HEART: irregular and tachycardic   ABDOMEN: Soft, Nontender, Nondistended; Bowel sounds present  EXTREMITIES:  2+ Peripheral Pulses, No clubbing, cyanosis, or edema  LYMPH: No lymphadenopathy noted  SKIN: No rashes or lesions    Consultant(s) Notes Reviewed:  [x ] YES  [ ] NO  Care Discussed with Consultants/Other Providers [ x] YES  [ ] NO    LABS:                        12.3   7.51  )-----------( 99       ( 2021 07:18 )             36.0     01-24    139  |  103  |  26<H>  ----------------------------<  174<H>  4.8   |  25  |  1.59<H>    Ca    9.2      2021 07:17  Phos  3.3       Mg     1.8         TPro  6.1  /  Alb  3.0<L>  /  TBili  0.5  /  DBili  x   /  AST  31  /  ALT  29  /  AlkPhos  98      PT/INR - ( 2021 03:03 )   PT: 21.8 sec;   INR: 1.87 ratio         PTT - ( 2021 03:03 )  PTT:47.2 sec  Urinalysis Basic - ( 2021 21:07 )    Color: Light Yellow / Appearance: Clear / S.014 / pH: x  Gluc: x / Ketone: Trace  / Bili: Negative / Urobili: Negative   Blood: x / Protein: Trace / Nitrite: Negative   Leuk Esterase: Negative / RBC: 4 /hpf / WBC 0 /HPF   Sq Epi: x / Non Sq Epi: 1 /hpf / Bacteria: Negative        CAPILLARY BLOOD GLUCOSE      POCT Blood Glucose.: 173 mg/dL (2021 06:17)  POCT Blood Glucose.: 220 mg/dL (2021 23:52)  POCT Blood Glucose.: 130 mg/dL (2021 17:25)  POCT Blood Glucose.: 119 mg/dL (2021 12:32)      RADIOLOGY & ADDITIONAL TESTS:    Imaging Personally Reviewed:  [ ] YES  [ ] NO

## 2021-01-24 NOTE — PROVIDER CONTACT NOTE (CHANGE IN STATUS NOTIFICATION) - ACTION/TREATMENT ORDERED:
MD aware. heating blanket ordered. unable to obtain a heating blanket machine, none available currently at the hospital. hot packs placed all around patient with multiple blankets.

## 2021-01-24 NOTE — DISCHARGE NOTE PROVIDER - NSDCCPCAREPLAN_GEN_ALL_CORE_FT
PRINCIPAL DISCHARGE DIAGNOSIS  Diagnosis: Septic shock  Assessment and Plan of Treatment: You came to the hospital with low temperature and high heart rate with concern for an infection. Possible sources included inflammation of colon due to stool burden versus pneumonia due to aspiration. You were treated with vancomycin and zosyn after all blood cultures grew staphylococcus epidermidis bacteria which is normally a skin patsy. Repeat cultures showed ____.      SECONDARY DISCHARGE DIAGNOSES  Diagnosis: Encephalopathy  Assessment and Plan of Treatment: You presented to the hospital with acute worsening of mental status away from baseline. CT scan of head and MRI of head were not very significant. Although when you came to the hospital you were quite unresponsive overall, over next couple days your mental status began to improve.    Diagnosis: Acute kidney injury  Assessment and Plan of Treatment: You had worsening kidney function likely due to low blood pressure leading to poor kidney perfusion in setting of infection versus decreased oral intake.     PRINCIPAL DISCHARGE DIAGNOSIS  Diagnosis: Septic shock  Assessment and Plan of Treatment: You came to the hospital with low temperature and high heart rate with concern for an infection. Possible sources included inflammation of colon due to stool burden versus pneumonia due to aspiration (choking on your food). You were treated woth 4 days of antibiotics to cover any infections. Your repeat blood cultures are negative. You were seen by the speech therapist who says you can be fed when you are more awake.      SECONDARY DISCHARGE DIAGNOSES  Diagnosis: Acute kidney injury  Assessment and Plan of Treatment: You had worsening kidney function likely due to low blood pressure leading to poor kidney perfusion in setting of infection versus decreased oral intake.    Diagnosis: Encephalopathy  Assessment and Plan of Treatment: You presented to the hospital with acute worsening of mental status away from baseline. CT scan of head and MRI of head were not very significant. Although when you came to the hospital you were quite unresponsive overall, over next couple days your mental status began to improve.

## 2021-01-24 NOTE — PROGRESS NOTE ADULT - PROBLEM SELECTOR PLAN 2
- patient presented w/ acutely altered mental status compared to her baseline as described in HPI  - likely 2/2 infectious vs primary neurological vs metabolic (less likely) event  - CTH negative for acute pathology  - will c/w antibiotics as above  -bcx/ucx as above  - will get MRI/MRA of head/neck  -might require LP/ID

## 2021-01-24 NOTE — DISCHARGE NOTE PROVIDER - NSDCMRMEDTOKEN_GEN_ALL_CORE_FT
Lopressor: 25 milligram(s) orally once a day  metFORMIN 500 mg oral tablet: 1 tab(s) orally once a day  Xarelto 15 mg oral tablet: 1 tab(s) orally once a day (in the evening)   ciprofloxacin 250 mg oral tablet: 1 tab(s) orally every 12 hours  Lopressor: 25 milligram(s) orally once a day  melatonin 3 mg oral tablet: 1 tab(s) orally once a day (at bedtime)  metFORMIN 500 mg oral tablet: 1 tab(s) orally once a day  polyethylene glycol 3350 oral powder for reconstitution: 17 gram(s) orally once a day, As needed, Constipation  tamsulosin 0.4 mg oral capsule: 1 cap(s) orally once a day (at bedtime)  Xarelto 15 mg oral tablet: 1 tab(s) orally once a day (in the evening)

## 2021-01-25 LAB
-  AMPICILLIN/SULBACTAM: SIGNIFICANT CHANGE UP
-  CEFAZOLIN: SIGNIFICANT CHANGE UP
-  CLINDAMYCIN: SIGNIFICANT CHANGE UP
-  ERYTHROMYCIN: SIGNIFICANT CHANGE UP
-  GENTAMICIN: SIGNIFICANT CHANGE UP
-  OXACILLIN: SIGNIFICANT CHANGE UP
-  RIFAMPIN: SIGNIFICANT CHANGE UP
-  TETRACYCLINE: SIGNIFICANT CHANGE UP
-  TRIMETHOPRIM/SULFAMETHOXAZOLE: SIGNIFICANT CHANGE UP
-  VANCOMYCIN: SIGNIFICANT CHANGE UP
ANION GAP SERPL CALC-SCNC: 9 MMOL/L — SIGNIFICANT CHANGE UP (ref 5–17)
BASOPHILS # BLD AUTO: 0.02 K/UL — SIGNIFICANT CHANGE UP (ref 0–0.2)
BASOPHILS NFR BLD AUTO: 0.4 % — SIGNIFICANT CHANGE UP (ref 0–2)
BUN SERPL-MCNC: 18 MG/DL — SIGNIFICANT CHANGE UP (ref 7–23)
CALCIUM SERPL-MCNC: 9.1 MG/DL — SIGNIFICANT CHANGE UP (ref 8.4–10.5)
CHLORIDE SERPL-SCNC: 105 MMOL/L — SIGNIFICANT CHANGE UP (ref 96–108)
CO2 SERPL-SCNC: 27 MMOL/L — SIGNIFICANT CHANGE UP (ref 22–31)
CREAT SERPL-MCNC: 1.43 MG/DL — HIGH (ref 0.5–1.3)
CULTURE RESULTS: SIGNIFICANT CHANGE UP
EOSINOPHIL # BLD AUTO: 0.1 K/UL — SIGNIFICANT CHANGE UP (ref 0–0.5)
EOSINOPHIL NFR BLD AUTO: 1.9 % — SIGNIFICANT CHANGE UP (ref 0–6)
GLUCOSE BLDC GLUCOMTR-MCNC: 101 MG/DL — HIGH (ref 70–99)
GLUCOSE BLDC GLUCOMTR-MCNC: 111 MG/DL — HIGH (ref 70–99)
GLUCOSE BLDC GLUCOMTR-MCNC: 114 MG/DL — HIGH (ref 70–99)
GLUCOSE BLDC GLUCOMTR-MCNC: 193 MG/DL — HIGH (ref 70–99)
GLUCOSE SERPL-MCNC: 108 MG/DL — HIGH (ref 70–99)
HCT VFR BLD CALC: 33.7 % — LOW (ref 34.5–45)
HGB BLD-MCNC: 11.8 G/DL — SIGNIFICANT CHANGE UP (ref 11.5–15.5)
IMM GRANULOCYTES NFR BLD AUTO: 0.4 % — SIGNIFICANT CHANGE UP (ref 0–1.5)
LACTATE BLDV-MCNC: 1.3 MMOL/L — SIGNIFICANT CHANGE UP (ref 0.7–2)
LYMPHOCYTES # BLD AUTO: 1.01 K/UL — SIGNIFICANT CHANGE UP (ref 1–3.3)
LYMPHOCYTES # BLD AUTO: 19.1 % — SIGNIFICANT CHANGE UP (ref 13–44)
MAGNESIUM SERPL-MCNC: 1.7 MG/DL — SIGNIFICANT CHANGE UP (ref 1.6–2.6)
MCHC RBC-ENTMCNC: 28.3 PG — SIGNIFICANT CHANGE UP (ref 27–34)
MCHC RBC-ENTMCNC: 35 GM/DL — SIGNIFICANT CHANGE UP (ref 32–36)
MCV RBC AUTO: 80.8 FL — SIGNIFICANT CHANGE UP (ref 80–100)
METHOD TYPE: SIGNIFICANT CHANGE UP
MONOCYTES # BLD AUTO: 0.55 K/UL — SIGNIFICANT CHANGE UP (ref 0–0.9)
MONOCYTES NFR BLD AUTO: 10.4 % — SIGNIFICANT CHANGE UP (ref 2–14)
NEUTROPHILS # BLD AUTO: 3.59 K/UL — SIGNIFICANT CHANGE UP (ref 1.8–7.4)
NEUTROPHILS NFR BLD AUTO: 67.8 % — SIGNIFICANT CHANGE UP (ref 43–77)
NRBC # BLD: 0 /100 WBCS — SIGNIFICANT CHANGE UP (ref 0–0)
ORGANISM # SPEC MICROSCOPIC CNT: SIGNIFICANT CHANGE UP
ORGANISM # SPEC MICROSCOPIC CNT: SIGNIFICANT CHANGE UP
PHOSPHATE SERPL-MCNC: 3 MG/DL — SIGNIFICANT CHANGE UP (ref 2.5–4.5)
PLATELET # BLD AUTO: 86 K/UL — LOW (ref 150–400)
PLATELET # BLD AUTO: SIGNIFICANT CHANGE UP (ref 150–400)
POTASSIUM SERPL-MCNC: 4.3 MMOL/L — SIGNIFICANT CHANGE UP (ref 3.5–5.3)
POTASSIUM SERPL-SCNC: 4.3 MMOL/L — SIGNIFICANT CHANGE UP (ref 3.5–5.3)
RBC # BLD: 4.17 M/UL — SIGNIFICANT CHANGE UP (ref 3.8–5.2)
RBC # FLD: 14.6 % — HIGH (ref 10.3–14.5)
SODIUM SERPL-SCNC: 141 MMOL/L — SIGNIFICANT CHANGE UP (ref 135–145)
SPECIMEN SOURCE: SIGNIFICANT CHANGE UP
T PALLIDUM AB TITR SER: NEGATIVE — SIGNIFICANT CHANGE UP
VANCOMYCIN TROUGH SERPL-MCNC: 13 UG/ML — SIGNIFICANT CHANGE UP (ref 10–20)
WBC # BLD: 5.29 K/UL — SIGNIFICANT CHANGE UP (ref 3.8–10.5)
WBC # FLD AUTO: 5.29 K/UL — SIGNIFICANT CHANGE UP (ref 3.8–10.5)

## 2021-01-25 PROCEDURE — 93306 TTE W/DOPPLER COMPLETE: CPT | Mod: 26

## 2021-01-25 PROCEDURE — 99223 1ST HOSP IP/OBS HIGH 75: CPT

## 2021-01-25 PROCEDURE — 99233 SBSQ HOSP IP/OBS HIGH 50: CPT | Mod: GC

## 2021-01-25 RX ORDER — SODIUM CHLORIDE 9 MG/ML
1000 INJECTION, SOLUTION INTRAVENOUS
Refills: 0 | Status: DISCONTINUED | OUTPATIENT
Start: 2021-01-25 | End: 2021-01-26

## 2021-01-25 RX ORDER — INSULIN LISPRO 100/ML
VIAL (ML) SUBCUTANEOUS
Refills: 0 | Status: DISCONTINUED | OUTPATIENT
Start: 2021-01-25 | End: 2021-01-25

## 2021-01-25 RX ORDER — INSULIN LISPRO 100/ML
VIAL (ML) SUBCUTANEOUS AT BEDTIME
Refills: 0 | Status: DISCONTINUED | OUTPATIENT
Start: 2021-01-25 | End: 2021-02-05

## 2021-01-25 RX ORDER — INSULIN LISPRO 100/ML
VIAL (ML) SUBCUTANEOUS
Refills: 0 | Status: DISCONTINUED | OUTPATIENT
Start: 2021-01-25 | End: 2021-02-05

## 2021-01-25 RX ORDER — GLUCAGON INJECTION, SOLUTION 0.5 MG/.1ML
1 INJECTION, SOLUTION SUBCUTANEOUS ONCE
Refills: 0 | Status: DISCONTINUED | OUTPATIENT
Start: 2021-01-25 | End: 2021-02-05

## 2021-01-25 RX ORDER — SODIUM CHLORIDE 9 MG/ML
1000 INJECTION, SOLUTION INTRAVENOUS
Refills: 0 | Status: DISCONTINUED | OUTPATIENT
Start: 2021-01-25 | End: 2021-02-05

## 2021-01-25 RX ADMIN — SENNA PLUS 2 TABLET(S): 8.6 TABLET ORAL at 22:10

## 2021-01-25 RX ADMIN — Medication 12.5 MILLIGRAM(S): at 05:26

## 2021-01-25 RX ADMIN — Medication 12.5 MILLIGRAM(S): at 13:29

## 2021-01-25 RX ADMIN — PIPERACILLIN AND TAZOBACTAM 25 GRAM(S): 4; .5 INJECTION, POWDER, LYOPHILIZED, FOR SOLUTION INTRAVENOUS at 13:29

## 2021-01-25 RX ADMIN — SODIUM CHLORIDE 100 MILLILITER(S): 9 INJECTION, SOLUTION INTRAVENOUS at 13:29

## 2021-01-25 RX ADMIN — Medication 12.5 MILLIGRAM(S): at 22:10

## 2021-01-25 RX ADMIN — Medication 1: at 00:00

## 2021-01-25 RX ADMIN — RIVAROXABAN 15 MILLIGRAM(S): KIT at 17:11

## 2021-01-25 RX ADMIN — PIPERACILLIN AND TAZOBACTAM 25 GRAM(S): 4; .5 INJECTION, POWDER, LYOPHILIZED, FOR SOLUTION INTRAVENOUS at 05:26

## 2021-01-25 RX ADMIN — POLYETHYLENE GLYCOL 3350 17 GRAM(S): 17 POWDER, FOR SOLUTION ORAL at 17:11

## 2021-01-25 RX ADMIN — Medication 1: at 17:11

## 2021-01-25 RX ADMIN — PIPERACILLIN AND TAZOBACTAM 25 GRAM(S): 4; .5 INJECTION, POWDER, LYOPHILIZED, FOR SOLUTION INTRAVENOUS at 22:09

## 2021-01-25 RX ADMIN — POLYETHYLENE GLYCOL 3350 17 GRAM(S): 17 POWDER, FOR SOLUTION ORAL at 05:27

## 2021-01-25 NOTE — SWALLOW BEDSIDE ASSESSMENT ADULT - SWALLOW EVAL: CURRENT DIET
Pt presents to ED for a complaint of a fever of 102 at home. Pt also having some nausea. Pt was seen in [facility] yesterday and worked up for sepsis, everything was negative, and he was discharged.   
NPO with NG tube
Dysphagia 1 with honey-thick liquids

## 2021-01-25 NOTE — CONSULT NOTE ADULT - ASSESSMENT
Assessment:  The patient is an 80 year old female with past medical history of dementia (baseline AAOx1), type 2 diabetes mellitus, atrial fibrillation (on Xarelto), and hypertension who presented with altered mental status. She has history of dementia and not on any medications. As per chart review, she has had a 2 week history of progressively worsening mental status and decreased PO intake. Her son reports that prior to admission, her lethargy increased and patient displayed garbled speech which prompted her to come to the emergency room. She reported having chills, but he denies her expressing shortness of breath, fevers, nausea, vomiting, diarrhea, dysuria and cough. She requires assistance with feeding and medications at home. She dresses and uses the restroom by herself. In the emergency department, she was found to be hypothermic and bradycardic and she was placed on a warming blanket and she became hypotensive. She was admitted for sepsis due to bacteremia and infectious disease was consulted for further recommendations.      Plan:              Juan Antonio Walker MD PGY-4   Fellow, Infectious Diseases   Pager: [phone]  If no response, after 5pm and on weekends: Call [phone]   Assessment:  The patient is an 80 year old female with past medical history of dementia (baseline AAOx1), type 2 diabetes mellitus, atrial fibrillation (on Xarelto), and hypertension who presented with altered mental status. She has history of dementia and not on any medications. As per chart review, she has had a 2 week history of progressively worsening mental status and decreased PO intake. Her son reports that prior to admission, her lethargy increased and patient displayed garbled speech which prompted her to come to the emergency room. She reported having chills, but he denies her expressing shortness of breath, fevers, nausea, vomiting, diarrhea, dysuria and cough. She requires assistance with feeding and medications at home. She dresses and uses the restroom by herself. In the emergency department, she was found to be hypothermic and bradycardic and she was placed on a warming blanket and she became hypotensive. She was admitted for sepsis due to bacteremia and infectious disease was consulted for further recommendations.      Plan:  # Sepsis due to staphylococcus epidermidis bacteremia               Juan Antonio Walker MD PGY-4   Fellow, Infectious Diseases   Pager: [phone]  If no response, after 5pm and on weekends: Call [phone]   Assessment:  The patient is an 80 year old female with past medical history of dementia (baseline AAOx1), type 2 diabetes mellitus, atrial fibrillation (on Xarelto), and hypertension who presented with altered mental status. She has history of dementia and not on any medications. As per chart review, she has had a 2 week history of progressively worsening mental status and decreased PO intake. Her son reports that prior to admission, her lethargy increased and patient displayed garbled speech which prompted her to come to the emergency room. She reported having chills, but he denies her expressing shortness of breath, fevers, nausea, vomiting, diarrhea, dysuria and cough. She requires assistance with feeding and medications at home. She dresses and uses the restroom by herself. In the emergency department, she was found to be hypothermic and bradycardic and she was placed on a warming blanket and she became hypotensive. She was admitted for sepsis due to bacteremia and infectious disease was consulted for further recommendations.      Plan:  # Hypothermia and bradycardia likely due to metabolic cause, sepsis unlikely  - Monitor off intravenous antibiotics   - Await final blood cultures with sensitivities  - Trend CBC and CMP daily  - Monitor fever curve  - Consider endocrine workup for other causes  - ID will continue to follow        Juan Antonio Walker MD PGY-4   Fellow, Infectious Diseases   Pager: [phone]  If no response, after 5pm and on weekends: Call [phone]   Assessment:  The patient is an 80 year old female with past medical history of dementia (baseline AAOx1), type 2 diabetes mellitus, atrial fibrillation (on Xarelto), and hypertension who presented with altered mental status. She has history of dementia and not on any medications. As per chart review, she has had a 2 week history of progressively worsening mental status and decreased PO intake. Her son reports that prior to admission, her lethargy increased and patient displayed garbled speech which prompted her to come to the emergency room. She reported having chills, but he denies her expressing shortness of breath, fevers, nausea, vomiting, diarrhea, dysuria and cough. She requires assistance with feeding and medications at home. She dresses and uses the restroom by herself. In the emergency department, she was found to be hypothermic and bradycardic and she was placed on a warming blanket and she became hypotensive. She was admitted for sepsis due to bacteremia and infectious disease was consulted for further recommendations.    Overall hypothermia, bradycardia, AMS, hypotension, lactic acidosis on presentation.   No obvious source of infection at this time. CoNS in blood cx in absence of hardware is usually a procurement contaminant, pt with no cellulitis or phlebitis.   No leucocytosis, CT abd with constipation, recent CXR with no pneumonia, procalcitonin not useful in setting of renal failure.   Mental status seems better.     Plan:  - recommend Monitor off intravenous antibiotics   - Await repeat blood cultures  - Trend CBC daily  - Consider endocrine workup for other causes      Juan Antonio Walker MD PGY-4   Fellow, Infectious Diseases   Pager: [phone]  If no response, after 5pm and on weekends: Call [phone]

## 2021-01-25 NOTE — CONSULT NOTE ADULT - SUBJECTIVE AND OBJECTIVE BOX
The patient is an 80 year old female who presents with a chief complaint of weakness (25 Jan 2021 07:29)    HPI:  The patient is an 80 year old female with past medical history of dementia (baseline AAOx1), type 2 diabetes mellitus, atrial fibrillation (on Xarelto), and hypertension who presented with altered mental status. She has history of dementia and not on any medications. As per chart review, she has had a 2 week history of progressively worsening mental status and decreased PO intake. Her son reports that prior to admission, her lethargy increased and patient displayed garbled speech which prompted her to come to the emergency room. She reported having chills, but he denies her expressing shortness of breath, fevers, nausea, vomiting, diarrhea, dysuria and cough. She requires assistance with feeding and medications at home. She dresses and uses the restroom by herself. In the emergency department, she was found to be hypothermic and bradycardic and she was placed on a warming blanket and she became hypotensive. She subsequently received intravenous fluids, and she was admitted to the MICU for closer monitoring but then downgraded to floor care. CBC showed low hematocrit with thrombocytopenia. CMP showed elevated creatinine and hyperglycemia. BUN was elevated on admission but normalized. Troponinemia was elevated along with BNP. Hypoalbuminemia was noted. LFTs were within satisfactory clinical range. Urine cultures were negative. Blood cultures were showing staphylococcus epidermidis in 2/2 bottles. Lactic acidosis was noted with normal procalcitonin. Urinalysis was unremarkable. TPA Ab was negative. COVID-19 PCR and Ab was negative. CT abdomen and pelvis with IV contrast showed moderate distention of the rectum with stool with mild rectal wall thickening. CT of the head without contrast showed mild chronic microvascular changes without evidence of an acute transcortical infarction or hemorrhage. Initial CXR on admission showed a Linear opacity in the left lower medial lung may be atelectasis or pneumonia. Repeat CXR was clear for acute pathology. 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 She received intravenous azithromycin and started on intravenous vancomycin and intravenous piperacillin-tazobactam. Repeat blood cultures are pending. Transthoracic echocardiogram was ordered. Infectious disease was consulted for further recommendations.        prior hospital charts reviewed [x]  primary team notes reviewed [x]  other consultant notes reviewed [x]    PAST MEDICAL & SURGICAL HISTORY:  Dementia  Atrial fibrillation  Type 2 diabetes mellitus  No significant past surgical history        Allergies  No Known Allergies    ANTIMICROBIALS (past 90 days)  MEDICATIONS  (STANDING):    azithromycin  IVPB   500 milliGRAM(s) IV Intermittent (01-23-21 @ 02:04)    piperacillin/tazobactam IVPB.   200 mL/Hr IV Intermittent (01-22-21 @ 21:08)    piperacillin/tazobactam IVPB..   25 mL/Hr IV Intermittent (01-25-21 @ 13:29)   25 mL/Hr IV Intermittent (01-25-21 @ 05:26)   25 mL/Hr IV Intermittent (01-24-21 @ 21:55)   25 mL/Hr IV Intermittent (01-24-21 @ 11:41)   25 mL/Hr IV Intermittent (01-24-21 @ 02:29)   25 mL/Hr IV Intermittent (01-23-21 @ 18:28)   25 mL/Hr IV Intermittent (01-23-21 @ 10:22)   25 mL/Hr IV Intermittent (01-23-21 @ 03:07)    vancomycin  IVPB   250 mL/Hr IV Intermittent (01-24-21 @ 21:56)   250 mL/Hr IV Intermittent (01-23-21 @ 22:05)    vancomycin  IVPB.   250 mL/Hr IV Intermittent (01-22-21 @ 22:26)        piperacillin/tazobactam IVPB.. 3.375 every 8 hours  vancomycin  IVPB 1000 every 24 hours    OTHER MEDS: MEDICATIONS  (STANDING):  bisacodyl Suppository 10 daily PRN  insulin lispro (ADMELOG) corrective regimen sliding scale  every 6 hours  metoprolol tartrate 12.5 every 8 hours  polyethylene glycol 3350 17 two times a day  rivaroxaban 15 with dinner  senna 2 at bedtime    SOCIAL HISTORY:  Unknown     FAMILY HISTORY:  No pertinent family history in first degree relatives      REVIEW OF SYSTEMS  [x] ROS unobtainable because:  confused  [  ] All other systems negative except as noted below:	    Constitutional:  [ ] fever [ ] chills  [ ] weight loss  [ ] weakness  Skin:  [ ] rash [ ] phlebitis	  Eyes: [ ] icterus [ ] pain  [ ] discharge	  ENMT: [ ] sore throat  [ ] thrush [ ] ulcers [ ] exudates  Respiratory: [ ] dyspnea [ ] hemoptysis [ ] cough [ ] sputum	  Cardiovascular:  [ ] chest pain [ ] palpitations [ ] edema	  Gastrointestinal:  [ ] nausea [ ] vomiting [ ] diarrhea [ ] constipation [ ] pain	  Genitourinary:  [ ] dysuria [ ] frequency [ ] hematuria [ ] discharge [ ] flank pain  [ ] incontinence  Musculoskeletal:  [ ] myalgias [ ] arthralgias [ ] arthritis  [ ] back pain  Neurological:  [ ] headache [ ] seizures  [x] confusion/altered mental status  Psychiatric:  [ ] anxiety [ ] depression	  Hematology/Lymphatics:  [ ] lymphadenopathy  Endocrine:  [ ] adrenal [ ] thyroid  Allergic/Immunologic:	 [ ] transplant [ ] seasonal    Vital Signs Last 24 Hrs  T(F): 97.5 (01-25-21 @ 09:41), Max: 98.5 (01-23-21 @ 01:40)  Vital Signs Last 24 Hrs  HR: 73 (01-25-21 @ 09:41) (65 - 85)  BP: 124/82 (01-25-21 @ 09:41) (96/66 - 131/78)  RR: 17 (01-25-21 @ 09:41)  SpO2: 92% (01-25-21 @ 09:41) (92% - 100%)  Wt(kg): --    PHYSICAL EXAM:  Constitutional: non-toxic, no distress  HEAD/EYES: anicteric, no conjunctival injection  ENT:  supple, no thrush  Cardiovascular:   normal S1, S2, no murmur, no edema  Respiratory:  clear BS bilaterally, no wheezes, no rales  GI:  soft, non-tender, normal bowel sounds  :  no martinez, no CVA tenderness  Musculoskeletal:  no synovitis, + decreased ROM  Neurologic: AAOx0  Skin:  no rash, no erythema, no phlebitis  Heme/Onc: no lymphadenopathy   Psychiatric:  awake                            x      x     )-----------( See note    ( 25 Jan 2021 07:44 )             x      01-25    141  |  105  |  18  ----------------------------<  108<H>  4.3   |  27  |  1.43<H>    Ca    9.1      25 Jan 2021 07:29  Phos  3.0     01-25  Mg     1.7     01-25    TPro  6.1  /  Alb  3.0<L>  /  TBili  0.5  /  DBili  x   /  AST  31  /  ALT  29  /  AlkPhos  98  01-24    MICROBIOLOGY:  Culture - Blood (collected 22 Jan 2021 23:01)  Source: .Blood Blood-Peripheral  Gram Stain (24 Jan 2021 01:04):    Growth in anaerobic bottle: Gram Positive Cocci in Clusters  Preliminary Report (24 Jan 2021 21:42):    Growth in anaerobic bottle: Staphylococcus epidermidis    Culture - Blood (collected 22 Jan 2021 23:01)  Source: .Blood Blood-Peripheral  Gram Stain (23 Jan 2021 19:33):    Growth in aerobic bottle: Gram Positive Cocci in Clusters  Final Report (24 Jan 2021 19:39):    Growth in aerobic bottle: Staphylococcus epidermidis    Coag Negative Staphylococcus    Single set isolate, possible contaminant. Contact    Microbiology if susceptibility testing clinically    indicated.    ***Blood Panel PCR results on this specimen are available    approximately 3 hours after the Gram stain result.***    Gram stain, PCR, and/or culture results may not always    correspond due to difference in methodologies.    ************************************************************    This PCR assay was performed by multiplex PCR. This    Assay tests for 66 bacterial and resistance gene targets.    Please refer to the [facility] [employer] test directory    at [email or web address] for details.  Organism: Blood Culture PCR (24 Jan 2021 19:39)  Organism: Blood Culture PCR (24 Jan 2021 19:39)      -  Staphylococcus epidermidis: Detec      Method Type: PCR    Culture - Urine (collected 22 Jan 2021 22:59)  Source: .Urine Clean Catch (Midstream)  Final Report (23 Jan 2021 19:57):    <10,000 CFU/mL Normal Urogenital Kiki            RADIOLOGY:      < from: MR Angio Neck w/ IV Cont (01.24.21 @ 16:48) >  FINDINGS:    MRI BRAIN:    Diffusion-weighted images show no abnormal areas of restricted diffusion. There is no MRI evidence of blood products.    Sagittal midline structures are unremarkable. There is cerebral and cerebellar volume loss with prominence of the ventricles, sulci, and cerebellar folia. There is minimal confluent and scattered foci of increased T2/FLAIR signal in the frontoparietal periventricular and deep hemispheric white matter reflective of chronic small vessel ischemic changes. There is no cerebral edema or mass effect. There is no evidence of a mass lesion. There is no abnormal intracranial enhancement. There is no extra-axial collection.    There are normal T2 flow voids within the major intracranial vasculature.    The regional soft tissues are unremarkable. There are no fluid levels within the paranasal sinuses or tympanic/mastoid cavities. There is bilateral ethmoid and left maxillary sinus mucosal thickening.    MRA HEAD:    Poor evaluation of the skull base carotid arteries and intradural vertebral arteries due to motion pulsation and in plane loss of signal.    The skull base and intracranial carotid arteries are patent without flow-limiting stenosis, however, there is poor flow related enhancement in the skull base and intracranial carotid arteries which is likely artifactual. The proximal MCAs and ACAs are patent without flow-limiting stenosis, however, there is tapering of the distal A2 segment of the right anterior cerebral artery which appears to occur at a bifurcation.    There is poor flow related enhancement in the intradural vertebral arteries which is likely artifactual. The basilar artery is patent without flow-limiting stenosis. The proximal PCAs are patent without flow-limiting stenosis. Bilateral posterior communicating arteries are visualized. There is a junctional infundibulum at the basilar tip. The superior cerebellar artery origins are visualized.    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      MRA neck:    Poor evaluation of the cervical internal carotid arteries and distal cervical vertebral arteries due to horizontal banding, stairstepping, motion pulsation, and in plane loss of signal.    The carotid bifurcations and cervical internal carotid arteries are poorly evaluated, however, grossly patent. The V2 segments of the cervical vertebral arteries are grossly patent, however, the V1 and V3 segments are poorly evaluated.      IMPRESSION:    MRI BRAIN: No acute/early subacute infarct or MRI evidence of blood products. No cerebral edema, mass effect, or evidence of a masslesion. No abnormal intracranial enhancement.    Minimal chronic ischemic changes in the frontoparietal white matter.    MRA HEAD: Poor evaluation of the skull base carotid arteries and intradural vertebral arteries due to artifact. Otherwise, grossly patent proximal vessels, however tapering of the distal A2 segment of the right anterior cerebral artery at the level of the vessel bifurcation. 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 These findings should be further evaluated with CTA of the head/neck.    MRA NECK:    Poor evaluation of the cervical internal carotid arteries and proximal and distal cervical vertebral arteries secondary to artifact. Grossly patent cervical carotid arteries and V2 segments of the vertebral arteries. CTA of the head and neck should be considered to better evaluate the cervical vasculature.    < end of copied text >    < from: CT Head No Cont (01.23.21 @ 01:36) >  FINDINGS:  Thereis periventricular and subcortical white matter hypodensity without mass effect, nonspecific, likely representing mild chronic microvascular ischemic changes. There is no compelling evidence for an acute transcortical infarction. There is no evidenceof mass, mass effect, midline shift or extra-axial fluid collection. The lateral ventricles and cortical sulci are age-appropriate in size and configuration. Moderate circumferential polypoid mucosal thickening in the left maxillary sinus. The orbits, mastoid air cells and remaining visualized paranasal sinuses are unremarkable. The calvarium is intact. Consider MRI as clinically warranted.    IMPRESSION:  Mild chronic microvascular changes without evidence of an acute transcortical infarction orhemorrhage.      < end of copied text >    < from: CT Abdomen and Pelvis w/ IV Cont (01.23.21 @ 01:24) >    FINDINGS:  LOWER CHEST: Cardiomegaly.    LIVER: Within normal limits.  BILE DUCTS: Normal caliber.  GALLBLADDER: Within normal limits.  SPLEEN: Within normal limits.  PANCREAS: Within normal limits.  ADRENALS: Within normal limits.  KIDNEYS/URETERS: No hydronephrosis. 2.3 cm right renal cyst.    BLADDER: Martinez catheter.  REPRODUCTIVE ORGANS: Uterus and adnexa within normal limits.    BOWEL: No bowel obstruction. Appendix is normal. Moderate stool in the rectal vault with mild rectal wall thickening.  PERITONEUM: No ascites.  VESSELS: Mild atherosclerotic changes.  RETROPERITONEUM/LYMPH NODES: No lymphadenopathy.  ABDOMINAL WALL: Within normal limits.  BONES: Degenerative changes.    IMPRESSION:    No bowel obstruction. Moderate distention of the rectum with stool with mild rectal wall thickening. Correlate forstercoral colitis.    < end of copied text >    < from: Xray Chest 1 View- PORTABLE-Urgent (01.22.21 @ 21:31) >    FINDINGS:    Linear opacity in the left lower medial lung may be atelectasis or pneumonia.  Heart size is enlarged. The patient is rotated.  No acute osseous abnormality.      IMPRESSION:  Linear opacity in the left lower medial lung may be atelectasis or pneumonia.      < end of copied text >   The patient is an 80 year old female who presents with a chief complaint of weakness (25 Jan 2021 07:29)    HPI:  The patient is an 80 year old female with past medical history of dementia (baseline AAOx1), type 2 diabetes mellitus, atrial fibrillation (on Xarelto), and hypertension who presented with altered mental status. She has history of dementia and not on any medications. As per chart review, she has had a 2 week history of progressively worsening mental status and decreased PO intake. Her son reports that prior to admission, her lethargy increased and patient displayed garbled speech which prompted her to come to the emergency room. She reported having chills, but he denies her expressing shortness of breath, fevers, nausea, vomiting, diarrhea, dysuria and cough. She requires assistance with feeding and medications at home. She dresses and uses the restroom by herself. In the emergency department, she was found to be hypothermic and bradycardic and she was placed on a warming blanket and she became hypotensive. She subsequently received intravenous fluids, and she was admitted to the MICU for closer monitoring and started on pressors, but then downgraded to floor care. CBC showed low hematocrit with thrombocytopenia. CMP showed elevated creatinine and hyperglycemia. BUN was elevated on admission but normalized. Troponinemia was elevated along with BNP. Hypoalbuminemia was noted. LFTs were within satisfactory clinical range. Urine cultures were negative. Blood cultures were showing staphylococcus epidermidis in 2/2 bottles. Lactic acidosis was noted with normal procalcitonin. Urinalysis was unremarkable. TPA Ab was negative. COVID-19 PCR and Ab was negative. CT abdomen and pelvis with IV contrast showed moderate distention of the rectum with stool with mild rectal wall thickening. CT of the head without contrast showed mild chronic microvascular changes without evidence of an acute transcortical infarction or hemorrhage. Initial CXR on admission showed a Linear opacity in the left lower medial lung may be atelectasis or pneumonia. Repeat CXR was clear for acute pathology. 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 She received intravenous azithromycin and started on intravenous vancomycin and intravenous piperacillin-tazobactam. Repeat blood cultures are pending. Transthoracic echocardiogram was ordered. Infectious disease was consulted for further recommendations.        prior hospital charts reviewed [x]  primary team notes reviewed [x]  other consultant notes reviewed [x]    PAST MEDICAL & SURGICAL HISTORY:  Dementia  Atrial fibrillation  Type 2 diabetes mellitus  No significant past surgical history        Allergies  No Known Allergies    ANTIMICROBIALS (past 90 days)  MEDICATIONS  (STANDING):    azithromycin  IVPB   500 milliGRAM(s) IV Intermittent (01-23-21 @ 02:04)    piperacillin/tazobactam IVPB.   200 mL/Hr IV Intermittent (01-22-21 @ 21:08)    piperacillin/tazobactam IVPB..   25 mL/Hr IV Intermittent (01-25-21 @ 13:29)   25 mL/Hr IV Intermittent (01-25-21 @ 05:26)   25 mL/Hr IV Intermittent (01-24-21 @ 21:55)   25 mL/Hr IV Intermittent (01-24-21 @ 11:41)   25 mL/Hr IV Intermittent (01-24-21 @ 02:29)   25 mL/Hr IV Intermittent (01-23-21 @ 18:28)   25 mL/Hr IV Intermittent (01-23-21 @ 10:22)   25 mL/Hr IV Intermittent (01-23-21 @ 03:07)    vancomycin  IVPB   250 mL/Hr IV Intermittent (01-24-21 @ 21:56)   250 mL/Hr IV Intermittent (01-23-21 @ 22:05)    vancomycin  IVPB.   250 mL/Hr IV Intermittent (01-22-21 @ 22:26)        piperacillin/tazobactam IVPB.. 3.375 every 8 hours  vancomycin  IVPB 1000 every 24 hours    OTHER MEDS: MEDICATIONS  (STANDING):  bisacodyl Suppository 10 daily PRN  insulin lispro (ADMELOG) corrective regimen sliding scale  every 6 hours  metoprolol tartrate 12.5 every 8 hours  polyethylene glycol 3350 17 two times a day  rivaroxaban 15 with dinner  senna 2 at bedtime    SOCIAL HISTORY:  Unknown     FAMILY HISTORY:  No pertinent family history in first degree relatives      REVIEW OF SYSTEMS  [x] ROS unobtainable because:  confused  [  ] All other systems negative except as noted below:	    Constitutional:  [ ] fever [ ] chills  [ ] weight loss  [ ] weakness  Skin:  [ ] rash [ ] phlebitis	  Eyes: [ ] icterus [ ] pain  [ ] discharge	  ENMT: [ ] sore throat  [ ] thrush [ ] ulcers [ ] exudates  Respiratory: [ ] dyspnea [ ] hemoptysis [ ] cough [ ] sputum	  Cardiovascular:  [ ] chest pain [ ] palpitations [ ] edema	  Gastrointestinal:  [ ] nausea [ ] vomiting [ ] diarrhea [ ] constipation [ ] pain	  Genitourinary:  [ ] dysuria [ ] frequency [ ] hematuria [ ] discharge [ ] flank pain  [ ] incontinence  Musculoskeletal:  [ ] myalgias [ ] arthralgias [ ] arthritis  [ ] back pain  Neurological:  [ ] headache [ ] seizures  [x] confusion/altered mental status  Psychiatric:  [ ] anxiety [ ] depression	  Hematology/Lymphatics:  [ ] lymphadenopathy  Endocrine:  [ ] adrenal [ ] thyroid  Allergic/Immunologic:	 [ ] transplant [ ] seasonal    Vital Signs Last 24 Hrs  T(F): 97.5 (01-25-21 @ 09:41), Max: 98.5 (01-23-21 @ 01:40)  Vital Signs Last 24 Hrs  HR: 73 (01-25-21 @ 09:41) (65 - 85)  BP: 124/82 (01-25-21 @ 09:41) (96/66 - 131/78)  RR: 17 (01-25-21 @ 09:41)  SpO2: 92% (01-25-21 @ 09:41) (92% - 100%)  Wt(kg): --    PHYSICAL EXAM:  Constitutional: non-toxic, no distress  HEAD/EYES: anicteric, no conjunctival injection  ENT:  supple, no thrush  Cardiovascular:   normal S1, S2, no murmur, no edema  Respiratory:  clear BS bilaterally, no wheezes, no rales  GI:  soft, non-tender, normal bowel sounds  :  no martinez, no CVA tenderness  Musculoskeletal:  no synovitis, + decreased ROM  Neurologic: AAOx0  Skin:  no rash, no erythema, no phlebitis  Heme/Onc: no lymphadenopathy   Psychiatric:  awake                            x      x     )-----------( See note    ( 25 Jan 2021 07:44 )             x      01-25    141  |  105  |  18  ----------------------------<  108<H>  4.3   |  27  |  1.43<H>    Ca    9.1      25 Jan 2021 07:29  Phos  3.0     01-25  Mg     1.7     01-25    TPro  6.1  /  Alb  3.0<L>  /  TBili  0.5  /  DBili  x   /  AST  31  /  ALT  29  /  AlkPhos  98  01-24    MICROBIOLOGY:  Culture - Blood (collected 22 Jan 2021 23:01)  Source: .Blood Blood-Peripheral  Gram Stain (24 Jan 2021 01:04):    Growth in anaerobic bottle: Gram Positive Cocci in Clusters  Preliminary Report (24 Jan 2021 21:42):    Growth in anaerobic bottle: Staphylococcus epidermidis    Culture - Blood (collected 22 Jan 2021 23:01)  Source: .Blood Blood-Peripheral  Gram Stain (23 Jan 2021 19:33):    Growth in aerobic bottle: Gram Positive Cocci in Clusters  Final Report (24 Jan 2021 19:39):    Growth in aerobic bottle: Staphylococcus epidermidis    Coag Negative Staphylococcus    Single set isolate, possible contaminant. Contact    Microbiology if susceptibility testing clinically    indicated.    ***Blood Panel PCR results on this specimen are available    approximately 3 hours after the Gram stain result.***    Gram stain, PCR, and/or culture results may not always    correspond due to difference in methodologies.    ************************************************************    This PCR assay was performed by multiplex PCR. This    Assay tests for 66 bacterial and resistance gene targets.    Please refer to the [facility] [employer] [facility] test directory    at [email or web address] for details.  Organism: Blood Culture PCR (24 Jan 2021 19:39)  Organism: Blood Culture PCR (24 Jan 2021 19:39)      -  Staphylococcus epidermidis: Detec      Method Type: PCR    Culture - Urine (collected 22 Jan 2021 22:59)  Source: .Urine Clean Catch (Midstream)  Final Report (23 Jan 2021 19:57):    <10,000 CFU/mL Normal Urogenital Kiki            RADIOLOGY:      < from: MR Angio Neck w/ IV Cont (01.24.21 @ 16:48) >  FINDINGS:    MRI BRAIN:    Diffusion-weighted images show no abnormal areas of restricted diffusion. There is no MRI evidence of blood products.    Sagittal midline structures are unremarkable. There is cerebral and cerebellar volume loss with prominence of the ventricles, sulci, and cerebellar folia. There is minimal confluent and scattered foci of increased T2/FLAIR signal in the frontoparietal periventricular and deep hemispheric white matter reflective of chronic small vessel ischemic changes. There is no cerebral edema or mass effect. There is no evidence of a mass lesion. There is no abnormal intracranial enhancement. There is no extra-axial collection.    There are normal T2 flow voids within the major intracranial vasculature.    The regional soft tissues are unremarkable. There are no fluid levels within the paranasal sinuses or tympanic/mastoid cavities. There is bilateral ethmoid and left maxillary sinus mucosal thickening.    MRA HEAD:    Poor evaluation of the skull base carotid arteries and intradural vertebral arteries due to motion pulsation and in plane loss of signal.    The skull base and intracranial carotid arteries are patent without flow-limiting stenosis, however, there is poor flow related enhancement in the skull base and intracranial carotid arteries which is likely artifactual. The proximal MCAs and ACAs are patent without flow-limiting stenosis, however, there is tapering of the distal A2 segment of the right anterior cerebral artery which appears to occur at a bifurcation.    There is poor flow related enhancement in the intradural vertebral arteries which is likely artifactual. The basilar artery is patent without flow-limiting stenosis. The proximal PCAs are patent without flow-limiting stenosis. Bilateral posterior communicating arteries are visualized. There is a junctional infundibulum at the basilar tip. The superior cerebellar artery origins are visualized.    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      MRA neck:    Poor evaluation of the cervical internal carotid arteries and distal cervical vertebral arteries due to horizontal banding, stairstepping, motion pulsation, and in plane loss of signal.    The carotid bifurcations and cervical internal carotid arteries are poorly evaluated, however, grossly patent. The V2 segments of the cervical vertebral arteries are grossly patent, however, the V1 and V3 segments are poorly evaluated.      IMPRESSION:    MRI BRAIN: No acute/early subacute infarct or MRI evidence of blood products. No cerebral edema, mass effect, or evidence of a masslesion. No abnormal intracranial enhancement.    Minimal chronic ischemic changes in the frontoparietal white matter.    MRA HEAD: Poor evaluation of the skull base carotid arteries and intradural vertebral arteries due to artifact. Otherwise, grossly patent proximal vessels, however tapering of the distal A2 segment of the right anterior cerebral artery at the level of the vessel bifurcation. 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 These findings should be further evaluated with CTA of the head/neck.    MRA NECK:    Poor evaluation of the cervical internal carotid arteries and proximal and distal cervical vertebral arteries secondary to artifact. Grossly patent cervical carotid arteries and V2 segments of the vertebral arteries. CTA of the head and neck should be considered to better evaluate the cervical vasculature.    < end of copied text >    < from: CT Head No Cont (01.23.21 @ 01:36) >  FINDINGS:  Thereis periventricular and subcortical white matter hypodensity without mass effect, nonspecific, likely representing mild chronic microvascular ischemic changes. There is no compelling evidence for an acute transcortical infarction. There is no evidenceof mass, mass effect, midline shift or extra-axial fluid collection. The lateral ventricles and cortical sulci are age-appropriate in size and configuration. Moderate circumferential polypoid mucosal thickening in the left maxillary sinus. The orbits, mastoid air cells and remaining visualized paranasal sinuses are unremarkable. The calvarium is intact. Consider MRI as clinically warranted.    IMPRESSION:  Mild chronic microvascular changes without evidence of an acute transcortical infarction orhemorrhage.      < end of copied text >    < from: CT Abdomen and Pelvis w/ IV Cont (01.23.21 @ 01:24) >    FINDINGS:  LOWER CHEST: Cardiomegaly.    LIVER: Within normal limits.  BILE DUCTS: Normal caliber.  GALLBLADDER: Within normal limits.  SPLEEN: Within normal limits.  PANCREAS: Within normal limits.  ADRENALS: Within normal limits.  KIDNEYS/URETERS: No hydronephrosis. 2.3 cm right renal cyst.    BLADDER: Martinez catheter.  REPRODUCTIVE ORGANS: Uterus and adnexa within normal limits.    BOWEL: No bowel obstruction. Appendix is normal. Moderate stool in the rectal vault with mild rectal wall thickening.  PERITONEUM: No ascites.  VESSELS: Mild atherosclerotic changes.  RETROPERITONEUM/LYMPH NODES: No lymphadenopathy.  ABDOMINAL WALL: Within normal limits.  BONES: Degenerative changes.    IMPRESSION:    No bowel obstruction. Moderate distention of the rectum with stool with mild rectal wall thickening. Correlate forstercoral colitis.    < end of copied text >    < from: Xray Chest 1 View- PORTABLE-Urgent (01.22.21 @ 21:31) >    FINDINGS:    Linear opacity in the left lower medial lung may be atelectasis or pneumonia.  Heart size is enlarged. The patient is rotated.  No acute osseous abnormality.      IMPRESSION:  Linear opacity in the left lower medial lung may be atelectasis or pneumonia.      < end of copied text >   The patient is an 80 year old female who presents with a chief complaint of weakness (25 Jan 2021 07:29)    HPI:  The patient is an 80 year old female with past medical history of dementia (baseline AAOx1), type 2 diabetes mellitus, atrial fibrillation (on Xarelto), and hypertension who presented with altered mental status. She has history of dementia and not on any medications. As per chart review, she has had a 2 week history of progressively worsening mental status and decreased PO intake. Her son reports that prior to admission, her lethargy increased and patient displayed garbled speech which prompted her to come to the emergency room. She reported having chills, but he denies her expressing shortness of breath, fevers, nausea, vomiting, diarrhea, dysuria and cough. She requires assistance with feeding and medications at home. She dresses and uses the restroom by herself. In the emergency department, she was found to be hypothermic and bradycardic and she was placed on a warming blanket and she became hypotensive. She subsequently received intravenous fluids, and she was admitted to the MICU for closer monitoring and started on pressors, but then downgraded to floor care. CBC showed low hematocrit with thrombocytopenia attributed to platelet clumping. CMP showed elevated creatinine and hyperglycemia. BUN was elevated on admission but normalized. Troponinemia was elevated along with BNP. Hypoalbuminemia was noted. LFTs were within satisfactory clinical range. Urine cultures were negative. Blood cultures were showing staphylococcus epidermidis in 2/2 bottles. Lactic acidosis was noted with normal procalcitonin. Urinalysis was unremarkable. TPA Ab was negative. COVID-19 PCR and Ab was negative. CT abdomen and pelvis with IV contrast showed moderate distention of the rectum with stool with mild rectal wall thickening. CT of the head without contrast showed mild chronic microvascular changes without evidence of an acute transcortical infarction or hemorrhage. Initial CXR on admission showed a Linear opacity in the left lower medial lung may be atelectasis or pneumonia. Repeat CXR was clear for acute pathology. 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 She received intravenous azithromycin and started on intravenous vancomycin and intravenous piperacillin-tazobactam. Repeat blood cultures are pending. Transthoracic echocardiogram was ordered. Infectious disease was consulted for further recommendations.        prior hospital charts reviewed [x]  primary team notes reviewed [x]  other consultant notes reviewed [x]    PAST MEDICAL & SURGICAL HISTORY:  Dementia  Atrial fibrillation  Type 2 diabetes mellitus  No significant past surgical history        Allergies  No Known Allergies    ANTIMICROBIALS (past 90 days)  MEDICATIONS  (STANDING):    azithromycin  IVPB   500 milliGRAM(s) IV Intermittent (01-23-21 @ 02:04)    piperacillin/tazobactam IVPB.   200 mL/Hr IV Intermittent (01-22-21 @ 21:08)    piperacillin/tazobactam IVPB..   25 mL/Hr IV Intermittent (01-25-21 @ 13:29)   25 mL/Hr IV Intermittent (01-25-21 @ 05:26)   25 mL/Hr IV Intermittent (01-24-21 @ 21:55)   25 mL/Hr IV Intermittent (01-24-21 @ 11:41)   25 mL/Hr IV Intermittent (01-24-21 @ 02:29)   25 mL/Hr IV Intermittent (01-23-21 @ 18:28)   25 mL/Hr IV Intermittent (01-23-21 @ 10:22)   25 mL/Hr IV Intermittent (01-23-21 @ 03:07)    vancomycin  IVPB   250 mL/Hr IV Intermittent (01-24-21 @ 21:56)   250 mL/Hr IV Intermittent (01-23-21 @ 22:05)    vancomycin  IVPB.   250 mL/Hr IV Intermittent (01-22-21 @ 22:26)        piperacillin/tazobactam IVPB.. 3.375 every 8 hours  vancomycin  IVPB 1000 every 24 hours    OTHER MEDS: MEDICATIONS  (STANDING):  bisacodyl Suppository 10 daily PRN  insulin lispro (ADMELOG) corrective regimen sliding scale  every 6 hours  metoprolol tartrate 12.5 every 8 hours  polyethylene glycol 3350 17 two times a day  rivaroxaban 15 with dinner  senna 2 at bedtime    SOCIAL HISTORY:  Unknown     FAMILY HISTORY:  No pertinent family history in first degree relatives      REVIEW OF SYSTEMS  [x] ROS unobtainable because:  underlying dementia  [  ] All other systems negative except as noted below:	    Constitutional:  [ ] fever [ ] chills  [ ] weight loss  [ ] weakness  Skin:  [ ] rash [ ] phlebitis	  Eyes: [ ] icterus [ ] pain  [ ] discharge	  ENMT: [ ] sore throat  [ ] thrush [ ] ulcers [ ] exudates  Respiratory: [ ] dyspnea [ ] hemoptysis [ ] cough [ ] sputum	  Cardiovascular:  [ ] chest pain [ ] palpitations [ ] edema	  Gastrointestinal:  [ ] nausea [ ] vomiting [ ] diarrhea [ ] constipation [x] pain	  Genitourinary:  [ ] dysuria [ ] frequency [ ] hematuria [ ] discharge [ ] flank pain  [ ] incontinence  Musculoskeletal:  [ ] myalgias [ ] arthralgias [ ] arthritis  [ ] back pain  Neurological:  [ ] headache [ ] seizures  [x] confusion/altered mental status  Psychiatric:  [ ] anxiety [ ] depression	  Hematology/Lymphatics:  [ ] lymphadenopathy  Endocrine:  [ ] adrenal [ ] thyroid  Allergic/Immunologic:	 [ ] transplant [ ] seasonal    Vital Signs Last 24 Hrs  T(F): 97.5 (01-25-21 @ 09:41), Max: 98.5 (01-23-21 @ 01:40)  Vital Signs Last 24 Hrs  HR: 73 (01-25-21 @ 09:41) (65 - 85)  BP: 124/82 (01-25-21 @ 09:41) (96/66 - 131/78)  RR: 17 (01-25-21 @ 09:41)  SpO2: 92% (01-25-21 @ 09:41) (92% - 100%)  Wt(kg): --    PHYSICAL EXAM:  Constitutional: non-toxic, no distress  HEAD/EYES: anicteric, no conjunctival injection  ENT:  supple, no thrush  Cardiovascular:   normal S1, S2, no murmur, no edema  Respiratory:  clear BS bilaterally, no wheezes, no rales  GI:  soft, non-tender, normal bowel sounds  :  no martinez, no CVA tenderness  Musculoskeletal:  no synovitis, + decreased ROM  Neurologic: Awake and alert, + difficulty with responding to commands  Skin:  no rash, no erythema, no phlebitis  Heme/Onc: no lymphadenopathy   Psychiatric:  awake                            x      x     )-----------( See note    ( 25 Jan 2021 07:44 )             x      01-25    141  |  105  |  18  ----------------------------<  108<H>  4.3   |  27  |  1.43<H>    Ca    9.1      25 Jan 2021 07:29  Phos  3.0     01-25  Mg     1.7     01-25    TPro  6.1  /  Alb  3.0<L>  /  TBili  0.5  /  DBili  x   /  AST  31  /  ALT  29  /  AlkPhos  98  01-24    MICROBIOLOGY:  Culture - Blood (collected 22 Jan 2021 23:01)  Source: .Blood Blood-Peripheral  Gram Stain (24 Jan 2021 01:04):    Growth in anaerobic bottle: Gram Positive Cocci in Clusters  Preliminary Report (24 Jan 2021 21:42):    Growth in anaerobic bottle: Staphylococcus epidermidis    Culture - Blood (collected 22 Jan 2021 23:01)  Source: .Blood Blood-Peripheral  Gram Stain (23 Jan 2021 19:33):    Growth in aerobic bottle: Gram Positive Cocci in Clusters  Final Report (24 Jan 2021 19:39):    Growth in aerobic bottle: Staphylococcus epidermidis    Coag Negative Staphylococcus    Single set isolate, possible contaminant. Contact    Microbiology if susceptibility testing clinically    indicated.    ***Blood Panel PCR results on this specimen are available    approximately 3 hours after the Gram stain result.***    Gram stain, PCR, and/or culture results may not always    correspond due to difference in methodologies.    ************************************************************    This PCR assay was performed by multiplex PCR. This    Assay tests for 66 bacterial and resistance gene targets.    Please refer to the [facility] test directory    at [email or web address] for details.  Organism: Blood Culture PCR (24 Jan 2021 19:39)  Organism: Blood Culture PCR (24 Jan 2021 19:39)      -  Staphylococcus epidermidis: Detec      Method Type: PCR    Culture - Urine (collected 22 Jan 2021 22:59)  Source: .Urine Clean Catch (Midstream)  Final Report (23 Jan 2021 19:57):    <10,000 CFU/mL Normal Urogenital Kiki            RADIOLOGY:      < from: MR Angio Neck w/ IV Cont (01.24.21 @ 16:48) >  FINDINGS:    MRI BRAIN:    Diffusion-weighted images show no abnormal areas of restricted diffusion. There is no MRI evidence of blood products.    Sagittal midline structures are unremarkable. There is cerebral and cerebellar volume loss with prominence of the ventricles, sulci, and cerebellar folia. There is minimal confluent and scattered foci of increased T2/FLAIR signal in the frontoparietal periventricular and deep hemispheric white matter reflective of chronic small vessel ischemic changes. There is no cerebral edema or mass effect. There is no evidence of a mass lesion. There is no abnormal intracranial enhancement. There is no extra-axial collection.    There are normal T2 flow voids within the major intracranial vasculature.    The regional soft tissues are unremarkable. There are no fluid levels within the paranasal sinuses or tympanic/mastoid cavities. There is bilateral ethmoid and left maxillary sinus mucosal thickening.    MRA HEAD:    Poor evaluation of the skull base carotid arteries and intradural vertebral arteries due to motion pulsation and in plane loss of signal.    The skull base and intracranial carotid arteries are patent without flow-limiting stenosis, however, there is poor flow related enhancement in the skull base and intracranial carotid arteries which is likely artifactual. The proximal MCAs and ACAs are patent without flow-limiting stenosis, however, there is tapering of the distal A2 segment of the right anterior cerebral artery which appears to occur at a bifurcation.    There is poor flow related enhancement in the intradural vertebral arteries which is likely artifactual. The basilar artery is patent without flow-limiting stenosis. The proximal PCAs are patent without flow-limiting stenosis. Bilateral posterior communicating arteries are visualized. There is a junctional infundibulum at the basilar tip. The superior cerebellar artery origins are visualized.    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      MRA neck:    Poor evaluation of the cervical internal carotid arteries and distal cervical vertebral arteries due to horizontal banding, stairstepping, motion pulsation, and in plane loss of signal.    The carotid bifurcations and cervical internal carotid arteries are poorly evaluated, however, grossly patent. The V2 segments of the cervical vertebral arteries are grossly patent, however, the V1 and V3 segments are poorly evaluated.      IMPRESSION:    MRI BRAIN: No acute/early subacute infarct or MRI evidence of blood products. No cerebral edema, mass effect, or evidence of a masslesion. No abnormal intracranial enhancement.    Minimal chronic ischemic changes in the frontoparietal white matter.    MRA HEAD: Poor evaluation of the skull base carotid arteries and intradural vertebral arteries due to artifact. Otherwise, grossly patent proximal vessels, however tapering of the distal A2 segment of the right anterior cerebral artery at the level of the vessel bifurcation. 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 These findings should be further evaluated with CTA of the head/neck.    MRA NECK:    Poor evaluation of the cervical internal carotid arteries and proximal and distal cervical vertebral arteries secondary to artifact. Grossly patent cervical carotid arteries and V2 segments of the vertebral arteries. CTA of the head and neck should be considered to better evaluate the cervical vasculature.    < end of copied text >    < from: CT Head No Cont (01.23.21 @ 01:36) >  FINDINGS:  Thereis periventricular and subcortical white matter hypodensity without mass effect, nonspecific, likely representing mild chronic microvascular ischemic changes. There is no compelling evidence for an acute transcortical infarction. There is no evidenceof mass, mass effect, midline shift or extra-axial fluid collection. The lateral ventricles and cortical sulci are age-appropriate in size and configuration. Moderate circumferential polypoid mucosal thickening in the left maxillary sinus. The orbits, mastoid air cells and remaining visualized paranasal sinuses are unremarkable. The calvarium is intact. Consider MRI as clinically warranted.    IMPRESSION:  Mild chronic microvascular changes without evidence of an acute transcortical infarction orhemorrhage.      < end of copied text >    < from: CT Abdomen and Pelvis w/ IV Cont (01.23.21 @ 01:24) >    FINDINGS:  LOWER CHEST: Cardiomegaly.    LIVER: Within normal limits.  BILE DUCTS: Normal caliber.  GALLBLADDER: Within normal limits.  SPLEEN: Within normal limits.  PANCREAS: Within normal limits.  ADRENALS: Within normal limits.  KIDNEYS/URETERS: No hydronephrosis. 2.3 cm right renal cyst.    BLADDER: Martinez catheter.  REPRODUCTIVE ORGANS: Uterus and adnexa within normal limits.    BOWEL: No bowel obstruction. Appendix is normal. Moderate stool in the rectal vault with mild rectal wall thickening.  PERITONEUM: No ascites.  VESSELS: Mild atherosclerotic changes.  RETROPERITONEUM/LYMPH NODES: No lymphadenopathy.  ABDOMINAL WALL: Within normal limits.  BONES: Degenerative changes.    IMPRESSION:    No bowel obstruction. Moderate distention of the rectum with stool with mild rectal wall thickening. Correlate forstercoral colitis.    < end of copied text >    < from: Xray Chest 1 View- PORTABLE-Urgent (01.22.21 @ 21:31) >    FINDINGS:    Linear opacity in the left lower medial lung may be atelectasis or pneumonia.  Heart size is enlarged. The patient is rotated.  No acute osseous abnormality.      IMPRESSION:  Linear opacity in the left lower medial lung may be atelectasis or pneumonia.      < end of copied text >   The patient is an 80 year old female who presents with a chief complaint of weakness (25 Jan 2021 07:29)    HPI:  The patient is an 80 year old female with past medical history of dementia (baseline AAOx1), type 2 diabetes mellitus, atrial fibrillation (on Xarelto), and hypertension who presented with altered mental status. She has history of dementia and not on any medications. As per chart review, she has had a 2 week history of progressively worsening mental status and decreased PO intake. Her son reports that prior to admission, her lethargy increased and patient displayed garbled speech which prompted her to come to the emergency room. She reported having chills, but he denies her expressing shortness of breath, fevers, nausea, vomiting, diarrhea, dysuria and cough. She requires assistance with feeding and medications at home. She dresses and uses the restroom by herself. In the emergency department, she was found to be hypothermic and bradycardic and she was placed on a warming blanket and she became hypotensive. She subsequently received intravenous fluids, and she was admitted to the MICU for closer monitoring and started on pressors, but then downgraded to floor care. CBC showed low hematocrit with thrombocytopenia attributed to platelet clumping. CMP showed elevated creatinine and hyperglycemia. BUN was elevated on admission but normalized. Troponinemia was elevated along with BNP. Hypoalbuminemia was noted. LFTs were within satisfactory clinical range. Urine cultures were negative. Blood cultures were showing staphylococcus epidermidis in 2/2 bottles. Lactic acidosis was noted with normal procalcitonin. Urinalysis was unremarkable. TPA Ab was negative. COVID-19 PCR and Ab was negative. CT abdomen and pelvis with IV contrast showed moderate distention of the rectum with stool with mild rectal wall thickening. CT of the head without contrast showed mild chronic microvascular changes without evidence of an acute transcortical infarction or hemorrhage. Initial CXR on admission showed a Linear opacity in the left lower medial lung may be atelectasis or pneumonia. Repeat CXR was clear for acute pathology. 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 She received intravenous azithromycin and started on intravenous vancomycin and intravenous piperacillin-tazobactam. Repeat blood cultures are pending. Transthoracic echocardiogram was ordered. Infectious disease was consulted for further recommendations.        prior hospital charts reviewed [x]  primary team notes reviewed [x]  other consultant notes reviewed [x]    PAST MEDICAL & SURGICAL HISTORY:  Dementia  Atrial fibrillation  Type 2 diabetes mellitus  No significant past surgical history        Allergies  No Known Allergies    ANTIMICROBIALS (past 90 days)  MEDICATIONS  (STANDING):    azithromycin  IVPB   500 milliGRAM(s) IV Intermittent (01-23-21 @ 02:04)    piperacillin/tazobactam IVPB.   200 mL/Hr IV Intermittent (01-22-21 @ 21:08)    piperacillin/tazobactam IVPB..   25 mL/Hr IV Intermittent (01-25-21 @ 13:29)   25 mL/Hr IV Intermittent (01-25-21 @ 05:26)   25 mL/Hr IV Intermittent (01-24-21 @ 21:55)   25 mL/Hr IV Intermittent (01-24-21 @ 11:41)   25 mL/Hr IV Intermittent (01-24-21 @ 02:29)   25 mL/Hr IV Intermittent (01-23-21 @ 18:28)   25 mL/Hr IV Intermittent (01-23-21 @ 10:22)   25 mL/Hr IV Intermittent (01-23-21 @ 03:07)    vancomycin  IVPB   250 mL/Hr IV Intermittent (01-24-21 @ 21:56)   250 mL/Hr IV Intermittent (01-23-21 @ 22:05)    vancomycin  IVPB.   250 mL/Hr IV Intermittent (01-22-21 @ 22:26)        piperacillin/tazobactam IVPB.. 3.375 every 8 hours  vancomycin  IVPB 1000 every 24 hours    OTHER MEDS: MEDICATIONS  (STANDING):  bisacodyl Suppository 10 daily PRN  insulin lispro (ADMELOG) corrective regimen sliding scale  every 6 hours  metoprolol tartrate 12.5 every 8 hours  polyethylene glycol 3350 17 two times a day  rivaroxaban 15 with dinner  senna 2 at bedtime      SOCIAL HISTORY: Unable to obtain due to pt's underlying dementia.       FAMILY HISTORY:  Unable to obtain due to pt's underlying dementia.        REVIEW OF SYSTEMS  [x] ROS unobtainable because:  underlying dementia  [  ] All other systems negative except as noted below:	    Constitutional:  [ ] fever [ ] chills  [ ] weight loss  [ ] weakness  Skin:  [ ] rash [ ] phlebitis	  Eyes: [ ] icterus [ ] pain  [ ] discharge	  ENMT: [ ] sore throat  [ ] thrush [ ] ulcers [ ] exudates  Respiratory: [ ] dyspnea [ ] hemoptysis [ ] cough [ ] sputum	  Cardiovascular:  [ ] chest pain [ ] palpitations [ ] edema	  Gastrointestinal:  [ ] nausea [ ] vomiting [ ] diarrhea [ ] constipation [x] pain	  Genitourinary:  [ ] dysuria [ ] frequency [ ] hematuria [ ] discharge [ ] flank pain  [ ] incontinence  Musculoskeletal:  [ ] myalgias [ ] arthralgias [ ] arthritis  [ ] back pain  Neurological:  [ ] headache [ ] seizures  [x] confusion/altered mental status  Psychiatric:  [ ] anxiety [ ] depression	  Endocrine:  [ ] adrenal [ ] thyroid  Allergic/Immunologic:	 [ ] transplant [ ] seasonal      Vital Signs Last 24 Hrs  T(F): 97.5 (01-25-21 @ 09:41), Max: 98.5 (01-23-21 @ 01:40)  Vital Signs Last 24 Hrs  HR: 73 (01-25-21 @ 09:41) (65 - 85)  BP: 124/82 (01-25-21 @ 09:41) (96/66 - 131/78)  RR: 17 (01-25-21 @ 09:41)  SpO2: 92% (01-25-21 @ 09:41) (92% - 100%)  Wt(kg): --      PHYSICAL EXAM:  Constitutional: non-toxic, no distress, awake, communicates but not legible.   HEAD/EYES: anicteric, no conjunctival injection  ENT:  supple, no thrush  Cardiovascular:   normal S1, S2,   Respiratory: + air entry b/l   GI:  soft, non-tender, normal bowel sounds  :  no martinez,   Musculoskeletal:  no joint swelling   Neurologic: Awake and alert, + difficulty with responding to commands appropriately   Skin:  no rash, no erythema, no phlebitis  Extremities: no edema  Vascular: palpable pulses.                             x      x     )-----------( See note    ( 25 Jan 2021 07:44 )             x      01-25    141  |  105  |  18  ----------------------------<  108<H>  4.3   |  27  |  1.43<H>    Ca    9.1      25 Jan 2021 07:29  Phos  3.0     01-25  Mg     1.7     01-25    TPro  6.1  /  Alb  3.0<L>  /  TBili  0.5  /  DBili  x   /  AST  31  /  ALT  29  /  AlkPhos  98  01-24    MICROBIOLOGY:  Culture - Blood (collected 22 Jan 2021 23:01)  Source: .Blood Blood-Peripheral  Gram Stain (24 Jan 2021 01:04):    Growth in anaerobic bottle: Gram Positive Cocci in Clusters  Preliminary Report (24 Jan 2021 21:42):    Growth in anaerobic bottle: Staphylococcus epidermidis    Culture - Blood (collected 22 Jan 2021 23:01)  Source: .Blood Blood-Peripheral  Gram Stain (23 Jan 2021 19:33):    Growth in aerobic bottle: Gram Positive Cocci in Clusters  Final Report (24 Jan 2021 19:39):    Growth in aerobic bottle: Staphylococcus epidermidis    Coag Negative Staphylococcus    Single set isolate, possible contaminant. Contact    Microbiology if susceptibility testing clinically    indicated.    ***Blood Panel PCR results on this specimen are available    approximately 3 hours after the Gram stain result.***    Gram stain, PCR, and/or culture results may not always    correspond due to difference in methodologies.    ************************************************************    This PCR assay was performed by multiplex PCR. This    Assay tests for 66 bacterial and resistance gene targets.    Please refer to the [facility] [employer] test directory    at [email or web address] for details.  Organism: Blood Culture PCR (24 Jan 2021 19:39)  Organism: Blood Culture PCR (24 Jan 2021 19:39)      -  Staphylococcus epidermidis: Detec      Method Type: PCR    Culture - Urine (collected 22 Jan 2021 22:59)  Source: .Urine Clean Catch (Midstream)  Final Report (23 Jan 2021 19:57):    <10,000 CFU/mL Normal Urogenital Kiki            RADIOLOGY:     < from: MR Angio Neck w/ IV Cont (01.24.21 @ 16:48) >    IMPRESSION:    MRI BRAIN: No acute/early subacute infarct or MRI evidence of blood products. No cerebral edema, mass effect, or evidence of a masslesion. No abnormal intracranial enhancement.  Minimal chronic ischemic changes in the frontoparietal white matter.    MRA HEAD: Poor evaluation of the skull base carotid arteries and intradural vertebral arteries due to artifact. Otherwise, grossly patent proximal vessels, however tapering of the distal A2 segment of the right anterior cerebral artery at the level of the vessel bifurcation. 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 These findings should be further evaluated with CTA of the head/neck.    MRA NECK:  Poor evaluation of the cervical internal carotid arteries and proximal and distal cervical vertebral arteries secondary to artifact. Grossly patent cervical carotid arteries and V2 segments of the vertebral arteries. CTA of the head and neck should be considered to better evaluate the cervical vasculature.      < from: CT Head No Cont (01.23.21 @ 01:36) >  IMPRESSION:  Mild chronic microvascular changes without evidence of an acute transcortical infarction orhemorrhage.        < from: CT Abdomen and Pelvis w/ IV Cont (01.23.21 @ 01:24) >  IMPRESSION:    No bowel obstruction. Moderate distention of the rectum with stool with mild rectal wall thickening. Correlate forstercoral colitis.        < from: Xray Chest 1 View- PORTABLE-Urgent (01.22.21 @ 21:31) >  IMPRESSION:  Linear opacity in the left lower medial lung may be atelectasis or pneumonia.

## 2021-01-25 NOTE — PROGRESS NOTE ADULT - PROBLEM SELECTOR PLAN 8
Diet: NPO w/ TF  DVT PPX: Xarelto  GOC: Full code per d/w son Diet: dysphagia, S&S  DVT PPX: Xarelto  GOC: Full code per d/w son

## 2021-01-25 NOTE — SWALLOW BEDSIDE ASSESSMENT ADULT - SWALLOW EVAL: DIAGNOSIS
Orders received for bedside swallow evaluation, chart reviewed and case discussed with Team 3, MD Art. Pt not appropriate for swallow evaluation at this time given current mental status. This service will continue to follow as clinically indicated.
Pt is an 79 y/o female w/ h/o dementia (baseline AAOx1), T2DM, Afib, HTN p/w AMS, found to be hypothermic to 88, w/ possible TIA at home, and CXR showing possible LLL atelectasis vs PNA. Pt presents with 1) An oropharyngeal dysphagia superimposed upon reduced mental status. Swallow function notable for prolonged oral holding requiring verbal cues to swallow, intermittently phonating with PO in oral cavity, suspected delayed pharyngeal swallow trigger, and wet vocal quality + throat clearing with conservative PO trials, concerning for laryngeal penetration/aspiration. 2) Dysarthria

## 2021-01-25 NOTE — SWALLOW BEDSIDE ASSESSMENT ADULT - COMMENTS
HX CONTINUED: Pt started on warming blanket, BP dropped to 60s systolic, pt started on fluids, received 2.6L total, started on levo, s/p vanc and zosyn, Bcx drawn, UA negative. Pt admitted to MICU for further monitoring. Concern for sepsis 2/2 to possible Asp PNA in setting of recent ?TIA, vs stercoral colitis given high stool burden in CTAP. Pt weaned off pressors, continued on zosyn. She was on legionella for questionable PNA on CXR but that was stopped once legionella remained negative. Pt had NGT placed for oral medications and TFs, pending swallow evaluation when less lethargic. Currently lethargic but easily arousable, AAOx0. She is currently HDS. For  follow-up: Swallow eval when less lethargic.
HX CONTINUED: Pt started on warming blanket, BP dropped to 60s systolic, pt started on fluids, received 2.6L total, started on levo, s/p vanc and zosyn, Bcx drawn, UA negative. Pt admitted to MICU for further monitoring. Concern for sepsis 2/2 to possible Asp PNA in setting of recent ?TIA, vs stercoral colitis given high stool burden in CTAP. Pt weaned off pressors, continued on zosyn. She was on legionella for questionable PNA on CXR but that was stopped once legionella remained negative. Pt had NGT placed for oral medications and TFs, pending swallow evaluation when less lethargic. Currently lethargic but easily arousable, AAOx0. She is currently HDS. For  follow-up: Swallow eval when less lethargic.

## 2021-01-25 NOTE — PROGRESS NOTE ADULT - PROBLEM SELECTOR PLAN 1
- pt presented with hypothermia and tachycardia with hypotension unresponsive to fluids which required brief use of pressors w/ LORIE and elevated lactate  - likely 2/2 to aspiration PNA (as seen on x-ray) vs stercoral colitis (as seen on CTAP)  - s/p vancomycin and azithromycin  - currently on zosyn  - f/u Bcx--> gram positive cocci in all bottles, started on renally dosed vanc  - f/u Ucx--> NGTD Pt presented with hypothermia and tachycardia with hypotension unresponsive to fluids which required brief use of pressors w/ LORIE and elevated lactate  - likely 2/2 to aspiration PNA (on x-ray) vs stercoral colitis (on CTAP)  - s/p azithromycin, c/w zosyn and vanc  - f/u Bcx--> gram positive cocci in all bottles, started on renally dosed vanc  - f/u Ucx--> NGTD  - ID consult

## 2021-01-25 NOTE — SWALLOW BEDSIDE ASSESSMENT ADULT - SLP GENERAL OBSERVATIONS
Pt encountered in bed, awake, on room air. A&Ox0, with confusion. +Dysarthria, with reduced articulatory precision. Demonstrates difficulty following single step commands.

## 2021-01-25 NOTE — SWALLOW BEDSIDE ASSESSMENT ADULT - ASR SWALLOW RECOMMEND DIAG
Defer instrumental assessment at this time. This service will follow-up at the bedside, pending improved mental status, to determine candidacy for PO diet vs need for instrumental assessment.

## 2021-01-25 NOTE — PROVIDER CONTACT NOTE (CHANGE IN STATUS NOTIFICATION) - ACTION/TREATMENT ORDERED:
MD aware. Able to obtain hypothermia blanket, hypothermia blanket  placed on patient. will continue to monitor pt and reassess temperature. MD aware. Able to obtain heating blanket, heating blanket  placed on patient. will continue to monitor pt and reassess temperature.

## 2021-01-25 NOTE — PROGRESS NOTE ADULT - SUBJECTIVE AND OBJECTIVE BOX
PROGRESS NOTE:   Authored by Anita Alvarez MD  Pager [phone] [facility] / [id number] [address]    Patient is a 80y old  Female who presents with a chief complaint of Hypotension (24 Jan 2021 21:35)      SUBJECTIVE / OVERNIGHT EVENTS:  Overnight, pt hypothermic to 89 and mildly hypotensive to 90s, given 500cc IVF with improvement. Blood clx with GP cocci in clusters, staph epi  This AM,    MEDICATIONS  (STANDING):  insulin lispro (ADMELOG) corrective regimen sliding scale   SubCutaneous every 6 hours  lactated ringers. 1000 milliLiter(s) (75 mL/Hr) IV Continuous <Continuous>  metoprolol tartrate 12.5 milliGRAM(s) Oral every 8 hours  piperacillin/tazobactam IVPB.. 3.375 Gram(s) IV Intermittent every 8 hours  polyethylene glycol 3350 17 Gram(s) Oral two times a day  rivaroxaban 15 milliGRAM(s) Oral with dinner  senna 2 Tablet(s) Oral at bedtime  vancomycin  IVPB 1000 milliGRAM(s) IV Intermittent every 24 hours    MEDICATIONS  (PRN):  bisacodyl Suppository 10 milliGRAM(s) Rectal daily PRN Constipation      CAPILLARY BLOOD GLUCOSE      POCT Blood Glucose.: 114 mg/dL (25 Jan 2021 06:37)  POCT Blood Glucose.: 161 mg/dL (24 Jan 2021 23:52)  POCT Blood Glucose.: 149 mg/dL (24 Jan 2021 17:58)  POCT Blood Glucose.: 117 mg/dL (24 Jan 2021 12:23)    I&O's Summary    24 Jan 2021 07:01  -  25 Jan 2021 07:00  --------------------------------------------------------  IN: 175 mL / OUT: 800 mL / NET: -625 mL        PHYSICAL EXAM:  Vital Signs Last 24 Hrs  T(C): 36.3 (25 Jan 2021 04:52), Max: 36.3 (25 Jan 2021 04:52)  T(F): 97.3 (25 Jan 2021 04:52), Max: 97.3 (25 Jan 2021 04:52)  HR: 65 (25 Jan 2021 04:52) (65 - 85)  BP: 131/78 (25 Jan 2021 04:52) (96/66 - 131/78)  BP(mean): --  RR: 16 (25 Jan 2021 04:52) (14 - 18)  SpO2: 99% (25 Jan 2021 04:52) (99% - 100%)    GENERAL: No acute distress, well-developed  HEAD:  Atraumatic, Normocephalic  EYES: EOMI, PERRLA, conjunctiva and sclera clear  NECK: Supple, no lymphadenopathy, no JVD  CHEST/LUNG: CTAB; No wheezes, rales, or rhonchi  HEART: Regular rate and rhythm; No murmurs, rubs, or gallops  ABDOMEN: Soft, non-tender, non-distended; normal bowel sounds, no organomegaly  EXTREMITIES:  2+ peripheral pulses b/l, No clubbing, cyanosis, or edema  NEUROLOGY: A&O x 3, no focal deficits  SKIN: No rashes or lesions    LABS:                        12.3   7.51  )-----------( 99       ( 24 Jan 2021 07:18 )             36.0     01-24    139  |  103  |  26<H>  ----------------------------<  174<H>  4.8   |  25  |  1.59<H>    Ca    9.2      24 Jan 2021 07:17  Phos  3.3     01-24  Mg     1.8     01-24    TPro  6.1  /  Alb  3.0<L>  /  TBili  0.5  /  DBili  x   /  AST  31  /  ALT  29  /  AlkPhos  98  01-24              Culture - Blood (collected 22 Jan 2021 23:01)  Source: .Blood Blood-Peripheral  Gram Stain (24 Jan 2021 01:04):    Growth in anaerobic bottle: Gram Positive Cocci in Clusters  Preliminary Report (24 Jan 2021 21:42):    Growth in anaerobic bottle: Staphylococcus epidermidis    Culture - Blood (collected 22 Jan 2021 23:01)  Source: .Blood Blood-Peripheral  Gram Stain (23 Jan 2021 19:33):    Growth in aerobic bottle: Gram Positive Cocci in Clusters  Final Report (24 Jan 2021 19:39):    Growth in aerobic bottle: Staphylococcus epidermidis    Coag Negative Staphylococcus    Single set isolate, possible contaminant. Contact    Microbiology if susceptibility testing clinically    indicated.    ***Blood Panel PCR results on this specimen are available    approximately 3 hours after the Gram stain result.***    Gram stain, PCR, and/or culture results may not always    correspond due to difference in methodologies.    ************************************************************    This PCR assay was performed by multiplex PCR. This    Assay tests for 66 bacterial and resistance gene targets.    Please refer to the [facility] [employer] test directory    at [email or web address] for details.  Organism: Blood Culture PCR (24 Jan 2021 19:39)  Organism: Blood Culture PCR (24 Jan 2021 19:39)    Culture - Urine (collected 22 Jan 2021 22:59)  Source: .Urine Clean Catch (Midstream)  Final Report (23 Jan 2021 19:57):    <10,000 CFU/mL Normal Urogenital Kiki     PROGRESS NOTE:   Authored by Anita Alvarez MD  Pager [phone] [facility] / [id number] [address]    Patient is a 80y old  Female who presents with a chief complaint of Hypotension (24 Jan 2021 21:35)      SUBJECTIVE / OVERNIGHT EVENTS:  Overnight, pt hypothermic to 89 and mildly hypotensive to 90s, given 500cc IVF with improvement. Blood clx with GP cocci in clusters, staph epi  This AM, AAOx0, awake, alert, laughing inappropriately and not able to answer questions. Dark yellow urine noted in martinez.    MEDICATIONS  (STANDING):  insulin lispro (ADMELOG) corrective regimen sliding scale   SubCutaneous every 6 hours  lactated ringers. 1000 milliLiter(s) (75 mL/Hr) IV Continuous <Continuous>  metoprolol tartrate 12.5 milliGRAM(s) Oral every 8 hours  piperacillin/tazobactam IVPB.. 3.375 Gram(s) IV Intermittent every 8 hours  polyethylene glycol 3350 17 Gram(s) Oral two times a day  rivaroxaban 15 milliGRAM(s) Oral with dinner  senna 2 Tablet(s) Oral at bedtime  vancomycin  IVPB 1000 milliGRAM(s) IV Intermittent every 24 hours    MEDICATIONS  (PRN):  bisacodyl Suppository 10 milliGRAM(s) Rectal daily PRN Constipation      CAPILLARY BLOOD GLUCOSE      POCT Blood Glucose.: 114 mg/dL (25 Jan 2021 06:37)  POCT Blood Glucose.: 161 mg/dL (24 Jan 2021 23:52)  POCT Blood Glucose.: 149 mg/dL (24 Jan 2021 17:58)  POCT Blood Glucose.: 117 mg/dL (24 Jan 2021 12:23)    I&O's Summary    24 Jan 2021 07:01  -  25 Jan 2021 07:00  --------------------------------------------------------  IN: 175 mL / OUT: 800 mL / NET: -625 mL        PHYSICAL EXAM:  Vital Signs Last 24 Hrs  T(C): 36.3 (25 Jan 2021 04:52), Max: 36.3 (25 Jan 2021 04:52)  T(F): 97.3 (25 Jan 2021 04:52), Max: 97.3 (25 Jan 2021 04:52)  HR: 65 (25 Jan 2021 04:52) (65 - 85)  BP: 131/78 (25 Jan 2021 04:52) (96/66 - 131/78)  BP(mean): --  RR: 16 (25 Jan 2021 04:52) (14 - 18)  SpO2: 99% (25 Jan 2021 04:52) (99% - 100%)    GENERAL: NAD, well-groomed, well-developed  HEAD:  Atraumatic, Normocephalic  EYES: PERRLA, conjunctiva and sclera clear  ENMT: Moist mucous membranes, poor dentition  NECK: no JVD  NERVOUS SYSTEM:  Alert & Oriented X0, not withdrawing to pain upper extremities, minimally responsive to pain at lower extremities but does not withdraw, doesn't follow command   CHEST/LUNG: limited exam due to mental status and habitus but appears clear ACW;   HEART: irregular and tachycardic   ABDOMEN: Soft, Nontender, Nondistended; Bowel sounds present  EXTREMITIES:  2+ Peripheral Pulses, No clubbing, cyanosis, or edema  LYMPH: No lymphadenopathy noted  SKIN: No rashes or lesions    LABS:                        12.3   7.51  )-----------( 99       ( 24 Jan 2021 07:18 )             36.0     01-24    139  |  103  |  26<H>  ----------------------------<  174<H>  4.8   |  25  |  1.59<H>    Ca    9.2      24 Jan 2021 07:17  Phos  3.3     01-24  Mg     1.8     01-24    TPro  6.1  /  Alb  3.0<L>  /  TBili  0.5  /  DBili  x   /  AST  31  /  ALT  29  /  AlkPhos  98  01-24              Culture - Blood (collected 22 Jan 2021 23:01)  Source: .Blood Blood-Peripheral  Gram Stain (24 Jan 2021 01:04):    Growth in anaerobic bottle: Gram Positive Cocci in Clusters  Preliminary Report (24 Jan 2021 21:42):    Growth in anaerobic bottle: Staphylococcus epidermidis    Culture - Blood (collected 22 Jan 2021 23:01)  Source: .Blood Blood-Peripheral  Gram Stain (23 Jan 2021 19:33):    Growth in aerobic bottle: Gram Positive Cocci in Clusters  Final Report (24 Jan 2021 19:39):    Growth in aerobic bottle: Staphylococcus epidermidis    Coag Negative Staphylococcus    Single set isolate, possible contaminant. Contact    Microbiology if susceptibility testing clinically    indicated.    ***Blood Panel PCR results on this specimen are available    approximately 3 hours after the Gram stain result.***    Gram stain, PCR, and/or culture results may not always    correspond due to difference in methodologies.    ************************************************************    This PCR assay was performed by multiplex PCR. This    Assay tests for 66 bacterial and resistance gene targets.    Please refer to the [facility] [employer] test directory    at [email or web address] for details.  Organism: Blood Culture PCR (24 Jan 2021 19:39)  Organism: Blood Culture PCR (24 Jan 2021 19:39)    Culture - Urine (collected 22 Jan 2021 22:59)  Source: .Urine Clean Catch (Midstream)  Final Report (23 Jan 2021 19:57):    <10,000 CFU/mL Normal Urogenital Kiki

## 2021-01-25 NOTE — PROGRESS NOTE ADULT - PROBLEM SELECTOR PLAN 2
- patient presented w/ acutely altered mental status compared to her baseline as described in HPI  - likely 2/2 infectious vs primary neurological vs metabolic (less likely) event  - CTH negative for acute pathology  - will c/w antibiotics as above  -bcx/ucx as above  - will get MRI/MRA of head/neck  -might require LP/ID acutely altered mental status compared to her baseline as described in HPI  - likely 2/2 infectious vs primary neurological vs metabolic (less likely) event  - CTH negative for acute pathology  - will c/w antibiotics as above and workup as above  - MRI/MRA of head/neck - poor study, minimal chronic ischemic changes in the frontoparietal white matter, small aneurysm R ICA  -might require LP/ID

## 2021-01-25 NOTE — PROGRESS NOTE ADULT - ATTENDING COMMENTS
pt seen and examined.  above plan discussed on rounds today.  In addition,    79 yo female w/pmh dementia, afib, p/w AMS, septic shock, originally admitted to MICU for pressors for <24h, now HDS on general medical floors. Found to be bacteremic with multiple blood culture bottles growing Staph epi. Also receiving tx for aspiration pneumonia.    -zosyn for aspiration pneumonia  -cont vancomycin for S. epi bacteremia  -repeat blood cx  -will get an ID eval

## 2021-01-25 NOTE — SWALLOW BEDSIDE ASSESSMENT ADULT - SLP PERTINENT HISTORY OF CURRENT PROBLEM
79y/o F w/ h/o dementia (baseline AAOx1), T2DM, Afib (on Xarelto) and HTN p/w AMS. Pt has h/o dementia not on any medications. Per patient's son (Jose), she has had 2 week history of progressively worsening sedation and decreased PO intake. Son reports yesterday, lethargy increased and patient displayed garbled speech which prompted ED evaluation. She has endorsed chills to him at home. He denies her expressing shortness of breath, fevers, nausea, vomiting, diarrhea, dysuria and cough. She requires assistance with feeding and medications at home. She dresses and uses the restroom by herself. At the ED, pt was found to have rectal temp of 88, HR of 55, BP of 120/80, saturating well on RA, CBC and CMP wnl, lactate elevated to 2.8, pH 7.28.
81y/o F w/ h/o dementia (baseline AAOx1), T2DM, Afib (on Xarelto) and HTN p/w AMS. Pt has h/o dementia not on any medications. Per patient's son (Jose), she has had 2 week history of progressively worsening sedation and decreased PO intake. Son reports yesterday, lethargy increased and patient displayed garbled speech which prompted ED evaluation. She has endorsed chills to him at home. He denies her expressing shortness of breath, fevers, nausea, vomiting, diarrhea, dysuria and cough. She requires assistance with feeding and medications at home. She dresses and uses the restroom by herself. At the ED, pt was found to have rectal temp of 88, HR of 55, BP of 120/80, saturating well on RA, CBC and CMP wnl, lactate elevated to 2.8, pH 7.28.

## 2021-01-25 NOTE — PROGRESS NOTE ADULT - PROBLEM SELECTOR PLAN 4
- presented with LORIE; 1.26-->1.55  - likely pre-renal i/s/o shock and decreased PO intake  - will c/w IVF  - c/w martinez  - monitor I&O and avoid nephrotoxic agents - downtrneding  - likely pre-renal i/s/o shock and decreased PO intake  - /hr for 10 hrs  - d/c martinez - TOV  - monitor I&O and avoid nephrotoxic agents - downtrending  - likely pre-renal i/s/o shock and decreased PO intake  - /hr for 10 hrs  - d/c martinez - TOV  - monitor I&O and avoid nephrotoxic agents

## 2021-01-25 NOTE — PROGRESS NOTE ADULT - ASSESSMENT
79y/o F w/ h/o dementia (baseline AAOx1), T2DM, Afib (on Xeralto), HTN p/w AMS, found to be hypothermic to 88, w/ possible TIA at home, CXR showing possible LLL atelectasis vs PNA, hypotensive s/p warming blanket requiring levo, who was admitted to MICU briefly and is now being transferred to floor.  81y/o F w/ h/o dementia (baseline AAOx1), T2DM, Afib (on Xeralto), HTN p/w AMS, found to be hypothermic to 88, w/ possible TIA at home, CXR showing possible LLL atelectasis vs PNA, hypotensive s/p warming blanket requiring levo in MICU, now w/ sepsis workup.

## 2021-01-25 NOTE — SWALLOW BEDSIDE ASSESSMENT ADULT - ASPIRATION PRECAUTIONS
Monitor for s/s aspiration/laryngeal penetration. If noted:  D/C p.o. intake, provide non-oral nutrition/hydration/meds, and contact this service @ [id number]

## 2021-01-25 NOTE — PROGRESS NOTE ADULT - PROBLEM SELECTOR PLAN 6
- A1C 7.8 (1/23/21) with home metformin 500mg qd  - c/w low sliding scale  - c/w Tf - A1C 7.8 (1/23/21) with home metformin 500mg qd  - c/w low sliding scale

## 2021-01-26 LAB
ALBUMIN SERPL ELPH-MCNC: 3 G/DL — LOW (ref 3.3–5)
ALP SERPL-CCNC: 94 U/L — SIGNIFICANT CHANGE UP (ref 40–120)
ALT FLD-CCNC: 34 U/L — SIGNIFICANT CHANGE UP (ref 10–45)
ANION GAP SERPL CALC-SCNC: 11 MMOL/L — SIGNIFICANT CHANGE UP (ref 5–17)
AST SERPL-CCNC: 42 U/L — HIGH (ref 10–40)
BILIRUB SERPL-MCNC: 0.7 MG/DL — SIGNIFICANT CHANGE UP (ref 0.2–1.2)
BUN SERPL-MCNC: 17 MG/DL — SIGNIFICANT CHANGE UP (ref 7–23)
CALCIUM SERPL-MCNC: 9.2 MG/DL — SIGNIFICANT CHANGE UP (ref 8.4–10.5)
CHLORIDE SERPL-SCNC: 104 MMOL/L — SIGNIFICANT CHANGE UP (ref 96–108)
CO2 SERPL-SCNC: 26 MMOL/L — SIGNIFICANT CHANGE UP (ref 22–31)
CORTIS AM PEAK SERPL-MCNC: 11.3 UG/DL — SIGNIFICANT CHANGE UP (ref 6–18.4)
CREAT SERPL-MCNC: 1.35 MG/DL — HIGH (ref 0.5–1.3)
GLUCOSE BLDC GLUCOMTR-MCNC: 110 MG/DL — HIGH (ref 70–99)
GLUCOSE BLDC GLUCOMTR-MCNC: 119 MG/DL — HIGH (ref 70–99)
GLUCOSE BLDC GLUCOMTR-MCNC: 121 MG/DL — HIGH (ref 70–99)
GLUCOSE BLDC GLUCOMTR-MCNC: 166 MG/DL — HIGH (ref 70–99)
GLUCOSE SERPL-MCNC: 106 MG/DL — HIGH (ref 70–99)
MAGNESIUM SERPL-MCNC: 1.8 MG/DL — SIGNIFICANT CHANGE UP (ref 1.6–2.6)
PHOSPHATE SERPL-MCNC: 3.4 MG/DL — SIGNIFICANT CHANGE UP (ref 2.5–4.5)
POTASSIUM SERPL-MCNC: 4.6 MMOL/L — SIGNIFICANT CHANGE UP (ref 3.5–5.3)
POTASSIUM SERPL-SCNC: 4.6 MMOL/L — SIGNIFICANT CHANGE UP (ref 3.5–5.3)
PROT SERPL-MCNC: 6.8 G/DL — SIGNIFICANT CHANGE UP (ref 6–8.3)
SODIUM SERPL-SCNC: 141 MMOL/L — SIGNIFICANT CHANGE UP (ref 135–145)

## 2021-01-26 PROCEDURE — 99232 SBSQ HOSP IP/OBS MODERATE 35: CPT

## 2021-01-26 PROCEDURE — 99232 SBSQ HOSP IP/OBS MODERATE 35: CPT | Mod: GC

## 2021-01-26 RX ORDER — SODIUM CHLORIDE 9 MG/ML
1000 INJECTION, SOLUTION INTRAVENOUS
Refills: 0 | Status: DISCONTINUED | OUTPATIENT
Start: 2021-01-26 | End: 2021-01-27

## 2021-01-26 RX ORDER — PIPERACILLIN AND TAZOBACTAM 4; .5 G/20ML; G/20ML
3.38 INJECTION, POWDER, LYOPHILIZED, FOR SOLUTION INTRAVENOUS EVERY 8 HOURS
Refills: 0 | Status: DISCONTINUED | OUTPATIENT
Start: 2021-01-26 | End: 2021-01-26

## 2021-01-26 RX ORDER — PIPERACILLIN AND TAZOBACTAM 4; .5 G/20ML; G/20ML
3.38 INJECTION, POWDER, LYOPHILIZED, FOR SOLUTION INTRAVENOUS ONCE
Refills: 0 | Status: COMPLETED | OUTPATIENT
Start: 2021-01-26 | End: 2021-01-26

## 2021-01-26 RX ORDER — VANCOMYCIN HCL 1 G
VIAL (EA) INTRAVENOUS
Refills: 0 | Status: DISCONTINUED | OUTPATIENT
Start: 2021-01-26 | End: 2021-01-26

## 2021-01-26 RX ORDER — VANCOMYCIN HCL 1 G
1000 VIAL (EA) INTRAVENOUS ONCE
Refills: 0 | Status: DISCONTINUED | OUTPATIENT
Start: 2021-01-26 | End: 2021-01-26

## 2021-01-26 RX ADMIN — SENNA PLUS 2 TABLET(S): 8.6 TABLET ORAL at 22:04

## 2021-01-26 RX ADMIN — Medication 250 MILLIGRAM(S): at 00:20

## 2021-01-26 RX ADMIN — POLYETHYLENE GLYCOL 3350 17 GRAM(S): 17 POWDER, FOR SOLUTION ORAL at 18:13

## 2021-01-26 RX ADMIN — Medication 1: at 13:15

## 2021-01-26 RX ADMIN — PIPERACILLIN AND TAZOBACTAM 25 GRAM(S): 4; .5 INJECTION, POWDER, LYOPHILIZED, FOR SOLUTION INTRAVENOUS at 04:17

## 2021-01-26 RX ADMIN — Medication 12.5 MILLIGRAM(S): at 05:56

## 2021-01-26 RX ADMIN — SODIUM CHLORIDE 100 MILLILITER(S): 9 INJECTION, SOLUTION INTRAVENOUS at 18:46

## 2021-01-26 RX ADMIN — POLYETHYLENE GLYCOL 3350 17 GRAM(S): 17 POWDER, FOR SOLUTION ORAL at 05:56

## 2021-01-26 RX ADMIN — PIPERACILLIN AND TAZOBACTAM 200 GRAM(S): 4; .5 INJECTION, POWDER, LYOPHILIZED, FOR SOLUTION INTRAVENOUS at 18:15

## 2021-01-26 RX ADMIN — Medication 12.5 MILLIGRAM(S): at 13:15

## 2021-01-26 RX ADMIN — Medication 12.5 MILLIGRAM(S): at 22:04

## 2021-01-26 RX ADMIN — RIVAROXABAN 15 MILLIGRAM(S): KIT at 18:06

## 2021-01-26 NOTE — CHART NOTE - NSCHARTNOTEFT_GEN_A_CORE
Provider called to bedside because pt was hypothermic, rectal temp 92.1. Rest of vitals stable /77, HR 63. Pt examined at bedside, more lethargic but arousable, opens eyes to voice, unable to follow commands, no speech. Abdomen soft, non-tender, non-distended. Lungs clear to auscultation. PERRLA.   Pt restarted on zosyn and vancomycin, bear hugger, and LR at 100ml/hr for 10 hrs.   Pt's bladder scan with 550cc (5 hrs after last scan). RN unable to straight cath or place martinez. Will CTM UOP and check bladder scan at midnight. Provider called to bedside because pt was hypothermic, rectal temp 92.1. Rest of vitals stable /77, HR 63. POC glu 110. Pt examined at bedside, more lethargic but arousable, opens eyes to voice, unable to follow commands, no speech. Abdomen soft, non-tender, non-distended. Lungs clear to auscultation. PERRLA.   Pt restarted on zosyn and vancomycin, bear hugger, and LR at 100ml/hr for 10 hrs.   Pt's bladder scan with 550cc (5 hrs after last scan). RN unable to straight cath or place martinez. Will CTM UOP and check bladder scan at midnight. Provider called to bedside because pt was hypothermic, rectal temp 92.1. Rest of vitals stable /77, HR 63. POC glu 110. Pt examined at bedside, more lethargic (compared to morning exam) but arousable, opens eyes to voice, unable to follow commands, no speech. Abdomen soft, non-tender, non-distended. Lungs clear to auscultation. PERRLA, difficult to assess neck stiffness  Pt placed bear hugger, and LR at 100ml/hr for 10 hrs (pt w/ low PO intake). Monitoring off abx. Will restart zosyn/vanc if hemodynamically unstable. Will consider nuclear medicine tagged WBC scan.  Pt's bladder scan with 550cc (5 hrs after last scan). RN unable to straight cath or place martinez. Will CTM UOP and check bladder scan at midnight. Provider called to bedside because pt was hypothermic, rectal temp 92.1. Rest of vitals stable /77, HR 63. POC glu 110. Pt examined at bedside, more lethargic (compared to morning exam) but arousable, opens eyes to voice, unable to follow commands, no speech. Abdomen soft, non-tender, non-distended. Lungs clear to auscultation. PERRLA, difficult to assess neck stiffness  Pt placed bear hugger, and LR at 100ml/hr for 10 hrs (pt w/ low PO intake). Spoke to ID attending, Dr. Amos. No clear source of infection and unlikely to be a rapid change in mental status after stopping antibiotics if meningitis. Monitoring off abx. Will restart zosyn/vanc if hemodynamically unstable. Will consider nuclear medicine tagged WBC scan.  Pt's bladder scan with 550cc (5 hrs after last scan). RN unable to straight cath or place martinez. Will CTM UOP and check bladder scan at midnight.

## 2021-01-26 NOTE — PROGRESS NOTE ADULT - ATTENDING COMMENTS
pt seen and examined.  above plan discussed on rounds today.  In addition,    79 yo female w/pmh dementia, afib, p/w AMS, septic shock, originally admitted to MICU for pressors for <24h, now HDS on general medical floors. Found to be bacteremic with multiple blood culture bottles growing Staph epi. Also receiving tx for aspiration pneumonia.  -ID eval appreciated--> Staph epi presumed to be contaminate, will monitor off abx. Repeat cx's NGTD

## 2021-01-26 NOTE — PROGRESS NOTE ADULT - PROBLEM SELECTOR PLAN 5
- pt w/ hx of atrial fibrillation w/ xarelto at home  - c/w xarelto   - start on 12.5mg lopressor tid - pt w/ hx of atrial fibrillation w/ xarelto at home  - c/w xarelto   - c/w 12.5mg lopressor tid

## 2021-01-26 NOTE — PROGRESS NOTE ADULT - SUBJECTIVE AND OBJECTIVE BOX
80y old  Female who presents with a chief complaint of Hypotension (26 Jan 2021 07:25)      Interval history:  hypothermic, no cough, no SOB, no diarrhea per RN.       Allergies:   No Known Allergies      Antimicrobials:  piperacillin/tazobactam IVPB.. 3.375 Gram(s) IV Intermittent every 8 hours    vancomycin  IVPB 1000 milliGRAM(s) IV Intermittent once      REVIEW OF SYSTEMS:  Unable to obtain due to pt's underlying mental status.       Vital Signs Last 24 Hrs  T(C): 33.4 (01-26-21 @ 17:23), Max: 36.6 (01-25-21 @ 21:00)  T(F): 92.1 (01-26-21 @ 17:23), Max: 97.9 (01-25-21 @ 21:00)  HR: 63 (01-26-21 @ 17:23) (59 - 100)  BP: 112/77 (01-26-21 @ 17:23) (109/74 - 131/87)  BP(mean): --  RR: 17 (01-26-21 @ 17:23) (17 - 20)  SpO2: 100% (01-26-21 @ 12:32) (97% - 100%)      PHYSICAL EXAM:  Patient in no acute distress. Alert, awake, communicating, but confused.   Cardiovascular: S1S2 normal.  Lungs: + air entry B/L lung fields.  Gastrointestinal: soft, nontender, nondistended.  Extremities: no edema.  IV sites not inflamed.                           x      x     )-----------( See note    ( 25 Jan 2021 07:44 )             x      01-26    141  |  104  |  17  ----------------------------<  106<H>  4.6   |  26  |  1.35<H>    Ca    9.2      26 Jan 2021 07:14  Phos  3.4     01-26  Mg     1.8     01-26    TPro  6.8  /  Alb  3.0<L>  /  TBili  0.7  /  DBili  x   /  AST  42<H>  /  ALT  34  /  AlkPhos  94  01-26      LIVER FUNCTIONS - ( 26 Jan 2021 07:14 )  Alb: 3.0 g/dL / Pro: 6.8 g/dL / ALK PHOS: 94 U/L / ALT: 34 U/L / AST: 42 U/L / GGT: x             Culture - Blood (collected 25 Jan 2021 16:48)  Source: .Blood Blood-Peripheral  Preliminary Report (26 Jan 2021 17:01):    No growth to date.    Culture - Blood (collected 25 Jan 2021 16:47)  Source: .Blood Blood-Peripheral  Preliminary Report (26 Jan 2021 17:01):    No growth to date.

## 2021-01-26 NOTE — PROGRESS NOTE ADULT - PROBLEM SELECTOR PLAN 8
Diet: dysphagia 1 per S&S  DVT PPX: Xarelto  GOC: Full code per d/w son Diet: dysphagia 1 per S&S  DVT PPX: Xarelto  Dispo: PT eval  GOC: Full code per d/w son

## 2021-01-26 NOTE — PROGRESS NOTE ADULT - PROBLEM SELECTOR PLAN 2
acutely altered mental status compared to her baseline as described in HPI  - likely 2/2 infectious vs primary neurological vs metabolic   - CTH negative for acute pathology  - will c/w antibiotics as above and workup as above  - MRI/MRA of head/neck - poor study, minimal chronic ischemic changes in the frontoparietal white matter, small aneurysm R ICA  -might require LP/ID Likely at baseline now, baseline dementia AAOx0-1  - likely 2/2 metabolic vs infectious vs primary neurological  - CTH negative for acute pathology  - MRI/MRA of head/neck - poor study, minimal chronic ischemic changes in the frontoparietal white matter, small aneurysm R ICA

## 2021-01-26 NOTE — PROGRESS NOTE ADULT - PROBLEM SELECTOR PLAN 3
- s/p manual disimpaction  - c/w dulcolax suppository, senna, miralax  - c/w antibiotics as above Abdominal exam benign  - s/p manual disimpaction  - c/w dulcolax suppository, senna, miralax  - c/w antibiotics as above

## 2021-01-26 NOTE — PROGRESS NOTE ADULT - ATTENDING COMMENTS
Rusty Amos  Pager: [phone]. If no response or past 5 pm call [phone].     Will sign off, please call with questions.

## 2021-01-26 NOTE — PROGRESS NOTE ADULT - PROBLEM SELECTOR PLAN 1
Pt presented with hypothermia and tachycardia with hypotension, unresponsive to fluids, which required brief use of pressors w/ LORIE and elevated lactate. TSH 1.75. Ucx neg  - likely 2/2 to aspiration PNA (on x-ray) vs stercoral colitis (on CTAP)  - s/p azithromycin, c/w zosyn and vanc  - Bcx - staph epi in 2/4 vials, likely contaminant   - ID consult - monitor off abx Pt presented with hypothermia and tachycardia with hypotension, unresponsive to fluids, which required brief use of pressors w/ LORIE and elevated lactate. TSH 1.75. Ucx neg  - possibly 2/2 to aspiration PNA (on x-ray) vs stercoral colitis (on CTAP)  - s/p azithromycin, d/c zosyn and vanc, monitor off abx  - Bcx - staph epi in 2/4 vials, likely contaminant   - f/u repeat bcx  - ID consult - monitor off abx  - f/u AM cortisol

## 2021-01-26 NOTE — PROGRESS NOTE ADULT - ASSESSMENT
81y/o F w/ h/o dementia (baseline AAOx1), T2DM, Afib (on Xeralto), HTN p/w AMS, found to be hypothermic to 88, w/ possible TIA at home, CXR showing possible LLL atelectasis vs PNA, hypotensive s/p warming blanket requiring levo in MICU, now w/ sepsis workup. 79y/o F w/ h/o dementia (baseline AAOx1), T2DM, Afib (on Xeralto), HTN p/w AMS, found to be hypothermic to 88, w/ possible TIA at home, CXR showing possible LLL atelectasis vs PNA, hypotensive s/p warming blanket requiring levo in MICU, now unlikely infectious etiology, monitoring off abx.

## 2021-01-26 NOTE — PROVIDER CONTACT NOTE (OTHER) - ACTION/TREATMENT ORDERED:
continue to monitor for urine output and bladder scan at 12am. will order any other necessary orders.

## 2021-01-26 NOTE — PROGRESS NOTE ADULT - PROBLEM SELECTOR PLAN 4
Cr downtrending, likely pre-renal i/s/o shock and decreased PO intake  - /hr for 10 hrs  - d/c martinez - TOV  - monitor I&O and avoid nephrotoxic agents Cr downtrending, likely pre-renal i/s/o shock and decreased PO intake  - d/c martinez - TOV   - monitor I&O and avoid nephrotoxic agents

## 2021-01-26 NOTE — PROGRESS NOTE ADULT - ASSESSMENT
Assessment:  The patient is an 80 year old female with past medical history of dementia (baseline AAOx1), type 2 diabetes mellitus, atrial fibrillation (on Xarelto), and hypertension who presented with altered mental status. She has history of dementia and not on any medications. As per chart review, she has had a 2 week history of progressively worsening mental status and decreased PO intake. Her son reports that prior to admission, her lethargy increased and patient displayed garbled speech which prompted her to come to the emergency room. She reported having chills, but he denies her expressing shortness of breath, fevers, nausea, vomiting, diarrhea, dysuria and cough. She requires assistance with feeding and medications at home. She dresses and uses the restroom by herself. In the emergency department, she was found to be hypothermic and bradycardic and she was placed on a warming blanket and she became hypotensive. She was admitted for sepsis due to bacteremia and infectious disease was consulted for further recommendations.    Overall hypothermia, bradycardia, AMS, hypotension, lactic acidosis on presentation.   No obvious source of infection at this time. CoNS in blood cx in absence of hardware is usually a procurement contaminant, pt with no cellulitis or phlebitis.   No leucocytosis, CT abd with constipation, recent CXR with no pneumonia, procalcitonin not useful in setting of renal failure.   Mental status seems better.       Plan:  - recommend Monitor off intravenous antibiotics   - repeat blood cultures, NTD   - Trend CBC daily  - Consider endocrine workup for other causes

## 2021-01-26 NOTE — PROGRESS NOTE ADULT - SUBJECTIVE AND OBJECTIVE BOX
PROGRESS NOTE:   Authored by Anita Alvarez MD  Pager [phone] [facility] / [id number] [facility]    Patient is a 80y old  Female who presents with a chief complaint of Hypotension (25 Jan 2021 13:49)      SUBJECTIVE / OVERNIGHT EVENTS:  No acute events overnight. Pt failed TOV, bladder scan at 5am 550cc, straight cath.  This AM, pt    MEDICATIONS  (STANDING):  dextrose 5%. 1000 milliLiter(s) (100 mL/Hr) IV Continuous <Continuous>  glucagon  Injectable 1 milliGRAM(s) IntraMuscular once  insulin lispro (ADMELOG) corrective regimen sliding scale   SubCutaneous three times a day before meals  insulin lispro (ADMELOG) corrective regimen sliding scale   SubCutaneous at bedtime  lactated ringers. 1000 milliLiter(s) (100 mL/Hr) IV Continuous <Continuous>  metoprolol tartrate 12.5 milliGRAM(s) Oral every 8 hours  piperacillin/tazobactam IVPB.. 3.375 Gram(s) IV Intermittent every 8 hours  polyethylene glycol 3350 17 Gram(s) Oral two times a day  rivaroxaban 15 milliGRAM(s) Oral with dinner  senna 2 Tablet(s) Oral at bedtime  vancomycin  IVPB 1000 milliGRAM(s) IV Intermittent every 24 hours    MEDICATIONS  (PRN):  bisacodyl Suppository 10 milliGRAM(s) Rectal daily PRN Constipation      CAPILLARY BLOOD GLUCOSE      POCT Blood Glucose.: 111 mg/dL (25 Jan 2021 21:52)  POCT Blood Glucose.: 193 mg/dL (25 Jan 2021 17:03)  POCT Blood Glucose.: 101 mg/dL (25 Jan 2021 13:01)    I&O's Summary    25 Jan 2021 07:01  -  26 Jan 2021 07:00  --------------------------------------------------------  IN: 1220 mL / OUT: 2050 mL / NET: -830 mL        PHYSICAL EXAM:  Vital Signs Last 24 Hrs  T(C): 36.2 (26 Jan 2021 05:35), Max: 36.6 (25 Jan 2021 21:00)  T(F): 97.1 (26 Jan 2021 05:35), Max: 97.9 (25 Jan 2021 21:00)  HR: 78 (26 Jan 2021 05:35) (73 - 100)  BP: 131/87 (26 Jan 2021 05:35) (109/74 - 131/87)  BP(mean): --  RR: 20 (26 Jan 2021 05:35) (17 - 20)  SpO2: 98% (26 Jan 2021 05:35) (92% - 99%)    GENERAL: NAD, well-groomed, well-developed  HEAD:  Atraumatic, Normocephalic  EYES: PERRLA, conjunctiva and sclera clear  ENMT: Moist mucous membranes, poor dentition  NECK: no JVD  NERVOUS SYSTEM:  Alert & Oriented X0, awake, alert, doesn't follow command   CHEST/LUNG: limited exam due to mental status and habitus but appears clear ACW;   HEART: irregular and tachycardic   ABDOMEN: Soft, Nontender, Nondistended; Bowel sounds present  EXTREMITIES:  2+ Peripheral Pulses, No clubbing, cyanosis, or edema  LYMPH: No lymphadenopathy noted  SKIN: No rashes or lesions      LABS:                        x      x     )-----------( See note    ( 25 Jan 2021 07:44 )             x        01-25    141  |  105  |  18  ----------------------------<  108<H>  4.3   |  27  |  1.43<H>    Ca    9.1      25 Jan 2021 07:29  Phos  3.0     01-25  Mg     1.7     01-25   PROGRESS NOTE:   Authored by Anita Alvarez MD  Pager [phone] [facility] / [id number] [facility]    Patient is a 80y old  Female who presents with a chief complaint of Hypotension (25 Jan 2021 13:49)      SUBJECTIVE / OVERNIGHT EVENTS:  No acute events overnight. Pt failed TOV, bladder scan at 5am 550cc, straight cath.  This AM, pt awake and alert, AAOx0, not able to follow commands, no acute distress, incomprehensible speech.    MEDICATIONS  (STANDING):  dextrose 5%. 1000 milliLiter(s) (100 mL/Hr) IV Continuous <Continuous>  glucagon  Injectable 1 milliGRAM(s) IntraMuscular once  insulin lispro (ADMELOG) corrective regimen sliding scale   SubCutaneous three times a day before meals  insulin lispro (ADMELOG) corrective regimen sliding scale   SubCutaneous at bedtime  lactated ringers. 1000 milliLiter(s) (100 mL/Hr) IV Continuous <Continuous>  metoprolol tartrate 12.5 milliGRAM(s) Oral every 8 hours  piperacillin/tazobactam IVPB.. 3.375 Gram(s) IV Intermittent every 8 hours  polyethylene glycol 3350 17 Gram(s) Oral two times a day  rivaroxaban 15 milliGRAM(s) Oral with dinner  senna 2 Tablet(s) Oral at bedtime  vancomycin  IVPB 1000 milliGRAM(s) IV Intermittent every 24 hours    MEDICATIONS  (PRN):  bisacodyl Suppository 10 milliGRAM(s) Rectal daily PRN Constipation      CAPILLARY BLOOD GLUCOSE      POCT Blood Glucose.: 111 mg/dL (25 Jan 2021 21:52)  POCT Blood Glucose.: 193 mg/dL (25 Jan 2021 17:03)  POCT Blood Glucose.: 101 mg/dL (25 Jan 2021 13:01)    I&O's Summary    25 Jan 2021 07:01  -  26 Jan 2021 07:00  --------------------------------------------------------  IN: 1220 mL / OUT: 2050 mL / NET: -830 mL        PHYSICAL EXAM:  Vital Signs Last 24 Hrs  T(C): 36.2 (26 Jan 2021 05:35), Max: 36.6 (25 Jan 2021 21:00)  T(F): 97.1 (26 Jan 2021 05:35), Max: 97.9 (25 Jan 2021 21:00)  HR: 78 (26 Jan 2021 05:35) (73 - 100)  BP: 131/87 (26 Jan 2021 05:35) (109/74 - 131/87)  BP(mean): --  RR: 20 (26 Jan 2021 05:35) (17 - 20)  SpO2: 98% (26 Jan 2021 05:35) (92% - 99%)    GENERAL: NAD, well-groomed, well-developed  HEAD:  Atraumatic, Normocephalic  EYES: PERRLA, conjunctiva and sclera clear  ENMT: Moist mucous membranes, poor dentition  NECK: no JVD  NERVOUS SYSTEM:  Alert & Oriented X0, awake, alert, doesn't follow commands, incomprehensible speech.  CHEST/LUNG: limited exam due to mental status and habitus but appears clear ACW;   HEART: irregular and tachycardic   ABDOMEN: Soft, Nontender, Nondistended; Bowel sounds present  EXTREMITIES:  2+ Peripheral Pulses, No clubbing, cyanosis, or edema  LYMPH: No lymphadenopathy noted  SKIN: No rashes or lesions      LABS:                        x      x     )-----------( See note    ( 25 Jan 2021 07:44 )             x        01-25    141  |  105  |  18  ----------------------------<  108<H>  4.3   |  27  |  1.43<H>    Ca    9.1      25 Jan 2021 07:29  Phos  3.0     01-25  Mg     1.7     01-25   PROGRESS NOTE:   Authored by Anita Alvarez MD  Pager [phone] [facility] / [id number] [facility]    Patient is a 80y old  Female who presents with a chief complaint of Hypotension (25 Jan 2021 13:49)      SUBJECTIVE / OVERNIGHT EVENTS:  No acute events overnight. Pt failed TOV, bladder scan at 5am 550cc, straight cath.  This AM, pt awake and alert, AAOx0, not able to follow commands, no acute distress, incomprehensible speech.  Last BM this AM.    MEDICATIONS  (STANDING):  dextrose 5%. 1000 milliLiter(s) (100 mL/Hr) IV Continuous <Continuous>  glucagon  Injectable 1 milliGRAM(s) IntraMuscular once  insulin lispro (ADMELOG) corrective regimen sliding scale   SubCutaneous three times a day before meals  insulin lispro (ADMELOG) corrective regimen sliding scale   SubCutaneous at bedtime  lactated ringers. 1000 milliLiter(s) (100 mL/Hr) IV Continuous <Continuous>  metoprolol tartrate 12.5 milliGRAM(s) Oral every 8 hours  piperacillin/tazobactam IVPB.. 3.375 Gram(s) IV Intermittent every 8 hours  polyethylene glycol 3350 17 Gram(s) Oral two times a day  rivaroxaban 15 milliGRAM(s) Oral with dinner  senna 2 Tablet(s) Oral at bedtime  vancomycin  IVPB 1000 milliGRAM(s) IV Intermittent every 24 hours    MEDICATIONS  (PRN):  bisacodyl Suppository 10 milliGRAM(s) Rectal daily PRN Constipation      CAPILLARY BLOOD GLUCOSE      POCT Blood Glucose.: 111 mg/dL (25 Jan 2021 21:52)  POCT Blood Glucose.: 193 mg/dL (25 Jan 2021 17:03)  POCT Blood Glucose.: 101 mg/dL (25 Jan 2021 13:01)    I&O's Summary    25 Jan 2021 07:01  -  26 Jan 2021 07:00  --------------------------------------------------------  IN: 1220 mL / OUT: 2050 mL / NET: -830 mL        PHYSICAL EXAM:  Vital Signs Last 24 Hrs  T(C): 36.2 (26 Jan 2021 05:35), Max: 36.6 (25 Jan 2021 21:00)  T(F): 97.1 (26 Jan 2021 05:35), Max: 97.9 (25 Jan 2021 21:00)  HR: 78 (26 Jan 2021 05:35) (73 - 100)  BP: 131/87 (26 Jan 2021 05:35) (109/74 - 131/87)  BP(mean): --  RR: 20 (26 Jan 2021 05:35) (17 - 20)  SpO2: 98% (26 Jan 2021 05:35) (92% - 99%)    GENERAL: NAD, well-groomed, well-developed  HEAD:  Atraumatic, Normocephalic  EYES: PERRLA, conjunctiva and sclera clear  ENMT: Moist mucous membranes, poor dentition  NECK: no JVD  NERVOUS SYSTEM:  Alert & Oriented X0, awake, alert, doesn't follow commands, incomprehensible speech.  CHEST/LUNG: limited exam due to mental status and habitus but appears clear ACW;   HEART: irregular and tachycardic   ABDOMEN: Soft, Nontender, Nondistended; Bowel sounds present  EXTREMITIES:  2+ Peripheral Pulses, No clubbing, cyanosis, or edema  LYMPH: No lymphadenopathy noted  SKIN: No rashes or lesions      LABS:                        x      x     )-----------( See note    ( 25 Jan 2021 07:44 )             x        01-25    141  |  105  |  18  ----------------------------<  108<H>  4.3   |  27  |  1.43<H>    Ca    9.1      25 Jan 2021 07:29  Phos  3.0     01-25  Mg     1.7     01-25

## 2021-01-27 DIAGNOSIS — D69.6 THROMBOCYTOPENIA, UNSPECIFIED: ICD-10-CM

## 2021-01-27 DIAGNOSIS — R33.9 RETENTION OF URINE, UNSPECIFIED: ICD-10-CM

## 2021-01-27 LAB
ALBUMIN SERPL ELPH-MCNC: 2.9 G/DL — LOW (ref 3.3–5)
ALP SERPL-CCNC: 87 U/L — SIGNIFICANT CHANGE UP (ref 40–120)
ALT FLD-CCNC: 28 U/L — SIGNIFICANT CHANGE UP (ref 10–45)
ANION GAP SERPL CALC-SCNC: 8 MMOL/L — SIGNIFICANT CHANGE UP (ref 5–17)
AST SERPL-CCNC: 30 U/L — SIGNIFICANT CHANGE UP (ref 10–40)
BASOPHILS # BLD AUTO: 0.03 K/UL — SIGNIFICANT CHANGE UP (ref 0–0.2)
BASOPHILS NFR BLD AUTO: 0.7 % — SIGNIFICANT CHANGE UP (ref 0–2)
BILIRUB SERPL-MCNC: 0.5 MG/DL — SIGNIFICANT CHANGE UP (ref 0.2–1.2)
BUN SERPL-MCNC: 16 MG/DL — SIGNIFICANT CHANGE UP (ref 7–23)
CALCIUM SERPL-MCNC: 8.9 MG/DL — SIGNIFICANT CHANGE UP (ref 8.4–10.5)
CHLORIDE SERPL-SCNC: 106 MMOL/L — SIGNIFICANT CHANGE UP (ref 96–108)
CO2 SERPL-SCNC: 28 MMOL/L — SIGNIFICANT CHANGE UP (ref 22–31)
CREAT SERPL-MCNC: 1.28 MG/DL — SIGNIFICANT CHANGE UP (ref 0.5–1.3)
EOSINOPHIL # BLD AUTO: 0.17 K/UL — SIGNIFICANT CHANGE UP (ref 0–0.5)
EOSINOPHIL NFR BLD AUTO: 4 % — SIGNIFICANT CHANGE UP (ref 0–6)
GLUCOSE BLDC GLUCOMTR-MCNC: 110 MG/DL — HIGH (ref 70–99)
GLUCOSE BLDC GLUCOMTR-MCNC: 165 MG/DL — HIGH (ref 70–99)
GLUCOSE BLDC GLUCOMTR-MCNC: 72 MG/DL — SIGNIFICANT CHANGE UP (ref 70–99)
GLUCOSE BLDC GLUCOMTR-MCNC: 77 MG/DL — SIGNIFICANT CHANGE UP (ref 70–99)
GLUCOSE BLDC GLUCOMTR-MCNC: 79 MG/DL — SIGNIFICANT CHANGE UP (ref 70–99)
GLUCOSE SERPL-MCNC: 86 MG/DL — SIGNIFICANT CHANGE UP (ref 70–99)
HCT VFR BLD CALC: 33.2 % — LOW (ref 34.5–45)
HGB BLD-MCNC: 11.5 G/DL — SIGNIFICANT CHANGE UP (ref 11.5–15.5)
IMM GRANULOCYTES NFR BLD AUTO: 0.2 % — SIGNIFICANT CHANGE UP (ref 0–1.5)
LYMPHOCYTES # BLD AUTO: 0.83 K/UL — LOW (ref 1–3.3)
LYMPHOCYTES # BLD AUTO: 19.4 % — SIGNIFICANT CHANGE UP (ref 13–44)
MAGNESIUM SERPL-MCNC: 1.7 MG/DL — SIGNIFICANT CHANGE UP (ref 1.6–2.6)
MCHC RBC-ENTMCNC: 28.4 PG — SIGNIFICANT CHANGE UP (ref 27–34)
MCHC RBC-ENTMCNC: 34.6 GM/DL — SIGNIFICANT CHANGE UP (ref 32–36)
MCV RBC AUTO: 82 FL — SIGNIFICANT CHANGE UP (ref 80–100)
MONOCYTES # BLD AUTO: 0.45 K/UL — SIGNIFICANT CHANGE UP (ref 0–0.9)
MONOCYTES NFR BLD AUTO: 10.5 % — SIGNIFICANT CHANGE UP (ref 2–14)
NEUTROPHILS # BLD AUTO: 2.78 K/UL — SIGNIFICANT CHANGE UP (ref 1.8–7.4)
NEUTROPHILS NFR BLD AUTO: 65.2 % — SIGNIFICANT CHANGE UP (ref 43–77)
NRBC # BLD: 0 /100 WBCS — SIGNIFICANT CHANGE UP (ref 0–0)
PHOSPHATE SERPL-MCNC: 3.3 MG/DL — SIGNIFICANT CHANGE UP (ref 2.5–4.5)
PLATELET # BLD AUTO: 85 K/UL — LOW (ref 150–400)
POTASSIUM SERPL-MCNC: 4.3 MMOL/L — SIGNIFICANT CHANGE UP (ref 3.5–5.3)
POTASSIUM SERPL-SCNC: 4.3 MMOL/L — SIGNIFICANT CHANGE UP (ref 3.5–5.3)
PROT SERPL-MCNC: 6.4 G/DL — SIGNIFICANT CHANGE UP (ref 6–8.3)
RBC # BLD: 4.05 M/UL — SIGNIFICANT CHANGE UP (ref 3.8–5.2)
RBC # FLD: 14.6 % — HIGH (ref 10.3–14.5)
SODIUM SERPL-SCNC: 142 MMOL/L — SIGNIFICANT CHANGE UP (ref 135–145)
WBC # BLD: 4.27 K/UL — SIGNIFICANT CHANGE UP (ref 3.8–10.5)
WBC # FLD AUTO: 4.27 K/UL — SIGNIFICANT CHANGE UP (ref 3.8–10.5)

## 2021-01-27 PROCEDURE — 99233 SBSQ HOSP IP/OBS HIGH 50: CPT | Mod: GC

## 2021-01-27 PROCEDURE — 71045 X-RAY EXAM CHEST 1 VIEW: CPT | Mod: 26

## 2021-01-27 PROCEDURE — 99232 SBSQ HOSP IP/OBS MODERATE 35: CPT

## 2021-01-27 RX ORDER — SODIUM CHLORIDE 9 MG/ML
500 INJECTION, SOLUTION INTRAVENOUS
Refills: 0 | Status: DISCONTINUED | OUTPATIENT
Start: 2021-01-27 | End: 2021-01-28

## 2021-01-27 RX ADMIN — Medication 12.5 MILLIGRAM(S): at 14:20

## 2021-01-27 RX ADMIN — POLYETHYLENE GLYCOL 3350 17 GRAM(S): 17 POWDER, FOR SOLUTION ORAL at 17:46

## 2021-01-27 RX ADMIN — Medication 12.5 MILLIGRAM(S): at 05:02

## 2021-01-27 RX ADMIN — Medication 1: at 17:45

## 2021-01-27 RX ADMIN — SENNA PLUS 2 TABLET(S): 8.6 TABLET ORAL at 22:06

## 2021-01-27 RX ADMIN — POLYETHYLENE GLYCOL 3350 17 GRAM(S): 17 POWDER, FOR SOLUTION ORAL at 05:03

## 2021-01-27 RX ADMIN — Medication 12.5 MILLIGRAM(S): at 22:06

## 2021-01-27 RX ADMIN — RIVAROXABAN 15 MILLIGRAM(S): KIT at 17:45

## 2021-01-27 NOTE — PROGRESS NOTE ADULT - PROBLEM SELECTOR PLAN 6
- pt w/ hx of atrial fibrillation w/ xarelto at home  - c/w xarelto   - c/w 12.5mg lopressor tid Cr downtrending, likely pre-renal i/s/o shock and decreased PO intake  - d/c martinez - TOV   - monitor I&O and avoid nephrotoxic agents

## 2021-01-27 NOTE — PROGRESS NOTE ADULT - PROBLEM SELECTOR PLAN 5
Cr downtrending, likely pre-renal i/s/o shock and decreased PO intake  - d/c martinez - TOV   - monitor I&O and avoid nephrotoxic agents Abdominal exam benign  - s/p manual disimpaction  - c/w dulcolax suppository, senna, miralax  - c/w antibiotics as above

## 2021-01-27 NOTE — PROGRESS NOTE ADULT - PROBLEM SELECTOR PLAN 8
- c/w lopressor 12.5 mg tid - A1C 7.8 (1/23/21) with home metformin 500mg qd  - c/w low sliding scale

## 2021-01-27 NOTE — PROVIDER CONTACT NOTE (OTHER) - ACTION/TREATMENT ORDERED:
MD acknowledged that the pt. removed NG tube and stated that he will most lilkely not be replacing the NG tube overnight

## 2021-01-27 NOTE — PROGRESS NOTE ADULT - PROBLEM SELECTOR PLAN 1
Pt presented with hypothermia and tachycardia with hypotension, unresponsive to fluids, which required brief use of pressors w/ LORIE and elevated lactate. TSH 1.75. AM cortisol 11. Ucx neg. Bcx - staph epi in 2/4 vials, likely contaminant   - possibly 2/2 to aspiration PNA (on x-ray) vs stercoral colitis (on CTAP)  - f/u repeat bcx  - ID consult - monitor off abx  - AM cortisol 11 Pt presented with hypothermia and tachycardia with hypotension, unresponsive to fluids, which required brief use of pressors w/ LORIE and elevated lactate. TSH 1.75. AM cortisol 11. Ucx neg. Bcx - staph epi in 2/4 vials, likely contaminant   - possibly 2/2 to aspiration PNA (on x-ray) vs stercoral colitis (on CTAP)  - f/u repeat bcx  - ID consult - monitor off abx  - AM cortisol 11  - endocrine consult - no further endocrine workup needed

## 2021-01-27 NOTE — CHART NOTE - NSCHARTNOTEFT_GEN_A_CORE
Endocrine consulted for hypothermia. Workup revealed normal am cortisol and normal TFTs. No other endocrine workup needed at this time. Please reconsult if needed.      Manuel Grajeda D.O  [phone]

## 2021-01-27 NOTE — PROGRESS NOTE ADULT - SUBJECTIVE AND OBJECTIVE BOX
80y old  Female who presents with a chief complaint of Hypotension (27 Jan 2021 07:31)      Interval history:  Hypothermic overnight, with AMS, now on warming blanket, awake, alert, speaking.       Allergies:   No Known Allergies      Antimicrobials:      REVIEW OF SYSTEMS:  Unable to obtain, ? reliability.       Vital Signs Last 24 Hrs  T(C): 36.4 (01-27-21 @ 12:56), Max: 36.4 (01-27-21 @ 01:16)  T(F): 97.5 (01-27-21 @ 12:56), Max: 97.6 (01-27-21 @ 05:00)  HR: 72 (01-27-21 @ 12:56) (64 - 92)  BP: 131/87 (01-27-21 @ 12:56) (112/70 - 149/88)  BP(mean): --  RR: 18 (01-27-21 @ 12:56) (16 - 18)  SpO2: 99% (01-27-21 @ 12:56) (96% - 100%)      PHYSICAL EXAM:  Patient in no acute distress. Alert, awake, communicating, but confused.   Cardiovascular: S1S2 normal.  Lungs: + air entry B/L lung fields.  Gastrointestinal: soft, nondistended.  Extremities: no edema.  IV sites not inflamed.                           11.5   4.27  )-----------( 85       ( 27 Jan 2021 07:13 )             33.2   01-27    142  |  106  |  16  ----------------------------<  86  4.3   |  28  |  1.28    Ca    8.9      27 Jan 2021 07:11  Phos  3.3     01-27  Mg     1.7     01-27    TPro  6.4  /  Alb  2.9<L>  /  TBili  0.5  /  DBili  x   /  AST  30  /  ALT  28  /  AlkPhos  87  01-27      LIVER FUNCTIONS - ( 27 Jan 2021 07:11 )  Alb: 2.9 g/dL / Pro: 6.4 g/dL / ALK PHOS: 87 U/L / ALT: 28 U/L / AST: 30 U/L / GGT: x             Culture - Blood (collected 25 Jan 2021 16:48)  Source: .Blood Blood-Peripheral  Preliminary Report (26 Jan 2021 17:01):    No growth to date.    Culture - Blood (collected 25 Jan 2021 16:47)  Source: .Blood Blood-Peripheral  Preliminary Report (26 Jan 2021 17:01):    No growth to date.

## 2021-01-27 NOTE — PROVIDER CONTACT NOTE (OTHER) - ACTION/TREATMENT ORDERED:
MD aware, continue to monitor. MD aware, continue to monitor.    f/u @1600: Central line nurse attempted to place an IVL but unable. Pt may need a midline. MD to review.

## 2021-01-27 NOTE — PROGRESS NOTE ADULT - PROBLEM SELECTOR PLAN 4
Abdominal exam benign  - s/p manual disimpaction  - c/w dulcolax suppository, senna, miralax  - c/w antibiotics as above -CTM

## 2021-01-27 NOTE — CHART NOTE - NSCHARTNOTEFT_GEN_A_CORE
81 y/o F with PMH dementia (baseline AAOx1), T2DM, Afib, HTN p/w AMS, found to be hypothermic to 88, with possible TIA at home, CXR showing possible LLL atelectasis vs PNA, hypotensive s/p warming blanket requiring levo in MICU, now unlikely infectious etiology, monitoring off abx. Pt seen for initial bedside swallow evaluation 1/25/21 which revealed an oropharyngeal dysphagia superimposed upon reduced mental status. Recommendations at that time: NPO, with non-oral nutrition/hydration/medications. Pt seen earlier today, 1/27/21, for re-evaluation of swallow fx; however, pt lethargic and unable to achieve or maintain arousal despite verbal, tactile, and noxious stimulation. Per Stefany COOK, pt with increased alertness this afternoon.     Pt was received at bedside this afternoon for re-evaluation of swallow fx. +room air. She was AAOx0. Nodded head when asked first name. Stated "I don't know" when asked last name. Pt's speech was characterized by nonsensical speech and dysarthria. Pt was administered trials of honey-thick liquid via tsp, thin puree, and thick puree. Pt presents with oral dysphagia and suspected pharyngeal dysphagia. The swallow sequence was marked by prolonged oral holding (up to 25 seconds) requiring verbal and tactile cues to swallow, intermittently phonating with PO in oral cavity, and suspected delayed pharyngeal swallow trigger. No overt s/s of aspiration or penetration across all consistencies trialed; however, pt remains at HIGH RISK for aspiration given demonstration of oral holding and phonating with PO in oral cavity. Purposeful and proactive rounding completed. 5 P's addressed. Pt left in no distress.     Impressions:  Pt continues to present with oral dysphagia and suspected pharyngeal dysphagia superimposed upon reduced mental status.     Recommendations:  1) Current mental status does not support safe PO intake at present time.  2) This service will continue to f/u to re-assess swallow fx as appropriate.  3) Maintain good oral hygiene and aspiration precautions.    Discussed results and recommendations with pt (as best able), Stefany COOK, and Anita SPENCER.    Ning Gonzales M.S., CCC-SLP ext. [phone]

## 2021-01-27 NOTE — PROGRESS NOTE ADULT - PROBLEM SELECTOR PLAN 7
- A1C 7.8 (1/23/21) with home metformin 500mg qd  - c/w low sliding scale - pt w/ hx of atrial fibrillation w/ xarelto at home  - c/w xarelto   - c/w 12.5mg lopressor tid

## 2021-01-27 NOTE — PROGRESS NOTE ADULT - PROBLEM SELECTOR PLAN 9
Diet: dysphagia 1 per S&S  DVT PPX: Xarelto  Dispo: PT eval  GOC: Full code per d/w son - c/w lopressor 12.5 mg tid

## 2021-01-27 NOTE — PROGRESS NOTE ADULT - SUBJECTIVE AND OBJECTIVE BOX
PROGRESS NOTE:   Authored by Anita Alvarez MD  Pager [phone] [facility] / [id number] [facility]    Patient is a 80y old  Female who presents with a chief complaint of Hypotension (26 Jan 2021 18:24)      SUBJECTIVE / OVERNIGHT EVENTS:  Overnight, pt still hypothermic but improved after hyperthemia blanket. Monitored off abx. Pt retaining, bladder scan at 12am with 700cc, straight cath with 900cc.  This AM,     MEDICATIONS  (STANDING):  dextrose 5%. 1000 milliLiter(s) (100 mL/Hr) IV Continuous <Continuous>  glucagon  Injectable 1 milliGRAM(s) IntraMuscular once  insulin lispro (ADMELOG) corrective regimen sliding scale   SubCutaneous three times a day before meals  insulin lispro (ADMELOG) corrective regimen sliding scale   SubCutaneous at bedtime  lactated ringers. 1000 milliLiter(s) (100 mL/Hr) IV Continuous <Continuous>  metoprolol tartrate 12.5 milliGRAM(s) Oral every 8 hours  polyethylene glycol 3350 17 Gram(s) Oral two times a day  rivaroxaban 15 milliGRAM(s) Oral with dinner  senna 2 Tablet(s) Oral at bedtime    MEDICATIONS  (PRN):  bisacodyl Suppository 10 milliGRAM(s) Rectal daily PRN Constipation      CAPILLARY BLOOD GLUCOSE      POCT Blood Glucose.: 121 mg/dL (26 Jan 2021 21:59)  POCT Blood Glucose.: 110 mg/dL (26 Jan 2021 17:15)  POCT Blood Glucose.: 166 mg/dL (26 Jan 2021 12:30)  POCT Blood Glucose.: 119 mg/dL (26 Jan 2021 08:28)    I&O's Summary    26 Jan 2021 07:01  -  27 Jan 2021 07:00  --------------------------------------------------------  IN: 900 mL / OUT: 1450 mL / NET: -550 mL        PHYSICAL EXAM:  Vital Signs Last 24 Hrs  T(C): 36.4 (27 Jan 2021 05:43), Max: 36.4 (27 Jan 2021 01:16)  T(F): 97.6 (27 Jan 2021 05:43), Max: 97.6 (27 Jan 2021 05:00)  HR: 89 (27 Jan 2021 05:43) (59 - 92)  BP: 149/88 (27 Jan 2021 05:43) (111/72 - 149/88)  BP(mean): --  RR: 18 (27 Jan 2021 05:43) (17 - 18)  SpO2: 96% (27 Jan 2021 05:43) (96% - 100%)    GENERAL: NAD, well-groomed, well-developed  HEAD:  Atraumatic, Normocephalic  EYES: PERRLA, conjunctiva and sclera clear  ENMT: Moist mucous membranes, poor dentition  NECK: no JVD  NERVOUS SYSTEM:  Alert & Oriented X0, awake, alert, doesn't follow commands, incomprehensible speech.  CHEST/LUNG: limited exam due to mental status and habitus but appears clear ACW;   HEART: irregular and tachycardic   ABDOMEN: Soft, Nontender, Nondistended; Bowel sounds present  EXTREMITIES:  2+ Peripheral Pulses, No clubbing, cyanosis, or edema  LYMPH: No lymphadenopathy noted  SKIN: No rashes or lesions    LABS:                        x      x     )-----------( See note    ( 25 Jan 2021 07:44 )             x        01-26    141  |  104  |  17  ----------------------------<  106<H>  4.6   |  26  |  1.35<H>    Ca    9.2      26 Jan 2021 07:14  Phos  3.4     01-26  Mg     1.8     01-26    TPro  6.8  /  Alb  3.0<L>  /  TBili  0.7  /  DBili  x   /  AST  42<H>  /  ALT  34  /  AlkPhos  94  01-26        Culture - Blood (collected 25 Jan 2021 16:48)  Source: .Blood Blood-Peripheral  Preliminary Report (26 Jan 2021 17:01):    No growth to date.    Culture - Blood (collected 25 Jan 2021 16:47)  Source: .Blood Blood-Peripheral  Preliminary Report (26 Jan 2021 17:01):    No growth to date. PROGRESS NOTE:   Authored by Anita Alvarez MD  Pager [phone] [facility] / [id number] [facility]    Patient is a 80y old  Female who presents with a chief complaint of Hypotension (26 Jan 2021 18:24)      SUBJECTIVE / OVERNIGHT EVENTS:  Overnight, pt still hypothermic but improved after hyperthemia blanket. Monitored off abx. Pt retaining, bladder scan at 12am with 700cc, straight cath with 900cc.  This AM, pt still very lethargic, but arousable. Oriented x0.     MEDICATIONS  (STANDING):  dextrose 5%. 1000 milliLiter(s) (100 mL/Hr) IV Continuous <Continuous>  glucagon  Injectable 1 milliGRAM(s) IntraMuscular once  insulin lispro (ADMELOG) corrective regimen sliding scale   SubCutaneous three times a day before meals  insulin lispro (ADMELOG) corrective regimen sliding scale   SubCutaneous at bedtime  lactated ringers. 1000 milliLiter(s) (100 mL/Hr) IV Continuous <Continuous>  metoprolol tartrate 12.5 milliGRAM(s) Oral every 8 hours  polyethylene glycol 3350 17 Gram(s) Oral two times a day  rivaroxaban 15 milliGRAM(s) Oral with dinner  senna 2 Tablet(s) Oral at bedtime    MEDICATIONS  (PRN):  bisacodyl Suppository 10 milliGRAM(s) Rectal daily PRN Constipation      CAPILLARY BLOOD GLUCOSE      POCT Blood Glucose.: 121 mg/dL (26 Jan 2021 21:59)  POCT Blood Glucose.: 110 mg/dL (26 Jan 2021 17:15)  POCT Blood Glucose.: 166 mg/dL (26 Jan 2021 12:30)  POCT Blood Glucose.: 119 mg/dL (26 Jan 2021 08:28)    I&O's Summary    26 Jan 2021 07:01  -  27 Jan 2021 07:00  --------------------------------------------------------  IN: 900 mL / OUT: 1450 mL / NET: -550 mL        PHYSICAL EXAM:  Vital Signs Last 24 Hrs  T(C): 36.4 (27 Jan 2021 05:43), Max: 36.4 (27 Jan 2021 01:16)  T(F): 97.6 (27 Jan 2021 05:43), Max: 97.6 (27 Jan 2021 05:00)  HR: 89 (27 Jan 2021 05:43) (59 - 92)  BP: 149/88 (27 Jan 2021 05:43) (111/72 - 149/88)  BP(mean): --  RR: 18 (27 Jan 2021 05:43) (17 - 18)  SpO2: 96% (27 Jan 2021 05:43) (96% - 100%)    GENERAL: NAD, well-groomed, well-developed  HEAD:  Atraumatic, Normocephalic  EYES: PERRLA, conjunctiva and sclera clear  ENMT: Moist mucous membranes, poor dentition  NECK: no JVD  NERVOUS SYSTEM:  Oriented X0, lethargic, doesn't follow commands, no speech.  CHEST/LUNG: limited exam due to mental status and habitus but appears clear ACW;   HEART: irregular and tachycardic   ABDOMEN: Soft, Nontender, Nondistended; Bowel sounds present  EXTREMITIES:  2+ Peripheral Pulses, No clubbing, cyanosis, or edema  LYMPH: No lymphadenopathy noted  SKIN: No rashes or lesions    LABS:                        x      x     )-----------( See note    ( 25 Jan 2021 07:44 )             x        01-26    141  |  104  |  17  ----------------------------<  106<H>  4.6   |  26  |  1.35<H>    Ca    9.2      26 Jan 2021 07:14  Phos  3.4     01-26  Mg     1.8     01-26    TPro  6.8  /  Alb  3.0<L>  /  TBili  0.7  /  DBili  x   /  AST  42<H>  /  ALT  34  /  AlkPhos  94  01-26        Culture - Blood (collected 25 Jan 2021 16:48)  Source: .Blood Blood-Peripheral  Preliminary Report (26 Jan 2021 17:01):    No growth to date.    Culture - Blood (collected 25 Jan 2021 16:47)  Source: .Blood Blood-Peripheral  Preliminary Report (26 Jan 2021 17:01):    No growth to date. PROGRESS NOTE:   Authored by Anita Alvarez MD  Pager [phone] [facility] / [id number] [facility]    Patient is a 80y old  Female who presents with a chief complaint of Hypotension (26 Jan 2021 18:24)      SUBJECTIVE / OVERNIGHT EVENTS:  Overnight, pt still hypothermic but improved after hyperthemia blanket. Monitored off abx. Pt retaining, bladder scan at 10pm with 700cc, straight cath with 900cc. Bladder scan at 5am 420cc, straight cath 500cc  This AM, pt still very lethargic, but arousable. Oriented x0.     MEDICATIONS  (STANDING):  dextrose 5%. 1000 milliLiter(s) (100 mL/Hr) IV Continuous <Continuous>  glucagon  Injectable 1 milliGRAM(s) IntraMuscular once  insulin lispro (ADMELOG) corrective regimen sliding scale   SubCutaneous three times a day before meals  insulin lispro (ADMELOG) corrective regimen sliding scale   SubCutaneous at bedtime  lactated ringers. 1000 milliLiter(s) (100 mL/Hr) IV Continuous <Continuous>  metoprolol tartrate 12.5 milliGRAM(s) Oral every 8 hours  polyethylene glycol 3350 17 Gram(s) Oral two times a day  rivaroxaban 15 milliGRAM(s) Oral with dinner  senna 2 Tablet(s) Oral at bedtime    MEDICATIONS  (PRN):  bisacodyl Suppository 10 milliGRAM(s) Rectal daily PRN Constipation      CAPILLARY BLOOD GLUCOSE      POCT Blood Glucose.: 121 mg/dL (26 Jan 2021 21:59)  POCT Blood Glucose.: 110 mg/dL (26 Jan 2021 17:15)  POCT Blood Glucose.: 166 mg/dL (26 Jan 2021 12:30)  POCT Blood Glucose.: 119 mg/dL (26 Jan 2021 08:28)    I&O's Summary    26 Jan 2021 07:01  -  27 Jan 2021 07:00  --------------------------------------------------------  IN: 900 mL / OUT: 1450 mL / NET: -550 mL        PHYSICAL EXAM:  Vital Signs Last 24 Hrs  T(C): 36.4 (27 Jan 2021 05:43), Max: 36.4 (27 Jan 2021 01:16)  T(F): 97.6 (27 Jan 2021 05:43), Max: 97.6 (27 Jan 2021 05:00)  HR: 89 (27 Jan 2021 05:43) (59 - 92)  BP: 149/88 (27 Jan 2021 05:43) (111/72 - 149/88)  BP(mean): --  RR: 18 (27 Jan 2021 05:43) (17 - 18)  SpO2: 96% (27 Jan 2021 05:43) (96% - 100%)    GENERAL: NAD, well-groomed, well-developed  HEAD:  Atraumatic, Normocephalic  EYES: PERRLA, conjunctiva and sclera clear  ENMT: Moist mucous membranes, poor dentition  NECK: no JVD  NERVOUS SYSTEM:  Oriented X0, lethargic, doesn't follow commands, no speech.  CHEST/LUNG: limited exam due to mental status and habitus but appears clear ACW;   HEART: irregular and tachycardic   ABDOMEN: Soft, Nontender, Nondistended; Bowel sounds present  EXTREMITIES:  2+ Peripheral Pulses, No clubbing, cyanosis, or edema  LYMPH: No lymphadenopathy noted  SKIN: No rashes or lesions    LABS:                        x      x     )-----------( See note    ( 25 Jan 2021 07:44 )             x        01-26    141  |  104  |  17  ----------------------------<  106<H>  4.6   |  26  |  1.35<H>    Ca    9.2      26 Jan 2021 07:14  Phos  3.4     01-26  Mg     1.8     01-26    TPro  6.8  /  Alb  3.0<L>  /  TBili  0.7  /  DBili  x   /  AST  42<H>  /  ALT  34  /  AlkPhos  94  01-26        Culture - Blood (collected 25 Jan 2021 16:48)  Source: .Blood Blood-Peripheral  Preliminary Report (26 Jan 2021 17:01):    No growth to date.    Culture - Blood (collected 25 Jan 2021 16:47)  Source: .Blood Blood-Peripheral  Preliminary Report (26 Jan 2021 17:01):    No growth to date.

## 2021-01-27 NOTE — PROGRESS NOTE ADULT - ASSESSMENT
Assessment:  The patient is an 80 year old female with past medical history of dementia (baseline AAOx1), type 2 diabetes mellitus, atrial fibrillation (on Xarelto), and hypertension who presented with altered mental status. She has history of dementia and not on any medications. As per chart review, she has had a 2 week history of progressively worsening mental status and decreased PO intake. Her son reports that prior to admission, her lethargy increased and patient displayed garbled speech which prompted her to come to the emergency room. She reported having chills, but he denies her expressing shortness of breath, fevers, nausea, vomiting, diarrhea, dysuria and cough. She requires assistance with feeding and medications at home. She dresses and uses the restroom by herself. In the emergency department, she was found to be hypothermic and bradycardic and she was placed on a warming blanket and she became hypotensive. She was admitted for sepsis due to bacteremia and infectious disease was consulted for further recommendations.    Overall hypothermia, bradycardia, AMS, hypotension, lactic acidosis on presentation.   No obvious source of infection at this time. CoNS in blood cx in absence of hardware is usually a procurement contaminant, pt with no cellulitis or phlebitis.   No leucocytosis, CT abd with constipation, recent CXR with no pneumonia, procalcitonin not useful in setting of renal failure.   Mental status seems better.   concern for urinary retention.       Plan:  - recommend Monitor off intravenous antibiotics   - repeat blood cultures, NTD   - Trend CBC daily  - Consider endocrine workup for other causes  - check U/A and urine cx given concern for urinary retention.

## 2021-01-27 NOTE — PROGRESS NOTE ADULT - PROBLEM SELECTOR PLAN 3
Resolved. Likely at baseline now, baseline dementia AAOx0-1  - likely 2/2 metabolic vs infectious vs primary neurological  - CTH negative for acute pathology  - MRI/MRA of head/neck - poor study, minimal chronic ischemic changes in the frontoparietal white matter, small aneurysm R ICA

## 2021-01-27 NOTE — PROGRESS NOTE ADULT - ASSESSMENT
79y/o F w/ h/o dementia (baseline AAOx1), T2DM, Afib (on Xeralto), HTN p/w AMS, found to be hypothermic to 88, w/ possible TIA at home, CXR showing possible LLL atelectasis vs PNA, hypotensive s/p warming blanket requiring levo in MICU, now unlikely infectious etiology, monitoring off abx.

## 2021-01-27 NOTE — CHART NOTE - NSCHARTNOTEFT_GEN_A_CORE
79y/o F w/ h/o dementia (baseline AAOx1), T2DM, Afib (on Xarelto) and HTN p/w AMS. Pt has h/o dementia not on any medications. Per patient's son (Jose), she has had 2 week history of progressively worsening sedation and decreased PO intake. Son reports yesterday, lethargy increased and patient displayed garbled speech which prompted ED evaluation. She has endorsed chills to him at home. He denies her expressing shortness of breath, fevers, nausea, vomiting, diarrhea, dysuria and cough. She requires assistance with feeding and medications at home. She dresses and uses the restroom by herself. At the ED, pt was found to have rectal temp of 88, HR of 55, BP of 120/80, saturating well on RA, CBC and CMP wnl, lactate elevated to 2.8, pH 7.28.    Pt started on warming blanket, BP dropped to 60s systolic, pt started on fluids, received 2.6L total, started on levo, s/p vanc and zosyn, Bcx drawn, UA negative. Pt admitted to MICU for further monitoring. Concern for sepsis 2/2 to possible Asp PNA in setting of recent ?TIA, vs stercoral colitis given high stool burden in CTAP. Pt weaned off pressors, continued on zosyn. She was on legionella for questionable PNA on CXR but that was stopped once legionella remained negative. Pt had NGT placed for oral medications and TFs, pending swallow evaluation when less lethargic. Currently lethargic but easily arousable, AAOx0. She is currently HDS. For  follow-up: Swallow eval when less lethargic.  1/26: Event note initiated as pt hypothermic.     Chart reviewed, pt seen for bedside swallow evaluation. Pt asleep in bed, lethargic unable to achieve or maintain arousal despite verbal, tactile, and noxious stimulation. RN: Stefany alerted and arrived in room to check vitals. Given pt's lethargy, PO trials deferred and pt deemed inappropriate for PO intake at present time. This service will f/u to re-assess swallow function when clinically appropriate. Above d/w MD: Team 3.     Recommendations:  Mental status does not support PO intake at present time  This service will f/u to re-assess swallow pending improvement in wakefulness  Maintain good oral hygiene  Aspiration precautions

## 2021-01-27 NOTE — PROGRESS NOTE ADULT - ATTENDING COMMENTS
pt seen and examined.  above plan discussed on rounds today.  In addition,    81 yo female w/pmh dementia, afib, p/w AMS, septic shock, originally admitted to MICU for pressors for <24h, now HDS on general medical floors. Found to be bacteremic with multiple blood culture bottles growing Staph epi. Also receiving tx for aspiration pneumonia.  -ID eval appreciated--> Staph epi presumed to be contaminate, will monitor off abx. Repeat cx's NGTD    Hypothermia- no clear cause, common causes have been ruled out. TSH normal, cortisol normal, no brain lesions seen on MRI brain, no infective process at this time. Will get Endo eval for further recommendation.

## 2021-01-28 DIAGNOSIS — R53.81 OTHER MALAISE: ICD-10-CM

## 2021-01-28 DIAGNOSIS — Z51.5 ENCOUNTER FOR PALLIATIVE CARE: ICD-10-CM

## 2021-01-28 DIAGNOSIS — G93.40 ENCEPHALOPATHY, UNSPECIFIED: ICD-10-CM

## 2021-01-28 DIAGNOSIS — R13.10 DYSPHAGIA, UNSPECIFIED: ICD-10-CM

## 2021-01-28 DIAGNOSIS — F03.90 UNSPECIFIED DEMENTIA WITHOUT BEHAVIORAL DISTURBANCE: ICD-10-CM

## 2021-01-28 DIAGNOSIS — N17.9 ACUTE KIDNEY FAILURE, UNSPECIFIED: ICD-10-CM

## 2021-01-28 LAB
ANION GAP SERPL CALC-SCNC: 14 MMOL/L — SIGNIFICANT CHANGE UP (ref 5–17)
BUN SERPL-MCNC: 16 MG/DL — SIGNIFICANT CHANGE UP (ref 7–23)
CALCIUM SERPL-MCNC: 9.4 MG/DL — SIGNIFICANT CHANGE UP (ref 8.4–10.5)
CHLORIDE SERPL-SCNC: 106 MMOL/L — SIGNIFICANT CHANGE UP (ref 96–108)
CO2 SERPL-SCNC: 24 MMOL/L — SIGNIFICANT CHANGE UP (ref 22–31)
CREAT SERPL-MCNC: 1.19 MG/DL — SIGNIFICANT CHANGE UP (ref 0.5–1.3)
GLUCOSE BLDC GLUCOMTR-MCNC: 112 MG/DL — HIGH (ref 70–99)
GLUCOSE BLDC GLUCOMTR-MCNC: 163 MG/DL — HIGH (ref 70–99)
GLUCOSE BLDC GLUCOMTR-MCNC: 97 MG/DL — SIGNIFICANT CHANGE UP (ref 70–99)
GLUCOSE BLDC GLUCOMTR-MCNC: 98 MG/DL — SIGNIFICANT CHANGE UP (ref 70–99)
GLUCOSE SERPL-MCNC: 119 MG/DL — HIGH (ref 70–99)
HCT VFR BLD CALC: 34.8 % — SIGNIFICANT CHANGE UP (ref 34.5–45)
HGB BLD-MCNC: 11.9 G/DL — SIGNIFICANT CHANGE UP (ref 11.5–15.5)
MAGNESIUM SERPL-MCNC: 1.9 MG/DL — SIGNIFICANT CHANGE UP (ref 1.6–2.6)
MCHC RBC-ENTMCNC: 28.1 PG — SIGNIFICANT CHANGE UP (ref 27–34)
MCHC RBC-ENTMCNC: 34.2 GM/DL — SIGNIFICANT CHANGE UP (ref 32–36)
MCV RBC AUTO: 82.1 FL — SIGNIFICANT CHANGE UP (ref 80–100)
NRBC # BLD: 0 /100 WBCS — SIGNIFICANT CHANGE UP (ref 0–0)
PHOSPHATE SERPL-MCNC: 3.5 MG/DL — SIGNIFICANT CHANGE UP (ref 2.5–4.5)
PLATELET # BLD AUTO: 98 K/UL — LOW (ref 150–400)
POTASSIUM SERPL-MCNC: 4.2 MMOL/L — SIGNIFICANT CHANGE UP (ref 3.5–5.3)
POTASSIUM SERPL-SCNC: 4.2 MMOL/L — SIGNIFICANT CHANGE UP (ref 3.5–5.3)
RBC # BLD: 4.24 M/UL — SIGNIFICANT CHANGE UP (ref 3.8–5.2)
RBC # FLD: 14.7 % — HIGH (ref 10.3–14.5)
SODIUM SERPL-SCNC: 144 MMOL/L — SIGNIFICANT CHANGE UP (ref 135–145)
WBC # BLD: 4.97 K/UL — SIGNIFICANT CHANGE UP (ref 3.8–10.5)
WBC # FLD AUTO: 4.97 K/UL — SIGNIFICANT CHANGE UP (ref 3.8–10.5)

## 2021-01-28 PROCEDURE — 99223 1ST HOSP IP/OBS HIGH 75: CPT

## 2021-01-28 PROCEDURE — 99233 SBSQ HOSP IP/OBS HIGH 50: CPT | Mod: GC

## 2021-01-28 PROCEDURE — 99232 SBSQ HOSP IP/OBS MODERATE 35: CPT

## 2021-01-28 RX ORDER — SODIUM CHLORIDE 9 MG/ML
1000 INJECTION, SOLUTION INTRAVENOUS
Refills: 0 | Status: DISCONTINUED | OUTPATIENT
Start: 2021-01-28 | End: 2021-02-02

## 2021-01-28 RX ADMIN — POLYETHYLENE GLYCOL 3350 17 GRAM(S): 17 POWDER, FOR SOLUTION ORAL at 18:03

## 2021-01-28 RX ADMIN — SODIUM CHLORIDE 75 MILLILITER(S): 9 INJECTION, SOLUTION INTRAVENOUS at 16:16

## 2021-01-28 RX ADMIN — RIVAROXABAN 15 MILLIGRAM(S): KIT at 18:03

## 2021-01-28 RX ADMIN — Medication 12.5 MILLIGRAM(S): at 13:34

## 2021-01-28 RX ADMIN — Medication 12.5 MILLIGRAM(S): at 21:37

## 2021-01-28 RX ADMIN — SODIUM CHLORIDE 75 MILLILITER(S): 9 INJECTION, SOLUTION INTRAVENOUS at 23:00

## 2021-01-28 RX ADMIN — SENNA PLUS 2 TABLET(S): 8.6 TABLET ORAL at 21:38

## 2021-01-28 NOTE — PROGRESS NOTE ADULT - PROBLEM SELECTOR PLAN 2
Pt failed TOV x2  - f/u next bladder scan q8h  - may need martinez Pt presented with hypothermia and tachycardia with hypotension, unresponsive to fluids, which required brief use of pressors w/ LORIE and elevated lactate. TSH 1.75. AM cortisol 11. Ucx neg. Bcx - staph epi in 2/4 vials, likely contaminant. Possibly 2/2 to aspiration PNA (on x-ray) vs stercoral colitis (on CTAP)  - f/u repeat bcx - NGTD  - ID consult - monitor off abx  - endocrine consult - no further endocrine workup needed

## 2021-01-28 NOTE — PROGRESS NOTE ADULT - SUBJECTIVE AND OBJECTIVE BOX
PROGRESS NOTE:   Authored by Anita Alvarez MD  Pager [phone] [facility] / [id number] [facility]    Patient is a 80y old  Female who presents with a chief complaint of Hypotension (27 Jan 2021 18:07)      SUBJECTIVE / OVERNIGHT EVENTS:  Overnight, pt pulled out her NGT.  This AM, pt awake and alert.     MEDICATIONS  (STANDING):  dextrose 5%. 1000 milliLiter(s) (100 mL/Hr) IV Continuous <Continuous>  dextrose 5%. 500 milliLiter(s) (50 mL/Hr) IV Continuous <Continuous>  glucagon  Injectable 1 milliGRAM(s) IntraMuscular once  insulin lispro (ADMELOG) corrective regimen sliding scale   SubCutaneous three times a day before meals  insulin lispro (ADMELOG) corrective regimen sliding scale   SubCutaneous at bedtime  metoprolol tartrate 12.5 milliGRAM(s) Oral every 8 hours  polyethylene glycol 3350 17 Gram(s) Oral two times a day  rivaroxaban 15 milliGRAM(s) Oral with dinner  senna 2 Tablet(s) Oral at bedtime    MEDICATIONS  (PRN):  bisacodyl Suppository 10 milliGRAM(s) Rectal daily PRN Constipation      CAPILLARY BLOOD GLUCOSE      POCT Blood Glucose.: 110 mg/dL (27 Jan 2021 22:50)  POCT Blood Glucose.: 165 mg/dL (27 Jan 2021 17:30)  POCT Blood Glucose.: 77 mg/dL (27 Jan 2021 12:26)  POCT Blood Glucose.: 79 mg/dL (27 Jan 2021 09:20)  POCT Blood Glucose.: 72 mg/dL (27 Jan 2021 08:29)    I&O's Summary    27 Jan 2021 07:01  -  28 Jan 2021 07:00  --------------------------------------------------------  IN: 30 mL / OUT: 400 mL / NET: -370 mL        PHYSICAL EXAM:  Vital Signs Last 24 Hrs  T(C): 36.2 (28 Jan 2021 01:57), Max: 37 (27 Jan 2021 22:04)  T(F): 97.2 (28 Jan 2021 01:57), Max: 98.6 (27 Jan 2021 22:04)  HR: 80 (28 Jan 2021 01:57) (72 - 82)  BP: 151/81 (28 Jan 2021 01:57) (131/87 - 151/81)  BP(mean): --  RR: 17 (28 Jan 2021 01:57) (16 - 18)  SpO2: 100% (28 Jan 2021 01:57) (97% - 100%)    GENERAL: No acute distress, well-developed  HEAD:  Atraumatic, Normocephalic  EYES: EOMI, PERRLA, conjunctiva and sclera clear  NECK: Supple, no lymphadenopathy, no JVD  CHEST/LUNG: CTAB; No wheezes, rales, or rhonchi  HEART: Regular rate and rhythm; No murmurs, rubs, or gallops  ABDOMEN: Soft, non-tender, non-distended; normal bowel sounds, no organomegaly  EXTREMITIES:  2+ peripheral pulses b/l, No clubbing, cyanosis, or edema  NEUROLOGY: A&O x 3, no focal deficits  SKIN: No rashes or lesions    LABS:                        11.5   4.27  )-----------( 85       ( 27 Jan 2021 07:13 )             33.2     01-27    142  |  106  |  16  ----------------------------<  86  4.3   |  28  |  1.28    Ca    8.9      27 Jan 2021 07:11  Phos  3.3     01-27  Mg     1.7     01-27    TPro  6.4  /  Alb  2.9<L>  /  TBili  0.5  /  DBili  x   /  AST  30  /  ALT  28  /  AlkPhos  87  01-27        Culture - Blood (collected 25 Jan 2021 16:48)  Source: .Blood Blood-Peripheral  Preliminary Report (26 Jan 2021 17:01):    No growth to date.    Culture - Blood (collected 25 Jan 2021 16:47)  Source: .Blood Blood-Peripheral  Preliminary Report (26 Jan 2021 17:01):    No growth to date.     PROGRESS NOTE:   Authored by Anita Alvarez MD  Pager [phone] [facility] / [id number] [facility]    Patient is a 80y old  Female who presents with a chief complaint of Hypotension (27 Jan 2021 18:07)      SUBJECTIVE / OVERNIGHT EVENTS:  Overnight, pt pulled out her NGT and unable to put in IV.  This AM, pt awake and alert. Pt mumbling    MEDICATIONS  (STANDING):  dextrose 5%. 1000 milliLiter(s) (100 mL/Hr) IV Continuous <Continuous>  dextrose 5%. 500 milliLiter(s) (50 mL/Hr) IV Continuous <Continuous>  glucagon  Injectable 1 milliGRAM(s) IntraMuscular once  insulin lispro (ADMELOG) corrective regimen sliding scale   SubCutaneous three times a day before meals  insulin lispro (ADMELOG) corrective regimen sliding scale   SubCutaneous at bedtime  metoprolol tartrate 12.5 milliGRAM(s) Oral every 8 hours  polyethylene glycol 3350 17 Gram(s) Oral two times a day  rivaroxaban 15 milliGRAM(s) Oral with dinner  senna 2 Tablet(s) Oral at bedtime    MEDICATIONS  (PRN):  bisacodyl Suppository 10 milliGRAM(s) Rectal daily PRN Constipation      CAPILLARY BLOOD GLUCOSE      POCT Blood Glucose.: 110 mg/dL (27 Jan 2021 22:50)  POCT Blood Glucose.: 165 mg/dL (27 Jan 2021 17:30)  POCT Blood Glucose.: 77 mg/dL (27 Jan 2021 12:26)  POCT Blood Glucose.: 79 mg/dL (27 Jan 2021 09:20)  POCT Blood Glucose.: 72 mg/dL (27 Jan 2021 08:29)    I&O's Summary    27 Jan 2021 07:01  -  28 Jan 2021 07:00  --------------------------------------------------------  IN: 30 mL / OUT: 400 mL / NET: -370 mL        PHYSICAL EXAM:  Vital Signs Last 24 Hrs  T(C): 36.2 (28 Jan 2021 01:57), Max: 37 (27 Jan 2021 22:04)  T(F): 97.2 (28 Jan 2021 01:57), Max: 98.6 (27 Jan 2021 22:04)  HR: 80 (28 Jan 2021 01:57) (72 - 82)  BP: 151/81 (28 Jan 2021 01:57) (131/87 - 151/81)  BP(mean): --  RR: 17 (28 Jan 2021 01:57) (16 - 18)  SpO2: 100% (28 Jan 2021 01:57) (97% - 100%)    GENERAL: No acute distress, well-developed  HEAD:  Atraumatic, Normocephalic  EYES: EOMI, PERRLA, conjunctiva and sclera clear  NECK: Supple, no lymphadenopathy, no JVD  CHEST/LUNG: CTAB; No wheezes, rales, or rhonchi  HEART: Regular rate and rhythm; No murmurs, rubs, or gallops  ABDOMEN: Soft, non-tender, non-distended; normal bowel sounds, no organomegaly  EXTREMITIES:  2+ peripheral pulses b/l, No clubbing, cyanosis, or edema  NEUROLOGY: A&O x 3, no focal deficits  SKIN: No rashes or lesions    LABS:                        11.5   4.27  )-----------( 85       ( 27 Jan 2021 07:13 )             33.2     01-27    142  |  106  |  16  ----------------------------<  86  4.3   |  28  |  1.28    Ca    8.9      27 Jan 2021 07:11  Phos  3.3     01-27  Mg     1.7     01-27    TPro  6.4  /  Alb  2.9<L>  /  TBili  0.5  /  DBili  x   /  AST  30  /  ALT  28  /  AlkPhos  87  01-27        Culture - Blood (collected 25 Jan 2021 16:48)  Source: .Blood Blood-Peripheral  Preliminary Report (26 Jan 2021 17:01):    No growth to date.    Culture - Blood (collected 25 Jan 2021 16:47)  Source: .Blood Blood-Peripheral  Preliminary Report (26 Jan 2021 17:01):    No growth to date.     PROGRESS NOTE:   Authored by Anita Alvarez MD  Pager [phone] [facility] / [id number] [facility]    Patient is a 80y old  Female who presents with a chief complaint of Hypotension (27 Jan 2021 18:07)      SUBJECTIVE / OVERNIGHT EVENTS:  Overnight, pt pulled out her NGT and unable to put in IV.  This AM, pt awake and alert. Pt mumbling, no acute distress.    MEDICATIONS  (STANDING):  dextrose 5%. 1000 milliLiter(s) (100 mL/Hr) IV Continuous <Continuous>  dextrose 5%. 500 milliLiter(s) (50 mL/Hr) IV Continuous <Continuous>  glucagon  Injectable 1 milliGRAM(s) IntraMuscular once  insulin lispro (ADMELOG) corrective regimen sliding scale   SubCutaneous three times a day before meals  insulin lispro (ADMELOG) corrective regimen sliding scale   SubCutaneous at bedtime  metoprolol tartrate 12.5 milliGRAM(s) Oral every 8 hours  polyethylene glycol 3350 17 Gram(s) Oral two times a day  rivaroxaban 15 milliGRAM(s) Oral with dinner  senna 2 Tablet(s) Oral at bedtime    MEDICATIONS  (PRN):  bisacodyl Suppository 10 milliGRAM(s) Rectal daily PRN Constipation      CAPILLARY BLOOD GLUCOSE      POCT Blood Glucose.: 110 mg/dL (27 Jan 2021 22:50)  POCT Blood Glucose.: 165 mg/dL (27 Jan 2021 17:30)  POCT Blood Glucose.: 77 mg/dL (27 Jan 2021 12:26)  POCT Blood Glucose.: 79 mg/dL (27 Jan 2021 09:20)  POCT Blood Glucose.: 72 mg/dL (27 Jan 2021 08:29)    I&O's Summary    27 Jan 2021 07:01  -  28 Jan 2021 07:00  --------------------------------------------------------  IN: 30 mL / OUT: 400 mL / NET: -370 mL        PHYSICAL EXAM:  Vital Signs Last 24 Hrs  T(C): 36.2 (28 Jan 2021 01:57), Max: 37 (27 Jan 2021 22:04)  T(F): 97.2 (28 Jan 2021 01:57), Max: 98.6 (27 Jan 2021 22:04)  HR: 80 (28 Jan 2021 01:57) (72 - 82)  BP: 151/81 (28 Jan 2021 01:57) (131/87 - 151/81)  BP(mean): --  RR: 17 (28 Jan 2021 01:57) (16 - 18)  SpO2: 100% (28 Jan 2021 01:57) (97% - 100%)    GENERAL: NAD, well-groomed, well-developed  HEAD:  Atraumatic, Normocephalic  EYES: PERRLA, conjunctiva and sclera clear  ENMT: Moist mucous membranes, poor dentition  NECK: no JVD  NERVOUS SYSTEM:  Oriented X0, lethargic, doesn't follow commands, no speech.  CHEST/LUNG: limited exam due to mental status and habitus but appears clear ACW;   HEART: irregular and tachycardic   ABDOMEN: Soft, Nontender, Nondistended; Bowel sounds present  EXTREMITIES:  2+ Peripheral Pulses, No clubbing, cyanosis, or edema  LYMPH: No lymphadenopathy noted  SKIN: No rashes or lesions    LABS:                        11.5   4.27  )-----------( 85       ( 27 Jan 2021 07:13 )             33.2     01-27    142  |  106  |  16  ----------------------------<  86  4.3   |  28  |  1.28    Ca    8.9      27 Jan 2021 07:11  Phos  3.3     01-27  Mg     1.7     01-27    TPro  6.4  /  Alb  2.9<L>  /  TBili  0.5  /  DBili  x   /  AST  30  /  ALT  28  /  AlkPhos  87  01-27        Culture - Blood (collected 25 Jan 2021 16:48)  Source: .Blood Blood-Peripheral  Preliminary Report (26 Jan 2021 17:01):    No growth to date.    Culture - Blood (collected 25 Jan 2021 16:47)  Source: .Blood Blood-Peripheral  Preliminary Report (26 Jan 2021 17:01):    No growth to date.

## 2021-01-28 NOTE — PROGRESS NOTE ADULT - ATTENDING COMMENTS
pt seen and examined.  above plan discussed on rounds today.  In addition,    79 yo female w/pmh dementia, afib, p/w AMS, septic shock, originally admitted to MICU for pressors for <24h, now HDS on general medical floors. Found to be bacteremic with multiple blood culture bottles growing Staph epi. Also receiving tx for aspiration pneumonia.  -ID eval appreciated--> Staph epi presumed to be contaminate, will monitor off abx. Repeat cx's NGTD    Hypothermia- no clear cause, common causes have been ruled out. TSH normal, cortisol normal, no brain lesions seen on MRI brain, no infective process at this time. Endo eval appreciated --> no further w/u needed    Encephalopathy  - presumed to be from congnitive impairment from dementia  - no infectious of metabolic causes have been identified    palliative eval

## 2021-01-28 NOTE — PROGRESS NOTE ADULT - SUBJECTIVE AND OBJECTIVE BOX
80y old  Female who presents with a chief complaint of Hypotension (28 Jan 2021 11:23)      Interval history:  On warming blanket, no diarrhea, continues to retain urine. Arousable, responds.     Allergies:   No Known Allergies      Antimicrobials:      REVIEW OF SYSTEMS:  Unreliable due to pt's underlying mental status.       Vital Signs Last 24 Hrs  T(C): 36.3 (01-28-21 @ 12:30), Max: 37 (01-27-21 @ 22:04)  T(F): 97.3 (01-28-21 @ 12:30), Max: 98.6 (01-27-21 @ 22:04)  HR: 83 (01-28-21 @ 12:30) (80 - 83)  BP: 133/71 (01-28-21 @ 12:30) (133/71 - 151/81)  BP(mean): --  RR: 18 (01-28-21 @ 12:30) (17 - 18)  SpO2: 97% (01-28-21 @ 12:30) (97% - 100%)      PHYSICAL EXAM:  Patient in no acute distress. Alert, awake, communicating, but confused.   Cardiovascular: S1S2 normal.  Lungs: + air entry B/L lung fields.  Gastrointestinal: soft, nondistended.  Extremities: no edema.  IV sites not inflamed.                             11.9   4.97  )-----------( 98       ( 28 Jan 2021 07:14 )             34.8   01-28    144  |  106  |  16  ----------------------------<  119<H>  4.2   |  24  |  1.19    Ca    9.4      28 Jan 2021 07:12  Phos  3.5     01-28  Mg     1.9     01-28    TPro  6.4  /  Alb  2.9<L>  /  TBili  0.5  /  DBili  x   /  AST  30  /  ALT  28  /  AlkPhos  87  01-27      LIVER FUNCTIONS - ( 27 Jan 2021 07:11 )  Alb: 2.9 g/dL / Pro: 6.4 g/dL / ALK PHOS: 87 U/L / ALT: 28 U/L / AST: 30 U/L / GGT: x             Culture - Blood (collected 25 Jan 2021 16:48)  Source: .Blood Blood-Peripheral  Preliminary Report (26 Jan 2021 17:01):    No growth to date.    Culture - Blood (collected 25 Jan 2021 16:47)  Source: .Blood Blood-Peripheral  Preliminary Report (26 Jan 2021 17:01):    No growth to date.      Radiology:  < from: Xray Chest 1 View- PORTABLE-Urgent (Xray Chest 1 View- PORTABLE-Urgent .) (01.27.21 @ 19:33) >  IMPRESSION: There is a nasogastric tube with its tip in good position, within the stomach.

## 2021-01-28 NOTE — PROGRESS NOTE ADULT - PROBLEM SELECTOR PLAN 3
Resolved. Likely at baseline now, baseline dementia AAOx0-1  - likely 2/2 metabolic vs infectious vs primary neurological  - CTH negative for acute pathology  - MRI/MRA of head/neck - poor study, minimal chronic ischemic changes in the frontoparietal white matter, small aneurysm R ICA Pt failed TOV a few times  - bladder scan q8h  - may need martinez, pt not receiving fluids so low UOP

## 2021-01-28 NOTE — CONSULT NOTE ADULT - PROBLEM SELECTOR RECOMMENDATION 4
PPSv2 prior to admission:  Current functional PPSv2:  Nursing care required:  Code status documented:  A review of the paper chart has been conducted  HCP form present:               Yes and valid []               Yes but invalid []                No []   MOLST present:                   Yes and valid []               Yes but invalid []                No []  Incapacity form present:   Yes and valid []                Yes but invalid []                No []                 N/A []  Living Will:                            Yes and valid []                Yes but invalid []                No []      Family Health Care Decision Act (FHCDA) Surrogate Decision Maker Hierarchy in the absence of a health care agent  [facility] Article 81 Guardian-->Spouse or domestic partner--> Adult child-->Parent-->Sibling--> Close friend PPSv2 prior to admission: 100%  Current functional PPSv2: 30-40%  Nursing care required:   Code status documented:  A review of the paper chart has been conducted  HCP form present:               Yes and valid []               Yes but invalid []                No []   MOLST present:                   Yes and valid []               Yes but invalid []                No []  Incapacity form present:   Yes and valid []                Yes but invalid []                No []                 N/A []  Living Will:                            Yes and valid []                Yes but invalid []                No []      Family Health Care Decision Act (FHCDA) Surrogate Decision Maker Hierarchy in the absence of a health care agent  [facility] Article 81 Guardian-->Spouse or domestic partner--> Adult child-->Parent-->Sibling--> Close friend PPSv2 prior to admission: 100%  Current functional PPSv2: 30-40%  Nursing care required: total assistance with ADLs  Code status documented:  A review of the paper chart has been conducted  HCP form present:               Yes and valid []               Yes but invalid []                No [x]   MOLST present:                   Yes and valid []               Yes but invalid []                No [x]  Incapacity form present:   Yes and valid []                Yes but invalid []                No [x]                 N/A []  Living Will:                            Yes and valid []                Yes but invalid []                No [x]      Family Health Care Decision Act (FHCDA) Surrogate Decision Maker Hierarchy in the absence of a health care agent  [facility] Article 81 Guardian-->Spouse or domestic partner--> Adult child-->Parent-->Sibling--> Close friend

## 2021-01-28 NOTE — CONSULT NOTE ADULT - PROBLEM SELECTOR RECOMMENDATION 5
Condition:  Degree:  Active treatment:  Clinical impact on complexity: At baseline, A&O x1. Patient was at baseline today, improved compared with yesterday. Per primary team, unlikely meningitis given wax and wane mental status.   Condition:    Degree: Stage 7   Active treatment:  Clinical impact on complexity: At baseline, A&O x1. Patient was at baseline today, improved compared with yesterday. Per primary team, unlikely meningitis given wax and wane mental status.   Condition:   dementia  Clinical impact on complexity: Significant

## 2021-01-28 NOTE — CONSULT NOTE ADULT - PROBLEM SELECTOR RECOMMENDATION 3
Judicious use of opioids if used Likely secondary to hypotensive episode.  - Judicious use of opioids if used

## 2021-01-28 NOTE — CONSULT NOTE ADULT - PROBLEM SELECTOR RECOMMENDATION 9
-NG tube or D5W  -speech and swallow when patient showed signs of improvement -NG tube or D5W  -speech and swallow when patient shows signs of improvement

## 2021-01-28 NOTE — PROGRESS NOTE ADULT - PROBLEM SELECTOR PLAN 1
Pt presented with hypothermia and tachycardia with hypotension, unresponsive to fluids, which required brief use of pressors w/ LORIE and elevated lactate. TSH 1.75. AM cortisol 11. Ucx neg. Bcx - staph epi in 2/4 vials, likely contaminant   - possibly 2/2 to aspiration PNA (on x-ray) vs stercoral colitis (on CTAP)  - f/u repeat bcx  - ID consult - monitor off abx  - endocrine consult - no further endocrine workup needed Baseline AAOx0-1. Unable to follow commands. Likely at baseline  -palliative consulted for home hospice and GOC  -pt pulling out NGT, not good candidate for PEG tube, possible comfort feeds

## 2021-01-28 NOTE — CONSULT NOTE ADULT - ATTENDING COMMENTS
Rusty Amos  Pager: [phone]. If no response or past 5 pm call [phone].
The patient was seen and examined  Annotations are embedded above  Predominant symptom(s) encephalopathy  Relevant factors: recent hypotension, trajectory, reports of ambulation prior to admission  Interdisciplinary Team Involvement: none at this time  Action Items: Work up and management of reversible causes per primary team          In the event of newly developing, evolving, or worsening symptoms, please contact the Palliative Medicine team via pager (if the patient is at [facility] #[phone] or if the patient is at [facility] #[phone]) The Geriatric and Palliative Medicine service has coverage 24 hours a day/ 7 days a week to provide medical recommendations regarding symptom management needs via telephone        Ellis Ca MD   of Geriatric and Palliative Medicine  [facility]           After 5pm and on weekends, please see the contact information below:    In the event of newly developing, evolving, or worsening symptoms, please contact the Palliative Medicine team via pager (if the patient is at [facility] #[phone] or if the patient is at [facility] #[phone]) The Geriatric and Palliative Medicine service has coverage 24 hours a day/ 7 days a week to provide medical recommendations regarding symptom management needs via telephone

## 2021-01-28 NOTE — CONSULT NOTE ADULT - PROBLEM SELECTOR RECOMMENDATION 2
Predominant symptom:  Likely due to  Degree of control:  Relative to previous day:  Current treatment regimen:  Recommendations:  Risk mitigation:  Adverse events noted: Predominant symptom: AMS  Likely due to: sepsis   Degree of control: improving, back at baseline today   Relative to previous day: mental status better today (more alert and oriented)  Current treatment regimen: not candidate for lumbar puncture   Recommendations: continue to monitor for improvement   Risk mitigation:  Adverse events noted: Predominant symptom: AMS  Likely due to: sepsis   Degree of control: improving, back at baseline today   Relative to previous day: mental status better today (more alert and oriented)  Current treatment regimen: not candidate for lumbar puncture   Recommendations: continue to monitor for improvement   Risk mitigation: monitor for changes  Adverse events noted: none

## 2021-01-28 NOTE — CONSULT NOTE ADULT - SUBJECTIVE AND OBJECTIVE BOX
HPI:  79y/o F w/ h/o dementia (baseline AAOx1), T2DM, Afib (on Xarelto) and HTN p/w AMS. Pt has h/o dementia not on any medications. Per patient's son (Jose), she has had 2 week history of progressively worsening sedation and decreased PO intake. Son reports yesterday, lethargy increased and patient displayed garbled speech which prompted ED evaluation. She has endorsed chills to him at home. He denies her expressing shortness of breath, fevers, nausea, vomiting, diarrhea, dysuria and cough. She requires assistance with feeding and medications at home. She dresses and uses the restroom by herself.    At the ED, pt was found to have rectal temp of 88, HR of 55, BP of 120/80, saturating well on RA, CBC and CMP wnl, lactate elevated to 2.8, pH 7.28. Pt started on warming blanket, BP dropped to 60s systolic, pt started on fluids, received 2.6L total, started on levo, s/p vanc and zosyn, Bcx drawn, UA negative. Pt admitted to MICU for further monitoring (23 Jan 2021 00:46)    PERTINENT PM/SXH:   Dementia    Atrial fibrillation    Type 2 diabetes mellitus      No significant past surgical history      FAMILY HISTORY:  No pertinent family history in first degree relatives      ITEMS NOT CHECKED ARE NOT PRESENT    SOCIAL HISTORY:   Significant other/partner[ ]  Children[ ]  Mu-ism/Spirituality:  Substance hx:  [ ]   Tobacco hx:  [ ]   Alcohol hx: [ ]   Home Opioid hx:  [ ] I-Stop Reference No:  Living Situation: [ ]Home  [ ]Long term care  [ ]Rehab [ ]Other    ADVANCE DIRECTIVES:    DNR  MOLST  [ ]  Living Will  [ ]   DECISION MAKER(s):  [ ] Health Care Proxy(s)  [ ] Surrogate(s)  [ ] Guardian           Name(s): Phone Number(s):    BASELINE (I)ADL(s) (prior to admission):  [place]: [ ]Total  [ ] Moderate [ ]Dependent    Allergies    No Known Allergies    Intolerances    MEDICATIONS  (STANDING):  dextrose 5%. 1000 milliLiter(s) (100 mL/Hr) IV Continuous <Continuous>  dextrose 5%. 500 milliLiter(s) (50 mL/Hr) IV Continuous <Continuous>  glucagon  Injectable 1 milliGRAM(s) IntraMuscular once  insulin lispro (ADMELOG) corrective regimen sliding scale   SubCutaneous three times a day before meals  insulin lispro (ADMELOG) corrective regimen sliding scale   SubCutaneous at bedtime  metoprolol tartrate 12.5 milliGRAM(s) Oral every 8 hours  polyethylene glycol 3350 17 Gram(s) Oral two times a day  rivaroxaban 15 milliGRAM(s) Oral with dinner  senna 2 Tablet(s) Oral at bedtime    MEDICATIONS  (PRN):  bisacodyl Suppository 10 milliGRAM(s) Rectal daily PRN Constipation    PRESENT SYMPTOMS: [ ]Unable to obtain due to poor mentation   Source if other than patient:  [ ]Family   [ ]Team     Pain: [ ]yes [ ]no  QOL impact -   Location -                    Aggravating factors -  Quality -  Radiation -  Timing-  Severity (0-10 scale):  Minimal acceptable level (0-10 scale):     PAIN AD Score:     http://[email or web address]/painad.pdf (press ctrl +  left click to view)    Dyspnea:                           [ ]Mild [ ]Moderate [ ]Severe  Anxiety:                             [ ]Mild [ ]Moderate [ ]Severe  Fatigue:                             [ ]Mild [ ]Moderate [ ]Severe  Nausea:                             [ ]Mild [ ]Moderate [ ]Severe  Loss of appetite:              [ ]Mild [ ]Moderate [ ]Severe  Constipation:                    [ ]Mild [ ]Moderate [ ]Severe    Other Symptoms:  [ ]All other review of systems negative     Palliative Performance Status Version 2:         %    http://npcrc.org/files/news/palliative_performance_scale_ppsv2.pdf  PHYSICAL EXAM:  Vital Signs Last 24 Hrs  T(C): 34.7 (28 Jan 2021 10:36), Max: 37 (27 Jan 2021 22:04)  T(F): 94.4 (28 Jan 2021 10:36), Max: 98.6 (27 Jan 2021 22:04)  HR: 80 (28 Jan 2021 01:57) (72 - 82)  BP: 151/81 (28 Jan 2021 01:57) (131/87 - 151/81)  BP(mean): --  RR: 17 (28 Jan 2021 01:57) (17 - 18)  SpO2: 100% (28 Jan 2021 01:57) (99% - 100%) I&O's Summary    27 Jan 2021 07:01  -  28 Jan 2021 07:00  --------------------------------------------------------  IN: 30 mL / OUT: 400 mL / NET: -370 mL      GENERAL:  [ ]Alert  [ ]Oriented x   [ ]Lethargic  [ ]Cachexia  [ ]Unarousable  [ ]Verbal  [ ]Non-Verbal  Behavioral:   [ ] Anxiety  [ ] Delirium [ ] Agitation [ ] Other  HEENT:  [ ]Normal   [ ]Dry mouth   [ ]ET Tube/Trach  [ ]Oral lesions  PULMONARY:   [ ]Clear [ ]Tachypnea  [ ]Audible excessive secretions   [ ]Rhonchi        [ ]Right [ ]Left [ ]Bilateral  [ ]Crackles        [ ]Right [ ]Left [ ]Bilateral  [ ]Wheezing     [ ]Right [ ]Left [ ]Bilatera  [ ]Diminished breath sounds [ ]right [ ]left [ ]bilateral  CARDIOVASCULAR:    [ ]Regular [ ]Irregular [ ]Tachy  [ ]Roberto [ ]Murmur [ ]Other  GASTROINTESTINAL:  [ ]Soft  [ ]Distended   [ ]+BS  [ ]Non tender [ ]Tender  [ ]PEG [ ]OGT/ NGT  Last BM:     GENITOURINARY:  [ ]Normal [ ] Incontinent   [ ]Oliguria/Anuria   [ ]Donohue  MUSCULOSKELETAL:   [ ]Normal   [ ]Weakness  [ ]Bed/Wheelchair bound [ ]Edema  NEUROLOGIC:   [ ]No focal deficits  [ ]Cognitive impairment  [ ]Dysphagia [ ]Dysarthria [ ]Paresis [ ]Other   SKIN:   [ ]Normal    [ ]Rash  [ ]Pressure ulcer(s)       Present on admission [ ]y [ ]n    CRITICAL CARE:  [ ] Shock Present  [ ]Septic [ ]Cardiogenic [ ]Neurologic [ ]Hypovolemic  [ ]  Vasopressors [ ]  Inotropes   [ ]Respiratory failure present [ ]Mechanical ventilation [ ]Non-invasive ventilatory support [ ]High flow  [ ]Acute  [ ]Chronic [ ]Hypoxic  [ ]Hypercarbic [ ]Other  [ ]Other organ failure     LABS:                        11.9   4.97  )-----------( 98       ( 28 Jan 2021 07:14 )             34.8   01-28    144  |  106  |  16  ----------------------------<  119<H>  4.2   |  24  |  1.19    Ca    9.4      28 Jan 2021 07:12  Phos  3.5     01-28  Mg     1.9     01-28    TPro  6.4  /  Alb  2.9<L>  /  TBili  0.5  /  DBili  x   /  AST  30  /  ALT  28  /  AlkPhos  87  01-27        RADIOLOGY & ADDITIONAL STUDIES:    PROTEIN CALORIE MALNUTRITION PRESENT: [ ]mild [ ]moderate [ ]severe [ ]underweight [ ]morbid obesity  https://www.andeal.org/vault/2440/web/files/ONC/Table_Clinical%20Characteristics%20to%20Document%20Malnutrition-White%20JV%20et%20al%202012.pdf    Height (cm): 160 (01-23-21 @ 18:20)  Weight (kg): 73 (01-23-21 @ 18:20)  BMI (kg/m2): 28.5 (01-23-21 @ 18:20)    [ ]PPSV2 < or = to 30% [ ]significant weight loss  [ ]poor nutritional intake  [ ]anasarca     Albumin, Serum: 2.9 g/dL (01-27-21 @ 07:11)   [ ]Artificial Nutrition      REFERRALS:   [ ]Chaplaincy  [ ]Hospice  [ ]Child Life  [ ]Social Work  [ ]Case management [ ]Holistic Therapy     Goals of Care Document:  HPI:  81y/o F w/ h/o dementia (baseline AAOx1), T2DM, Afib (on Xarelto) and HTN p/w AMS. Pt has h/o dementia not on any medications. Per patient's son (Jose), she has had 2 week history of progressively worsening sedation and decreased PO intake. Son reports yesterday, lethargy increased and patient displayed garbled speech which prompted ED evaluation. She has endorsed chills to him at home. He denies her expressing shortness of breath, fevers, nausea, vomiting, diarrhea, dysuria and cough. She requires assistance with feeding and medications at home. She dresses and uses the restroom by herself.    At the ED, pt was found to have rectal temp of 88, HR of 55, BP of 120/80, saturating well on RA, CBC and CMP wnl, lactate elevated to 2.8, pH 7.28. Pt started on warming blanket, BP dropped to 60s systolic, pt started on fluids, received 2.6L total, started on levo, s/p vanc and zosyn, Bcx drawn, UA negative. Pt admitted to MICU for further monitoring (23 Jan 2021 00:46)    PERTINENT PM/SXH:   Dementia    Atrial fibrillation    Type 2 diabetes mellitus      No significant past surgical history      FAMILY HISTORY:  No pertinent family history in first degree relatives      ITEMS NOT CHECKED ARE NOT PRESENT    SOCIAL HISTORY:   Significant other/partner[ ]  Children[ ]  Hinduism/Spirituality:  Substance hx:  [ ]   Tobacco hx:  [ ]   Alcohol hx: [ ]   Home Opioid hx:  [ ] I-Stop Reference No:  Living Situation: [ ]Home  [ ]Long term care  [ ]Rehab [ ]Other    ADVANCE DIRECTIVES:    DNR  MOLST  [ ]  Living Will  [ ]   DECISION MAKER(s):  [ ] Health Care Proxy(s)  [ ] Surrogate(s)  [ ] Guardian           Name(s): Phone Number(s):    BASELINE (I)ADL(s) (prior to admission):  [place]: [ ]Total  [ ] Moderate [ ]Dependent    Allergies    No Known Allergies    Intolerances    MEDICATIONS  (STANDING):  dextrose 5%. 1000 milliLiter(s) (100 mL/Hr) IV Continuous <Continuous>  dextrose 5%. 500 milliLiter(s) (50 mL/Hr) IV Continuous <Continuous>  glucagon  Injectable 1 milliGRAM(s) IntraMuscular once  insulin lispro (ADMELOG) corrective regimen sliding scale   SubCutaneous three times a day before meals  insulin lispro (ADMELOG) corrective regimen sliding scale   SubCutaneous at bedtime  metoprolol tartrate 12.5 milliGRAM(s) Oral every 8 hours  polyethylene glycol 3350 17 Gram(s) Oral two times a day  rivaroxaban 15 milliGRAM(s) Oral with dinner  senna 2 Tablet(s) Oral at bedtime    MEDICATIONS  (PRN):  bisacodyl Suppository 10 milliGRAM(s) Rectal daily PRN Constipation    PRESENT SYMPTOMS: [ ]Unable to obtain due to poor mentation   Source if other than patient:  [ ]Family   [ ]Team     Pain: [ ]yes [x ]no  QOL impact -   Location -                    Aggravating factors -  Quality -  Radiation -  Timing-  Severity (0-10 scale):  Minimal acceptable level (0-10 scale):     PAIN AD Score:     [email or web address]/painad.pdf (press ctrl +  left click to view)    Dyspnea:                           [ ]Mild [ ]Moderate [ ]Severe  Anxiety:                             [ ]Mild [ ]Moderate [ ]Severe  Fatigue:                             [ ]Mild [ ]Moderate [ ]Severe  Nausea:                             [ ]Mild [ ]Moderate [ ]Severe  Loss of appetite:              [ ]Mild [ ]Moderate [ ]Severe  Constipation:                    [ ]Mild [ ]Moderate [ ]Severe    Other Symptoms:  [ ]All other review of systems negative     Palliative Performance Status Version 2:         %    http://npcrc.org/files/news/palliative_performance_scale_ppsv2.pdf  PHYSICAL EXAM:  Vital Signs Last 24 Hrs  T(C): 34.7 (28 Jan 2021 10:36), Max: 37 (27 Jan 2021 22:04)  T(F): 94.4 (28 Jan 2021 10:36), Max: 98.6 (27 Jan 2021 22:04)  HR: 80 (28 Jan 2021 01:57) (72 - 82)  BP: 151/81 (28 Jan 2021 01:57) (131/87 - 151/81)  BP(mean): --  RR: 17 (28 Jan 2021 01:57) (17 - 18)  SpO2: 100% (28 Jan 2021 01:57) (99% - 100%) I&O's Summary    27 Jan 2021 07:01  -  28 Jan 2021 07:00  --------------------------------------------------------  IN: 30 mL / OUT: 400 mL / NET: -370 mL      GENERAL:  [x ]Alert  [ ]Oriented x   [ ]Lethargic  [ ]Cachexia  [ ]Unarousable  [ ]Verbal  [ ]Non-Verbal  Behavioral:   [ ] Anxiety  [ ] Delirium [ ] Agitation [ ] Other  HEENT:  [ ]Normal   [ ]Dry mouth   [ ]ET Tube/Trach  [ ]Oral lesions  PULMONARY:   [x ]Clear [ ]Tachypnea  [ ]Audible excessive secretions   [ ]Rhonchi        [ ]Right [ ]Left [ ]Bilateral  [ ]Crackles        [ ]Right [ ]Left [ ]Bilateral  [ ]Wheezing     [ ]Right [ ]Left [ ]Bilatera  [ ]Diminished breath sounds [ ]right [ ]left [ ]bilateral  CARDIOVASCULAR:    [x ]Regular [ ]Irregular [ ]Tachy  [ ]Roberto [ ]Murmur [ ]Other  GASTROINTESTINAL:  [x ]Soft  [ ]Distended   [x ]+BS  [x ]Non tender [ ]Tender  [ ]PEG [ ]OGT/ NGT  Last BM:     GENITOURINARY:  [ ]Normal [ ] Incontinent   [ ]Oliguria/Anuria   [ ]Donohue  MUSCULOSKELETAL:   [ ]Normal   [ ]Weakness  [ ]Bed/Wheelchair bound [ ]Edema  NEUROLOGIC:   [ ]No focal deficits  [ ]Cognitive impairment  [ ]Dysphagia [ ]Dysarthria [ ]Paresis [ ]Other   SKIN:   [ ]Normal    [ ]Rash  [ ]Pressure ulcer(s)       Present on admission [ ]y [ ]n    CRITICAL CARE:  [ ] Shock Present  [ ]Septic [ ]Cardiogenic [ ]Neurologic [ ]Hypovolemic  [ ]  Vasopressors [ ]  Inotropes   [ ]Respiratory failure present [ ]Mechanical ventilation [ ]Non-invasive ventilatory support [ ]High flow  [ ]Acute  [ ]Chronic [ ]Hypoxic  [ ]Hypercarbic [ ]Other  [ ]Other organ failure     LABS:                        11.9   4.97  )-----------( 98       ( 28 Jan 2021 07:14 )             34.8   01-28    144  |  106  |  16  ----------------------------<  119<H>  4.2   |  24  |  1.19    Ca    9.4      28 Jan 2021 07:12  Phos  3.5     01-28  Mg     1.9     01-28    TPro  6.4  /  Alb  2.9<L>  /  TBili  0.5  /  DBili  x   /  AST  30  /  ALT  28  /  AlkPhos  87  01-27        RADIOLOGY & ADDITIONAL STUDIES:    PROTEIN CALORIE MALNUTRITION PRESENT: [ ]mild [ ]moderate [ ]severe [ ]underweight [ ]morbid obesity  https://www.andeal.org/vault/2440/web/files/ONC/Table_Clinical%20Characteristics%20to%20Document%20Malnutrition-White%20JV%20et%20al%202012.pdf    Height (cm): 160 (01-23-21 @ 18:20)  Weight (kg): 73 (01-23-21 @ 18:20)  BMI (kg/m2): 28.5 (01-23-21 @ 18:20)    [ ]PPSV2 < or = to 30% [ ]significant weight loss  [ ]poor nutritional intake  [ ]anasarca     Albumin, Serum: 2.9 g/dL (01-27-21 @ 07:11)   [ ]Artificial Nutrition      REFERRALS:   [ ]Chaplaincy  [ ]Hospice  [ ]Child Life  [ ]Social Work  [ ]Case management [ ]Holistic Therapy     Goals of Care Document:  HPI:  81y/o F w/ h/o dementia (baseline AAOx1), T2DM, Afib (on Xarelto) and HTN p/w AMS. Pt has h/o dementia not on any medications. Per patient's son (Jose), she has had 2 week history of progressively worsening sedation and decreased PO intake. Son reports yesterday, lethargy increased and patient displayed garbled speech which prompted ED evaluation. She has endorsed chills to him at home. He denies her expressing shortness of breath, fevers, nausea, vomiting, diarrhea, dysuria and cough. She requires assistance with feeding and medications at home. She dresses and uses the restroom by herself.    At the ED, pt was found to have rectal temp of 88, HR of 55, BP of 120/80, saturating well on RA, CBC and CMP wnl, lactate elevated to 2.8, pH 7.28. Pt started on warming blanket, BP dropped to 60s systolic, pt started on fluids, received 2.6L total, started on levo, s/p vanc and zosyn, Bcx drawn, UA negative. Pt admitted to MICU for further monitoring (23 Jan 2021 00:46)    PERTINENT PM/SXH:   Dementia    Atrial fibrillation    Type 2 diabetes mellitus      No significant past surgical history      FAMILY HISTORY:  No pertinent family history in first degree relatives      ITEMS NOT CHECKED ARE NOT PRESENT    SOCIAL HISTORY:   Significant other/partner[ ]  Children[x ]  Jewish/Spirituality:  Substance hx:  [ ]   Tobacco hx:  [ ]   Alcohol hx: [ ]   Home Opioid hx:  [ ] I-Stop Reference No:  Living Situation: [ ]Home  [ ]Long term care  [ ]Rehab [ ]Other    ADVANCE DIRECTIVES:    DNR  MOLST  [ ]  Living Will  [ ]   DECISION MAKER(s):  [ ] Health Care Proxy(s)  [ ] Surrogate(s)  [ ] Guardian           Name(s): Phone Number(s):    BASELINE (I)ADL(s) (prior to admission):  [place]: [ ]Total  [ ] Moderate [ ]Dependent    Allergies    No Known Allergies    Intolerances    MEDICATIONS  (STANDING):  dextrose 5%. 1000 milliLiter(s) (100 mL/Hr) IV Continuous <Continuous>  dextrose 5%. 500 milliLiter(s) (50 mL/Hr) IV Continuous <Continuous>  glucagon  Injectable 1 milliGRAM(s) IntraMuscular once  insulin lispro (ADMELOG) corrective regimen sliding scale   SubCutaneous three times a day before meals  insulin lispro (ADMELOG) corrective regimen sliding scale   SubCutaneous at bedtime  metoprolol tartrate 12.5 milliGRAM(s) Oral every 8 hours  polyethylene glycol 3350 17 Gram(s) Oral two times a day  rivaroxaban 15 milliGRAM(s) Oral with dinner  senna 2 Tablet(s) Oral at bedtime    MEDICATIONS  (PRN):  bisacodyl Suppository 10 milliGRAM(s) Rectal daily PRN Constipation    PRESENT SYMPTOMS: [ ]Unable to obtain due to poor mentation   Source if other than patient:  [ ]Family   [ ]Team     Pain: [ ]yes [x ]no  QOL impact -   Location -                    Aggravating factors -  Quality -  Radiation -  Timing-  Severity (0-10 scale):  Minimal acceptable level (0-10 scale):     PAIN AD Score:     [email or web address]/[email or web address].pdf (press ctrl +  left click to view)    Dyspnea:                           [ ]Mild [ ]Moderate [ ]Severe  Anxiety:                             [ ]Mild [ ]Moderate [ ]Severe  Fatigue:                             [ ]Mild [ ]Moderate [ ]Severe  Nausea:                             [ ]Mild [ ]Moderate [ ]Severe  Loss of appetite:              [ ]Mild [ ]Moderate [ ]Severe  Constipation:                    [ ]Mild [ ]Moderate [ ]Severe    Other Symptoms:  [ ]All other review of systems negative     Palliative Performance Status Version 2:    40     %    http://npcrc.org/files/news/palliative_performance_scale_ppsv2.pdf  PHYSICAL EXAM:  Vital Signs Last 24 Hrs  T(C): 34.7 (28 Jan 2021 10:36), Max: 37 (27 Jan 2021 22:04)  T(F): 94.4 (28 Jan 2021 10:36), Max: 98.6 (27 Jan 2021 22:04)  HR: 80 (28 Jan 2021 01:57) (72 - 82)  BP: 151/81 (28 Jan 2021 01:57) (131/87 - 151/81)  BP(mean): --  RR: 17 (28 Jan 2021 01:57) (17 - 18)  SpO2: 100% (28 Jan 2021 01:57) (99% - 100%) I&O's Summary    27 Jan 2021 07:01  -  28 Jan 2021 07:00  --------------------------------------------------------  IN: 30 mL / OUT: 400 mL / NET: -370 mL      GENERAL:  [x ]Alert  [ ]Oriented x   [ ]Lethargic  [ ]Cachexia  [ ]Unarousable  [ ]Verbal  [ ]Non-Verbal  Behavioral:   [ ] Anxiety  [ ] Delirium [ ] Agitation [ ] Other  HEENT:  [ ]Normal   [ ]Dry mouth   [ ]ET Tube/Trach  [ ]Oral lesions  PULMONARY:   [x ]Clear [ ]Tachypnea  [ ]Audible excessive secretions   [ ]Rhonchi        [ ]Right [ ]Left [ ]Bilateral  [ ]Crackles        [ ]Right [ ]Left [ ]Bilateral  [ ]Wheezing     [ ]Right [ ]Left [ ]Bilatera  [ ]Diminished breath sounds [ ]right [ ]left [ ]bilateral  CARDIOVASCULAR:    [x ]Regular [ ]Irregular [ ]Tachy  [ ]Roberto [ ]Murmur [ ]Other  GASTROINTESTINAL:  [x ]Soft  [ ]Distended   [x ]+BS  [x ]Non tender [ ]Tender  [ ]PEG [ ]OGT/ NGT  Last BM:     GENITOURINARY:  [ ]Normal [ ] Incontinent   [ ]Oliguria/Anuria   [ ]Donohue  MUSCULOSKELETAL:   [ ]Normal   [ ]Weakness  [ ]Bed/Wheelchair bound [ ]Edema  NEUROLOGIC:   [ ]No focal deficits  [ ]Cognitive impairment  [ ]Dysphagia [ ]Dysarthria [ ]Paresis [ ]Other   SKIN:   [ ]Normal    [ ]Rash  [ ]Pressure ulcer(s)       Present on admission [ ]y [ ]n    CRITICAL CARE:  [ ] Shock Present  [ ]Septic [ ]Cardiogenic [ ]Neurologic [ ]Hypovolemic  [ ]  Vasopressors [ ]  Inotropes   [ ]Respiratory failure present [ ]Mechanical ventilation [ ]Non-invasive ventilatory support [ ]High flow  [ ]Acute  [ ]Chronic [ ]Hypoxic  [ ]Hypercarbic [ ]Other  [ ]Other organ failure     LABS:                        11.9   4.97  )-----------( 98       ( 28 Jan 2021 07:14 )             34.8   01-28    144  |  106  |  16  ----------------------------<  119<H>  4.2   |  24  |  1.19    Ca    9.4      28 Jan 2021 07:12  Phos  3.5     01-28  Mg     1.9     01-28    TPro  6.4  /  Alb  2.9<L>  /  TBili  0.5  /  DBili  x   /  AST  30  /  ALT  28  /  AlkPhos  87  01-27        RADIOLOGY & ADDITIONAL STUDIES:    PROTEIN CALORIE MALNUTRITION PRESENT: [ ]mild [ ]moderate [ ]severe [ ]underweight [ ]morbid obesity  https://www.andeal.org/vault/2440/web/files/ONC/Table_Clinical%20Characteristics%20to%20Document%20Malnutrition-White%20JV%20et%20al%202012.pdf    Height (cm): 160 (01-23-21 @ 18:20)  Weight (kg): 73 (01-23-21 @ 18:20)  BMI (kg/m2): 28.5 (01-23-21 @ 18:20)    [ ]PPSV2 < or = to 30% [ ]significant weight loss  [ ]poor nutritional intake  [ ]anasarca     Albumin, Serum: 2.9 g/dL (01-27-21 @ 07:11)   [ ]Artificial Nutrition      REFERRALS:   [ ]Chaplaincy  [ ]Hospice  [ ]Child Life  [ ]Social Work  [ ]Case management [ ]Holistic Therapy     Goals of Care Document:

## 2021-01-28 NOTE — PROGRESS NOTE ADULT - PROBLEM SELECTOR PLAN 4
-CTM Resolved. Likely at baseline now, baseline dementia AAOx0-1  - likely 2/2 metabolic vs infectious vs primary neurological  - CTH negative for acute pathology  - MRI/MRA of head/neck - poor study, minimal chronic ischemic changes in the frontoparietal white matter, small aneurysm R ICA

## 2021-01-28 NOTE — PROGRESS NOTE ADULT - ASSESSMENT
Assessment:  The patient is an 80 year old female with past medical history of dementia (baseline AAOx1), type 2 diabetes mellitus, atrial fibrillation (on Xarelto), and hypertension who presented with altered mental status. She has history of dementia and not on any medications. As per chart review, she has had a 2 week history of progressively worsening mental status and decreased PO intake. Her son reports that prior to admission, her lethargy increased and patient displayed garbled speech which prompted her to come to the emergency room. She reported having chills, but he denies her expressing shortness of breath, fevers, nausea, vomiting, diarrhea, dysuria and cough. She requires assistance with feeding and medications at home. She dresses and uses the restroom by herself. In the emergency department, she was found to be hypothermic and bradycardic and she was placed on a warming blanket and she became hypotensive. She was admitted for sepsis due to bacteremia and infectious disease was consulted for further recommendations.    Overall hypothermia, bradycardia, AMS, hypotension, lactic acidosis on presentation.   No obvious source of infection at this time. CoNS in blood cx in absence of hardware is usually a procurement contaminant, pt with no cellulitis or phlebitis.   No leucocytosis, CT abd with constipation, recent CXR with no pneumonia, procalcitonin not useful in setting of renal failure.   Mental status seems better.   concern for urinary retention.       Plan:  - recommend Monitor off intravenous antibiotics   - repeat blood cultures, NTD   - Trend CBC daily  - Consider endocrine workup for other causes  - check U/A and urine cx given concern for urinary retention, possible role of martinez ?   - given fluctuating mental status doubt meningitis clinically, but cannot r/o 100% without a LP.

## 2021-01-28 NOTE — CONSULT NOTE ADULT - PROBLEM SELECTOR RECOMMENDATION 6
Actions:  [] Rapport building     [] Symptom assessment    [] Eliciting preferences of goals   [] Prognostic understanding    [] Emotional Support  [] Coping skill developement  []  Other  Interdisciplinary Referrals:  Communication:  Documentation Review: [] Primary Team [] Consultants [] Interdisciplinary team  Content: Actions:  [] Rapport building     [x] Symptom assessment    [] Eliciting preferences of goals   [] Prognostic understanding    [] Emotional Support  [] Coping skill development  [x]  Other Collateral  Interdisciplinary Referrals: None  Communication: d/w son  Documentation Review: [x] Primary Team [] Consultants [] Interdisciplinary team

## 2021-01-29 LAB
CORTICOSTEROID BINDING GLOBULIN RESULT: 1.2 MG/DL — LOW
CORTIS F/TOTAL MFR SERPL: 60 % — SIGNIFICANT CHANGE UP
CORTIS SERPL-MCNC: 20 UG/DL — HIGH
CORTISOL, FREE RESULT: 12 UG/DL — HIGH
GLUCOSE BLDC GLUCOMTR-MCNC: 107 MG/DL — HIGH (ref 70–99)
GLUCOSE BLDC GLUCOMTR-MCNC: 147 MG/DL — HIGH (ref 70–99)
GLUCOSE BLDC GLUCOMTR-MCNC: 170 MG/DL — HIGH (ref 70–99)
GLUCOSE BLDC GLUCOMTR-MCNC: 173 MG/DL — HIGH (ref 70–99)
GLUCOSE BLDC GLUCOMTR-MCNC: 182 MG/DL — HIGH (ref 70–99)

## 2021-01-29 PROCEDURE — 99233 SBSQ HOSP IP/OBS HIGH 50: CPT | Mod: GC

## 2021-01-29 PROCEDURE — 99233 SBSQ HOSP IP/OBS HIGH 50: CPT

## 2021-01-29 RX ORDER — SODIUM CHLORIDE 9 MG/ML
1000 INJECTION, SOLUTION INTRAVENOUS
Refills: 0 | Status: DISCONTINUED | OUTPATIENT
Start: 2021-01-29 | End: 2021-02-02

## 2021-01-29 RX ADMIN — RIVAROXABAN 15 MILLIGRAM(S): KIT at 17:49

## 2021-01-29 RX ADMIN — Medication 12.5 MILLIGRAM(S): at 21:42

## 2021-01-29 RX ADMIN — Medication 12.5 MILLIGRAM(S): at 13:15

## 2021-01-29 RX ADMIN — Medication 1: at 17:49

## 2021-01-29 RX ADMIN — SENNA PLUS 2 TABLET(S): 8.6 TABLET ORAL at 21:42

## 2021-01-29 RX ADMIN — Medication 1: at 13:18

## 2021-01-29 NOTE — PROGRESS NOTE ADULT - ASSESSMENT
79y/o F w/ h/o dementia (baseline AAOx1), T2DM, Afib (on Xeralto), HTN p/w AMS, found to be hypothermic to 88, w/ possible TIA at home, CXR showing possible LLL atelectasis vs PNA, hypotensive s/p warming blanket requiring levo in MICU, now unlikely infectious etiology, monitoring off abx. 81y/o F w/ h/o dementia (baseline AAOx0-1), T2DM, Afib (on Xeralto), HTN p/w AMS, likely 2/2 progression of dementia. Pt found to be hypothermic to 88, hypotensive s/p warming blanket requiring levo in MICU, w/ possible TIA at home, CXR showing possible LLL atelectasis vs PNA, now unlikely infectious etiology, monitoring off abx.

## 2021-01-29 NOTE — DIETITIAN INITIAL EVALUATION ADULT. - PERTINENT LABORATORY DATA
N/A  HbA1c 7.8%  CAPILLARY BLOOD GLUCOSE  POCT Blood Glucose.: 170 mg/dL (29 Jan 2021 12:45)  POCT Blood Glucose.: 147 mg/dL (29 Jan 2021 07:34)  POCT Blood Glucose.: 173 mg/dL (29 Jan 2021 05:59)  POCT Blood Glucose.: 163 mg/dL (28 Jan 2021 22:00)  POCT Blood Glucose.: 112 mg/dL (28 Jan 2021 17:25)

## 2021-01-29 NOTE — PROGRESS NOTE ADULT - PROBLEM SELECTOR PLAN 7
Cr downtrending, likely pre-renal i/s/o shock and decreased PO intake  - d/c martinez - TOV   - monitor I&O and avoid nephrotoxic agents - pt w/ hx of atrial fibrillation w/ xarelto at home  - c/w xarelto   - c/w 12.5mg lopressor tid

## 2021-01-29 NOTE — DIETITIAN INITIAL EVALUATION ADULT. - CHIEF COMPLAINT
The patient is a 81y/o F w/ h/o dementia (baseline AAOx0-1), T2DM, Afib (on Xeralto), HTN p/w AMS, likely 2/2 progression of dementia. Pt found to be hypothermic to 88, hypotensive s/p warming blanket requiring levo in MICU, w/ possible TIA at home, CXR showing possible LLL atelectasis vs PNA, now unlikely infectious etiology, monitoring off abx.

## 2021-01-29 NOTE — PROGRESS NOTE ADULT - PROBLEM SELECTOR PLAN 10
Diet: NPO per S&S due to mental status, D5W  DVT PPX: Xarelto  Dispo: PT eval  GOC: Full code per d/w son

## 2021-01-29 NOTE — DIETITIAN INITIAL EVALUATION ADULT. - ORAL INTAKE PTA/DIET HISTORY
Limited information obtained 2/2 pt's mentation. Per H&P pt with 2 weeks of poor PO intake PTA. Micronutrient supplementation PTA unknown. NKFA per chart.

## 2021-01-29 NOTE — PROGRESS NOTE ADULT - PROBLEM SELECTOR PLAN 2
Improving  Initial insult is unclear  Continue to monitor for improvement  May have been due to precipitous hypotension

## 2021-01-29 NOTE — PROGRESS NOTE ADULT - SUBJECTIVE AND OBJECTIVE BOX
HPI:  81y/o F w/ h/o dementia (baseline AAOx1), T2DM, Afib (on Xarelto) and HTN p/w AMS. Pt has h/o dementia not on any medications. Per patient's son (Jose), she has had 2 week history of progressively worsening sedation and decreased PO intake. Son reports yesterday, lethargy increased and patient displayed garbled speech which prompted ED evaluation. She has endorsed chills to him at home. He denies her expressing shortness of breath, fevers, nausea, vomiting, diarrhea, dysuria and cough. She requires assistance with feeding and medications at home. She dresses and uses the restroom by herself.    At the ED, pt was found to have rectal temp of 88, HR of 55, BP of 120/80, saturating well on RA, CBC and CMP wnl, lactate elevated to 2.8, pH 7.28. Pt started on warming blanket, BP dropped to 60s systolic, pt started on fluids, received 2.6L total, started on levo, s/p vanc and zosyn, Bcx drawn, UA negative. Pt admitted to MICU for further monitoring (23 Jan 2021 00:46)        Overnight events: Patient passed speech and swallow  Staff reports: d/w SLP team  Patient: More awake. Smiling and attempting to   PRN use: n/a        RECENT VITALS/LABS/MEDICATIONS/ASSAYS     01-29-21 @ 11:46    T(C): 36.1 (01-29-21 @ 11:34), Max: 36.9 (01-29-21 @ 02:36)  HR: 77 (01-29-21 @ 04:45) (66 - 85)  BP: 138/76 (01-29-21 @ 04:45) (128/84 - 138/76)  RR: 18 (01-29-21 @ 04:45) (18 - 19)  SpO2: 100% (01-29-21 @ 04:45) (97% - 100%)  Wt(kg): --      28 Jan 2021 07:01  -  29 Jan 2021 07:00  --------------------------------------------------------  IN:    dextrose 5%: 900 mL  Total IN: 900 mL    OUT:    Intermittent Catheterization - Urethral (mL): 1175 mL  Total OUT: 1175 mL    Total NET: -275 mL          01-28 @ 07:01  -  01-29 @ 07:00  --------------------------------------------------------  IN: 900 mL / OUT: 1175 mL / NET: -275 mL      CAPILLARY BLOOD GLUCOSE      POCT Blood Glucose.: 147 mg/dL (29 Jan 2021 07:34)  POCT Blood Glucose.: 173 mg/dL (29 Jan 2021 05:59)  POCT Blood Glucose.: 163 mg/dL (28 Jan 2021 22:00)  POCT Blood Glucose.: 112 mg/dL (28 Jan 2021 17:25)  POCT Blood Glucose.: 97 mg/dL (28 Jan 2021 12:25)        bisacodyl Suppository 10 milliGRAM(s) Rectal daily PRN  dextrose 5%. 1000 milliLiter(s) IV Continuous <Continuous>  dextrose 5%. 1000 milliLiter(s) IV Continuous <Continuous>  dextrose 5%. 1000 milliLiter(s) IV Continuous <Continuous>  glucagon  Injectable 1 milliGRAM(s) IntraMuscular once  insulin lispro (ADMELOG) corrective regimen sliding scale   SubCutaneous three times a day before meals  insulin lispro (ADMELOG) corrective regimen sliding scale   SubCutaneous at bedtime  metoprolol tartrate 12.5 milliGRAM(s) Oral every 8 hours  polyethylene glycol 3350 17 Gram(s) Oral two times a day  rivaroxaban 15 milliGRAM(s) Oral with dinner  senna 2 Tablet(s) Oral at bedtime          01-28    144  |  106  |  16  ----------------------------<  119<H>  4.2   |  24  |  1.19    Ca    9.4      28 Jan 2021 07:12  Phos  3.5     01-28  Mg     1.9     01-28        Procalc  BNP  ABG                          11.9   4.97  )-----------( 98       ( 28 Jan 2021 07:14 )             34.8           blood and urine cultures          PERTINENT PM/SXH:   Dementia    Atrial fibrillation    Type 2 diabetes mellitus      No significant past surgical history      FAMILY HISTORY:  No pertinent family history in first degree relatives      ITEMS NOT CHECKED ARE NOT PRESENT    SOCIAL HISTORY:   Significant other/partner[ ]  Children[x ]  Samaritan/Spirituality:  Substance hx:  [ ]   Tobacco hx:  [ ]   Alcohol hx: [ ]   Home Opioid hx:  [ ] I-Stop Reference No:  Living Situation: [ ]Home  [ ]Long term care  [ ]Rehab [ ]Other    ADVANCE DIRECTIVES:    DNR  MOLST  [ ]  Living Will  [ ]   DECISION MAKER(s):  [ ] Health Care Proxy(s)  [ ] Surrogate(s)  [ ] Guardian           Name(s): Phone Number(s):    BASELINE (I)ADL(s) (prior to admission):  [place]: [ ]Total  [ ] Moderate [ ]Dependent    Allergies    No Known Allergies    Intolerances    MEDICATIONS  (STANDING):     MEDICATIONS  (PRN):  bisacodyl Suppository 10 milliGRAM(s) Rectal daily PRN Constipation    PRESENT SYMPTOMS: [ ]Unable to obtain due to poor mentation   Source if other than patient:  [ ]Family   [ ]Team     Pain: [ ]yes [x ]no  QOL impact -   Location -                    Aggravating factors -  Quality -  Radiation -  Timing-  Severity (0-10 scale):  Minimal acceptable level (0-10 scale):     PAIN AD Score:     [email or web address]/painad.pdf (press ctrl +  left click to view)    Dyspnea:                           [ ]Mild [ ]Moderate [ ]Severe  Anxiety:                             [ ]Mild [ ]Moderate [ ]Severe  Fatigue:                             [ ]Mild [ ]Moderate [ ]Severe  Nausea:                             [ ]Mild [ ]Moderate [ ]Severe  Loss of appetite:              [ ]Mild [ ]Moderate [ ]Severe  Constipation:                    [ ]Mild [ ]Moderate [ ]Severe    Other Symptoms:  [ ]All other review of systems negative     Palliative Performance Status Version 2:    40     %    http://npcrc.org/files/news/palliative_performance_scale_ppsv2.pdf  PHYSICAL EXAM:         GENERAL:  [x ]Alert  [ ]Oriented x   [ ]Lethargic  [ ]Cachexia  [ ]Unarousable  [ ]Verbal  [ ]Non-Verbal  Behavioral:   [ ] Anxiety  [ ] Delirium [ ] Agitation [ ] Other  HEENT:  [ ]Normal   [ ]Dry mouth   [ ]ET Tube/Trach  [ ]Oral lesions  PULMONARY:   [x ]Clear [ ]Tachypnea  [ ]Audible excessive secretions   [ ]Rhonchi        [ ]Right [ ]Left [ ]Bilateral  [ ]Crackles        [ ]Right [ ]Left [ ]Bilateral  [ ]Wheezing     [ ]Right [ ]Left [ ]Bilatera  [ ]Diminished breath sounds [ ]right [ ]left [ ]bilateral  CARDIOVASCULAR:    [x ]Regular [ ]Irregular [ ]Tachy  [ ]Roberto [ ]Murmur [ ]Other  GASTROINTESTINAL:  [x ]Soft  [ ]Distended   [x ]+BS  [x ]Non tender [ ]Tender  [ ]PEG [ ]OGT/ NGT  Last BM:     GENITOURINARY:  [ ]Normal [ ] Incontinent   [ ]Oliguria/Anuria   [ ]Donohue  MUSCULOSKELETAL:   [ ]Normal   [ ]Weakness  [ ]Bed/Wheelchair bound [ ]Edema  NEUROLOGIC:   [ ]No focal deficits  [ ]Cognitive impairment  [ ]Dysphagia [ ]Dysarthria [ ]Paresis [ ]Other   SKIN:   [ ]Normal    [ ]Rash  [ ]Pressure ulcer(s)       Present on admission [ ]y [ ]n    CRITICAL CARE:  [ ] Shock Present  [ ]Septic [ ]Cardiogenic [ ]Neurologic [ ]Hypovolemic  [ ]  Vasopressors [ ]  Inotropes   [ ]Respiratory failure present [ ]Mechanical ventilation [ ]Non-invasive ventilatory support [ ]High flow  [ ]Acute  [ ]Chronic [ ]Hypoxic  [ ]Hypercarbic [ ]Other  [ ]Other organ failure     LABS:              RADIOLOGY & ADDITIONAL STUDIES:    PROTEIN CALORIE MALNUTRITION PRESENT: [ ]mild [ ]moderate [ ]severe [ ]underweight [ ]morbid obesity  https://www.andeal.org/vault/4810/web/files/ONC/Table_Clinical%20Characteristics%20to%20Document%20Malnutrition-White%20JV%20et%20al%202012.pdf    Height (cm): 160 (01-23-21 @ 18:20)  Weight (kg): 73 (01-23-21 @ 18:20)  BMI (kg/m2): 28.5 (01-23-21 @ 18:20)    [ ]PPSV2 < or = to 30% [ ]significant weight loss  [ ]poor nutritional intake  [ ]anasarca     Albumin, Serum: 2.9 g/dL (01-27-21 @ 07:11)   [ ]Artificial Nutrition      REFERRALS:   [ ]Chaplaincy  [ ]Hospice  [ ]Child Life  [ ]Social Work  [ ]Case management [ ]Holistic Therapy     Goals of Care Document:

## 2021-01-29 NOTE — CHART NOTE - NSCHARTNOTEFT_GEN_A_CORE
......    Recommendations  1) Dysphagia 1 and Honey-Thick Liquids VIA TSP ONLY.  2) 1:1 feeding assistance. Ensure pt is upright at 90 degrees, slow rate (allow time for pt to swallow), encourage NO TALKING when eating/drinking.  3) Excellent oral care and aspiration precautions. Monitor for s/s of aspiration or penetration (coughing, choking, wet/gurgly vocal qualiy). d/c PO intake if any signs are observed and contact speech department [phone].   4) This service will continue to f/u as schedule permits.    Ning Gonzales M.S., CCC-SLP ext. [phone]. 81 y/o F with PMH dementia (baseline AAOx1), T2DM, Afib, HTN p/w AMS, found to be hypothermic to 88, with possible TIA at home, CXR showing possible LLL atelectasis vs PNA, hypotensive s/p warming blanket requiring levo in MICU, now unlikely infectious etiology, monitoring off abx. Pt seen for bedside swallow evaluations 1/25/21 and 1/27/21 which both revealed an oropharyngeal dysphagia superimposed upon reduced mental status. Recommendations at that time: NPO, with non-oral nutrition/hydration/medications.     Pt was received at bedside today, 1/29/21, for re-evaluation of swallow fx. +room air, +dysarthria (baseline). She was AAOx1 to first name only. Pt with improvement in mental status characterized by increased eye contact and initiation of conversation. Pt's speech continues to be mostly nonsensical, though pt with a few appropriate responses to questions today. She was administered trials of honey-thick liquid via tsp and cup, nectar-thick liquid via cup, thin puree, and thick puree. The swallow sequence was marked by improvement in bolus holding (only ~5 seconds with NO need for verbal or tactile cues to swallow), suspected delayed pharyngeal swallow trigger, and overt s/s of aspiration or penetration with nectar-thick liquids via cup (throat clearing, trace wet vocal quality). No overt s/s of aspiration or penetration with honey-thick liquids or puree textures. Unable to hold cup independently. In addition, pt demonstrated x2 instances of phonating with PO in oral cavity. Clinician discussed all safe swallowing recommendations with emphasis on NO TALKING when eating/drinking; however, carryover is guarded given dementia and confusion. Pt requires 1:1 feeding assistance and cueing to implement all safe swallowing recommendations. Purposeful and proactive rounding completed. 5 P's addressed. Pt left in no distress.     Impressions:  Pt presents with improvement in mental status. She continues to present with oral dysphagia and suspected pharyngeal dysphagia (throat clearing and trace wet vocal quality with nectar-thick liquids). No overt s/s of aspiration or penetration with honey-thick liquids and puree textures.     Recommendations  1) Dysphagia 1 and Honey-Thick Liquids VIA TSP ONLY. Only feed when pt is AWAKE AND ALERT. d/c feeding if pt lethargic or pocketing.   2) 1:1 feeding assistance. Ensure pt is upright at 90 degrees, slow rate (allow time for pt to swallow), encourage NO TALKING when eating/drinking.  3) Excellent oral care and aspiration precautions. Monitor for s/s of aspiration or penetration (coughing, choking, wet/gurgly vocal qualiy). d/c PO intake if any signs are observed and contact speech department [phone].   4) This service will continue to f/u as schedule permits.    Ning Gonzales M.S., CCC-SLP ext. [phone].

## 2021-01-29 NOTE — DIETITIAN INITIAL EVALUATION ADULT. - ADD RECOMMEND
Continue diet free of therapeutic restrictions, consistencies/textures per SLP recommendations. Provide full assistance per SLP recommendations and PO encouragement as needed. RD remains available to provide nutrition education to pt/family when appropriate. Continue to monitor PO intake, labs, weights, BM, skin, clinical course.

## 2021-01-29 NOTE — PROGRESS NOTE ADULT - ATTENDING COMMENTS
pt seen and examined.  above plan discussed on rounds today.  In addition,    79 yo female w/pmh dementia, afib, p/w AMS, septic shock, originally admitted to MICU for pressors for <24h, now HDS on general medical floors. Found to be bacteremic with multiple blood culture bottles growing Staph epi. Also receiving tx for aspiration pneumonia.  -ID eval appreciated--> Staph epi presumed to be contaminate, will monitor off abx. Repeat cx's NGTD    Hypothermia- no clear cause, common causes have been ruled out. TSH normal, cortisol normal, no brain lesions seen on MRI brain, no infective process at this time. Endo eval appreciated --> no further w/u needed    Encephalopathy  - presumed to be from cognitive impairment from dementia  - no infectious of metabolic causes have been identified    Dysphagia  - S&S eval appreciated--> can start on a dysphagia 1 honey thick diet.  palliative  appreciated --> pt not a candidate for hospice at this time

## 2021-01-29 NOTE — PROGRESS NOTE ADULT - SUBJECTIVE AND OBJECTIVE BOX
PROGRESS NOTE:   Authored by Anita Alvarez MD  Pager [phone] [facility] / [id number] [facility]    Patient is a 80y old  Female who presents with a chief complaint of Hypotension (28 Jan 2021 15:44)      SUBJECTIVE / OVERNIGHT EVENTS:  No acute events overnight.   This AM,    MEDICATIONS  (STANDING):  dextrose 5%. 1000 milliLiter(s) (75 mL/Hr) IV Continuous <Continuous>  dextrose 5%. 1000 milliLiter(s) (75 mL/Hr) IV Continuous <Continuous>  dextrose 5%. 1000 milliLiter(s) (100 mL/Hr) IV Continuous <Continuous>  glucagon  Injectable 1 milliGRAM(s) IntraMuscular once  insulin lispro (ADMELOG) corrective regimen sliding scale   SubCutaneous three times a day before meals  insulin lispro (ADMELOG) corrective regimen sliding scale   SubCutaneous at bedtime  metoprolol tartrate 12.5 milliGRAM(s) Oral every 8 hours  polyethylene glycol 3350 17 Gram(s) Oral two times a day  rivaroxaban 15 milliGRAM(s) Oral with dinner  senna 2 Tablet(s) Oral at bedtime    MEDICATIONS  (PRN):  bisacodyl Suppository 10 milliGRAM(s) Rectal daily PRN Constipation      CAPILLARY BLOOD GLUCOSE      POCT Blood Glucose.: 173 mg/dL (29 Jan 2021 05:59)  POCT Blood Glucose.: 163 mg/dL (28 Jan 2021 22:00)  POCT Blood Glucose.: 112 mg/dL (28 Jan 2021 17:25)  POCT Blood Glucose.: 97 mg/dL (28 Jan 2021 12:25)  POCT Blood Glucose.: 98 mg/dL (28 Jan 2021 08:37)    I&O's Summary    28 Jan 2021 07:01  -  29 Jan 2021 07:00  --------------------------------------------------------  IN: 900 mL / OUT: 1175 mL / NET: -275 mL        PHYSICAL EXAM:  Vital Signs Last 24 Hrs  T(C): 36.7 (29 Jan 2021 05:00), Max: 36.9 (29 Jan 2021 02:36)  T(F): 98 (29 Jan 2021 05:00), Max: 98.4 (29 Jan 2021 02:36)  HR: 77 (29 Jan 2021 04:45) (66 - 85)  BP: 138/76 (29 Jan 2021 04:45) (128/84 - 138/76)  BP(mean): --  RR: 18 (29 Jan 2021 04:45) (18 - 19)  SpO2: 100% (29 Jan 2021 04:45) (97% - 100%)    GENERAL: NAD, well-groomed, well-developed  HEAD:  Atraumatic, Normocephalic  EYES: PERRLA, conjunctiva and sclera clear  ENMT: Moist mucous membranes, poor dentition  NECK: no JVD  NERVOUS SYSTEM:  Oriented X0, lethargic, doesn't follow commands, no speech.  CHEST/LUNG: limited exam due to mental status and habitus but appears clear ACW;   HEART: irregular and tachycardic   ABDOMEN: Soft, Nontender, Nondistended; Bowel sounds present  EXTREMITIES:  2+ Peripheral Pulses, No clubbing, cyanosis, or edema  LYMPH: No lymphadenopathy noted  SKIN: No rashes or lesions      LABS:                        11.9   4.97  )-----------( 98       ( 28 Jan 2021 07:14 )             34.8     01-28    144  |  106  |  16  ----------------------------<  119<H>  4.2   |  24  |  1.19    Ca    9.4      28 Jan 2021 07:12  Phos  3.5     01-28  Mg     1.9     01-28 PROGRESS NOTE:   Authored by Anita Alvarez MD  Pager [phone] [facility] / [id number] [facility]    Patient is a 80y old  Female who presents with a chief complaint of Hypotension (28 Jan 2021 15:44)      SUBJECTIVE / OVERNIGHT EVENTS:  No acute events overnight. Pt still hypothermic, taking off hyperthermia blanket.  This AM, pt retaining 500cc, straight cath. Pt awake and alert, no acute distress. Mumbling incoherently. AAOx0. Not following commands.    MEDICATIONS  (STANDING):  dextrose 5%. 1000 milliLiter(s) (75 mL/Hr) IV Continuous <Continuous>  dextrose 5%. 1000 milliLiter(s) (75 mL/Hr) IV Continuous <Continuous>  dextrose 5%. 1000 milliLiter(s) (100 mL/Hr) IV Continuous <Continuous>  glucagon  Injectable 1 milliGRAM(s) IntraMuscular once  insulin lispro (ADMELOG) corrective regimen sliding scale   SubCutaneous three times a day before meals  insulin lispro (ADMELOG) corrective regimen sliding scale   SubCutaneous at bedtime  metoprolol tartrate 12.5 milliGRAM(s) Oral every 8 hours  polyethylene glycol 3350 17 Gram(s) Oral two times a day  rivaroxaban 15 milliGRAM(s) Oral with dinner  senna 2 Tablet(s) Oral at bedtime    MEDICATIONS  (PRN):  bisacodyl Suppository 10 milliGRAM(s) Rectal daily PRN Constipation      CAPILLARY BLOOD GLUCOSE      POCT Blood Glucose.: 173 mg/dL (29 Jan 2021 05:59)  POCT Blood Glucose.: 163 mg/dL (28 Jan 2021 22:00)  POCT Blood Glucose.: 112 mg/dL (28 Jan 2021 17:25)  POCT Blood Glucose.: 97 mg/dL (28 Jan 2021 12:25)  POCT Blood Glucose.: 98 mg/dL (28 Jan 2021 08:37)    I&O's Summary    28 Jan 2021 07:01  -  29 Jan 2021 07:00  --------------------------------------------------------  IN: 900 mL / OUT: 1175 mL / NET: -275 mL        PHYSICAL EXAM:  Vital Signs Last 24 Hrs  T(C): 36.7 (29 Jan 2021 05:00), Max: 36.9 (29 Jan 2021 02:36)  T(F): 98 (29 Jan 2021 05:00), Max: 98.4 (29 Jan 2021 02:36)  HR: 77 (29 Jan 2021 04:45) (66 - 85)  BP: 138/76 (29 Jan 2021 04:45) (128/84 - 138/76)  BP(mean): --  RR: 18 (29 Jan 2021 04:45) (18 - 19)  SpO2: 100% (29 Jan 2021 04:45) (97% - 100%)    GENERAL: NAD, well-groomed, well-developed  HEAD:  Atraumatic, Normocephalic  EYES: PERRLA, conjunctiva and sclera clear  ENMT: Moist mucous membranes, poor dentition  NECK: no JVD  NERVOUS SYSTEM:  Oriented X0, lethargic, doesn't follow commands, no speech, mumbling incoherently.  CHEST/LUNG: limited exam due to mental status and habitus but appears clear ACW;   HEART: irregular and tachycardic   ABDOMEN: Soft, Nontender, Nondistended; Bowel sounds present  EXTREMITIES:  2+ Peripheral Pulses, No clubbing, cyanosis, or edema  LYMPH: No lymphadenopathy noted  SKIN: No rashes or lesions      LABS:                        11.9   4.97  )-----------( 98       ( 28 Jan 2021 07:14 )             34.8     01-28    144  |  106  |  16  ----------------------------<  119<H>  4.2   |  24  |  1.19    Ca    9.4      28 Jan 2021 07:12  Phos  3.5     01-28  Mg     1.9     01-28 PROGRESS NOTE:   Authored by Anita Alvarez MD  Pager [phone] [facility] / [id number] [facility]    Patient is a 80y old  Female who presents with a chief complaint of Hypotension (28 Jan 2021 15:44)      SUBJECTIVE / OVERNIGHT EVENTS:  No acute events overnight. Pt still hypothermic, taking off hyperthermia blanket.  This AM, pt retaining 500cc, straight cath. Pt awake and alert, no acute distress. Mumbling incoherently. AAOx0. Not following commands, laughing inappropriately    MEDICATIONS  (STANDING):  dextrose 5%. 1000 milliLiter(s) (75 mL/Hr) IV Continuous <Continuous>  dextrose 5%. 1000 milliLiter(s) (75 mL/Hr) IV Continuous <Continuous>  dextrose 5%. 1000 milliLiter(s) (100 mL/Hr) IV Continuous <Continuous>  glucagon  Injectable 1 milliGRAM(s) IntraMuscular once  insulin lispro (ADMELOG) corrective regimen sliding scale   SubCutaneous three times a day before meals  insulin lispro (ADMELOG) corrective regimen sliding scale   SubCutaneous at bedtime  metoprolol tartrate 12.5 milliGRAM(s) Oral every 8 hours  polyethylene glycol 3350 17 Gram(s) Oral two times a day  rivaroxaban 15 milliGRAM(s) Oral with dinner  senna 2 Tablet(s) Oral at bedtime    MEDICATIONS  (PRN):  bisacodyl Suppository 10 milliGRAM(s) Rectal daily PRN Constipation      CAPILLARY BLOOD GLUCOSE      POCT Blood Glucose.: 173 mg/dL (29 Jan 2021 05:59)  POCT Blood Glucose.: 163 mg/dL (28 Jan 2021 22:00)  POCT Blood Glucose.: 112 mg/dL (28 Jan 2021 17:25)  POCT Blood Glucose.: 97 mg/dL (28 Jan 2021 12:25)  POCT Blood Glucose.: 98 mg/dL (28 Jan 2021 08:37)    I&O's Summary    28 Jan 2021 07:01  -  29 Jan 2021 07:00  --------------------------------------------------------  IN: 900 mL / OUT: 1175 mL / NET: -275 mL        PHYSICAL EXAM:  Vital Signs Last 24 Hrs  T(C): 36.7 (29 Jan 2021 05:00), Max: 36.9 (29 Jan 2021 02:36)  T(F): 98 (29 Jan 2021 05:00), Max: 98.4 (29 Jan 2021 02:36)  HR: 77 (29 Jan 2021 04:45) (66 - 85)  BP: 138/76 (29 Jan 2021 04:45) (128/84 - 138/76)  BP(mean): --  RR: 18 (29 Jan 2021 04:45) (18 - 19)  SpO2: 100% (29 Jan 2021 04:45) (97% - 100%)    GENERAL: NAD, well-groomed, well-developed  HEAD:  Atraumatic, Normocephalic  EYES: PERRLA, conjunctiva and sclera clear  ENMT: Moist mucous membranes, poor dentition  NECK: no JVD  NERVOUS SYSTEM:  Oriented X0, lethargic, doesn't follow commands, no speech, mumbling incoherently.  CHEST/LUNG: limited exam due to mental status and habitus but appears clear ACW;   HEART: irregular and tachycardic   ABDOMEN: Soft, Nontender, Nondistended; Bowel sounds present  EXTREMITIES:  2+ Peripheral Pulses, No clubbing, cyanosis, or edema  LYMPH: No lymphadenopathy noted  SKIN: No rashes or lesions      LABS:                        11.9   4.97  )-----------( 98       ( 28 Jan 2021 07:14 )             34.8     01-28    144  |  106  |  16  ----------------------------<  119<H>  4.2   |  24  |  1.19    Ca    9.4      28 Jan 2021 07:12  Phos  3.5     01-28  Mg     1.9     01-28

## 2021-01-29 NOTE — PROGRESS NOTE ADULT - PROBLEM SELECTOR PLAN 2
Pt presented with hypothermia and tachycardia with hypotension, unresponsive to fluids, which required brief use of pressors w/ LOREI and elevated lactate. TSH 1.75. AM cortisol 11. Ucx neg. Bcx - staph epi in 2/4 vials, likely contaminant. Possibly 2/2 to aspiration PNA (on x-ray) vs stercoral colitis (on CTAP)  - f/u repeat bcx - NGTD  - ID consult - monitor off abx  - endocrine consult - no further endocrine workup needed

## 2021-01-29 NOTE — PROGRESS NOTE ADULT - PROBLEM SELECTOR PLAN 1
Baseline AAOx0-1. Unable to follow commands. Likely at baseline  -palliative consulted for home hospice and GOC  -pt pulling out NGT, not good candidate for PEG tube, possible comfort feeds Baseline AAOx0-1. Unable to follow commands. Likely at baseline, progression of dementia.   -palliative consulted for home hospice and GOC  -pt pulling out NGT, not good candidate for PEG tube, possible comfort feeds Waxing and waning. FAST 7a-7b dementia. Possible hospital delirium. Likely at baseline now, baseline dementia AAOx0-1. Unable to follow commands. Likely at baseline, progression of dementia. Infectious causes unlikely due to neg bld and urine clx, treated empirically with abx for 4 days. Less likely meningitis due to waxing and waning MS. Unlikely primary neuro cause, CTH negative for acute pathology, MRI/MRA of head/neck - poor study, minimal chronic ischemic changes in the frontoparietal white matter, small aneurysm R ICA. Unlikely to be endocrine etiology, TSH 1.75, AM cortisol 11.3  -palliative consulted for home hospice and GOC  -pt pulling out NGT, not good candidate for PEG tube, possible comfort feeds  -per s&s, dysphagia 1 diet Waxing and waning. FAST 7a-7b dementia. Possible hospital delirium.  Likely at baseline, progression of dementia. Unable to follow commands. Infectious causes unlikely due to neg bld and urine clx, treated empirically with abx for 4 days. Less likely meningitis due to waxing and waning MS. Unlikely primary neuro cause, CTH negative for acute pathology, MRI/MRA of head/neck - poor study, minimal chronic ischemic changes in the frontoparietal white matter, small aneurysm R ICA. Unlikely to be endocrine etiology, TSH 1.75, AM cortisol 11.3  -palliative consulted for home hospice and GOC  -pt pulling out NGT, not good candidate for PEG tube, possible comfort feeds  -per s&s, dysphagia 1 diet Waxing and waning. FAST 7a-7b dementia. Possible hospital delirium.  Likely at baseline, progression of dementia. Unable to follow commands. Infectious causes unlikely due to neg bld and urine clx, treated empirically with abx for 4 days. Less likely meningitis due to waxing and waning MS. Unlikely primary neuro cause, CTH negative for acute pathology, MRI/MRA of head/neck - poor study, minimal chronic ischemic changes in the frontoparietal white matter, small aneurysm R ICA. Unlikely to be endocrine etiology, TSH 1.75, AM cortisol 11.3  -palliative consulted for home hospice and GOC - not likely candidate for hospice at this time, will refer to [facility] home advanced care  -pt pulling out NGT, not good candidate for PEG tube  -per s&s, dysphagia 1 diet  -aspiration precautions, feeding only when awake Waxing and waning. FAST 7a-7b dementia. Possible hospital delirium. Possibly TIA vs. progression of dementia. Acute change on 1/22, previous baseline able to shower and eat by herself, some words, able to follow commands. Infectious causes unlikely due to neg bld and urine clx, treated empirically with abx for 4 days. Less likely meningitis due to waxing and waning MS. Unlikely primary neuro cause, CTH negative for acute pathology, MRI/MRA of head/neck - poor study, minimal chronic ischemic changes in the frontoparietal white matter, small aneurysm R ICA. Unlikely to be endocrine etiology, TSH 1.75, AM cortisol 11.3  -palliative consulted for home hospice and GOC - not likely candidate for hospice at this time, will refer to [facility] advanced care  -pt pulling out NGT, not good candidate for PEG tube  -per s&s, dysphagia 1 diet  -aspiration precautions, feeding only when awake

## 2021-01-29 NOTE — PROGRESS NOTE ADULT - PROBLEM SELECTOR PLAN 6
Abdominal exam benign  - s/p manual disimpaction  - c/w dulcolax suppository, senna, miralax  - c/w antibiotics as above Resolved.

## 2021-01-29 NOTE — DIETITIAN INITIAL EVALUATION ADULT. - PHYSCIAL ASSESSMENT
well nourished nutrition focused physical exam deferred as pt unable to consent - pt appears visually well nourished. no noted pressure injuries as per documentation.

## 2021-01-29 NOTE — PROGRESS NOTE ADULT - PROBLEM SELECTOR PLAN 9
- A1C 7.8 (1/23/21) with home metformin 500mg qd  - c/w low sliding scale Diet: per s&s, dysphagia 1 diet  DVT PPX: Xarelto  Dispo: PT shanell  GOC: Full code per d/w son

## 2021-01-29 NOTE — PROGRESS NOTE ADULT - PROBLEM SELECTOR PLAN 8
- pt w/ hx of atrial fibrillation w/ xarelto at home  - c/w xarelto   - c/w 12.5mg lopressor tid - A1C 7.8 (1/23/21) with home metformin 500mg qd  - c/w low sliding scale

## 2021-01-29 NOTE — DIETITIAN INITIAL EVALUATION ADULT. - OTHER INFO
Pt with dosing weight of 160.9 pounds. [facility] to trend weights as assess as made available.    Pt w/ HbA1c 7.8% suggesting fair glycemic control PTA. Per outpatient medicine records pt on Metformin PTA.     Pt with no overt GI distress. Last BM 1/28. Pt previously on NGT feeds of Glucerna 1.2 at 50mL/hr x 18 hr from Jan 23-24, and Jan 27-28. Pt s/p failed SLP evaluation 12/25, passed SLP evaluation today recommended for dysphagia 1 with honey consistency liquids.

## 2021-01-29 NOTE — DIETITIAN INITIAL EVALUATION ADULT. - PERTINENT MEDS FT
MEDICATIONS  (STANDING):  dextrose 5%. 1000 milliLiter(s) (75 mL/Hr) IV Continuous <Continuous>  dextrose 5%. 1000 milliLiter(s) (75 mL/Hr) IV Continuous <Continuous>  dextrose 5%. 1000 milliLiter(s) (100 mL/Hr) IV Continuous <Continuous>  glucagon  Injectable 1 milliGRAM(s) IntraMuscular once  insulin lispro (ADMELOG) corrective regimen sliding scale   SubCutaneous three times a day before meals  insulin lispro (ADMELOG) corrective regimen sliding scale   SubCutaneous at bedtime  metoprolol tartrate 12.5 milliGRAM(s) Oral every 8 hours  polyethylene glycol 3350 17 Gram(s) Oral two times a day  rivaroxaban 15 milliGRAM(s) Oral with dinner  senna 2 Tablet(s) Oral at bedtime    MEDICATIONS  (PRN):  bisacodyl Suppository 10 milliGRAM(s) Rectal daily PRN Constipation

## 2021-01-29 NOTE — PROGRESS NOTE ADULT - PROBLEM SELECTOR PLAN 3
Pt failed TOV a few times  - bladder scan q8h  - may need martinez, pt not receiving fluids so low UOP Pt failed TOV a few times  - bladder scan q8h  - may need chronic martinez, TOV - f/u next bladder scan, martinez if still retaining

## 2021-01-29 NOTE — PROGRESS NOTE ADULT - PROBLEM SELECTOR PLAN 4
PPS 40  Reportedly, the patient had a high degree of functionality prior to admission (dressing herself). Her performance status change during his hospitalization was likely due to her altered mental status. That is improving now. It is unlikely that she will be a hospice candidate given her speech and swallow findings, coupled with her degree of functionality

## 2021-01-30 LAB
CULTURE RESULTS: SIGNIFICANT CHANGE UP
CULTURE RESULTS: SIGNIFICANT CHANGE UP
GLUCOSE BLDC GLUCOMTR-MCNC: 118 MG/DL — HIGH (ref 70–99)
GLUCOSE BLDC GLUCOMTR-MCNC: 123 MG/DL — HIGH (ref 70–99)
GLUCOSE BLDC GLUCOMTR-MCNC: 127 MG/DL — HIGH (ref 70–99)
GLUCOSE BLDC GLUCOMTR-MCNC: 143 MG/DL — HIGH (ref 70–99)
SPECIMEN SOURCE: SIGNIFICANT CHANGE UP
SPECIMEN SOURCE: SIGNIFICANT CHANGE UP

## 2021-01-30 PROCEDURE — 99232 SBSQ HOSP IP/OBS MODERATE 35: CPT | Mod: GC

## 2021-01-30 RX ADMIN — Medication 12.5 MILLIGRAM(S): at 05:27

## 2021-01-30 RX ADMIN — Medication 12.5 MILLIGRAM(S): at 12:06

## 2021-01-30 RX ADMIN — POLYETHYLENE GLYCOL 3350 17 GRAM(S): 17 POWDER, FOR SOLUTION ORAL at 17:53

## 2021-01-30 RX ADMIN — SENNA PLUS 2 TABLET(S): 8.6 TABLET ORAL at 20:48

## 2021-01-30 RX ADMIN — RIVAROXABAN 15 MILLIGRAM(S): KIT at 17:53

## 2021-01-30 RX ADMIN — Medication 12.5 MILLIGRAM(S): at 20:48

## 2021-01-30 NOTE — PROGRESS NOTE ADULT - SUBJECTIVE AND OBJECTIVE BOX
PROGRESS NOTE:     Patient is a 80y old  Female who presents with a chief complaint of Hypotension (29 Jan 2021 16:03)      SUBJECTIVE / OVERNIGHT EVENTS: Pt seen and examined. No acute overnight events. In the morning, pt denies fever, chills, CP, SOB, abdominal pain, N/V, urinary or bowel issues.     ADDITIONAL REVIEW OF SYSTEMS: Otherwise negative    MEDICATIONS  (STANDING):  dextrose 5%. 1000 milliLiter(s) (100 mL/Hr) IV Continuous <Continuous>  dextrose 5%. 1000 milliLiter(s) (75 mL/Hr) IV Continuous <Continuous>  dextrose 5%. 1000 milliLiter(s) (75 mL/Hr) IV Continuous <Continuous>  glucagon  Injectable 1 milliGRAM(s) IntraMuscular once  insulin lispro (ADMELOG) corrective regimen sliding scale   SubCutaneous three times a day before meals  insulin lispro (ADMELOG) corrective regimen sliding scale   SubCutaneous at bedtime  metoprolol tartrate 12.5 milliGRAM(s) Oral every 8 hours  polyethylene glycol 3350 17 Gram(s) Oral two times a day  rivaroxaban 15 milliGRAM(s) Oral with dinner  senna 2 Tablet(s) Oral at bedtime    MEDICATIONS  (PRN):  bisacodyl Suppository 10 milliGRAM(s) Rectal daily PRN Constipation      CAPILLARY BLOOD GLUCOSE      POCT Blood Glucose.: 107 mg/dL (29 Jan 2021 21:38)  POCT Blood Glucose.: 182 mg/dL (29 Jan 2021 17:41)  POCT Blood Glucose.: 170 mg/dL (29 Jan 2021 12:45)    I&O's Summary      PHYSICAL EXAM:  Vital Signs Last 24 Hrs  T(C): 36.3 (30 Jan 2021 05:11), Max: 36.3 (29 Jan 2021 14:04)  T(F): 97.4 (30 Jan 2021 05:11), Max: 97.4 (29 Jan 2021 14:04)  HR: 94 (30 Jan 2021 05:11) (71 - 94)  BP: 129/76 (30 Jan 2021 05:11) (108/70 - 145/82)  BP(mean): --  RR: 18 (30 Jan 2021 05:11) (18 - 18)  SpO2: 99% (30 Jan 2021 05:11) (96% - 99%)    CONSTITUTIONAL: NAD  RESPIRATORY: Normal respiratory effort; lungs are clear to auscultation bilaterally  CARDIOVASCULAR: Regular rate and rhythm, normal S1 and S2, no murmur/rub/gallop  ABDOMEN: Nontender to palpation, normoactive bowel sounds, no rebound/guarding; No hepatosplenomegaly  EXTREMITIES: No lower extremity edema; Peripheral pulses are 2+ bilaterally  MUSCLOSKELETAL: no clubbing or cyanosis of digits; no joint swelling or tenderness to palpation  PSYCH: A+O to person, place, and time; affect appropriate    LABS:                      RADIOLOGY & ADDITIONAL TESTS:  Results Reviewed: Y  Imaging Personally Reviewed: Y  Electrocardiogram Personally Reviewed: Y    COORDINATION OF CARE:  Care Discussed with Consultants/Other Providers [Y/N]: Y  Prior or Outpatient Records Reviewed [Y/N]: Y

## 2021-01-30 NOTE — PROGRESS NOTE ADULT - PROBLEM SELECTOR PLAN 3
Pt failed TOV a few times  - bladder scan q8h  - may need chronic martinez, TOV - f/u next bladder scan, martinez if still retaining

## 2021-01-30 NOTE — PROGRESS NOTE ADULT - PROBLEM SELECTOR PLAN 2
Pt presented with hypothermia and tachycardia with hypotension, unresponsive to fluids, which required brief use of pressors w/ LORIE and elevated lactate. TSH 1.75. AM cortisol 11. Ucx neg. Bcx - staph epi in 2/4 vials, likely contaminant. Possibly 2/2 to aspiration PNA (on x-ray) vs stercoral colitis (on CTAP)  - f/u repeat bcx - NGTD  - ID consult - monitor off abx  - endocrine consult - no further endocrine workup needed

## 2021-01-30 NOTE — PROGRESS NOTE ADULT - PROBLEM SELECTOR PLAN 1
Waxing and waning. FAST 7a-7b dementia. Possible hospital delirium. Possibly TIA vs. progression of dementia. Acute change on 1/22, previous baseline able to shower and eat by herself, some words, able to follow commands. Infectious causes unlikely due to neg bld and urine clx, treated empirically with abx for 4 days. Less likely meningitis due to waxing and waning MS. Unlikely primary neuro cause, CTH negative for acute pathology, MRI/MRA of head/neck - poor study, minimal chronic ischemic changes in the frontoparietal white matter, small aneurysm R ICA. Unlikely to be endocrine etiology, TSH 1.75, AM cortisol 11.3  -palliative consulted for home hospice and GOC - not likely candidate for hospice at this time, will refer to [facility] advanced care  -pt pulling out NGT, not good candidate for PEG tube  -per s&s, dysphagia 1 diet  -aspiration precautions, feeding only when awake

## 2021-01-30 NOTE — PROGRESS NOTE ADULT - ASSESSMENT
79y/o F w/ h/o dementia (baseline AAOx0-1), T2DM, Afib (on Xeralto), HTN p/w AMS, likely 2/2 progression of dementia. Pt found to be hypothermic to 88, hypotensive s/p warming blanket requiring levo in MICU, w/ possible TIA at home, CXR showing possible LLL atelectasis vs PNA, now unlikely infectious etiology, monitoring off abx.

## 2021-01-30 NOTE — PROGRESS NOTE ADULT - ATTENDING COMMENTS
pt seen and examined.  above plan discussed on rounds today.  In addition,    81 yo female w/pmh dementia, afib, p/w AMS, septic shock, originally admitted to MICU for pressors for <24h, now HDS on general medical floors. Found to be bacteremic with multiple blood culture bottles growing Staph epi. Also receiving tx for aspiration pneumonia.  -ID eval appreciated--> Staph epi presumed to be contaminate, will monitor off abx. Repeat cx's NGTD    Hypothermia- no clear cause, common causes have been ruled out. TSH normal, cortisol normal, no brain lesions seen on MRI brain, no infective process at this time. Endo eval appreciated --> no further w/u needed    Encephalopathy  - presumed to be from cognitive impairment from dementia  - no infectious of metabolic causes have been identified    Dysphagia  - S&S eval appreciated--> can start on a dysphagia 1 honey thick diet.  palliative  appreciated --> pt not a candidate for hospice at this time pt seen and examined.  above plan discussed on rounds today.  In addition,    79 yo female w/pmh dementia, afib, p/w AMS, septic shock, originally admitted to MICU for pressors for <24h, now HDS on general medical floors. Found to be bacteremic with multiple blood culture bottles growing Staph epi. Also receiving tx for aspiration pneumonia.  -ID eval appreciated--> Staph epi presumed to be contaminate, will monitor off abx. Repeat cx's NGTD    Hypothermia- no clear cause, common causes have been ruled out. TSH normal, cortisol normal, no brain lesions seen on MRI brain, no infective process at this time. Endo eval appreciated --> no further w/u needed  urinary retention s/p martinez, patient having multiple bowel movements - if continues will need to check cdiff    Encephalopathy  - presumed to be from cognitive impairment from dementia  - no infectious of metabolic causes have been identified    Dysphagia  - S&S eval appreciated--> can start on a dysphagia 1 honey thick diet.  palliative  appreciated --> pt not a candidate for hospice at this time

## 2021-01-31 LAB
ALBUMIN SERPL ELPH-MCNC: 2.9 G/DL — LOW (ref 3.3–5)
ALP SERPL-CCNC: 114 U/L — SIGNIFICANT CHANGE UP (ref 40–120)
ALT FLD-CCNC: 30 U/L — SIGNIFICANT CHANGE UP (ref 10–45)
ANION GAP SERPL CALC-SCNC: 10 MMOL/L — SIGNIFICANT CHANGE UP (ref 5–17)
ANION GAP SERPL CALC-SCNC: 11 MMOL/L — SIGNIFICANT CHANGE UP (ref 5–17)
AST SERPL-CCNC: 41 U/L — HIGH (ref 10–40)
BASOPHILS # BLD AUTO: 0.05 K/UL — SIGNIFICANT CHANGE UP (ref 0–0.2)
BASOPHILS NFR BLD AUTO: 0.8 % — SIGNIFICANT CHANGE UP (ref 0–2)
BILIRUB SERPL-MCNC: 0.6 MG/DL — SIGNIFICANT CHANGE UP (ref 0.2–1.2)
BUN SERPL-MCNC: 9 MG/DL — SIGNIFICANT CHANGE UP (ref 7–23)
BUN SERPL-MCNC: 9 MG/DL — SIGNIFICANT CHANGE UP (ref 7–23)
CALCIUM SERPL-MCNC: 9.1 MG/DL — SIGNIFICANT CHANGE UP (ref 8.4–10.5)
CALCIUM SERPL-MCNC: 9.2 MG/DL — SIGNIFICANT CHANGE UP (ref 8.4–10.5)
CHLORIDE SERPL-SCNC: 104 MMOL/L — SIGNIFICANT CHANGE UP (ref 96–108)
CHLORIDE SERPL-SCNC: 104 MMOL/L — SIGNIFICANT CHANGE UP (ref 96–108)
CO2 SERPL-SCNC: 25 MMOL/L — SIGNIFICANT CHANGE UP (ref 22–31)
CO2 SERPL-SCNC: 25 MMOL/L — SIGNIFICANT CHANGE UP (ref 22–31)
CREAT SERPL-MCNC: 0.97 MG/DL — SIGNIFICANT CHANGE UP (ref 0.5–1.3)
CREAT SERPL-MCNC: 1 MG/DL — SIGNIFICANT CHANGE UP (ref 0.5–1.3)
EOSINOPHIL # BLD AUTO: 0.23 K/UL — SIGNIFICANT CHANGE UP (ref 0–0.5)
EOSINOPHIL NFR BLD AUTO: 3.5 % — SIGNIFICANT CHANGE UP (ref 0–6)
GLUCOSE BLDC GLUCOMTR-MCNC: 140 MG/DL — HIGH (ref 70–99)
GLUCOSE BLDC GLUCOMTR-MCNC: 151 MG/DL — HIGH (ref 70–99)
GLUCOSE BLDC GLUCOMTR-MCNC: 86 MG/DL — SIGNIFICANT CHANGE UP (ref 70–99)
GLUCOSE BLDC GLUCOMTR-MCNC: 90 MG/DL — SIGNIFICANT CHANGE UP (ref 70–99)
GLUCOSE SERPL-MCNC: 103 MG/DL — HIGH (ref 70–99)
GLUCOSE SERPL-MCNC: 151 MG/DL — HIGH (ref 70–99)
HCT VFR BLD CALC: 41.2 % — SIGNIFICANT CHANGE UP (ref 34.5–45)
HGB BLD-MCNC: 13.9 G/DL — SIGNIFICANT CHANGE UP (ref 11.5–15.5)
IMM GRANULOCYTES NFR BLD AUTO: 0.3 % — SIGNIFICANT CHANGE UP (ref 0–1.5)
LYMPHOCYTES # BLD AUTO: 1.09 K/UL — SIGNIFICANT CHANGE UP (ref 1–3.3)
LYMPHOCYTES # BLD AUTO: 16.4 % — SIGNIFICANT CHANGE UP (ref 13–44)
MAGNESIUM SERPL-MCNC: 2.1 MG/DL — SIGNIFICANT CHANGE UP (ref 1.6–2.6)
MCHC RBC-ENTMCNC: 28.6 PG — SIGNIFICANT CHANGE UP (ref 27–34)
MCHC RBC-ENTMCNC: 33.7 GM/DL — SIGNIFICANT CHANGE UP (ref 32–36)
MCV RBC AUTO: 84.8 FL — SIGNIFICANT CHANGE UP (ref 80–100)
MONOCYTES # BLD AUTO: 0.74 K/UL — SIGNIFICANT CHANGE UP (ref 0–0.9)
MONOCYTES NFR BLD AUTO: 11.2 % — SIGNIFICANT CHANGE UP (ref 2–14)
NEUTROPHILS # BLD AUTO: 4.5 K/UL — SIGNIFICANT CHANGE UP (ref 1.8–7.4)
NEUTROPHILS NFR BLD AUTO: 67.8 % — SIGNIFICANT CHANGE UP (ref 43–77)
NRBC # BLD: 0 /100 WBCS — SIGNIFICANT CHANGE UP (ref 0–0)
PHOSPHATE SERPL-MCNC: 3.3 MG/DL — SIGNIFICANT CHANGE UP (ref 2.5–4.5)
PLATELET # BLD AUTO: 99 K/UL — LOW (ref 150–400)
POTASSIUM SERPL-MCNC: 4.7 MMOL/L — SIGNIFICANT CHANGE UP (ref 3.5–5.3)
POTASSIUM SERPL-MCNC: 6.4 MMOL/L — CRITICAL HIGH (ref 3.5–5.3)
POTASSIUM SERPL-SCNC: 4.7 MMOL/L — SIGNIFICANT CHANGE UP (ref 3.5–5.3)
POTASSIUM SERPL-SCNC: 6.4 MMOL/L — CRITICAL HIGH (ref 3.5–5.3)
PROT SERPL-MCNC: 7.5 G/DL — SIGNIFICANT CHANGE UP (ref 6–8.3)
RBC # BLD: 4.86 M/UL — SIGNIFICANT CHANGE UP (ref 3.8–5.2)
RBC # FLD: 15.7 % — HIGH (ref 10.3–14.5)
SODIUM SERPL-SCNC: 139 MMOL/L — SIGNIFICANT CHANGE UP (ref 135–145)
SODIUM SERPL-SCNC: 140 MMOL/L — SIGNIFICANT CHANGE UP (ref 135–145)
WBC # BLD: 6.63 K/UL — SIGNIFICANT CHANGE UP (ref 3.8–10.5)
WBC # FLD AUTO: 6.63 K/UL — SIGNIFICANT CHANGE UP (ref 3.8–10.5)

## 2021-01-31 PROCEDURE — 99232 SBSQ HOSP IP/OBS MODERATE 35: CPT | Mod: GC

## 2021-01-31 RX ORDER — SENNA PLUS 8.6 MG/1
1 TABLET ORAL DAILY
Refills: 0 | Status: DISCONTINUED | OUTPATIENT
Start: 2021-01-31 | End: 2021-02-05

## 2021-01-31 RX ORDER — POLYETHYLENE GLYCOL 3350 17 G/17G
17 POWDER, FOR SOLUTION ORAL DAILY
Refills: 0 | Status: DISCONTINUED | OUTPATIENT
Start: 2021-01-31 | End: 2021-02-03

## 2021-01-31 RX ADMIN — Medication 1: at 18:06

## 2021-01-31 RX ADMIN — POLYETHYLENE GLYCOL 3350 17 GRAM(S): 17 POWDER, FOR SOLUTION ORAL at 16:29

## 2021-01-31 RX ADMIN — Medication 12.5 MILLIGRAM(S): at 05:59

## 2021-01-31 RX ADMIN — RIVAROXABAN 15 MILLIGRAM(S): KIT at 16:29

## 2021-01-31 RX ADMIN — SENNA PLUS 1 TABLET(S): 8.6 TABLET ORAL at 22:51

## 2021-01-31 RX ADMIN — Medication 12.5 MILLIGRAM(S): at 22:50

## 2021-01-31 RX ADMIN — Medication 12.5 MILLIGRAM(S): at 12:39

## 2021-01-31 NOTE — PROVIDER CONTACT NOTE (CRITICAL VALUE NOTIFICATION) - TEST AND RESULT REPORTED:
VBG results: Lactate 2.4
positive blood cultures, gram positive cocci in clusters, anaerobic
preliminary results: growth in aerobic bottle: gram positive cocci in clusters
moderately hemolyzed K+: 6.4

## 2021-01-31 NOTE — PROGRESS NOTE ADULT - PROBLEM SELECTOR PLAN 3
Pt failed TOV a few times  - bladder scan q8h  - may need chronic martinez, TOV - f/u next bladder scan, martinez if still retaining Pt failed TOV a few times  - bladder scan q8h  - now with martinez, may need chronic martinez

## 2021-01-31 NOTE — PROVIDER CONTACT NOTE (CRITICAL VALUE NOTIFICATION) - SITUATION
pt has positive blood cultures in anaerobic bottle, gram positive cocci in clusters, specimen collected 1/22/2021
AMS, dementia, urinary retention
VBG results: Lactate 2.4
preliminary results: growth in aerobic bottle: gram positive cocci in clusters

## 2021-01-31 NOTE — PROVIDER CONTACT NOTE (CRITICAL VALUE NOTIFICATION) - PERSON GIVING RESULT:
Samira Edmondson [facility]
Curtis Garcia
Chad Troncoso from [facility]
Kathleen Conde

## 2021-01-31 NOTE — PROGRESS NOTE ADULT - PROBLEM SELECTOR PLAN 5
Abdominal exam benign  - s/p manual disimpaction  - c/w dulcolax suppository, senna, miralax Abdominal exam benign, s/p manual disimpaction  - decrease dulcolax suppository and senna to PRN  - c/w miralax 1x daily

## 2021-01-31 NOTE — PROVIDER CONTACT NOTE (CRITICAL VALUE NOTIFICATION) - NS PROVIDER READ BACK
VBG results: Lactate 2.4/yes
preliminary results: growth in aerobic bottle: gram positive cocci in clusters/yes
yes
yes

## 2021-01-31 NOTE — PROGRESS NOTE ADULT - ATTENDING COMMENTS
pt seen and examined.  above plan discussed on rounds today.  vitals and labs reviewed  In addition,    81 yo female w/pmh dementia, afib, p/w AMS, septic shock, originally admitted to MICU for pressors for <24h, now HDS on general medical floors. Found to be bacteremic with multiple blood culture bottles growing Staph epi. Also receiving tx for aspiration pneumonia.  -ID eval appreciated--> Staph epi presumed to be contaminate, will monitor off abx. Repeat cx's NGTD    Hypothermia- no clear cause, common causes have been ruled out. TSH normal, cortisol normal, no brain lesions seen on MRI brain, no infective process at this time. Endo eval appreciated --> no further w/u needed  urinary retention s/p martinez - possible TOV prior to discharge  patient having multiple bowel movements likely from bowel regimen - adjusted    Encephalopathy  - presumed to be from cognitive impairment from dementia  - no infectious of metabolic causes have been identified    Dysphagia  - S&S eval appreciated--> can start on a dysphagia 1 honey thick diet.  palliative  appreciated --> pt not a candidate for hospice at this time

## 2021-01-31 NOTE — PROGRESS NOTE ADULT - SUBJECTIVE AND OBJECTIVE BOX
PROGRESS NOTE:   Authored by Anita Alvarez MD  Pager [phone] [facility] / [id number] [facility]    Patient is a 80y old  Female who presents with a chief complaint of Hypotension (30 Jan 2021 08:45)      SUBJECTIVE / OVERNIGHT EVENTS:  No acute events overnight.  This AM, pt    MEDICATIONS  (STANDING):  dextrose 5%. 1000 milliLiter(s) (100 mL/Hr) IV Continuous <Continuous>  dextrose 5%. 1000 milliLiter(s) (75 mL/Hr) IV Continuous <Continuous>  dextrose 5%. 1000 milliLiter(s) (75 mL/Hr) IV Continuous <Continuous>  glucagon  Injectable 1 milliGRAM(s) IntraMuscular once  insulin lispro (ADMELOG) corrective regimen sliding scale   SubCutaneous three times a day before meals  insulin lispro (ADMELOG) corrective regimen sliding scale   SubCutaneous at bedtime  metoprolol tartrate 12.5 milliGRAM(s) Oral every 8 hours  polyethylene glycol 3350 17 Gram(s) Oral two times a day  rivaroxaban 15 milliGRAM(s) Oral with dinner  senna 2 Tablet(s) Oral at bedtime    MEDICATIONS  (PRN):  bisacodyl Suppository 10 milliGRAM(s) Rectal daily PRN Constipation      CAPILLARY BLOOD GLUCOSE      POCT Blood Glucose.: 127 mg/dL (30 Jan 2021 21:12)  POCT Blood Glucose.: 118 mg/dL (30 Jan 2021 17:25)  POCT Blood Glucose.: 123 mg/dL (30 Jan 2021 12:13)  POCT Blood Glucose.: 143 mg/dL (30 Jan 2021 08:55)    I&O's Summary    30 Jan 2021 07:01  -  31 Jan 2021 07:00  --------------------------------------------------------  IN: 0 mL / OUT: 1450 mL / NET: -1450 mL        PHYSICAL EXAM:  Vital Signs Last 24 Hrs  T(C): 36.3 (31 Jan 2021 04:22), Max: 36.3 (30 Jan 2021 14:28)  T(F): 97.3 (31 Jan 2021 04:22), Max: 97.4 (30 Jan 2021 14:28)  HR: 89 (31 Jan 2021 04:22) (81 - 91)  BP: 146/83 (31 Jan 2021 04:22) (124/82 - 146/83)  BP(mean): --  RR: 18 (31 Jan 2021 04:22) (18 - 20)  SpO2: 95% (31 Jan 2021 04:22) (95% - 98%)    GENERAL: NAD, well-groomed, well-developed  HEAD:  Atraumatic, Normocephalic  EYES: PERRLA, conjunctiva and sclera clear  ENMT: Moist mucous membranes, poor dentition  NECK: no JVD  NERVOUS SYSTEM:  Oriented X0, lethargic, doesn't follow commands, no speech, mumbling incoherently.  CHEST/LUNG: limited exam due to mental status and habitus but appears clear ACW;   HEART: irregular and tachycardic   ABDOMEN: Soft, Nontender, Nondistended; Bowel sounds present  EXTREMITIES:  2+ Peripheral Pulses, No clubbing, cyanosis, or edema  LYMPH: No lymphadenopathy noted  SKIN: No rashes or lesions PROGRESS NOTE:   Authored by Anita Alvarez MD  Pager [phone] [facility] / [id number] [facility]    Patient is a 80y old  Female who presents with a chief complaint of Hypotension (30 Jan 2021 08:45)      SUBJECTIVE / OVERNIGHT EVENTS:  No acute events overnight.  This AM, pt awake and alert. Pt able to answer that she feels "terrible" but cannot explain what is bothering her, mumbling incoherently. She denies any abdominal pain. Still unable to follow commands. She had 4 BMs yesterday.    MEDICATIONS  (STANDING):  dextrose 5%. 1000 milliLiter(s) (100 mL/Hr) IV Continuous <Continuous>  dextrose 5%. 1000 milliLiter(s) (75 mL/Hr) IV Continuous <Continuous>  dextrose 5%. 1000 milliLiter(s) (75 mL/Hr) IV Continuous <Continuous>  glucagon  Injectable 1 milliGRAM(s) IntraMuscular once  insulin lispro (ADMELOG) corrective regimen sliding scale   SubCutaneous three times a day before meals  insulin lispro (ADMELOG) corrective regimen sliding scale   SubCutaneous at bedtime  metoprolol tartrate 12.5 milliGRAM(s) Oral every 8 hours  polyethylene glycol 3350 17 Gram(s) Oral two times a day  rivaroxaban 15 milliGRAM(s) Oral with dinner  senna 2 Tablet(s) Oral at bedtime    MEDICATIONS  (PRN):  bisacodyl Suppository 10 milliGRAM(s) Rectal daily PRN Constipation      CAPILLARY BLOOD GLUCOSE      POCT Blood Glucose.: 127 mg/dL (30 Jan 2021 21:12)  POCT Blood Glucose.: 118 mg/dL (30 Jan 2021 17:25)  POCT Blood Glucose.: 123 mg/dL (30 Jan 2021 12:13)  POCT Blood Glucose.: 143 mg/dL (30 Jan 2021 08:55)    I&O's Summary    30 Jan 2021 07:01  -  31 Jan 2021 07:00  --------------------------------------------------------  IN: 0 mL / OUT: 1450 mL / NET: -1450 mL        PHYSICAL EXAM:  Vital Signs Last 24 Hrs  T(C): 36.3 (31 Jan 2021 04:22), Max: 36.3 (30 Jan 2021 14:28)  T(F): 97.3 (31 Jan 2021 04:22), Max: 97.4 (30 Jan 2021 14:28)  HR: 89 (31 Jan 2021 04:22) (81 - 91)  BP: 146/83 (31 Jan 2021 04:22) (124/82 - 146/83)  BP(mean): --  RR: 18 (31 Jan 2021 04:22) (18 - 20)  SpO2: 95% (31 Jan 2021 04:22) (95% - 98%)    GENERAL: NAD, well-groomed, well-developed  HEAD:  Atraumatic, Normocephalic  EYES: PERRLA, conjunctiva and sclera clear  ENMT: Moist mucous membranes, poor dentition  NECK: no JVD  NERVOUS SYSTEM:  Oriented X0, lethargic, doesn't follow commands, no speech, mumbling incoherently.  CHEST/LUNG: limited exam due to mental status and habitus but appears clear ACW;   HEART: irregular and tachycardic   ABDOMEN: Soft, Nontender, Nondistended; Bowel sounds present  EXTREMITIES:  2+ Peripheral Pulses, No clubbing, cyanosis, or edema  LYMPH: No lymphadenopathy noted  SKIN: No rashes or lesions

## 2021-01-31 NOTE — PROVIDER CONTACT NOTE (CRITICAL VALUE NOTIFICATION) - NS PROVIDER READ BACK TO LAB
preliminary results: growth in aerobic bottle: gram positive cocci in clusters/yes
yes
yes
VBG Lactate 2.4/yes

## 2021-01-31 NOTE — PROVIDER CONTACT NOTE (CRITICAL VALUE NOTIFICATION) - ASSESSMENT
no signs of distress noted
patient is AOx1 at baseline, in no s/s of distress.
patient AOx1 at baseline

## 2021-01-31 NOTE — PROVIDER CONTACT NOTE (CRITICAL VALUE NOTIFICATION) - ACTION/TREATMENT ORDERED:
BMP to be reordered.
MD aware. no new interventions at this time.
no interventions ordered, pt already started on vancomycin
MD aware. will continue to monitor pt.

## 2021-01-31 NOTE — PROGRESS NOTE ADULT - PROBLEM SELECTOR PLAN 1
Waxing and waning. FAST 7a-7b dementia. Possible hospital delirium. Possibly TIA vs. progression of dementia. Acute change on 1/22, previous baseline able to shower and eat by herself, some words, able to follow commands. Infectious causes unlikely due to neg bld and urine clx, treated empirically with abx for 4 days. Less likely meningitis due to waxing and waning MS. Unlikely primary neuro cause, CTH negative for acute pathology, MRI/MRA of head/neck - poor study, minimal chronic ischemic changes in the frontoparietal white matter, small aneurysm R ICA. Unlikely to be endocrine etiology, TSH 1.75, AM cortisol 11.3  -palliative consulted for home hospice and GOC - not likely candidate for hospice at this time, will refer to [facility] advanced care  -pt pulling out NGT, not good candidate for PEG tube  -per s&s, dysphagia 1 diet  -aspiration precautions, feeding only when awake

## 2021-02-01 LAB
GLUCOSE BLDC GLUCOMTR-MCNC: 106 MG/DL — HIGH (ref 70–99)
GLUCOSE BLDC GLUCOMTR-MCNC: 178 MG/DL — HIGH (ref 70–99)
GLUCOSE BLDC GLUCOMTR-MCNC: 202 MG/DL — HIGH (ref 70–99)
GLUCOSE BLDC GLUCOMTR-MCNC: 92 MG/DL — SIGNIFICANT CHANGE UP (ref 70–99)

## 2021-02-01 PROCEDURE — 99232 SBSQ HOSP IP/OBS MODERATE 35: CPT | Mod: GC

## 2021-02-01 PROCEDURE — 99232 SBSQ HOSP IP/OBS MODERATE 35: CPT

## 2021-02-01 RX ORDER — LANOLIN ALCOHOL/MO/W.PET/CERES
5 CREAM (GRAM) TOPICAL AT BEDTIME
Refills: 0 | Status: COMPLETED | OUTPATIENT
Start: 2021-02-01 | End: 2021-02-01

## 2021-02-01 RX ADMIN — Medication 5 MILLIGRAM(S): at 23:16

## 2021-02-01 RX ADMIN — Medication 1: at 12:35

## 2021-02-01 RX ADMIN — RIVAROXABAN 15 MILLIGRAM(S): KIT at 18:49

## 2021-02-01 RX ADMIN — POLYETHYLENE GLYCOL 3350 17 GRAM(S): 17 POWDER, FOR SOLUTION ORAL at 12:33

## 2021-02-01 RX ADMIN — Medication 12.5 MILLIGRAM(S): at 21:52

## 2021-02-01 RX ADMIN — Medication 12.5 MILLIGRAM(S): at 12:36

## 2021-02-01 RX ADMIN — Medication 12.5 MILLIGRAM(S): at 05:21

## 2021-02-01 NOTE — PROGRESS NOTE ADULT - PROBLEM SELECTOR PLAN 5
Abdominal exam benign, s/p manual disimpaction  - decrease dulcolax suppository and senna to PRN  - c/w miralax 1x daily

## 2021-02-01 NOTE — PROGRESS NOTE ADULT - SUBJECTIVE AND OBJECTIVE BOX
PROGRESS NOTE:   Authored by Anita Alvarez MD  Pager [phone] [facility] / [id number] [facility]    Patient is a 80y old  Female who presents with a chief complaint of Hypotension (31 Jan 2021 07:07)      SUBJECTIVE / OVERNIGHT EVENTS:  No acute overnight events.    MEDICATIONS  (STANDING):  dextrose 5%. 1000 milliLiter(s) (100 mL/Hr) IV Continuous <Continuous>  dextrose 5%. 1000 milliLiter(s) (75 mL/Hr) IV Continuous <Continuous>  dextrose 5%. 1000 milliLiter(s) (75 mL/Hr) IV Continuous <Continuous>  glucagon  Injectable 1 milliGRAM(s) IntraMuscular once  insulin lispro (ADMELOG) corrective regimen sliding scale   SubCutaneous three times a day before meals  insulin lispro (ADMELOG) corrective regimen sliding scale   SubCutaneous at bedtime  metoprolol tartrate 12.5 milliGRAM(s) Oral every 8 hours  polyethylene glycol 3350 17 Gram(s) Oral daily  rivaroxaban 15 milliGRAM(s) Oral with dinner    MEDICATIONS  (PRN):  bisacodyl Suppository 10 milliGRAM(s) Rectal daily PRN Constipation  senna 1 Tablet(s) Oral daily PRN Constipation      CAPILLARY BLOOD GLUCOSE      POCT Blood Glucose.: 86 mg/dL (31 Jan 2021 23:23)  POCT Blood Glucose.: 151 mg/dL (31 Jan 2021 17:42)  POCT Blood Glucose.: 140 mg/dL (31 Jan 2021 12:14)  POCT Blood Glucose.: 90 mg/dL (31 Jan 2021 08:20)    I&O's Summary    31 Jan 2021 07:01  -  01 Feb 2021 07:00  --------------------------------------------------------  IN: 0 mL / OUT: 502 mL / NET: -502 mL        PHYSICAL EXAM:  Vital Signs Last 24 Hrs  T(C): 36.3 (01 Feb 2021 05:18), Max: 36.5 (31 Jan 2021 16:00)  T(F): 97.4 (01 Feb 2021 05:18), Max: 97.7 (31 Jan 2021 16:00)  HR: 75 (01 Feb 2021 05:18) (75 - 80)  BP: 138/85 (01 Feb 2021 05:18) (132/80 - 144/89)  BP(mean): --  RR: 18 (01 Feb 2021 05:18) (18 - 18)  SpO2: 100% (01 Feb 2021 05:18) (95% - 100%)    GENERAL: NAD, well-groomed, well-developed  HEAD:  Atraumatic, Normocephalic  EYES: PERRLA, conjunctiva and sclera clear  ENMT: Moist mucous membranes, poor dentition  NECK: no JVD  NERVOUS SYSTEM:  Oriented X0, doesn't follow commands, no speech, mumbling incoherently.  CHEST/LUNG: limited exam due to mental status and habitus but appears clear ACW;   HEART: irregular and tachycardic   ABDOMEN: Soft, Nontender, Nondistended; Bowel sounds present  EXTREMITIES:  2+ Peripheral Pulses, No clubbing, cyanosis, or edema  LYMPH: No lymphadenopathy noted  SKIN: No rashes or lesions    LABS:                        13.9   6.63  )-----------( 99       ( 31 Jan 2021 08:29 )             41.2     01-31    140  |  104  |  9   ----------------------------<  151<H>  4.7   |  25  |  1.00    Ca    9.2      31 Jan 2021 10:32  Phos  3.3     01-31  Mg     2.1     01-31    TPro  7.5  /  Alb  2.9<L>  /  TBili  0.6  /  DBili  x   /  AST  41<H>  /  ALT  30  /  AlkPhos  114  01-31     PROGRESS NOTE:   Authored by Anita Alvarez MD  Pager [phone] [facility] / [id number] [facility]    Patient is a 80y old  Female who presents with a chief complaint of Hypotension (31 Jan 2021 07:07)      SUBJECTIVE / OVERNIGHT EVENTS:  No acute overnight events.  This AM, pt more awake and alert. Oriented x1 to self. Pt able to say "I don't know what's going on". No acute distress.     MEDICATIONS  (STANDING):  dextrose 5%. 1000 milliLiter(s) (100 mL/Hr) IV Continuous <Continuous>  dextrose 5%. 1000 milliLiter(s) (75 mL/Hr) IV Continuous <Continuous>  dextrose 5%. 1000 milliLiter(s) (75 mL/Hr) IV Continuous <Continuous>  glucagon  Injectable 1 milliGRAM(s) IntraMuscular once  insulin lispro (ADMELOG) corrective regimen sliding scale   SubCutaneous three times a day before meals  insulin lispro (ADMELOG) corrective regimen sliding scale   SubCutaneous at bedtime  metoprolol tartrate 12.5 milliGRAM(s) Oral every 8 hours  polyethylene glycol 3350 17 Gram(s) Oral daily  rivaroxaban 15 milliGRAM(s) Oral with dinner    MEDICATIONS  (PRN):  bisacodyl Suppository 10 milliGRAM(s) Rectal daily PRN Constipation  senna 1 Tablet(s) Oral daily PRN Constipation      CAPILLARY BLOOD GLUCOSE      POCT Blood Glucose.: 86 mg/dL (31 Jan 2021 23:23)  POCT Blood Glucose.: 151 mg/dL (31 Jan 2021 17:42)  POCT Blood Glucose.: 140 mg/dL (31 Jan 2021 12:14)  POCT Blood Glucose.: 90 mg/dL (31 Jan 2021 08:20)    I&O's Summary    31 Jan 2021 07:01  -  01 Feb 2021 07:00  --------------------------------------------------------  IN: 0 mL / OUT: 502 mL / NET: -502 mL        PHYSICAL EXAM:  Vital Signs Last 24 Hrs  T(C): 36.3 (01 Feb 2021 05:18), Max: 36.5 (31 Jan 2021 16:00)  T(F): 97.4 (01 Feb 2021 05:18), Max: 97.7 (31 Jan 2021 16:00)  HR: 75 (01 Feb 2021 05:18) (75 - 80)  BP: 138/85 (01 Feb 2021 05:18) (132/80 - 144/89)  BP(mean): --  RR: 18 (01 Feb 2021 05:18) (18 - 18)  SpO2: 100% (01 Feb 2021 05:18) (95% - 100%)    GENERAL: NAD, well-groomed, well-developed  HEAD:  Atraumatic, Normocephalic  EYES: PERRLA, conjunctiva and sclera clear  ENMT: Moist mucous membranes, poor dentition  NECK: no JVD  NERVOUS SYSTEM:  Oriented X1, doesn't follow commands, alert, awake, moving all 4 limbs spontaneously  CHEST/LUNG: limited exam due to mental status and habitus but appears clear ACW;   HEART: irregular, normal rate  ABDOMEN: Soft, Nontender, Nondistended; Bowel sounds present  EXTREMITIES:  2+ Peripheral Pulses, No clubbing, cyanosis, or edema  LYMPH: No lymphadenopathy noted  SKIN: No rashes or lesions    LABS:                        13.9   6.63  )-----------( 99       ( 31 Jan 2021 08:29 )             41.2     01-31    140  |  104  |  9   ----------------------------<  151<H>  4.7   |  25  |  1.00    Ca    9.2      31 Jan 2021 10:32  Phos  3.3     01-31  Mg     2.1     01-31    TPro  7.5  /  Alb  2.9<L>  /  TBili  0.6  /  DBili  x   /  AST  41<H>  /  ALT  30  /  AlkPhos  114  01-31

## 2021-02-01 NOTE — PROGRESS NOTE ADULT - ATTENDING COMMENTS
Rusty Amos  Pager: [phone]. If no response or past 5 pm call [phone].     Will sign off, please call with questions.

## 2021-02-01 NOTE — PROGRESS NOTE ADULT - PROBLEM SELECTOR PLAN 1
Waxing and waning. FAST 7a-7b dementia. Possible hospital delirium. Possibly TIA vs. progression of dementia. Acute change on 1/22, previous baseline able to shower and eat by herself, some words, able to follow commands. Infectious causes unlikely due to neg bld and urine clx, treated empirically with abx for 4 days. Less likely meningitis due to waxing and waning MS. Unlikely primary neuro cause, CTH negative for acute pathology, MRI/MRA of head/neck - poor study, minimal chronic ischemic changes in the frontoparietal white matter, small aneurysm R ICA. Unlikely to be endocrine etiology, TSH 1.75, AM cortisol 11.3  -palliative consulted for home hospice and GOC - not likely candidate for hospice at this time, will refer to [facility] advanced care  -pt pulling out NGT, not good candidate for PEG tube  -per s&s, dysphagia 1 diet  -aspiration precautions, feeding only when awake Waxing and waning. FAST 7a-7b dementia. Possible hospital delirium. Possibly TIA vs. progression of dementia. Acute change on 1/22, previous baseline able to shower and eat by herself, some words, able to follow commands. Infectious causes unlikely due to neg bld and urine clx, treated empirically with abx for 4 days. Less likely meningitis due to waxing and waning MS. Unlikely primary neuro cause, CTH negative for acute pathology, MRI/MRA of head/neck - poor study, minimal chronic ischemic changes in the frontoparietal white matter, small aneurysm R ICA. Unlikely to be endocrine etiology, TSH 1.75, AM cortisol 11.3  -palliative consulted for home hospice and GOC - not likely candidate for hospice at this time, will refer to [facility] advanced care  - setting up home services  -pt pulling out NGT, not good candidate for PEG tube  -per s&s, dysphagia 1 diet  -aspiration precautions, feeding only when awake

## 2021-02-01 NOTE — PROGRESS NOTE ADULT - SUBJECTIVE AND OBJECTIVE BOX
80y old  Female who presents with a chief complaint of Hypotension (01 Feb 2021 07:28)      Interval history:  afebrile, off hypothermic blanket, was up and ate this morning, got washed per PCA.       Allergies:   No Known Allergies      Antimicrobials:      REVIEW OF SYSTEMS:  No SOB  No N/V  No rash.       Vital Signs Last 24 Hrs  T(C): 36.4 (02-01-21 @ 12:23), Max: 36.4 (01-31-21 @ 22:19)  T(F): 97.5 (02-01-21 @ 12:23), Max: 97.6 (01-31-21 @ 22:19)  HR: 65 (02-01-21 @ 12:23) (65 - 79)  BP: 117/79 (02-01-21 @ 12:23) (117/79 - 138/85)  BP(mean): --  RR: 18 (02-01-21 @ 12:23) (18 - 18)  SpO2: 99% (02-01-21 @ 12:23) (95% - 100%)      PHYSICAL EXAM:  Patient in no acute distress. sleepy   Cardiovascular: S1S2 normal.  Lungs: + air entry B/L lung fields.  Gastrointestinal: soft, nondistended.  Extremities: no edema.  IV sites not inflamed.   + martinez                           13.9   6.63  )-----------( 99       ( 31 Jan 2021 08:29 )             41.2   01-31    140  |  104  |  9   ----------------------------<  151<H>  4.7   |  25  |  1.00    Ca    9.2      31 Jan 2021 10:32  Phos  3.3     01-31  Mg     2.1     01-31    TPro  7.5  /  Alb  2.9<L>  /  TBili  0.6  /  DBili  x   /  AST  41<H>  /  ALT  30  /  AlkPhos  114  01-31      LIVER FUNCTIONS - ( 31 Jan 2021 08:29 )  Alb: 2.9 g/dL / Pro: 7.5 g/dL / ALK PHOS: 114 U/L / ALT: 30 U/L / AST: 41 U/L / GGT: x

## 2021-02-01 NOTE — PROGRESS NOTE ADULT - ATTENDING COMMENTS
pt seen and examined.  above plan discussed on rounds today.  vitals and labs reviewed  In addition,    81 yo female w/pmh dementia, afib, p/w AMS, septic shock, originally admitted to MICU for pressors for <24h, now HDS on general medical floors. Found to be bacteremic with multiple blood culture bottles growing Staph epi. Also receiving tx for aspiration pneumonia.  -ID eval appreciated--> Staph epi presumed to be contaminate, will monitor off abx. Repeat cx's NGTD  Hypothermia- no clear cause, common causes have been ruled out. TSH normal, cortisol normal, no brain lesions seen on MRI brain, no infective process at this time. Endo eval appreciated --> no further w/u needed  urinary retention s/p martinez - possible TOV prior to discharge  patient having multiple bowel movements likely from bowel regimen - adjusted    Encephalopathy  - presumed to be from cognitive impairment from dementia  - no infectious of metabolic causes have been identified    Dysphagia  - S&S eval appreciated--> started on a dysphagia 1 honey thick diet.  palliative  appreciated --> pt not a candidate for hospice at this time    dispo: D/C planning with home services

## 2021-02-01 NOTE — PROGRESS NOTE ADULT - ASSESSMENT
Assessment:  The patient is an 80 year old female with past medical history of dementia (baseline AAOx1), type 2 diabetes mellitus, atrial fibrillation (on Xarelto), and hypertension who presented with altered mental status. She has history of dementia and not on any medications. As per chart review, she has had a 2 week history of progressively worsening mental status and decreased PO intake. Her son reports that prior to admission, her lethargy increased and patient displayed garbled speech which prompted her to come to the emergency room. She reported having chills, but he denies her expressing shortness of breath, fevers, nausea, vomiting, diarrhea, dysuria and cough. She requires assistance with feeding and medications at home. She dresses and uses the restroom by herself. In the emergency department, she was found to be hypothermic and bradycardic and she was placed on a warming blanket and she became hypotensive. She was admitted for sepsis due to bacteremia and infectious disease was consulted for further recommendations.    Overall hypothermia, bradycardia, AMS, hypotension, lactic acidosis on presentation.   No obvious source of infection at this time. CoNS in blood cx in absence of hardware is usually a procurement contaminant, pt with no cellulitis or phlebitis.   No leucocytosis, CT abd with constipation, recent CXR with no pneumonia, procalcitonin not useful in setting of renal failure.   Mental status seems better.   concern for urinary retention.       Plan:  - recommend Monitor off intravenous antibiotics   - repeat blood cultures, NTD   - Trend CBC daily  - Consider endocrine workup for other causes  - check U/A and urine cx given concern for urinary retention, possible role of martinez ?   - given fluctuating mental status doubt meningitis clinically, but cannot r/o 100% without a LP.        Assessment:  The patient is an 80 year old female with past medical history of dementia (baseline AAOx1), type 2 diabetes mellitus, atrial fibrillation (on Xarelto), and hypertension who presented with altered mental status. She has history of dementia and not on any medications. As per chart review, she has had a 2 week history of progressively worsening mental status and decreased PO intake. Her son reports that prior to admission, her lethargy increased and patient displayed garbled speech which prompted her to come to the emergency room. She reported having chills, but he denies her expressing shortness of breath, fevers, nausea, vomiting, diarrhea, dysuria and cough. She requires assistance with feeding and medications at home. She dresses and uses the restroom by herself. In the emergency department, she was found to be hypothermic and bradycardic and she was placed on a warming blanket and she became hypotensive. She was admitted for sepsis due to bacteremia and infectious disease was consulted for further recommendations.    Overall hypothermia, bradycardia, AMS, hypotension, lactic acidosis on presentation.   No obvious source of infection at this time. CoNS in blood cx in absence of hardware is usually a procurement contaminant, pt with no cellulitis or phlebitis.   No leucocytosis, CT abd with constipation, recent CXR with no pneumonia, procalcitonin not useful in setting of renal failure.   Mental status improving   concern for urinary retention, s/p martinez       Plan:  - recommend Monitor off intravenous antibiotics   - repeat blood cultures, NTD   - Trend CBC daily  - given fluctuating mental status doubt meningitis clinically, but cannot r/o 100% without a LP.

## 2021-02-01 NOTE — PROGRESS NOTE ADULT - ASSESSMENT
81y/o F w/ h/o dementia (baseline AAOx0-1), T2DM, Afib (on Xeralto), HTN p/w AMS, likely 2/2 progression of dementia. Pt found to be hypothermic to 88, hypotensive s/p warming blanket requiring levo in MICU, w/ possible TIA at home, CXR showing possible LLL atelectasis vs PNA, now unlikely infectious etiology, monitoring off abx.

## 2021-02-02 LAB
APPEARANCE UR: ABNORMAL
BACTERIA # UR AUTO: ABNORMAL
BILIRUB UR-MCNC: NEGATIVE — SIGNIFICANT CHANGE UP
COLOR SPEC: YELLOW — SIGNIFICANT CHANGE UP
DIFF PNL FLD: ABNORMAL
EPI CELLS # UR: 1 /HPF — SIGNIFICANT CHANGE UP
GLUCOSE BLDC GLUCOMTR-MCNC: 172 MG/DL — HIGH (ref 70–99)
GLUCOSE BLDC GLUCOMTR-MCNC: 217 MG/DL — HIGH (ref 70–99)
GLUCOSE BLDC GLUCOMTR-MCNC: 83 MG/DL — SIGNIFICANT CHANGE UP (ref 70–99)
GLUCOSE BLDC GLUCOMTR-MCNC: 97 MG/DL — SIGNIFICANT CHANGE UP (ref 70–99)
GLUCOSE UR QL: NEGATIVE — SIGNIFICANT CHANGE UP
HYALINE CASTS # UR AUTO: 5 /LPF — HIGH (ref 0–2)
KETONES UR-MCNC: SIGNIFICANT CHANGE UP
LEUKOCYTE ESTERASE UR-ACNC: ABNORMAL
NITRITE UR-MCNC: POSITIVE
PH UR: 6.5 — SIGNIFICANT CHANGE UP (ref 5–8)
PROT UR-MCNC: ABNORMAL
RBC CASTS # UR COMP ASSIST: 44 /HPF — HIGH (ref 0–4)
SP GR SPEC: 1.02 — SIGNIFICANT CHANGE UP (ref 1.01–1.02)
UROBILINOGEN FLD QL: NEGATIVE — SIGNIFICANT CHANGE UP
WBC UR QL: 217 /HPF — HIGH (ref 0–5)

## 2021-02-02 PROCEDURE — 99232 SBSQ HOSP IP/OBS MODERATE 35: CPT | Mod: GC

## 2021-02-02 RX ORDER — TAMSULOSIN HYDROCHLORIDE 0.4 MG/1
0.4 CAPSULE ORAL AT BEDTIME
Refills: 0 | Status: DISCONTINUED | OUTPATIENT
Start: 2021-02-02 | End: 2021-02-05

## 2021-02-02 RX ORDER — LANOLIN ALCOHOL/MO/W.PET/CERES
3 CREAM (GRAM) TOPICAL AT BEDTIME
Refills: 0 | Status: DISCONTINUED | OUTPATIENT
Start: 2021-02-02 | End: 2021-02-05

## 2021-02-02 RX ADMIN — Medication 12.5 MILLIGRAM(S): at 06:07

## 2021-02-02 RX ADMIN — TAMSULOSIN HYDROCHLORIDE 0.4 MILLIGRAM(S): 0.4 CAPSULE ORAL at 21:38

## 2021-02-02 RX ADMIN — POLYETHYLENE GLYCOL 3350 17 GRAM(S): 17 POWDER, FOR SOLUTION ORAL at 11:33

## 2021-02-02 RX ADMIN — RIVAROXABAN 15 MILLIGRAM(S): KIT at 18:16

## 2021-02-02 RX ADMIN — Medication 2: at 12:50

## 2021-02-02 RX ADMIN — Medication 3 MILLIGRAM(S): at 21:38

## 2021-02-02 RX ADMIN — Medication 12.5 MILLIGRAM(S): at 14:10

## 2021-02-02 RX ADMIN — Medication 12.5 MILLIGRAM(S): at 21:38

## 2021-02-02 NOTE — PROVIDER CONTACT NOTE (CHANGE IN STATUS NOTIFICATION) - BACKGROUND
patient admitted for hypothermia
pt admitted for hypothermia
Pt A&Ox1 found to be hypothermic
pt admitted for hypothermia

## 2021-02-02 NOTE — PROGRESS NOTE ADULT - PROBLEM SELECTOR PLAN 2
Pt presented with hypothermia and tachycardia with hypotension, unresponsive to fluids, which required brief use of pressors w/ LORIE and elevated lactate. TSH 1.75. AM cortisol 11. Ucx neg. Bcx - staph epi in 2/4 vials, likely contaminant. Possibly 2/2 to aspiration PNA (on x-ray) vs stercoral colitis (on CTAP)  - repeat bcx neg  - ID consult - monitor off abx  - endocrine consult - no further endocrine workup needed

## 2021-02-02 NOTE — PROGRESS NOTE ADULT - ATTENDING COMMENTS
pt seen and examined.  above plan discussed on rounds today.  vitals and labs reviewed  In addition,    79 yo female w/pmh dementia, afib, p/w AMS, septic shock, originally admitted to MICU for pressors for <24h, now HDS on general medical floors. Found to be bacteremic with multiple blood culture bottles growing Staph epi. Also receiving tx for aspiration pneumonia.  -ID eval appreciated--> Staph epi presumed to be contaminate, will monitor off abx. Repeat cx's NGTD. Overnight UA was noted; given clinical improvement will hold off on starting any antibiotics  Hypothermia- resolved. no clear cause, common causes have been ruled out. TSH normal, cortisol normal, no brain lesions seen on MRI brain, no infective process at this time. Endo eval appreciated --> no further w/u needed  urinary retention s/p martinez - possible TOV prior to discharge  patient having multiple bowel movements likely from bowel regimen - adjusted    Encephalopathy  - presumed to be from cognitive impairment from dementia  - no infectious of metabolic causes have been identified  - pt is more alert and communicative today then she had been before    Dysphagia  - S&S eval appreciated--> started on a dysphagia 1 honey thick diet.  palliative  appreciated --> pt not a candidate for hospice at this time    dispo: D/C planning with home services

## 2021-02-02 NOTE — PROVIDER CONTACT NOTE (CHANGE IN STATUS NOTIFICATION) - ASSESSMENT
patient AOx1, patient appears in no s/s of distress.
Pt doesn't appear to be in any distress
patient AOx1 at baseline, alert, in no s/s of distress. heat packs placed all over patient and multiple blankets covering the patient.
patient AOx1, in no s/s of distress.

## 2021-02-02 NOTE — PROGRESS NOTE ADULT - SUBJECTIVE AND OBJECTIVE BOX
PROGRESS NOTE:   Authored by Anita Alvarez MD  Pager [phone] [facility] / [id number] [facility]    Patient is a 80y old  Female who presents with a chief complaint of Hypotension (01 Feb 2021 17:10)      SUBJECTIVE / OVERNIGHT EVENTS:  Overnight, pt restless and pulling at lines and tubes. Pt given melatonin.  This AM,     MEDICATIONS  (STANDING):  dextrose 5%. 1000 milliLiter(s) (100 mL/Hr) IV Continuous <Continuous>  dextrose 5%. 1000 milliLiter(s) (75 mL/Hr) IV Continuous <Continuous>  dextrose 5%. 1000 milliLiter(s) (75 mL/Hr) IV Continuous <Continuous>  glucagon  Injectable 1 milliGRAM(s) IntraMuscular once  insulin lispro (ADMELOG) corrective regimen sliding scale   SubCutaneous three times a day before meals  insulin lispro (ADMELOG) corrective regimen sliding scale   SubCutaneous at bedtime  metoprolol tartrate 12.5 milliGRAM(s) Oral every 8 hours  polyethylene glycol 3350 17 Gram(s) Oral daily  rivaroxaban 15 milliGRAM(s) Oral with dinner    MEDICATIONS  (PRN):  bisacodyl Suppository 10 milliGRAM(s) Rectal daily PRN Constipation  senna 1 Tablet(s) Oral daily PRN Constipation      CAPILLARY BLOOD GLUCOSE      POCT Blood Glucose.: 202 mg/dL (01 Feb 2021 21:23)  POCT Blood Glucose.: 106 mg/dL (01 Feb 2021 17:20)  POCT Blood Glucose.: 178 mg/dL (01 Feb 2021 12:31)  POCT Blood Glucose.: 92 mg/dL (01 Feb 2021 08:30)    I&O's Summary    01 Feb 2021 07:01  -  02 Feb 2021 07:00  --------------------------------------------------------  IN: 200 mL / OUT: 450 mL / NET: -250 mL        PHYSICAL EXAM:  Vital Signs Last 24 Hrs  T(C): 36.3 (02 Feb 2021 05:18), Max: 36.4 (01 Feb 2021 12:23)  T(F): 97.4 (02 Feb 2021 05:18), Max: 97.5 (01 Feb 2021 12:23)  HR: 91 (02 Feb 2021 05:18) (65 - 93)  BP: 122/81 (02 Feb 2021 05:18) (117/79 - 138/70)  BP(mean): --  RR: 18 (02 Feb 2021 05:18) (18 - 20)  SpO2: 97% (02 Feb 2021 05:18) (96% - 99%)    GENERAL: NAD, well-groomed, well-developed  HEAD:  Atraumatic, Normocephalic  EYES: PERRLA, conjunctiva and sclera clear  ENMT: Moist mucous membranes, poor dentition  NECK: no JVD  NERVOUS SYSTEM:  Oriented X1, doesn't follow commands, alert, awake, moving all 4 limbs spontaneously  CHEST/LUNG: limited exam due to mental status and habitus but appears clear ACW;   HEART: irregular, normal rate  ABDOMEN: Soft, Nontender, Nondistended; Bowel sounds present  EXTREMITIES:  2+ Peripheral Pulses, No clubbing, cyanosis, or edema  LYMPH: No lymphadenopathy noted  SKIN: No rashes or lesions    LABS:                        13.9   6.63  )-----------( 99       ( 31 Jan 2021 08:29 )             41.2     01-31    140  |  104  |  9   ----------------------------<  151<H>  4.7   |  25  |  1.00    Ca    9.2      31 Jan 2021 10:32  Phos  3.3     01-31  Mg     2.1     01-31    TPro  7.5  /  Alb  2.9<L>  /  TBili  0.6  /  DBili  x   /  AST  41<H>  /  ALT  30  /  AlkPhos  114  01-31   PROGRESS NOTE:   Authored by Anita Alvarez MD  Pager [phone] [facility] / [id number] [facility]    Patient is a 80y old  Female who presents with a chief complaint of Hypotension (01 Feb 2021 17:10)      SUBJECTIVE / OVERNIGHT EVENTS:  Overnight, pt restless and pulling at lines and tubes. Pt given melatonin.  This AM, pt AAOx1, laughing inappropriately, awake and alert.     MEDICATIONS  (STANDING):  dextrose 5%. 1000 milliLiter(s) (100 mL/Hr) IV Continuous <Continuous>  dextrose 5%. 1000 milliLiter(s) (75 mL/Hr) IV Continuous <Continuous>  dextrose 5%. 1000 milliLiter(s) (75 mL/Hr) IV Continuous <Continuous>  glucagon  Injectable 1 milliGRAM(s) IntraMuscular once  insulin lispro (ADMELOG) corrective regimen sliding scale   SubCutaneous three times a day before meals  insulin lispro (ADMELOG) corrective regimen sliding scale   SubCutaneous at bedtime  metoprolol tartrate 12.5 milliGRAM(s) Oral every 8 hours  polyethylene glycol 3350 17 Gram(s) Oral daily  rivaroxaban 15 milliGRAM(s) Oral with dinner    MEDICATIONS  (PRN):  bisacodyl Suppository 10 milliGRAM(s) Rectal daily PRN Constipation  senna 1 Tablet(s) Oral daily PRN Constipation      CAPILLARY BLOOD GLUCOSE      POCT Blood Glucose.: 202 mg/dL (01 Feb 2021 21:23)  POCT Blood Glucose.: 106 mg/dL (01 Feb 2021 17:20)  POCT Blood Glucose.: 178 mg/dL (01 Feb 2021 12:31)  POCT Blood Glucose.: 92 mg/dL (01 Feb 2021 08:30)    I&O's Summary    01 Feb 2021 07:01  -  02 Feb 2021 07:00  --------------------------------------------------------  IN: 200 mL / OUT: 450 mL / NET: -250 mL        PHYSICAL EXAM:  Vital Signs Last 24 Hrs  T(C): 36.3 (02 Feb 2021 05:18), Max: 36.4 (01 Feb 2021 12:23)  T(F): 97.4 (02 Feb 2021 05:18), Max: 97.5 (01 Feb 2021 12:23)  HR: 91 (02 Feb 2021 05:18) (65 - 93)  BP: 122/81 (02 Feb 2021 05:18) (117/79 - 138/70)  BP(mean): --  RR: 18 (02 Feb 2021 05:18) (18 - 20)  SpO2: 97% (02 Feb 2021 05:18) (96% - 99%)    GENERAL: NAD, well-groomed, well-developed  HEAD:  Atraumatic, Normocephalic  EYES: PERRLA, conjunctiva and sclera clear  ENMT: Moist mucous membranes, poor dentition  NECK: no JVD  NERVOUS SYSTEM:  Oriented X1, doesn't follow commands, alert, awake, moving all 4 limbs spontaneously  CHEST/LUNG: limited exam due to mental status and habitus but appears clear ACW;   HEART: irregular, normal rate  ABDOMEN: Soft, Nontender, Nondistended; Bowel sounds present  EXTREMITIES:  2+ Peripheral Pulses, No clubbing, cyanosis, or edema  LYMPH: No lymphadenopathy noted  SKIN: No rashes or lesions    LABS:                        13.9   6.63  )-----------( 99       ( 31 Jan 2021 08:29 )             41.2     01-31    140  |  104  |  9   ----------------------------<  151<H>  4.7   |  25  |  1.00    Ca    9.2      31 Jan 2021 10:32  Phos  3.3     01-31  Mg     2.1     01-31    TPro  7.5  /  Alb  2.9<L>  /  TBili  0.6  /  DBili  x   /  AST  41<H>  /  ALT  30  /  AlkPhos  114  01-31

## 2021-02-02 NOTE — PROVIDER CONTACT NOTE (CHANGE IN STATUS NOTIFICATION) - SITUATION
patient hypothermic, rectal temperature of 94.7F
Pt  temperature 94.5 degrees
patient has a temperature of 89.1 F
patient temperature is 94.5F, BP is 96/66

## 2021-02-02 NOTE — PROGRESS NOTE ADULT - PROBLEM SELECTOR PLAN 3
Pt failed TOV a several times  - now with martinez, may need chronic martinez  - urology follow up outpatient Pt failed TOV a several times  - now with martinez, may need chronic martinez  - start flomax  - urology follow up outpatient

## 2021-02-02 NOTE — PROGRESS NOTE ADULT - PROBLEM SELECTOR PLAN 1
Waxing and waning. FAST 7a-7b dementia. Possible hospital delirium. Possibly TIA vs. progression of dementia. Acute change on 1/22, previous baseline able to shower and eat by herself, some words, able to follow commands. Infectious causes unlikely due to neg bld and urine clx, treated empirically with abx for 4 days. Less likely meningitis due to waxing and waning MS. Unlikely primary neuro cause, CTH negative for acute pathology, MRI/MRA of head/neck - poor study, minimal chronic ischemic changes in the frontoparietal white matter, small aneurysm R ICA. Unlikely to be endocrine etiology, TSH 1.75, AM cortisol 11.3  -palliative consulted for home hospice and GOC - not likely candidate for hospice at this time, will refer to [facility] advanced care  - setting up home services  -pt pulling out NGT, not good candidate for PEG tube  -per s&s, dysphagia 1 diet  -aspiration precautions, feeding only when awake  -melatonin

## 2021-02-03 LAB
GLUCOSE BLDC GLUCOMTR-MCNC: 118 MG/DL — HIGH (ref 70–99)
GLUCOSE BLDC GLUCOMTR-MCNC: 160 MG/DL — HIGH (ref 70–99)
GLUCOSE BLDC GLUCOMTR-MCNC: 213 MG/DL — HIGH (ref 70–99)
GLUCOSE BLDC GLUCOMTR-MCNC: 97 MG/DL — SIGNIFICANT CHANGE UP (ref 70–99)

## 2021-02-03 PROCEDURE — 99232 SBSQ HOSP IP/OBS MODERATE 35: CPT | Mod: GC

## 2021-02-03 RX ORDER — CIPROFLOXACIN LACTATE 400MG/40ML
250 VIAL (ML) INTRAVENOUS EVERY 12 HOURS
Refills: 0 | Status: DISCONTINUED | OUTPATIENT
Start: 2021-02-03 | End: 2021-02-05

## 2021-02-03 RX ORDER — POLYETHYLENE GLYCOL 3350 17 G/17G
17 POWDER, FOR SOLUTION ORAL DAILY
Refills: 0 | Status: DISCONTINUED | OUTPATIENT
Start: 2021-02-03 | End: 2021-02-05

## 2021-02-03 RX ORDER — CEFTRIAXONE 500 MG/1
1000 INJECTION, POWDER, FOR SOLUTION INTRAMUSCULAR; INTRAVENOUS EVERY 24 HOURS
Refills: 0 | Status: DISCONTINUED | OUTPATIENT
Start: 2021-02-03 | End: 2021-02-03

## 2021-02-03 RX ADMIN — Medication 2: at 12:56

## 2021-02-03 RX ADMIN — Medication 250 MILLIGRAM(S): at 18:01

## 2021-02-03 RX ADMIN — RIVAROXABAN 15 MILLIGRAM(S): KIT at 18:01

## 2021-02-03 RX ADMIN — Medication 12.5 MILLIGRAM(S): at 05:06

## 2021-02-03 RX ADMIN — Medication 1: at 18:01

## 2021-02-03 RX ADMIN — Medication 12.5 MILLIGRAM(S): at 12:57

## 2021-02-03 RX ADMIN — Medication 3 MILLIGRAM(S): at 21:31

## 2021-02-03 RX ADMIN — Medication 12.5 MILLIGRAM(S): at 21:31

## 2021-02-03 RX ADMIN — TAMSULOSIN HYDROCHLORIDE 0.4 MILLIGRAM(S): 0.4 CAPSULE ORAL at 21:31

## 2021-02-03 NOTE — PROGRESS NOTE ADULT - PROBLEM SELECTOR PLAN 8
Diet: per s&s, dysphagia 1 diet  DVT PPX: Xarelto  Dispo: PT shanell  GOC: Full code per d/w son Diet: per s&s, dysphagia 1 diet  DVT PPX: Xarelto  Dispo: JOHN  GOC: Full code per d/w son

## 2021-02-03 NOTE — PROGRESS NOTE ADULT - PROBLEM SELECTOR PLAN 2
Pt presented with hypothermia and tachycardia with hypotension, unresponsive to fluids, which required brief use of pressors w/ LORIE and elevated lactate. TSH 1.75. AM cortisol 11. Ucx neg. Bcx - staph epi in 2/4 vials, likely contaminant. Possibly 2/2 to aspiration PNA (on x-ray) vs stercoral colitis (on CTAP)  - repeat bcx neg  - ID consult - monitor off abx  - endocrine consult - no further endocrine workup needed  - hyperthermia blanket as needed

## 2021-02-03 NOTE — PROGRESS NOTE ADULT - ATTENDING COMMENTS
pt seen and examined.  above plan discussed on rounds today.  vitals and labs reviewed  In addition,    79 yo female w/pmh dementia, afib, p/w AMS, septic shock, originally admitted to MICU for pressors for <24h, now HDS on general medical floors. Found to be bacteremic with multiple blood culture bottles growing Staph epi. Also receiving tx for aspiration pneumonia.  -ID eval appreciated--> Staph epi presumed to be contaminate, will monitor off abx. Repeat cx's NGTD. Overnight UA was noted; given clinical improvement will hold off on starting any antibiotics  Hypothermia- resolved. no clear cause, common causes have been ruled out. TSH normal, cortisol normal, no brain lesions seen on MRI brain, no infective process at this time. Endo eval appreciated --> no further w/u needed  urinary retention s/p martinez - c/w flomax at bedtime  patient having multiple bowel movements likely from bowel regimen - adjusted    Encephalopathy  - presumed to be from cognitive impairment from dementia  - no infectious of metabolic causes have been identified  - pt is alert and communicative    Dysphagia  - S&S eval appreciated--> c/w dysphagia 1 honey thick diet  palliative  appreciated --> pt not a candidate for hospice at this time    dispo: family now requesting JOHN, CM team aware.

## 2021-02-03 NOTE — PROGRESS NOTE ADULT - PROBLEM SELECTOR PLAN 3
Pt failed TOV a several times  - now with martinez, may need chronic martinez  - c/w flomax  - urology follow up outpatient Pt failed TOV a several times  - now with martinez, may need chronic martinez  - c/w flomax  - urology follow up outpatient  - UA positive - 3 days CTX

## 2021-02-03 NOTE — PROGRESS NOTE ADULT - SUBJECTIVE AND OBJECTIVE BOX
PROGRESS NOTE:   Authored by Anita Alvarez MD  Pager [phone] [facility] / [id number] [facility]    Patient is a 80y old  Female who presents with a chief complaint of Hypotension (2021 07:28)      SUBJECTIVE / OVERNIGHT EVENTS:  Overnight, pt hypothermic to 94, placed on hyperthermia blanket, T97.     MEDICATIONS  (STANDING):  dextrose 5%. 1000 milliLiter(s) (100 mL/Hr) IV Continuous <Continuous>  glucagon  Injectable 1 milliGRAM(s) IntraMuscular once  insulin lispro (ADMELOG) corrective regimen sliding scale   SubCutaneous three times a day before meals  insulin lispro (ADMELOG) corrective regimen sliding scale   SubCutaneous at bedtime  melatonin 3 milliGRAM(s) Oral at bedtime  metoprolol tartrate 12.5 milliGRAM(s) Oral every 8 hours  polyethylene glycol 3350 17 Gram(s) Oral daily  rivaroxaban 15 milliGRAM(s) Oral with dinner  tamsulosin 0.4 milliGRAM(s) Oral at bedtime    MEDICATIONS  (PRN):  bisacodyl Suppository 10 milliGRAM(s) Rectal daily PRN Constipation  senna 1 Tablet(s) Oral daily PRN Constipation      CAPILLARY BLOOD GLUCOSE      POCT Blood Glucose.: 172 mg/dL (2021 21:35)  POCT Blood Glucose.: 97 mg/dL (2021 17:32)  POCT Blood Glucose.: 217 mg/dL (2021 12:41)  POCT Blood Glucose.: 83 mg/dL (2021 08:46)    I&O's Summary    2021 07:01  -  2021 07:00  --------------------------------------------------------  IN: 630 mL / OUT: 600 mL / NET: 30 mL        PHYSICAL EXAM:  Vital Signs Last 24 Hrs  T(C): 36.5 (2021 05:04), Max: 36.8 (2021 14:00)  T(F): 97.7 (2021 05:04), Max: 98.3 (2021 14:00)  HR: 98 (2021 05:04) (76 - 98)  BP: 154/93 (2021 05:04) (120/80 - 154/93)  BP(mean): --  RR: 18 (2021 04:01) (18 - 18)  SpO2: 96% (2021 04:01) (96% - 100%)    GENERAL: NAD, well-groomed, well-developed  HEAD:  Atraumatic, Normocephalic  EYES: PERRLA, conjunctiva and sclera clear  ENMT: Moist mucous membranes, poor dentition  NECK: no JVD  NERVOUS SYSTEM:  Oriented X1, doesn't follow commands, alert, awake, moving all 4 limbs spontaneously  CHEST/LUNG: limited exam due to mental status and habitus but appears clear ACW;   HEART: irregular, normal rate  ABDOMEN: Soft, Nontender, Nondistended; Bowel sounds present  EXTREMITIES:  2+ Peripheral Pulses, No clubbing, cyanosis, or edema  LYMPH: No lymphadenopathy noted  SKIN: No rashes or lesions    LABS:      Urinalysis Basic - ( 2021 07:43 )    Color: Yellow / Appearance: Slightly Turbid / S.016 / pH: x  Gluc: x / Ketone: Trace  / Bili: Negative / Urobili: Negative   Blood: x / Protein: 30 mg/dL / Nitrite: Positive   Leuk Esterase: Large / RBC: 44 /hpf /  /HPF   Sq Epi: x / Non Sq Epi: 1 /hpf / Bacteria: Many   PROGRESS NOTE:   Authored by Anita Alvarez MD  Pager [phone] [facility] / [id number] [facility]    Patient is a 80y old  Female who presents with a chief complaint of Hypotension (2021 07:28)      SUBJECTIVE / OVERNIGHT EVENTS:  Overnight, pt hypothermic to 94, placed on hyperthermia blanket, T97.   This AM, pt awake and alert. AAOx3, speaking incoherently. Pt had 5 bowel movements yesterday. Per RN, she has been eating ~50% of her food.    MEDICATIONS  (STANDING):  dextrose 5%. 1000 milliLiter(s) (100 mL/Hr) IV Continuous <Continuous>  glucagon  Injectable 1 milliGRAM(s) IntraMuscular once  insulin lispro (ADMELOG) corrective regimen sliding scale   SubCutaneous three times a day before meals  insulin lispro (ADMELOG) corrective regimen sliding scale   SubCutaneous at bedtime  melatonin 3 milliGRAM(s) Oral at bedtime  metoprolol tartrate 12.5 milliGRAM(s) Oral every 8 hours  polyethylene glycol 3350 17 Gram(s) Oral daily  rivaroxaban 15 milliGRAM(s) Oral with dinner  tamsulosin 0.4 milliGRAM(s) Oral at bedtime    MEDICATIONS  (PRN):  bisacodyl Suppository 10 milliGRAM(s) Rectal daily PRN Constipation  senna 1 Tablet(s) Oral daily PRN Constipation      CAPILLARY BLOOD GLUCOSE      POCT Blood Glucose.: 172 mg/dL (2021 21:35)  POCT Blood Glucose.: 97 mg/dL (2021 17:32)  POCT Blood Glucose.: 217 mg/dL (2021 12:41)  POCT Blood Glucose.: 83 mg/dL (2021 08:46)    I&O's Summary    2021 07:01  -  2021 07:00  --------------------------------------------------------  IN: 630 mL / OUT: 600 mL / NET: 30 mL        PHYSICAL EXAM:  Vital Signs Last 24 Hrs  T(C): 36.5 (2021 05:04), Max: 36.8 (2021 14:00)  T(F): 97.7 (2021 05:04), Max: 98.3 (2021 14:00)  HR: 98 (2021 05:04) (76 - 98)  BP: 154/93 (2021 05:04) (120/80 - 154/93)  BP(mean): --  RR: 18 (2021 04:01) (18 - 18)  SpO2: 96% (2021 04:01) (96% - 100%)    GENERAL: NAD, well-groomed, well-developed  HEAD:  Atraumatic, Normocephalic  EYES: PERRLA, conjunctiva and sclera clear  ENMT: Moist mucous membranes, poor dentition  NECK: no JVD  NERVOUS SYSTEM:  Oriented X1, doesn't follow commands, alert, awake, moving all 4 limbs spontaneously  CHEST/LUNG: limited exam due to mental status and habitus but appears clear ACW;   HEART: irregular, normal rate  ABDOMEN: Soft, Nontender, Nondistended; Bowel sounds present  EXTREMITIES:  2+ Peripheral Pulses, No clubbing, cyanosis, or edema  LYMPH: No lymphadenopathy noted  SKIN: No rashes or lesions    LABS:      Urinalysis Basic - ( 2021 07:43 )    Color: Yellow / Appearance: Slightly Turbid / S.016 / pH: x  Gluc: x / Ketone: Trace  / Bili: Negative / Urobili: Negative   Blood: x / Protein: 30 mg/dL / Nitrite: Positive   Leuk Esterase: Large / RBC: 44 /hpf /  /HPF   Sq Epi: x / Non Sq Epi: 1 /hpf / Bacteria: Many

## 2021-02-03 NOTE — PROGRESS NOTE ADULT - PROBLEM SELECTOR PLAN 1
Waxing and waning. FAST 7a-7b dementia. Possible hospital delirium. Possibly TIA vs. progression of dementia. Acute change on 1/22, previous baseline able to shower and eat by herself, some words, able to follow commands. Infectious causes unlikely due to neg bld and urine clx, treated empirically with abx for 4 days. Less likely meningitis due to waxing and waning MS. Unlikely primary neuro cause, CTH negative for acute pathology, MRI/MRA of head/neck - poor study, minimal chronic ischemic changes in the frontoparietal white matter, small aneurysm R ICA. Unlikely to be endocrine etiology, TSH 1.75, AM cortisol 11.3  -palliative consulted for home hospice and GOC - not likely candidate for hospice at this time, will refer to [facility] advanced care  - setting up home services  -pt pulling out NGT, not good candidate for PEG tube  -per s&s, dysphagia 1 diet  -aspiration precautions, feeding only when awake  -melatonin

## 2021-02-04 LAB
ALBUMIN SERPL ELPH-MCNC: 2.9 G/DL — LOW (ref 3.3–5)
ALP SERPL-CCNC: 104 U/L — SIGNIFICANT CHANGE UP (ref 40–120)
ALT FLD-CCNC: 17 U/L — SIGNIFICANT CHANGE UP (ref 10–45)
ANION GAP SERPL CALC-SCNC: 9 MMOL/L — SIGNIFICANT CHANGE UP (ref 5–17)
AST SERPL-CCNC: 25 U/L — SIGNIFICANT CHANGE UP (ref 10–40)
BASOPHILS # BLD AUTO: 0.06 K/UL — SIGNIFICANT CHANGE UP (ref 0–0.2)
BASOPHILS NFR BLD AUTO: 1.1 % — SIGNIFICANT CHANGE UP (ref 0–2)
BILIRUB SERPL-MCNC: 0.4 MG/DL — SIGNIFICANT CHANGE UP (ref 0.2–1.2)
BUN SERPL-MCNC: 10 MG/DL — SIGNIFICANT CHANGE UP (ref 7–23)
CALCIUM SERPL-MCNC: 9 MG/DL — SIGNIFICANT CHANGE UP (ref 8.4–10.5)
CHLORIDE SERPL-SCNC: 106 MMOL/L — SIGNIFICANT CHANGE UP (ref 96–108)
CO2 SERPL-SCNC: 27 MMOL/L — SIGNIFICANT CHANGE UP (ref 22–31)
CREAT SERPL-MCNC: 1 MG/DL — SIGNIFICANT CHANGE UP (ref 0.5–1.3)
EOSINOPHIL # BLD AUTO: 0.17 K/UL — SIGNIFICANT CHANGE UP (ref 0–0.5)
EOSINOPHIL NFR BLD AUTO: 3 % — SIGNIFICANT CHANGE UP (ref 0–6)
GLUCOSE BLDC GLUCOMTR-MCNC: 104 MG/DL — HIGH (ref 70–99)
GLUCOSE BLDC GLUCOMTR-MCNC: 138 MG/DL — HIGH (ref 70–99)
GLUCOSE BLDC GLUCOMTR-MCNC: 141 MG/DL — HIGH (ref 70–99)
GLUCOSE BLDC GLUCOMTR-MCNC: 203 MG/DL — HIGH (ref 70–99)
GLUCOSE SERPL-MCNC: 104 MG/DL — HIGH (ref 70–99)
HCT VFR BLD CALC: 34.4 % — LOW (ref 34.5–45)
HGB BLD-MCNC: 11.2 G/DL — LOW (ref 11.5–15.5)
IMM GRANULOCYTES NFR BLD AUTO: 0.2 % — SIGNIFICANT CHANGE UP (ref 0–1.5)
LYMPHOCYTES # BLD AUTO: 1.54 K/UL — SIGNIFICANT CHANGE UP (ref 1–3.3)
LYMPHOCYTES # BLD AUTO: 27.5 % — SIGNIFICANT CHANGE UP (ref 13–44)
MAGNESIUM SERPL-MCNC: 2.1 MG/DL — SIGNIFICANT CHANGE UP (ref 1.6–2.6)
MCHC RBC-ENTMCNC: 27.9 PG — SIGNIFICANT CHANGE UP (ref 27–34)
MCHC RBC-ENTMCNC: 32.6 GM/DL — SIGNIFICANT CHANGE UP (ref 32–36)
MCV RBC AUTO: 85.6 FL — SIGNIFICANT CHANGE UP (ref 80–100)
MONOCYTES # BLD AUTO: 0.5 K/UL — SIGNIFICANT CHANGE UP (ref 0–0.9)
MONOCYTES NFR BLD AUTO: 8.9 % — SIGNIFICANT CHANGE UP (ref 2–14)
NEUTROPHILS # BLD AUTO: 3.32 K/UL — SIGNIFICANT CHANGE UP (ref 1.8–7.4)
NEUTROPHILS NFR BLD AUTO: 59.3 % — SIGNIFICANT CHANGE UP (ref 43–77)
NRBC # BLD: 0 /100 WBCS — SIGNIFICANT CHANGE UP (ref 0–0)
PHOSPHATE SERPL-MCNC: 2.8 MG/DL — SIGNIFICANT CHANGE UP (ref 2.5–4.5)
PLATELET # BLD AUTO: 181 K/UL — SIGNIFICANT CHANGE UP (ref 150–400)
POTASSIUM SERPL-MCNC: 3.9 MMOL/L — SIGNIFICANT CHANGE UP (ref 3.5–5.3)
POTASSIUM SERPL-SCNC: 3.9 MMOL/L — SIGNIFICANT CHANGE UP (ref 3.5–5.3)
PROT SERPL-MCNC: 6.2 G/DL — SIGNIFICANT CHANGE UP (ref 6–8.3)
RBC # BLD: 4.02 M/UL — SIGNIFICANT CHANGE UP (ref 3.8–5.2)
RBC # FLD: 15.2 % — HIGH (ref 10.3–14.5)
SARS-COV-2 RNA SPEC QL NAA+PROBE: SIGNIFICANT CHANGE UP
SODIUM SERPL-SCNC: 142 MMOL/L — SIGNIFICANT CHANGE UP (ref 135–145)
WBC # BLD: 5.6 K/UL — SIGNIFICANT CHANGE UP (ref 3.8–10.5)
WBC # FLD AUTO: 5.6 K/UL — SIGNIFICANT CHANGE UP (ref 3.8–10.5)

## 2021-02-04 PROCEDURE — 99232 SBSQ HOSP IP/OBS MODERATE 35: CPT | Mod: GC

## 2021-02-04 PROCEDURE — 93923 UPR/LXTR ART STDY 3+ LVLS: CPT | Mod: 26

## 2021-02-04 RX ORDER — ACETAMINOPHEN 500 MG
1000 TABLET ORAL EVERY 6 HOURS
Refills: 0 | Status: DISCONTINUED | OUTPATIENT
Start: 2021-02-04 | End: 2021-02-05

## 2021-02-04 RX ORDER — POLYETHYLENE GLYCOL 3350 17 G/17G
17 POWDER, FOR SOLUTION ORAL
Qty: 0 | Refills: 0 | DISCHARGE
Start: 2021-02-04

## 2021-02-04 RX ORDER — LANOLIN ALCOHOL/MO/W.PET/CERES
1 CREAM (GRAM) TOPICAL
Qty: 0 | Refills: 0 | DISCHARGE
Start: 2021-02-04

## 2021-02-04 RX ORDER — TAMSULOSIN HYDROCHLORIDE 0.4 MG/1
1 CAPSULE ORAL
Qty: 0 | Refills: 0 | DISCHARGE
Start: 2021-02-04

## 2021-02-04 RX ORDER — CIPROFLOXACIN LACTATE 400MG/40ML
1 VIAL (ML) INTRAVENOUS
Qty: 0 | Refills: 0 | DISCHARGE
Start: 2021-02-04 | End: 2021-02-05

## 2021-02-04 RX ADMIN — Medication 12.5 MILLIGRAM(S): at 05:14

## 2021-02-04 RX ADMIN — RIVAROXABAN 15 MILLIGRAM(S): KIT at 17:29

## 2021-02-04 RX ADMIN — Medication 250 MILLIGRAM(S): at 05:14

## 2021-02-04 RX ADMIN — TAMSULOSIN HYDROCHLORIDE 0.4 MILLIGRAM(S): 0.4 CAPSULE ORAL at 22:16

## 2021-02-04 RX ADMIN — Medication 3 MILLIGRAM(S): at 22:19

## 2021-02-04 RX ADMIN — Medication 12.5 MILLIGRAM(S): at 13:36

## 2021-02-04 RX ADMIN — Medication 250 MILLIGRAM(S): at 17:29

## 2021-02-04 RX ADMIN — Medication 12.5 MILLIGRAM(S): at 22:16

## 2021-02-04 NOTE — PROGRESS NOTE ADULT - PROBLEM SELECTOR PLAN 3
Pt failed TOV a several times  - now with martinez, may need chronic martinez  - c/w flomax  - urology follow up outpatient  - UA positive - 3 days cipro

## 2021-02-04 NOTE — CONSULT NOTE ADULT - ASSESSMENT
79 y/o F w/ L posterior heel eschar    - pt seen and evaluated  - L posterior heel partial thickness eschar, no open lesions, no clinical signs of infection   - no acute pod sx intervention  - local wound care with betadine and allyven pad  - off loading boots to be worn while resting in bed at all times   - will cont to monitor if still in house   - discussed with attending  79 y/o F w/ L posterior heel ulcer    - pt seen and evaluated  - L posterior heel partial thickness ulcer, no open lesions, no clinical signs of infection   - no acute pod sx intervention  - local wound care with betadine and allyven pad  - off loading boots to be worn while resting in bed at all times   - will cont to monitor if still in house   - discussed with attending

## 2021-02-04 NOTE — PROGRESS NOTE ADULT - PROBLEM SELECTOR PLAN 1
Waxing and waning. FAST 7a-7b dementia. Possible hospital delirium. Possibly TIA vs. progression of dementia. Acute change on 1/22, previous baseline able to shower and eat by herself, some words, able to follow commands. Infectious causes unlikely due to neg bld and urine clx, treated empirically with abx for 4 days. Less likely meningitis due to waxing and waning MS. Unlikely primary neuro cause, CTH negative for acute pathology, MRI/MRA of head/neck - poor study, minimal chronic ischemic changes in the frontoparietal white matter, small aneurysm R ICA. Unlikely to be endocrine etiology, TSH 1.75, AM cortisol 11.3  -palliative consulted for home hospice and GOC - not likely candidate for hospice at this time, will refer to [facility] home advanced care  - setting up home services  -per s&s, dysphagia 1 diet  -aspiration precautions, feeding only when awake  -melatonin

## 2021-02-04 NOTE — PROGRESS NOTE ADULT - ASSESSMENT
79y/o F w/ h/o dementia (baseline AAOx0-1), T2DM, Afib (on Xeralto), HTN p/w AMS, likely 2/2 progression of dementia. Pt found to be hypothermic to 88, hypotensive s/p warming blanket requiring levo in MICU, w/ possible TIA at home, CXR showing possible LLL atelectasis vs PNA, now unlikely infectious etiology, monitoring off abx. 79y/o F w/ h/o dementia (baseline AAOx0-1), T2DM, Afib (on Xeralto), HTN p/w AMS, likely 2/2 progression of dementia. Pt found to be hypothermic to 88, hypotensive s/p warming blanket requiring levo in MICU, w/ possible TIA at home, CXR showing possible LLL atelectasis vs PNA, now unlikely infectious etiology, monitoring off abx. Treating for UTI w/ 3 days cipro. Pending JOHN placement.

## 2021-02-04 NOTE — PROVIDER CONTACT NOTE (OTHER) - ASSESSMENT
zflo boots applied
Pt. confused, no s/s of distress currently noted. No bleeding noted around nostril
pt assessed for urine output with bladder scan reading 550, pt had no wet pad during the day, attempted to straight cath x4 with another nurse, unsuccessful. t 92.1 warming blanket applied

## 2021-02-04 NOTE — PROGRESS NOTE ADULT - SUBJECTIVE AND OBJECTIVE BOX
PROGRESS NOTE:   Authored by Anita Alvarez MD  Pager [phone] [facility] / [id number] [facility]    Patient is a 80y old  Female who presents with a chief complaint of Hypotension (2021 07:34)      SUBJECTIVE / OVERNIGHT EVENTS:  No acute events overnight.  This AM,    MEDICATIONS  (STANDING):  ciprofloxacin     Tablet 250 milliGRAM(s) Oral every 12 hours  dextrose 5%. 1000 milliLiter(s) (100 mL/Hr) IV Continuous <Continuous>  glucagon  Injectable 1 milliGRAM(s) IntraMuscular once  insulin lispro (ADMELOG) corrective regimen sliding scale   SubCutaneous three times a day before meals  insulin lispro (ADMELOG) corrective regimen sliding scale   SubCutaneous at bedtime  melatonin 3 milliGRAM(s) Oral at bedtime  metoprolol tartrate 12.5 milliGRAM(s) Oral every 8 hours  rivaroxaban 15 milliGRAM(s) Oral with dinner  tamsulosin 0.4 milliGRAM(s) Oral at bedtime    MEDICATIONS  (PRN):  bisacodyl Suppository 10 milliGRAM(s) Rectal daily PRN Constipation  polyethylene glycol 3350 17 Gram(s) Oral daily PRN Constipation  senna 1 Tablet(s) Oral daily PRN Constipation      CAPILLARY BLOOD GLUCOSE      POCT Blood Glucose.: 97 mg/dL (2021 21:26)  POCT Blood Glucose.: 160 mg/dL (2021 17:38)  POCT Blood Glucose.: 213 mg/dL (2021 12:19)  POCT Blood Glucose.: 118 mg/dL (2021 08:21)    I&O's Summary    2021 07:01  -  2021 07:00  --------------------------------------------------------  IN: 630 mL / OUT: 600 mL / NET: 30 mL    2021 07:01  -  2021 06:58  --------------------------------------------------------  IN: 720 mL / OUT: 800 mL / NET: -80 mL        PHYSICAL EXAM:  Vital Signs Last 24 Hrs  T(C): 36.4 (2021 05:00), Max: 36.4 (2021 21:10)  T(F): 97.6 (2021 05:00), Max: 97.6 (2021 05:00)  HR: 76 (2021 05:00) (76 - 83)  BP: 131/84 (2021 05:00) (106/77 - 131/84)  BP(mean): --  RR: 18 (2021 05:00) (18 - 18)  SpO2: 100% (2021 05:00) (95% - 100%)    GENERAL: NAD, well-groomed, well-developed  HEAD:  Atraumatic, Normocephalic  EYES: PERRLA, conjunctiva and sclera clear  ENMT: Moist mucous membranes, poor dentition  NECK: no JVD  NERVOUS SYSTEM:  Oriented X1, doesn't follow commands, alert, awake, moving all 4 limbs spontaneously  CHEST/LUNG: limited exam due to mental status and habitus but appears clear ACW;   HEART: irregular, normal rate  ABDOMEN: Soft, Nontender, Nondistended; Bowel sounds present  EXTREMITIES:  2+ Peripheral Pulses, No clubbing, cyanosis, or edema  LYMPH: No lymphadenopathy noted  SKIN: No rashes or lesions    Urinalysis Basic - ( 2021 07:43 )    Color: Yellow / Appearance: Slightly Turbid / S.016 / pH: x  Gluc: x / Ketone: Trace  / Bili: Negative / Urobili: Negative   Blood: x / Protein: 30 mg/dL / Nitrite: Positive   Leuk Esterase: Large / RBC: 44 /hpf /  /HPF   Sq Epi: x / Non Sq Epi: 1 /hpf / Bacteria: Many     PROGRESS NOTE:   Authored by Anita Alvarez MD  Pager [phone] [facility] / [id number] [facility]    Patient is a 80y old  Female who presents with a chief complaint of Hypotension (2021 07:34)      SUBJECTIVE / OVERNIGHT EVENTS:  No acute events overnight.  This AM, pt AAOx1. She says she is not feeling good but cannot explain what is bothering her.     MEDICATIONS  (STANDING):  ciprofloxacin     Tablet 250 milliGRAM(s) Oral every 12 hours  dextrose 5%. 1000 milliLiter(s) (100 mL/Hr) IV Continuous <Continuous>  glucagon  Injectable 1 milliGRAM(s) IntraMuscular once  insulin lispro (ADMELOG) corrective regimen sliding scale   SubCutaneous three times a day before meals  insulin lispro (ADMELOG) corrective regimen sliding scale   SubCutaneous at bedtime  melatonin 3 milliGRAM(s) Oral at bedtime  metoprolol tartrate 12.5 milliGRAM(s) Oral every 8 hours  rivaroxaban 15 milliGRAM(s) Oral with dinner  tamsulosin 0.4 milliGRAM(s) Oral at bedtime    MEDICATIONS  (PRN):  bisacodyl Suppository 10 milliGRAM(s) Rectal daily PRN Constipation  polyethylene glycol 3350 17 Gram(s) Oral daily PRN Constipation  senna 1 Tablet(s) Oral daily PRN Constipation      CAPILLARY BLOOD GLUCOSE      POCT Blood Glucose.: 97 mg/dL (2021 21:26)  POCT Blood Glucose.: 160 mg/dL (2021 17:38)  POCT Blood Glucose.: 213 mg/dL (2021 12:19)  POCT Blood Glucose.: 118 mg/dL (2021 08:21)    I&O's Summary    2021 07:01  -  2021 07:00  --------------------------------------------------------  IN: 630 mL / OUT: 600 mL / NET: 30 mL    2021 07:01  -  2021 06:58  --------------------------------------------------------  IN: 720 mL / OUT: 800 mL / NET: -80 mL        PHYSICAL EXAM:  Vital Signs Last 24 Hrs  T(C): 36.4 (2021 05:00), Max: 36.4 (2021 21:10)  T(F): 97.6 (2021 05:00), Max: 97.6 (2021 05:00)  HR: 76 (2021 05:00) (76 - 83)  BP: 131/84 (2021 05:00) (106/77 - 131/84)  BP(mean): --  RR: 18 (2021 05:00) (18 - 18)  SpO2: 100% (2021 05:00) (95% - 100%)    GENERAL: NAD, well-groomed, well-developed  HEAD:  Atraumatic, Normocephalic  EYES: PERRLA, conjunctiva and sclera clear  ENMT: Moist mucous membranes, poor dentition  NECK: no JVD  NERVOUS SYSTEM:  Oriented X1, doesn't follow commands, alert, awake, moving all 4 limbs spontaneously  CHEST/LUNG: limited exam due to mental status and habitus but appears clear ACW;   HEART: irregular, normal rate  ABDOMEN: Soft, Nontender, Nondistended; Bowel sounds present  EXTREMITIES:  2+ Peripheral Pulses, No clubbing, cyanosis, or edema  LYMPH: No lymphadenopathy noted  SKIN: No rashes or lesions    Urinalysis Basic - ( 2021 07:43 )    Color: Yellow / Appearance: Slightly Turbid / S.016 / pH: x  Gluc: x / Ketone: Trace  / Bili: Negative / Urobili: Negative   Blood: x / Protein: 30 mg/dL / Nitrite: Positive   Leuk Esterase: Large / RBC: 44 /hpf /  /HPF   Sq Epi: x / Non Sq Epi: 1 /hpf / Bacteria: Many

## 2021-02-04 NOTE — PROVIDER CONTACT NOTE (OTHER) - SITUATION
pt found to have a DTI on left heel measuring 3x5 with pain to touch, RT heel dry skin with discoloration pt found to have a diabetic foot ulcer on left heel measuring 3x5 with pain to touch, RT heel dry skin with discoloration

## 2021-02-04 NOTE — CONSULT NOTE ADULT - SUBJECTIVE AND OBJECTIVE BOX
Podiatry pager #: [phone]/ [phone]    Patient is a 80y old  Female who presents with a chief complaint of Hypotension (04 Feb 2021 06:58)      HPI:  81y/o F w/ h/o dementia (baseline AAOx1), T2DM, Afib (on Xarelto) and HTN p/w AMS. Pt has h/o dementia not on any medications. Per patient's son (Jose), she has had 2 week history of progressively worsening sedation and decreased PO intake. Son reports yesterday, lethargy increased and patient displayed garbled speech which prompted ED evaluation. She has endorsed chills to him at home. He denies her expressing shortness of breath, fevers, nausea, vomiting, diarrhea, dysuria and cough. She requires assistance with feeding and medications at home. She dresses and uses the restroom by herself.    At the ED, pt was found to have rectal temp of 88, HR of 55, BP of 120/80, saturating well on RA, CBC and CMP wnl, lactate elevated to 2.8, pH 7.28. Pt started on warming blanket, BP dropped to 60s systolic, pt started on fluids, received 2.6L total, started on levo, s/p vanc and zosyn, Bcx drawn, UA negative. Pt admitted to MICU for further monitoring (23 Jan 2021 00:46)      PAST MEDICAL & SURGICAL HISTORY:  Dementia    Atrial fibrillation    Type 2 diabetes mellitus    No significant past surgical history        MEDICATIONS  (STANDING):  ciprofloxacin     Tablet 250 milliGRAM(s) Oral every 12 hours  dextrose 5%. 1000 milliLiter(s) (100 mL/Hr) IV Continuous <Continuous>  glucagon  Injectable 1 milliGRAM(s) IntraMuscular once  insulin lispro (ADMELOG) corrective regimen sliding scale   SubCutaneous three times a day before meals  insulin lispro (ADMELOG) corrective regimen sliding scale   SubCutaneous at bedtime  melatonin 3 milliGRAM(s) Oral at bedtime  metoprolol tartrate 12.5 milliGRAM(s) Oral every 8 hours  rivaroxaban 15 milliGRAM(s) Oral with dinner  tamsulosin 0.4 milliGRAM(s) Oral at bedtime    MEDICATIONS  (PRN):  acetaminophen    Suspension .. 1000 milliGRAM(s) Oral every 6 hours PRN Mild Pain (1 - 3)  bisacodyl Suppository 10 milliGRAM(s) Rectal daily PRN Constipation  polyethylene glycol 3350 17 Gram(s) Oral daily PRN Constipation  senna 1 Tablet(s) Oral daily PRN Constipation      Allergies    No Known Allergies    Intolerances        VITALS:    Vital Signs Last 24 Hrs  T(C): 36.2 (04 Feb 2021 13:34), Max: 36.4 (03 Feb 2021 21:10)  T(F): 97.2 (04 Feb 2021 13:34), Max: 97.6 (04 Feb 2021 05:00)  HR: 77 (04 Feb 2021 13:34) (76 - 80)  BP: 149/70 (04 Feb 2021 13:34) (119/72 - 149/70)  BP(mean): --  RR: 17 (04 Feb 2021 13:34) (17 - 18)  SpO2: 97% (04 Feb 2021 13:34) (97% - 100%)    LABS:                          11.2   5.60  )-----------( 181      ( 04 Feb 2021 07:10 )             34.4       02-04    142  |  106  |  10  ----------------------------<  104<H>  3.9   |  27  |  1.00    Ca    9.0      04 Feb 2021 07:09  Phos  2.8     02-04  Mg     2.1     02-04    TPro  6.2  /  Alb  2.9<L>  /  TBili  0.4  /  DBili  x   /  AST  25  /  ALT  17  /  AlkPhos  104  02-04      CAPILLARY BLOOD GLUCOSE      POCT Blood Glucose.: 138 mg/dL (04 Feb 2021 17:14)  POCT Blood Glucose.: 141 mg/dL (04 Feb 2021 11:59)  POCT Blood Glucose.: 104 mg/dL (04 Feb 2021 08:27)  POCT Blood Glucose.: 97 mg/dL (03 Feb 2021 21:26)          LOWER EXTREMITY PHYSICAL EXAM:    Vasular: DP/PT 1/4, B/L, CFT intact B/L, Temperature gradient warm to cool B/L.   Neuro: Epicritic sensation intact to the level of midfoot, B/L.  Musculoskeletal/Ortho: non ambulatory   Derm: no open lesions, no clinical signs of infection   Wound #1:   Location: L posterior heel  Size: partial thickness eschar   Depth: subq  Wound bed: eschar    Drainage: none  Odor: none  Periwound: mild erythema 2/2 pressure   Etiology: chronic pressure          Podiatry pager #: [phone]/ [phone]    Patient is a 80y old  Female who presents with a chief complaint of Hypotension (04 Feb 2021 06:58)      HPI:  79y/o F w/ h/o dementia (baseline AAOx1), T2DM, Afib (on Xarelto) and HTN p/w AMS. Pt has h/o dementia not on any medications. Per patient's son (Jose), she has had 2 week history of progressively worsening sedation and decreased PO intake. Son reports yesterday, lethargy increased and patient displayed garbled speech which prompted ED evaluation. She has endorsed chills to him at home. He denies her expressing shortness of breath, fevers, nausea, vomiting, diarrhea, dysuria and cough. She requires assistance with feeding and medications at home. She dresses and uses the restroom by herself.    At the ED, pt was found to have rectal temp of 88, HR of 55, BP of 120/80, saturating well on RA, CBC and CMP wnl, lactate elevated to 2.8, pH 7.28. Pt started on warming blanket, BP dropped to 60s systolic, pt started on fluids, received 2.6L total, started on levo, s/p vanc and zosyn, Bcx drawn, UA negative. Pt admitted to MICU for further monitoring (23 Jan 2021 00:46)      PAST MEDICAL & SURGICAL HISTORY:  Dementia    Atrial fibrillation    Type 2 diabetes mellitus    No significant past surgical history        MEDICATIONS  (STANDING):  ciprofloxacin     Tablet 250 milliGRAM(s) Oral every 12 hours  dextrose 5%. 1000 milliLiter(s) (100 mL/Hr) IV Continuous <Continuous>  glucagon  Injectable 1 milliGRAM(s) IntraMuscular once  insulin lispro (ADMELOG) corrective regimen sliding scale   SubCutaneous three times a day before meals  insulin lispro (ADMELOG) corrective regimen sliding scale   SubCutaneous at bedtime  melatonin 3 milliGRAM(s) Oral at bedtime  metoprolol tartrate 12.5 milliGRAM(s) Oral every 8 hours  rivaroxaban 15 milliGRAM(s) Oral with dinner  tamsulosin 0.4 milliGRAM(s) Oral at bedtime    MEDICATIONS  (PRN):  acetaminophen    Suspension .. 1000 milliGRAM(s) Oral every 6 hours PRN Mild Pain (1 - 3)  bisacodyl Suppository 10 milliGRAM(s) Rectal daily PRN Constipation  polyethylene glycol 3350 17 Gram(s) Oral daily PRN Constipation  senna 1 Tablet(s) Oral daily PRN Constipation      Allergies    No Known Allergies    Intolerances        VITALS:    Vital Signs Last 24 Hrs  T(C): 36.2 (04 Feb 2021 13:34), Max: 36.4 (03 Feb 2021 21:10)  T(F): 97.2 (04 Feb 2021 13:34), Max: 97.6 (04 Feb 2021 05:00)  HR: 77 (04 Feb 2021 13:34) (76 - 80)  BP: 149/70 (04 Feb 2021 13:34) (119/72 - 149/70)  BP(mean): --  RR: 17 (04 Feb 2021 13:34) (17 - 18)  SpO2: 97% (04 Feb 2021 13:34) (97% - 100%)    LABS:                          11.2   5.60  )-----------( 181      ( 04 Feb 2021 07:10 )             34.4       02-04    142  |  106  |  10  ----------------------------<  104<H>  3.9   |  27  |  1.00    Ca    9.0      04 Feb 2021 07:09  Phos  2.8     02-04  Mg     2.1     02-04    TPro  6.2  /  Alb  2.9<L>  /  TBili  0.4  /  DBili  x   /  AST  25  /  ALT  17  /  AlkPhos  104  02-04      CAPILLARY BLOOD GLUCOSE      POCT Blood Glucose.: 138 mg/dL (04 Feb 2021 17:14)  POCT Blood Glucose.: 141 mg/dL (04 Feb 2021 11:59)  POCT Blood Glucose.: 104 mg/dL (04 Feb 2021 08:27)  POCT Blood Glucose.: 97 mg/dL (03 Feb 2021 21:26)          LOWER EXTREMITY PHYSICAL EXAM:    Vasular: DP/PT 1/4, B/L, CFT intact B/L, Temperature gradient warm to cool B/L.   Neuro: Epicritic sensation decreased to the level of toes, B/L.  Musculoskeletal/Ortho: non ambulatory   Derm: no open lesions, no clinical signs of infection   Wound #1:   Location: L posterior heel  Size:  4x1x0.1  Depth: partial thickness  Drainage: none  Odor: none  Periwound: mild erythema   Etiology: diabetic

## 2021-02-04 NOTE — ADVANCED PRACTICE NURSE CONSULT - REASON FOR CONSULT
Requested by staff to assess skin status: left heel. PMH is noted:  79y/o F w/ h/o dementia (baseline AAOx1), T2DM, Afib (on Xarelto) and HTN p/w AMS. Pt has h/o dementia not on any medications. Per patient's son (Jose), she has had 2 week history of progressively worsening sedation and decreased PO intake. Son reports yesterday, lethargy increased and patient displayed garbled speech which prompted ED evaluation. She has endorsed chills to him at home. He denies her expressing shortness of breath, fevers, nausea, vomiting, diarrhea, dysuria and cough. She requires assistance with feeding and medications at home. She dresses and uses the restroom by herself.    At the ED, pt was found to have rectal temp of 88, HR of 55, BP of 120/80, saturating well on RA, CBC and CMP wnl, lactate elevated to 2.8, pH 7.28. Pt started on warming blanket, BP dropped to 60s systolic, pt started on fluids, received 2.6L total, started on levo, s/p vanc and zosyn, Bcx drawn, UA negative. Pt admitted to MICU for further monitoring

## 2021-02-04 NOTE — CONSULT NOTE ADULT - CONSULT REASON
consulted for assistance with medical decision making in the context of a patient with dementia
Sepsis
L posterior heel eschar

## 2021-02-04 NOTE — ADVANCED PRACTICE NURSE CONSULT - RECOMMEDATIONS
Will recommend the followin. Left heel: apply Cavilon daily, continue to monitor for changes.  2. Continue off-loading with Complete Cair boots.  3. Sween 24 cream to dry skin daily to moisturize  4. Nutrition support as pt cognition allows  5. Continue with turning and positioning  Tx plan discussed with RN

## 2021-02-04 NOTE — PROGRESS NOTE ADULT - ATTENDING COMMENTS
pt seen and examined.  above plan discussed on rounds today.  vitals and labs reviewed  In addition,    81 yo female w/pmh dementia, afib, p/w AMS, septic shock, originally admitted to MICU for pressors for <24h, now HDS on general medical floors. Found to be bacteremic with multiple blood culture bottles growing Staph epi. Also receiving tx for aspiration pneumonia.  -ID eval appreciated--> Staph epi presumed to be contaminate, will monitor off abx. Repeat cx's NGTD. Overnight UA was noted; given clinical improvement will hold off on starting any antibiotics  Hypothermia- resolved. no clear cause, common causes have been ruled out. TSH normal, cortisol normal, no brain lesions seen on MRI brain, no infective process at this time. Endo eval appreciated --> no further w/u needed  urinary retention s/p martinez - c/w flomax at bedtime  patient having multiple bowel movements likely from bowel regimen - adjusted    Encephalopathy  - appears resolved. pt appears to be at baseline  - presumed to be from cognitive impairment from dementia  - no infectious of metabolic causes have been identified    Dysphagia  - S&S eval appreciated--> c/w dysphagia 1 honey thick diet  palliative  appreciated --> pt not a candidate for hospice at this time    B/L Heel ulcers  - presumed to be from pressure wounds, will get Podiatry for further evaluation  - wound care evaluation appreciated: offloading, cavilon, frequent turning.  - will also check ABIs and PVRs to r/o vascular causes of ulceration    dispo: family requesting JOHN, CM team aware. COVID swab to be sent today

## 2021-02-05 ENCOUNTER — TRANSCRIPTION ENCOUNTER (OUTPATIENT)
Age: 81
End: 2021-02-05

## 2021-02-05 VITALS
HEART RATE: 71 BPM | RESPIRATION RATE: 17 BRPM | SYSTOLIC BLOOD PRESSURE: 136 MMHG | DIASTOLIC BLOOD PRESSURE: 82 MMHG | OXYGEN SATURATION: 98 % | TEMPERATURE: 97 F

## 2021-02-05 DIAGNOSIS — L97.509 NON-PRESSURE CHRONIC ULCER OF OTHER PART OF UNSPECIFIED FOOT WITH UNSPECIFIED SEVERITY: ICD-10-CM

## 2021-02-05 LAB
GLUCOSE BLDC GLUCOMTR-MCNC: 100 MG/DL — HIGH (ref 70–99)
GLUCOSE BLDC GLUCOMTR-MCNC: 114 MG/DL — HIGH (ref 70–99)
GLUCOSE BLDC GLUCOMTR-MCNC: 132 MG/DL — HIGH (ref 70–99)

## 2021-02-05 PROCEDURE — 82947 ASSAY GLUCOSE BLOOD QUANT: CPT

## 2021-02-05 PROCEDURE — 92526 ORAL FUNCTION THERAPY: CPT

## 2021-02-05 PROCEDURE — 84100 ASSAY OF PHOSPHORUS: CPT

## 2021-02-05 PROCEDURE — 83036 HEMOGLOBIN GLYCOSYLATED A1C: CPT

## 2021-02-05 PROCEDURE — 83735 ASSAY OF MAGNESIUM: CPT

## 2021-02-05 PROCEDURE — 93923 UPR/LXTR ART STDY 3+ LVLS: CPT

## 2021-02-05 PROCEDURE — 82550 ASSAY OF CK (CPK): CPT

## 2021-02-05 PROCEDURE — 82435 ASSAY OF BLOOD CHLORIDE: CPT

## 2021-02-05 PROCEDURE — 84484 ASSAY OF TROPONIN QUANT: CPT

## 2021-02-05 PROCEDURE — 82962 GLUCOSE BLOOD TEST: CPT

## 2021-02-05 PROCEDURE — 85730 THROMBOPLASTIN TIME PARTIAL: CPT

## 2021-02-05 PROCEDURE — 82803 BLOOD GASES ANY COMBINATION: CPT

## 2021-02-05 PROCEDURE — 87641 MR-STAPH DNA AMP PROBE: CPT

## 2021-02-05 PROCEDURE — 84443 ASSAY THYROID STIM HORMONE: CPT

## 2021-02-05 PROCEDURE — 81001 URINALYSIS AUTO W/SCOPE: CPT

## 2021-02-05 PROCEDURE — 85025 COMPLETE CBC W/AUTO DIFF WBC: CPT

## 2021-02-05 PROCEDURE — 80048 BASIC METABOLIC PNL TOTAL CA: CPT

## 2021-02-05 PROCEDURE — 70544 MR ANGIOGRAPHY HEAD W/O DYE: CPT

## 2021-02-05 PROCEDURE — 84145 PROCALCITONIN (PCT): CPT

## 2021-02-05 PROCEDURE — 82533 TOTAL CORTISOL: CPT

## 2021-02-05 PROCEDURE — 87449 NOS EACH ORGANISM AG IA: CPT

## 2021-02-05 PROCEDURE — 70553 MRI BRAIN STEM W/O & W/DYE: CPT

## 2021-02-05 PROCEDURE — 96365 THER/PROPH/DIAG IV INF INIT: CPT | Mod: XU

## 2021-02-05 PROCEDURE — 96375 TX/PRO/DX INJ NEW DRUG ADDON: CPT

## 2021-02-05 PROCEDURE — 99239 HOSP IP/OBS DSCHRG MGMT >30: CPT | Mod: GC

## 2021-02-05 PROCEDURE — 70450 CT HEAD/BRAIN W/O DYE: CPT

## 2021-02-05 PROCEDURE — 84295 ASSAY OF SERUM SODIUM: CPT

## 2021-02-05 PROCEDURE — 83880 ASSAY OF NATRIURETIC PEPTIDE: CPT

## 2021-02-05 PROCEDURE — 84132 ASSAY OF SERUM POTASSIUM: CPT

## 2021-02-05 PROCEDURE — 97161 PT EVAL LOW COMPLEX 20 MIN: CPT

## 2021-02-05 PROCEDURE — A9585: CPT

## 2021-02-05 PROCEDURE — 85027 COMPLETE CBC AUTOMATED: CPT

## 2021-02-05 PROCEDURE — 82607 VITAMIN B-12: CPT

## 2021-02-05 PROCEDURE — 82746 ASSAY OF FOLIC ACID SERUM: CPT

## 2021-02-05 PROCEDURE — 82553 CREATINE MB FRACTION: CPT

## 2021-02-05 PROCEDURE — 71045 X-RAY EXAM CHEST 1 VIEW: CPT

## 2021-02-05 PROCEDURE — 92610 EVALUATE SWALLOWING FUNCTION: CPT

## 2021-02-05 PROCEDURE — U0003: CPT

## 2021-02-05 PROCEDURE — 99291 CRITICAL CARE FIRST HOUR: CPT | Mod: 25

## 2021-02-05 PROCEDURE — 80202 ASSAY OF VANCOMYCIN: CPT

## 2021-02-05 PROCEDURE — 97530 THERAPEUTIC ACTIVITIES: CPT

## 2021-02-05 PROCEDURE — 85610 PROTHROMBIN TIME: CPT

## 2021-02-05 PROCEDURE — 87150 DNA/RNA AMPLIFIED PROBE: CPT

## 2021-02-05 PROCEDURE — 96366 THER/PROPH/DIAG IV INF ADDON: CPT

## 2021-02-05 PROCEDURE — 96368 THER/DIAG CONCURRENT INF: CPT

## 2021-02-05 PROCEDURE — U0005: CPT

## 2021-02-05 PROCEDURE — 70548 MR ANGIOGRAPHY NECK W/DYE: CPT

## 2021-02-05 PROCEDURE — 87186 SC STD MICRODIL/AGAR DIL: CPT

## 2021-02-05 PROCEDURE — 85014 HEMATOCRIT: CPT

## 2021-02-05 PROCEDURE — 83605 ASSAY OF LACTIC ACID: CPT

## 2021-02-05 PROCEDURE — 85018 HEMOGLOBIN: CPT

## 2021-02-05 PROCEDURE — 93005 ELECTROCARDIOGRAM TRACING: CPT

## 2021-02-05 PROCEDURE — 82330 ASSAY OF CALCIUM: CPT

## 2021-02-05 PROCEDURE — 86769 SARS-COV-2 COVID-19 ANTIBODY: CPT

## 2021-02-05 PROCEDURE — 85049 AUTOMATED PLATELET COUNT: CPT

## 2021-02-05 PROCEDURE — C8929: CPT

## 2021-02-05 PROCEDURE — 74177 CT ABD & PELVIS W/CONTRAST: CPT

## 2021-02-05 PROCEDURE — 97110 THERAPEUTIC EXERCISES: CPT

## 2021-02-05 PROCEDURE — 87040 BLOOD CULTURE FOR BACTERIA: CPT

## 2021-02-05 PROCEDURE — 86780 TREPONEMA PALLIDUM: CPT

## 2021-02-05 PROCEDURE — 87640 STAPH A DNA AMP PROBE: CPT

## 2021-02-05 PROCEDURE — 80053 COMPREHEN METABOLIC PANEL: CPT

## 2021-02-05 PROCEDURE — 87086 URINE CULTURE/COLONY COUNT: CPT

## 2021-02-05 RX ADMIN — RIVAROXABAN 15 MILLIGRAM(S): KIT at 17:27

## 2021-02-05 RX ADMIN — Medication 250 MILLIGRAM(S): at 17:27

## 2021-02-05 RX ADMIN — Medication 12.5 MILLIGRAM(S): at 13:17

## 2021-02-05 RX ADMIN — Medication 12.5 MILLIGRAM(S): at 05:43

## 2021-02-05 RX ADMIN — Medication 250 MILLIGRAM(S): at 05:43

## 2021-02-05 NOTE — DISCHARGE NOTE NURSING/CASE MANAGEMENT/SOCIAL WORK - PATIENT PORTAL LINK FT
You can access the FollowMyHealth Patient Portal offered by [facility] by registering at the following website: [email or web address]. By joining [facility]’s FollowMyHealth portal, you will also be able to view your health information using other applications (apps) compatible with our system.

## 2021-02-05 NOTE — PROGRESS NOTE ADULT - SUBJECTIVE AND OBJECTIVE BOX
PROGRESS NOTE:   Authored by Anita Alvarez MD  Pager [phone] [facility] / [id number] [facility]    Patient is a 80y old  Female who presents with a chief complaint of Hypotension (04 Feb 2021 18:39)      SUBJECTIVE / OVERNIGHT EVENTS:  No acute events overnight.  This AM, pt     MEDICATIONS  (STANDING):  ciprofloxacin     Tablet 250 milliGRAM(s) Oral every 12 hours  dextrose 5%. 1000 milliLiter(s) (100 mL/Hr) IV Continuous <Continuous>  glucagon  Injectable 1 milliGRAM(s) IntraMuscular once  insulin lispro (ADMELOG) corrective regimen sliding scale   SubCutaneous three times a day before meals  insulin lispro (ADMELOG) corrective regimen sliding scale   SubCutaneous at bedtime  melatonin 3 milliGRAM(s) Oral at bedtime  metoprolol tartrate 12.5 milliGRAM(s) Oral every 8 hours  rivaroxaban 15 milliGRAM(s) Oral with dinner  tamsulosin 0.4 milliGRAM(s) Oral at bedtime    MEDICATIONS  (PRN):  acetaminophen    Suspension .. 1000 milliGRAM(s) Oral every 6 hours PRN Mild Pain (1 - 3)  bisacodyl Suppository 10 milliGRAM(s) Rectal daily PRN Constipation  polyethylene glycol 3350 17 Gram(s) Oral daily PRN Constipation  senna 1 Tablet(s) Oral daily PRN Constipation      CAPILLARY BLOOD GLUCOSE      POCT Blood Glucose.: 203 mg/dL (04 Feb 2021 21:57)  POCT Blood Glucose.: 138 mg/dL (04 Feb 2021 17:14)  POCT Blood Glucose.: 141 mg/dL (04 Feb 2021 11:59)  POCT Blood Glucose.: 104 mg/dL (04 Feb 2021 08:27)    I&O's Summary    04 Feb 2021 07:01  -  05 Feb 2021 07:00  --------------------------------------------------------  IN: 240 mL / OUT: 1150 mL / NET: -910 mL        PHYSICAL EXAM:  Vital Signs Last 24 Hrs  T(C): 36.4 (05 Feb 2021 05:19), Max: 36.4 (05 Feb 2021 05:19)  T(F): 97.6 (05 Feb 2021 05:19), Max: 97.6 (05 Feb 2021 05:19)  HR: 79 (05 Feb 2021 05:19) (77 - 84)  BP: 139/72 (05 Feb 2021 05:19) (115/80 - 149/70)  BP(mean): --  RR: 18 (05 Feb 2021 05:19) (17 - 18)  SpO2: 96% (05 Feb 2021 05:19) (96% - 97%)    GENERAL: NAD, well-groomed, well-developed  HEAD:  Atraumatic, Normocephalic  EYES: PERRLA, conjunctiva and sclera clear  ENMT: Moist mucous membranes, poor dentition  NECK: no JVD  NERVOUS SYSTEM:  Oriented X1, doesn't follow commands, alert, awake, moving all 4 limbs spontaneously  CHEST/LUNG: limited exam due to mental status and habitus but appears clear ACW;   HEART: irregular, normal rate  ABDOMEN: Soft, Nontender, Nondistended; Bowel sounds present  EXTREMITIES:  2+ Peripheral Pulses, No clubbing, cyanosis, or edema. L heel ulcer    LABS:                        11.2   5.60  )-----------( 181      ( 04 Feb 2021 07:10 )             34.4     02-04    142  |  106  |  10  ----------------------------<  104<H>  3.9   |  27  |  1.00    Ca    9.0      04 Feb 2021 07:09  Phos  2.8     02-04  Mg     2.1     02-04    TPro  6.2  /  Alb  2.9<L>  /  TBili  0.4  /  DBili  x   /  AST  25  /  ALT  17  /  AlkPhos  104  02-04        RADIOLOGY & ADDITIONAL TESTS:    < from: [facility] Physiol Extremity Lower 3+ Level, BI (02.04.21 @ 15:10) >  Impression: Mild arterial flow limitation in both lower extremities localizing to the infrapopliteal circulation, worse on the left.      < end of copied text >     PROGRESS NOTE:   Authored by Anita Alvarez MD  Pager [phone] [facility] / [id number] [facility]    Patient is a 80y old  Female who presents with a chief complaint of Hypotension (04 Feb 2021 18:39)      SUBJECTIVE / OVERNIGHT EVENTS:  No acute events overnight.  This AM, pt awake and alert. NAD, more conversant    MEDICATIONS  (STANDING):  ciprofloxacin     Tablet 250 milliGRAM(s) Oral every 12 hours  dextrose 5%. 1000 milliLiter(s) (100 mL/Hr) IV Continuous <Continuous>  glucagon  Injectable 1 milliGRAM(s) IntraMuscular once  insulin lispro (ADMELOG) corrective regimen sliding scale   SubCutaneous three times a day before meals  insulin lispro (ADMELOG) corrective regimen sliding scale   SubCutaneous at bedtime  melatonin 3 milliGRAM(s) Oral at bedtime  metoprolol tartrate 12.5 milliGRAM(s) Oral every 8 hours  rivaroxaban 15 milliGRAM(s) Oral with dinner  tamsulosin 0.4 milliGRAM(s) Oral at bedtime    MEDICATIONS  (PRN):  acetaminophen    Suspension .. 1000 milliGRAM(s) Oral every 6 hours PRN Mild Pain (1 - 3)  bisacodyl Suppository 10 milliGRAM(s) Rectal daily PRN Constipation  polyethylene glycol 3350 17 Gram(s) Oral daily PRN Constipation  senna 1 Tablet(s) Oral daily PRN Constipation      CAPILLARY BLOOD GLUCOSE      POCT Blood Glucose.: 203 mg/dL (04 Feb 2021 21:57)  POCT Blood Glucose.: 138 mg/dL (04 Feb 2021 17:14)  POCT Blood Glucose.: 141 mg/dL (04 Feb 2021 11:59)  POCT Blood Glucose.: 104 mg/dL (04 Feb 2021 08:27)    I&O's Summary    04 Feb 2021 07:01  -  05 Feb 2021 07:00  --------------------------------------------------------  IN: 240 mL / OUT: 1150 mL / NET: -910 mL        PHYSICAL EXAM:  Vital Signs Last 24 Hrs  T(C): 36.4 (05 Feb 2021 05:19), Max: 36.4 (05 Feb 2021 05:19)  T(F): 97.6 (05 Feb 2021 05:19), Max: 97.6 (05 Feb 2021 05:19)  HR: 79 (05 Feb 2021 05:19) (77 - 84)  BP: 139/72 (05 Feb 2021 05:19) (115/80 - 149/70)  BP(mean): --  RR: 18 (05 Feb 2021 05:19) (17 - 18)  SpO2: 96% (05 Feb 2021 05:19) (96% - 97%)    GENERAL: NAD, well-groomed, well-developed  HEAD:  Atraumatic, Normocephalic  EYES: PERRLA, conjunctiva and sclera clear  ENMT: Moist mucous membranes, poor dentition  NECK: no JVD  NERVOUS SYSTEM:  Oriented X1, doesn't follow commands, alert, awake, moving all 4 limbs spontaneously  CHEST/LUNG: limited exam due to mental status and habitus but appears clear ACW;   HEART: irregular, normal rate  ABDOMEN: Soft, Nontender, Nondistended; Bowel sounds present  EXTREMITIES:  2+ Peripheral Pulses, No clubbing, cyanosis, or edema. L heel ulcer    LABS:                        11.2   5.60  )-----------( 181      ( 04 Feb 2021 07:10 )             34.4     02-04    142  |  106  |  10  ----------------------------<  104<H>  3.9   |  27  |  1.00    Ca    9.0      04 Feb 2021 07:09  Phos  2.8     02-04  Mg     2.1     02-04    TPro  6.2  /  Alb  2.9<L>  /  TBili  0.4  /  DBili  x   /  AST  25  /  ALT  17  /  AlkPhos  104  02-04        RADIOLOGY & ADDITIONAL TESTS:    < from: [facility] Physiol Extremity Lower 3+ Level, BI (02.04.21 @ 15:10) >  Impression: Mild arterial flow limitation in both lower extremities localizing to the infrapopliteal circulation, worse on the left.      < end of copied text >

## 2021-02-05 NOTE — PROGRESS NOTE ADULT - ATTENDING COMMENTS
pt seen and examined.  above plan discussed on rounds today.  vitals and labs reviewed  In addition,    81 yo female w/pmh dementia, afib, p/w AMS, septic shock, originally admitted to MICU for pressors for <24h, now HDS on general medical floors. Found to be bacteremic with multiple blood culture bottles growing Staph epi. Also receiving tx for aspiration pneumonia.  -ID eval appreciated--> Staph epi presumed to be contaminate, will monitor off abx. Repeat cx's NGTD. Overnight UA was noted; given clinical improvement will hold off on starting any antibiotics  Hypothermia- resolved. no clear cause, common causes have been ruled out. TSH normal, cortisol normal, no brain lesions seen on MRI brain, no infective process at this time. Endo eval appreciated --> no further w/u needed  urinary retention s/p martinez - c/w flomax at bedtime  patient having multiple bowel movements likely from bowel regimen - adjusted    Encephalopathy  - appears resolved. pt appears to be at baseline  - presumed to be from cognitive impairment from dementia  - no infectious of metabolic causes have been identified    Dysphagia  - S&S eval appreciated--> c/w dysphagia 1 honey thick diet  palliative  appreciated --> pt not a candidate for hospice at this time    B/L Heel ulcers  - presumed to be from pressure wounds, Podiatry eval appreciated  - wound care evaluation appreciated: offloading, cavilon, frequent turning.  - ABIs and PVRs did not show any major vessel occlusion    stable for discharge today to [facility]  Discharge time spent: 36 min

## 2021-02-05 NOTE — PROGRESS NOTE ADULT - PROBLEM SELECTOR PLAN 2
Waxing and waning. FAST 7a-7b dementia. Possible hospital delirium. Possibly TIA vs. progression of dementia. Acute change on 1/22, previous baseline able to shower and eat by herself, some words, able to follow commands. Infectious causes unlikely due to neg bld and urine clx, treated empirically with abx for 4 days. Less likely meningitis due to waxing and waning MS. Unlikely primary neuro cause, CTH negative for acute pathology, MRI/MRA of head/neck - poor study, minimal chronic ischemic changes in the frontoparietal white matter, small aneurysm R ICA. Unlikely to be endocrine etiology, TSH 1.75, AM cortisol 11.3  -palliative consulted for home hospice and GOC - not likely candidate for hospice at this time, will refer to [facility] home advanced care  - setting up home services  -per s&s, dysphagia 1 diet  -aspiration precautions, feeding only when awake  -melatonin

## 2021-02-05 NOTE — PROGRESS NOTE ADULT - PROVIDER SPECIALTY LIST ADULT
Infectious Disease
Internal Medicine
MICU
Internal Medicine
Palliative Care
Internal Medicine

## 2021-02-05 NOTE — PROGRESS NOTE ADULT - ASSESSMENT
79y/o F w/ h/o dementia (baseline AAOx0-1), T2DM, Afib (on Xeralto), HTN p/w AMS, likely 2/2 progression of dementia. Pt found to be hypothermic to 88, hypotensive s/p warming blanket requiring levo in MICU, w/ possible TIA at home, CXR showing possible LLL atelectasis vs PNA, now unlikely infectious etiology, monitoring off abx. Treating for UTI w/ 3 days cipro. Pending JOHN placement.

## 2021-02-05 NOTE — PROGRESS NOTE ADULT - PROBLEM SELECTOR PLAN 1
L posterior heel partial thickness eschar, no open lesions, no clinical signs of infection   -podiatry consulted - no acute pod sx intervention  - local wound care with betadine and allyven pad  - off loading boots to be worn while resting in bed at all times L posterior heel partial thickness eschar, no open lesions, no clinical signs of infection   -podiatry consulted - no acute pod sx intervention  - local wound care with betadine and allyven pad  - off loading boots to be worn while resting in bed at all times  - GAURAV: mild arterial flow limitation in both lower extremities localizing to the infrapopliteal circulation, worse on the left.

## 2021-04-29 NOTE — PROVIDER CONTACT NOTE (CRITICAL VALUE NOTIFICATION) - ACTION/TREATMENT ORDERED:
Central Prior Authorization Team   Phone: [phone]      Patient must have an active rx on file to process PA. Please included sig, qty, npi and route back to me.  
follow nomogram
will follow heparin nomogram
Follow the heparin nomogram.
followed heparin protocol, on hold x 1 hour

## 2021-05-26 NOTE — PROGRESS NOTE ADULT - PROBLEM SELECTOR PLAN 5
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 h/o persistent ulceration of the vertex scalp. repeatedly biopsied in 8/2019 and 12/2019- both bx w/o evidence of recurrent spindle cell neoplasm. Area is not painful, bleeding, or tender, though pt has no sensation to the area. now crusted over. 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  He was referred for surgical management.\par \par 09/27/2018:  Patient is s/p radical resection of frontal scalp spindle cell neoplasm and biopsy of vertex scalp lesion.  Skin graft coverage by Dr. Myles.  Recovering well.  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  Additional deep margin shows spindle cell lesion felt to represent fibrosing granulation tissue.  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 Myles 12/17/2018.  Pathology shows minimal residual disease, margins negative.\par \par 10/01/2019: Patient recently developed firm nodule at the posterior edge of his scalp skin graft.  Biopsy performed 08/27/2019 demonstrated atypical epithelioid cells for which an underlying neoplasm could not be ruled out.  He is s/p deeper biopsy (9/2019, Dr. Fitzpatrick) which showed no residual neoplasm. -CTM Abdominal exam benign  - s/p manual disimpaction  - c/w dulcolax suppository, senna, miralax

## 2021-06-04 ENCOUNTER — EMERGENCY (EMERGENCY)
Facility: HOSPITAL | Age: 81
LOS: 1 days | Discharge: HOME | End: 2021-06-04
Attending: EMERGENCY MEDICINE | Admitting: EMERGENCY MEDICINE
Payer: MEDICARE

## 2021-06-04 VITALS
OXYGEN SATURATION: 100 % | DIASTOLIC BLOOD PRESSURE: 78 MMHG | HEIGHT: 66 IN | TEMPERATURE: 98 F | SYSTOLIC BLOOD PRESSURE: 147 MMHG | RESPIRATION RATE: 16 BRPM | HEART RATE: 77 BPM

## 2021-06-04 PROCEDURE — 99284 EMERGENCY DEPT VISIT MOD MDM: CPT | Mod: GC

## 2021-06-04 NOTE — ED ADULT TRIAGE NOTE - CHIEF COMPLAINT QUOTE
Pt sent in by PMD for right knee swelling since 5/19. Pt was taken out of nursing home and family noted both knees swollen. Pt had a tele visit with PMD and told to come to ed for further eval. pt has hx of dementia a/ox 0-1. Per family pt has pressure wounds to sacral and heal.

## 2021-06-05 VITALS — SYSTOLIC BLOOD PRESSURE: 107 MMHG | DIASTOLIC BLOOD PRESSURE: 82 MMHG | RESPIRATION RATE: 17 BRPM | HEART RATE: 100 BPM

## 2021-06-05 LAB
ALBUMIN SERPL ELPH-MCNC: 3.4 G/DL — SIGNIFICANT CHANGE UP (ref 3.3–5)
ALP SERPL-CCNC: 105 U/L — SIGNIFICANT CHANGE UP (ref 40–120)
ALT FLD-CCNC: 15 U/L — SIGNIFICANT CHANGE UP (ref 4–33)
ANION GAP SERPL CALC-SCNC: 8 MMOL/L — SIGNIFICANT CHANGE UP (ref 7–14)
AST SERPL-CCNC: 109 U/L — HIGH (ref 4–32)
BASOPHILS # BLD AUTO: 0.07 K/UL — SIGNIFICANT CHANGE UP (ref 0–0.2)
BASOPHILS NFR BLD AUTO: 0.9 % — SIGNIFICANT CHANGE UP (ref 0–2)
BILIRUB SERPL-MCNC: 0.5 MG/DL — SIGNIFICANT CHANGE UP (ref 0.2–1.2)
BUN SERPL-MCNC: 11 MG/DL — SIGNIFICANT CHANGE UP (ref 7–23)
CALCIUM SERPL-MCNC: 9 MG/DL — SIGNIFICANT CHANGE UP (ref 8.4–10.5)
CHLORIDE SERPL-SCNC: 101 MMOL/L — SIGNIFICANT CHANGE UP (ref 98–107)
CO2 SERPL-SCNC: 23 MMOL/L — SIGNIFICANT CHANGE UP (ref 22–31)
CREAT SERPL-MCNC: 0.77 MG/DL — SIGNIFICANT CHANGE UP (ref 0.5–1.3)
CRP SERPL-MCNC: 15.3 MG/L — HIGH
EOSINOPHIL # BLD AUTO: 0.2 K/UL — SIGNIFICANT CHANGE UP (ref 0–0.5)
EOSINOPHIL NFR BLD AUTO: 2.5 % — SIGNIFICANT CHANGE UP (ref 0–6)
ERYTHROCYTE [SEDIMENTATION RATE] IN BLOOD: 22 MM/HR — SIGNIFICANT CHANGE UP (ref 4–25)
GLUCOSE SERPL-MCNC: 148 MG/DL — HIGH (ref 70–99)
HCT VFR BLD CALC: 44.7 % — SIGNIFICANT CHANGE UP (ref 34.5–45)
HGB BLD-MCNC: 14.7 G/DL — SIGNIFICANT CHANGE UP (ref 11.5–15.5)
IANC: 4.52 K/UL — SIGNIFICANT CHANGE UP (ref 1.5–8.5)
IMM GRANULOCYTES NFR BLD AUTO: 0.3 % — SIGNIFICANT CHANGE UP (ref 0–1.5)
LYMPHOCYTES # BLD AUTO: 2.44 K/UL — SIGNIFICANT CHANGE UP (ref 1–3.3)
LYMPHOCYTES # BLD AUTO: 30.7 % — SIGNIFICANT CHANGE UP (ref 13–44)
MCHC RBC-ENTMCNC: 28.3 PG — SIGNIFICANT CHANGE UP (ref 27–34)
MCHC RBC-ENTMCNC: 32.9 GM/DL — SIGNIFICANT CHANGE UP (ref 32–36)
MCV RBC AUTO: 86.1 FL — SIGNIFICANT CHANGE UP (ref 80–100)
MONOCYTES # BLD AUTO: 0.7 K/UL — SIGNIFICANT CHANGE UP (ref 0–0.9)
MONOCYTES NFR BLD AUTO: 8.8 % — SIGNIFICANT CHANGE UP (ref 2–14)
NEUTROPHILS # BLD AUTO: 4.52 K/UL — SIGNIFICANT CHANGE UP (ref 1.8–7.4)
NEUTROPHILS NFR BLD AUTO: 56.8 % — SIGNIFICANT CHANGE UP (ref 43–77)
NRBC # BLD: 0 /100 WBCS — SIGNIFICANT CHANGE UP
NRBC # FLD: 0 K/UL — SIGNIFICANT CHANGE UP
PLATELET # BLD AUTO: 237 K/UL — SIGNIFICANT CHANGE UP (ref 150–400)
POTASSIUM SERPL-MCNC: SIGNIFICANT CHANGE UP MMOL/L (ref 3.5–5.3)
POTASSIUM SERPL-SCNC: SIGNIFICANT CHANGE UP MMOL/L (ref 3.5–5.3)
PROT SERPL-MCNC: SIGNIFICANT CHANGE UP G/DL (ref 6–8.3)
RBC # BLD: 5.19 M/UL — SIGNIFICANT CHANGE UP (ref 3.8–5.2)
RBC # FLD: 15.5 % — HIGH (ref 10.3–14.5)
SODIUM SERPL-SCNC: 132 MMOL/L — LOW (ref 135–145)
WBC # BLD: 7.95 K/UL — SIGNIFICANT CHANGE UP (ref 3.8–10.5)
WBC # FLD AUTO: 7.95 K/UL — SIGNIFICANT CHANGE UP (ref 3.8–10.5)

## 2021-06-05 PROCEDURE — 73564 X-RAY EXAM KNEE 4 OR MORE: CPT | Mod: 26,RT

## 2021-06-05 NOTE — ED ADULT NURSE NOTE - OBJECTIVE STATEMENT
awake cooperative responds to all stimuli  no distress  as per granddaughter has been c/o christine knee pain patient is unable to articulate  moans when moved has 6cm x 4 cardiac monitor unstageable pressure ulcer on left heel  and 4cm x 3 cardiac monitor stage 2 pressure ulcer on coccyx   VSS  patient is not ambulatory as per grand daughter

## 2021-06-05 NOTE — ED PROVIDER NOTE - PATIENT PORTAL LINK FT
You can access the FollowMyHealth Patient Portal offered by [facility] by registering at the following website: [email or web address]. By joining [employer]’s FollowMyHealth portal, you will also be able to view your health information using other applications (apps) compatible with our system.

## 2021-06-05 NOTE — ED PROVIDER NOTE - NSFOLLOWUPINSTRUCTIONS_ED_ALL_ED_FT
Follow up with your PCP in 24-48 hours.   Call orthopedics to make a follow up appointment.   May take Tylenol and Motrin as directed on the bottle for pain control.   Return to the ER if you develop any new or worsening symptoms such as chest pain, shortness of breath, numbness, weakness, abdominal pain, nausea, vomiting, or visual changes.

## 2021-06-05 NOTE — ED PROVIDER NOTE - PROGRESS NOTE DETAILS
Spoke to pt's son who is ok with plan to have pt with ortho and return if she develops any redness/swelling/fevers. Will receive ambulette at his house.

## 2021-06-05 NOTE — ED PROVIDER NOTE - PHYSICAL EXAMINATION
Gen: AAOx3, non-toxic  Head: NCAT  HEENT: EOMI, oral mucosa moist, normal conjunctiva  Lung: CTAB, no respiratory distress, no wheezes/rhonchi/rales B/L, speaking in full sentences  CV: RRR, no murmurs, rubs or gallops  Abd: soft, NTND  MSK: +R knee swelling without redness/warmth, no pain on active or passive ROM of affected knee, no midline spinal TTP, no hip tenderness or instability, full ROM of bilateral legs without pain or limitation, no visible deformities  Neuro: No focal sensory or motor deficits  Skin: Warm, well perfused, no rash  Psych: normal affect.     Chuckie Cabral PGY3

## 2021-06-05 NOTE — ED PROVIDER NOTE - NSFOLLOWUPCLINICS_GEN_ALL_ED_FT
[facility]  Orthopedic Surgery  [address], 3rd & 4th [place] [address]  Phone: [phone]  Fax:

## 2021-06-05 NOTE — ED PROVIDER NOTE - ATTENDING CONTRIBUTION TO CARE
81yo F PMH dementia AAO x 0-1 baseline, bedbound, HTN, CVA, Afib on Xarelto, DM p/w b/l knee pain and swelling (mostly on right) x 2 weeks. Family at bedside says patient has been complaining of pain to right knee recently but otherwise has normal behavior and appetite at home with no fevers.  Family states that ~1month ago at rehab pt had fall and was found on floor but knee swelling not noticed until 2 weeks ago    General: Patient elderly woman lying in bed in no apparent distress, AAO x 1  Skin: Dry and intact  HEENT: Head atraumatic. Oral mucosa moist.   Eyes: Conjunctiva normal  Cardiac: Regular rhythm and rate. No pretibial edema b/l  Respiratory: Lungs clear b/l and symmetric. No respiratory distress. Able to speak in complete sentences.  Gastrointestinal: Abdomen soft, nondistended, nontender  Musculoskeletal: Moves all extremities spontaneously. No pain on range of motion of b/l knees. +generalized periarticular swelling of b/l knees R>L without b/l knee warmth or redness  Neurological: alert and oriented to person  Psychiatric: Cooperative    a/p  knee swelling with remote h/o trauma  clinically not septic joint  crystal arthropathy less likely  possible OA  will get labs and XRays of knees

## 2021-06-05 NOTE — ED PROVIDER NOTE - CLINICAL SUMMARY MEDICAL DECISION MAKING FREE TEXT BOX
80F with PMH including dementia, HTN, CVA, Afib on Xarelto, DM p/w right knee pain and swelling x 2 weeks. Question of a fall last month but no other obvious injuries, pt at baseline mental status. Right knee mildly swollen without redness/warmth, no pain on active or passive ROM of affected knee. Low suspicion for septic or inflammatory arthritis based on exam/history. Will screen with inflammatory markers and assess for fracture with XR.

## 2021-06-05 NOTE — ED PROVIDER NOTE - OBJECTIVE STATEMENT
80F with PMH including dementia, HTN, CVA, Afib on Xarelto, DM p/w right knee pain and swelling x 2 weeks. Granddaughter at bedside states it has been bothering the pt ever since being placed in a rehab after a recent admission for CVA. Also reports that the pt fell while at rehab one month ago. States current mental status is baseline for her since the stroke. Denies hearing about any other symptoms including fever. 80F with PMH including dementia, HTN, CVA, Afib on Xarelto, DM p/w right knee pain and swelling x 2 weeks. Granddaughter at bedside states it has been bothering the pt ever since being placed in a rehab after a recent admission for CVA. Also reports that the pt fell while at rehab one month ago. States current mental status is baseline for her since the stroke (A&Ox1). Denies hearing about any other symptoms including fever.

## 2021-06-05 NOTE — PROVIDER CONTACT NOTE (OTHER) - ASSESSMENT
SW contacted by Charge RN stating Pt transport set up overnight, very delayed. SW contacted [facility] spoke with staff stating they are very delayed transport to arrive within next 30 minutes. SW informed Charge RN and Attending David. No further SW intervention required at this time.

## 2021-08-06 ENCOUNTER — INPATIENT (INPATIENT)
Facility: HOSPITAL | Age: 81
LOS: 12 days | Discharge: HOME HEALTH | End: 2021-08-19
Attending: STUDENT IN AN ORGANIZED HEALTH CARE EDUCATION/TRAINING PROGRAM | Admitting: STUDENT IN AN ORGANIZED HEALTH CARE EDUCATION/TRAINING PROGRAM
Payer: MEDICARE

## 2021-08-06 VITALS
OXYGEN SATURATION: 100 % | HEIGHT: 66 IN | SYSTOLIC BLOOD PRESSURE: 101 MMHG | DIASTOLIC BLOOD PRESSURE: 85 MMHG | HEART RATE: 106 BPM | TEMPERATURE: 98 F | RESPIRATION RATE: 18 BRPM

## 2021-08-06 DIAGNOSIS — L97.429 NON-PRESSURE CHRONIC ULCER OF LEFT HEEL AND MIDFOOT WITH UNSPECIFIED SEVERITY: ICD-10-CM

## 2021-08-06 DIAGNOSIS — E87.0 HYPEROSMOLALITY AND HYPERNATREMIA: ICD-10-CM

## 2021-08-06 DIAGNOSIS — E11.9 TYPE 2 DIABETES MELLITUS WITHOUT COMPLICATIONS: ICD-10-CM

## 2021-08-06 DIAGNOSIS — I48.91 UNSPECIFIED ATRIAL FIBRILLATION: ICD-10-CM

## 2021-08-06 DIAGNOSIS — Z29.9 ENCOUNTER FOR PROPHYLACTIC MEASURES, UNSPECIFIED: ICD-10-CM

## 2021-08-06 DIAGNOSIS — I10 ESSENTIAL (PRIMARY) HYPERTENSION: ICD-10-CM

## 2021-08-06 DIAGNOSIS — F03.90 UNSPECIFIED DEMENTIA, UNSPECIFIED SEVERITY, WITHOUT BEHAVIORAL DISTURBANCE, PSYCHOTIC DISTURBANCE, MOOD DISTURBANCE, AND ANXIETY: ICD-10-CM

## 2021-08-06 LAB
ALBUMIN SERPL ELPH-MCNC: 4.1 G/DL — SIGNIFICANT CHANGE UP (ref 3.3–5)
ALP SERPL-CCNC: 105 U/L — SIGNIFICANT CHANGE UP (ref 40–120)
ALT FLD-CCNC: 18 U/L — SIGNIFICANT CHANGE UP (ref 4–33)
ANION GAP SERPL CALC-SCNC: 15 MMOL/L — HIGH (ref 7–14)
APTT BLD: 32 SEC — SIGNIFICANT CHANGE UP (ref 27–36.3)
AST SERPL-CCNC: 36 U/L — HIGH (ref 4–32)
BASOPHILS # BLD AUTO: 0.08 K/UL — SIGNIFICANT CHANGE UP (ref 0–0.2)
BASOPHILS NFR BLD AUTO: 1.2 % — SIGNIFICANT CHANGE UP (ref 0–2)
BILIRUB SERPL-MCNC: 0.6 MG/DL — SIGNIFICANT CHANGE UP (ref 0.2–1.2)
BUN SERPL-MCNC: 22 MG/DL — SIGNIFICANT CHANGE UP (ref 7–23)
CALCIUM SERPL-MCNC: 10.5 MG/DL — SIGNIFICANT CHANGE UP (ref 8.4–10.5)
CHLORIDE SERPL-SCNC: 111 MMOL/L — HIGH (ref 98–107)
CO2 SERPL-SCNC: 26 MMOL/L — SIGNIFICANT CHANGE UP (ref 22–31)
CREAT SERPL-MCNC: 1.13 MG/DL — SIGNIFICANT CHANGE UP (ref 0.5–1.3)
CRP SERPL-MCNC: 13.5 MG/L — HIGH
EOSINOPHIL # BLD AUTO: 0.16 K/UL — SIGNIFICANT CHANGE UP (ref 0–0.5)
EOSINOPHIL NFR BLD AUTO: 2.5 % — SIGNIFICANT CHANGE UP (ref 0–6)
ERYTHROCYTE [SEDIMENTATION RATE] IN BLOOD: 23 MM/HR — SIGNIFICANT CHANGE UP (ref 4–25)
GLUCOSE BLDC GLUCOMTR-MCNC: 125 MG/DL — HIGH (ref 70–99)
GLUCOSE SERPL-MCNC: 220 MG/DL — HIGH (ref 70–99)
HCT VFR BLD CALC: 48.5 % — HIGH (ref 34.5–45)
HGB BLD-MCNC: 15.6 G/DL — HIGH (ref 11.5–15.5)
IANC: 3.65 K/UL — SIGNIFICANT CHANGE UP (ref 1.5–8.5)
IMM GRANULOCYTES NFR BLD AUTO: 0.3 % — SIGNIFICANT CHANGE UP (ref 0–1.5)
INR BLD: 0.99 RATIO — SIGNIFICANT CHANGE UP (ref 0.88–1.16)
LYMPHOCYTES # BLD AUTO: 1.98 K/UL — SIGNIFICANT CHANGE UP (ref 1–3.3)
LYMPHOCYTES # BLD AUTO: 30.7 % — SIGNIFICANT CHANGE UP (ref 13–44)
MCHC RBC-ENTMCNC: 27.7 PG — SIGNIFICANT CHANGE UP (ref 27–34)
MCHC RBC-ENTMCNC: 32.2 GM/DL — SIGNIFICANT CHANGE UP (ref 32–36)
MCV RBC AUTO: 86 FL — SIGNIFICANT CHANGE UP (ref 80–100)
MONOCYTES # BLD AUTO: 0.57 K/UL — SIGNIFICANT CHANGE UP (ref 0–0.9)
MONOCYTES NFR BLD AUTO: 8.8 % — SIGNIFICANT CHANGE UP (ref 2–14)
NEUTROPHILS # BLD AUTO: 3.65 K/UL — SIGNIFICANT CHANGE UP (ref 1.8–7.4)
NEUTROPHILS NFR BLD AUTO: 56.5 % — SIGNIFICANT CHANGE UP (ref 43–77)
NRBC # BLD: 0 /100 WBCS — SIGNIFICANT CHANGE UP
NRBC # FLD: 0 K/UL — SIGNIFICANT CHANGE UP
PLATELET # BLD AUTO: 176 K/UL — SIGNIFICANT CHANGE UP (ref 150–400)
POTASSIUM SERPL-MCNC: 4.9 MMOL/L — SIGNIFICANT CHANGE UP (ref 3.5–5.3)
POTASSIUM SERPL-SCNC: 4.9 MMOL/L — SIGNIFICANT CHANGE UP (ref 3.5–5.3)
PROT SERPL-MCNC: 9 G/DL — HIGH (ref 6–8.3)
PROTHROM AB SERPL-ACNC: 11.4 SEC — SIGNIFICANT CHANGE UP (ref 10.6–13.6)
RBC # BLD: 5.64 M/UL — HIGH (ref 3.8–5.2)
RBC # FLD: 16.5 % — HIGH (ref 10.3–14.5)
SARS-COV-2 RNA SPEC QL NAA+PROBE: SIGNIFICANT CHANGE UP
SODIUM SERPL-SCNC: 152 MMOL/L — HIGH (ref 135–145)
WBC # BLD: 6.46 K/UL — SIGNIFICANT CHANGE UP (ref 3.8–10.5)
WBC # FLD AUTO: 6.46 K/UL — SIGNIFICANT CHANGE UP (ref 3.8–10.5)

## 2021-08-06 PROCEDURE — 73600 X-RAY EXAM OF ANKLE: CPT | Mod: 26,LT

## 2021-08-06 PROCEDURE — 99223 1ST HOSP IP/OBS HIGH 75: CPT | Mod: GC

## 2021-08-06 PROCEDURE — 99285 EMERGENCY DEPT VISIT HI MDM: CPT | Mod: GC

## 2021-08-06 PROCEDURE — 73620 X-RAY EXAM OF FOOT: CPT | Mod: 26,LT

## 2021-08-06 RX ORDER — VANCOMYCIN HCL 1 G
1000 VIAL (EA) INTRAVENOUS ONCE
Refills: 0 | Status: COMPLETED | OUTPATIENT
Start: 2021-08-06 | End: 2021-08-06

## 2021-08-06 RX ORDER — DEXTROSE 50 % IN WATER 50 %
25 SYRINGE (ML) INTRAVENOUS ONCE
Refills: 0 | Status: DISCONTINUED | OUTPATIENT
Start: 2021-08-06 | End: 2021-08-19

## 2021-08-06 RX ORDER — SODIUM CHLORIDE 9 MG/ML
1000 INJECTION, SOLUTION INTRAVENOUS
Refills: 0 | Status: DISCONTINUED | OUTPATIENT
Start: 2021-08-06 | End: 2021-08-07

## 2021-08-06 RX ORDER — OLANZAPINE 15 MG/1
1.25 TABLET, FILM COATED ORAL EVERY 6 HOURS
Refills: 0 | Status: DISCONTINUED | OUTPATIENT
Start: 2021-08-06 | End: 2021-08-07

## 2021-08-06 RX ORDER — INSULIN LISPRO 100/ML
VIAL (ML) SUBCUTANEOUS
Refills: 0 | Status: DISCONTINUED | OUTPATIENT
Start: 2021-08-06 | End: 2021-08-19

## 2021-08-06 RX ORDER — ACETAMINOPHEN 500 MG
650 TABLET ORAL EVERY 6 HOURS
Refills: 0 | Status: DISCONTINUED | OUTPATIENT
Start: 2021-08-06 | End: 2021-08-19

## 2021-08-06 RX ORDER — OLANZAPINE 15 MG/1
1.25 TABLET, FILM COATED ORAL ONCE
Refills: 0 | Status: COMPLETED | OUTPATIENT
Start: 2021-08-06 | End: 2021-08-06

## 2021-08-06 RX ORDER — DEXTROSE 50 % IN WATER 50 %
12.5 SYRINGE (ML) INTRAVENOUS ONCE
Refills: 0 | Status: DISCONTINUED | OUTPATIENT
Start: 2021-08-06 | End: 2021-08-19

## 2021-08-06 RX ORDER — SODIUM CHLORIDE 9 MG/ML
1000 INJECTION, SOLUTION INTRAVENOUS ONCE
Refills: 0 | Status: DISCONTINUED | OUTPATIENT
Start: 2021-08-06 | End: 2021-08-06

## 2021-08-06 RX ORDER — METOPROLOL TARTRATE 50 MG
1 TABLET ORAL
Qty: 0 | Refills: 0 | DISCHARGE

## 2021-08-06 RX ORDER — GLUCAGON INJECTION, SOLUTION 0.5 MG/.1ML
1 INJECTION, SOLUTION SUBCUTANEOUS ONCE
Refills: 0 | Status: DISCONTINUED | OUTPATIENT
Start: 2021-08-06 | End: 2021-08-19

## 2021-08-06 RX ORDER — VANCOMYCIN HCL 1 G
1000 VIAL (EA) INTRAVENOUS EVERY 12 HOURS
Refills: 0 | Status: DISCONTINUED | OUTPATIENT
Start: 2021-08-06 | End: 2021-08-06

## 2021-08-06 RX ORDER — CEFEPIME 1 G/1
1000 INJECTION, POWDER, FOR SOLUTION INTRAMUSCULAR; INTRAVENOUS EVERY 12 HOURS
Refills: 0 | Status: DISCONTINUED | OUTPATIENT
Start: 2021-08-07 | End: 2021-08-08

## 2021-08-06 RX ORDER — VANCOMYCIN HCL 1 G
1000 VIAL (EA) INTRAVENOUS DAILY
Refills: 0 | Status: DISCONTINUED | OUTPATIENT
Start: 2021-08-07 | End: 2021-08-07

## 2021-08-06 RX ORDER — SODIUM CHLORIDE 9 MG/ML
1000 INJECTION, SOLUTION INTRAVENOUS
Refills: 0 | Status: DISCONTINUED | OUTPATIENT
Start: 2021-08-06 | End: 2021-08-19

## 2021-08-06 RX ORDER — SODIUM HYPOCHLORITE 0.125 %
1 SOLUTION, NON-ORAL MISCELLANEOUS ONCE
Refills: 0 | Status: DISCONTINUED | OUTPATIENT
Start: 2021-08-06 | End: 2021-08-19

## 2021-08-06 RX ORDER — DEXTROSE 50 % IN WATER 50 %
15 SYRINGE (ML) INTRAVENOUS ONCE
Refills: 0 | Status: DISCONTINUED | OUTPATIENT
Start: 2021-08-06 | End: 2021-08-19

## 2021-08-06 RX ORDER — CEFEPIME 1 G/1
1000 INJECTION, POWDER, FOR SOLUTION INTRAMUSCULAR; INTRAVENOUS ONCE
Refills: 0 | Status: COMPLETED | OUTPATIENT
Start: 2021-08-06 | End: 2021-08-06

## 2021-08-06 RX ORDER — LANOLIN ALCOHOL/MO/W.PET/CERES
3 CREAM (GRAM) TOPICAL AT BEDTIME
Refills: 0 | Status: DISCONTINUED | OUTPATIENT
Start: 2021-08-06 | End: 2021-08-13

## 2021-08-06 RX ORDER — METOPROLOL TARTRATE 50 MG
25 TABLET ORAL THREE TIMES A DAY
Refills: 0 | Status: DISCONTINUED | OUTPATIENT
Start: 2021-08-06 | End: 2021-08-17

## 2021-08-06 RX ORDER — SODIUM CHLORIDE 9 MG/ML
500 INJECTION INTRAMUSCULAR; INTRAVENOUS; SUBCUTANEOUS ONCE
Refills: 0 | Status: DISCONTINUED | OUTPATIENT
Start: 2021-08-06 | End: 2021-08-06

## 2021-08-06 RX ORDER — RIVAROXABAN 15 MG-20MG
15 KIT ORAL
Refills: 0 | Status: DISCONTINUED | OUTPATIENT
Start: 2021-08-06 | End: 2021-08-17

## 2021-08-06 RX ADMIN — Medication 250 MILLIGRAM(S): at 16:07

## 2021-08-06 RX ADMIN — CEFEPIME 100 MILLIGRAM(S): 1 INJECTION, POWDER, FOR SOLUTION INTRAMUSCULAR; INTRAVENOUS at 15:34

## 2021-08-06 RX ADMIN — OLANZAPINE 1.25 MILLIGRAM(S): 15 TABLET, FILM COATED ORAL at 20:57

## 2021-08-06 NOTE — H&P ADULT - PROBLEM SELECTOR PLAN 2
Patient p/w elevated sodium of 152  - based on history most likely caused by impaired PO intake of fluids 2/2 dementia  - will check urine osm, urine lytes  - patient with calculated free water deficit of approximately 1.5L  - will begin IV hydration, LR @ 100cc/hr for 12 hours

## 2021-08-06 NOTE — PHARMACOTHERAPY INTERVENTION NOTE - COMMENTS
Medication history is incomplete. Medication list/dosages obtained from outpatient pharmacy, all filled in Peggy/2021 x 1 month supply. Unable to reach family to confirm medication list.

## 2021-08-06 NOTE — ED PROVIDER NOTE - PHYSICAL EXAMINATION
Gen: awake and alert but non verbal  CV: rrr, no murmur  Pulm: clear lungs  Abd: soft,tntd  Ext: ulceration to L lateral malleolus; ulceration to L heel with eschar, yello discharge and boggy to touch  Psych: AOx0, calm

## 2021-08-06 NOTE — ED ADULT NURSE NOTE - NSIMPLEMENTINTERV_GEN_ALL_ED
Implemented All Fall with Harm Risk Interventions:  [place] to call system. Call bell, personal items and telephone within reach. Instruct patient to call for assistance. Room bathroom lighting operational. Non-slip footwear when patient is off stretcher. Physically safe environment: no spills, clutter or unnecessary equipment. Stretcher in lowest position, wheels locked, appropriate side rails in place. Provide visual cue, wrist band, yellow gown, etc. Monitor gait and stability. Monitor for mental status changes and reorient to person, place, and time. Review medications for side effects contributing to fall risk. Reinforce activity limits and safety measures with patient and family. Provide visual clues: red socks.

## 2021-08-06 NOTE — H&P ADULT - NSHPLABSRESULTS_GEN_ALL_CORE
LABS:                         15.6   6.46  )-----------( 176      ( 06 Aug 2021 14:19 )             48.5     08-06    152<H>  |  111<H>  |  22  ----------------------------<  220<H>  4.9   |  26  |  1.13    Ca    10.5      06 Aug 2021 14:19    TPro  9.0<H>  /  Alb  4.1  /  TBili  0.6  /  DBili  x   /  AST  36<H>  /  ALT  18  /  AlkPhos  105  08-06    PT/INR - ( 06 Aug 2021 14:19 )   PT: 11.4 sec;   INR: 0.99 ratio       PTT - ( 06 Aug 2021 14:19 )  PTT:32.0 sec    RADIOLOGY, EKG & ADDITIONAL TESTS: Reviewed.

## 2021-08-06 NOTE — H&P ADULT - NSHPPHYSICALEXAM_GEN_ALL_CORE
PHYSICAL EXAM:  GENERAL: NAD, lying in bed comfortably  HEENT: NC/AT, EOMI, PERRL  NECK: Supple, No JVD  CHEST/LUNG: CTAB, no increased WOB  HEART: RRR, no m/r/g  ABDOMEN: soft, NT, ND, BS+  EXTREMITIES: left foot wrapped in dressing s/p podiatry procedure, dressing c/d/i, no leakage or bleeding noted, b/l feel cool to touch, diminished DP pulses b/l,  NERVOUS SYSTEM: A&Ox0-1, no focal deficits  MSK: unable to assess 2/2 poor cooperation  SKIN: No rashes or lesions

## 2021-08-06 NOTE — ED PROVIDER NOTE - ATTENDING CONTRIBUTION TO CARE
80F h/o dementia, HTN, CVA, Afib on Xarelto, DM p/w L heel ulceration. Pt unable to provide additional info due to dementia.  Per son, ulcer present since Jan 2021. Had been scabbed over but fell off ~3 wks ago, now with foul smell. Famly concerned for infection to ulcer. Deny fever.  Gen: awake and alert but non verbal  CV: rrr, no murmur  Pulm: clear lungs  Abd: soft,tntd  Ext: ulceration to L lateral malleolus; ulceration to L heel with eschar, yello discharge and boggy to touch  MDM:  80F h/o dementia, HTN, CVA, Afib on Xarelto, DM p/w L heel ulceration concerning for infected wound, possible osteo - will get XRays, podiatry, vascular as ulceration likely 2/2 arterial insufficiency and pressure, basic labs, XRay to r/o free air

## 2021-08-06 NOTE — ED PROVIDER NOTE - PROGRESS NOTE DETAILS
PGY 1 Henri Lott: Pt seen by podiatry, concern for osteomyelitis, rec cefepime and vanc. Fluids ordered for hypernatremia. Case discussed w/ hospitalist and pt accepted for admission.

## 2021-08-06 NOTE — H&P ADULT - PROBLEM SELECTOR PLAN 4
Patient with history of atrial fibrillation  - currently on rate control with metoprolol 25mg TID, and diltiazem 60mg QID at home  - will resume metoprolol 25mg TID  - will judiciously add on diltiazem as patient with c/f ongoing infection  - will c/w home xarelto for AC

## 2021-08-06 NOTE — ED PROVIDER NOTE - CLINICAL SUMMARY MEDICAL DECISION MAKING FREE TEXT BOX
80F h/o dementia, HTN, CVA, Afib on Xarelto, DM p/w L heel ulceration concerning for infected wound, possible osteo - will get XRays, podiatry, vascular as ulceration likely 2/2 arterial insufficiency and pressure, basic labs, XRay to r/o free air

## 2021-08-06 NOTE — H&P ADULT - PROBLEM SELECTOR PLAN 3
Noted to be A&Ox0-1 at baseline at home  - patient currently alert and speaking however speech incoherent  - patient intermittently able to follow very basic commands  - aspiration precautions  - per son, patient able to tolerate puree diet and glucerna at home  - frequent reorientation  - melatonin qhs

## 2021-08-06 NOTE — ED ADULT NURSE REASSESSMENT NOTE - NS ED NURSE REASSESS COMMENT FT1
Pt A&OX0, pulled out US guided IV. Pt A&OX0, pulled out US guided IV. New 22g IV placed in L hand, flushes well with blood return. Wrapped with kerlex. Pt ripped out 2nd IV. Pt refusing VS, EKG. Team 6 unavailable at this time. Team 3 resident made aware. Report given to ESSU 1 RN. Pt transported to area in NAD at this time.

## 2021-08-06 NOTE — ED ADULT NURSE NOTE - OBJECTIVE STATEMENT
pt has hx of dementia, poor historian. as per triage pt brought in from home for ulcer on left heel. pt heel noted to be unstageable with eschar, and also has a ulcer on left ankle. pt also has a stage 1 on sacrum. pt calm, no indicators of pain or discomfort at this time. Safety measures in place. will continue to monitor.

## 2021-08-06 NOTE — ED ADULT TRIAGE NOTE - CHIEF COMPLAINT QUOTE
Pt from home with dementia arrives with left heel ulceration area is black in color. finger stick 198.

## 2021-08-06 NOTE — H&P ADULT - PROBLEM SELECTOR PLAN 6
Patient with longstanding history of hypertension  - c/w home metoprolol 25mg TID  - will add on home diltiazem 60mg QID if BP tolerates

## 2021-08-06 NOTE — H&P ADULT - PROBLEM SELECTOR PLAN 1
- Patient with chronic Left Heel wound, now appearing to be worsening over past 3 weeks  - family noted foul smell from wound  - XR foot without gross evidence of OM or fracture  - patient seen and evaluated by podiatry  - s/p escharectomy with no pus noted and negative probe to bone  - patient without signs of systemic infection  - MRI Left Foot pending  - per podiatry recs will c/w vancomycin/cefepime for now

## 2021-08-06 NOTE — ED PROVIDER NOTE - OBJECTIVE STATEMENT
80 F PMHx of dementia, HTN, CVA, Afib on Xarelto, DM presenting to ED for ulceration of left heel ulceration> Unable to obtain significant hx from pt 2/2 to dementia. Pt's son reports, that the pt has an ulcer over her left heal, which has been present since Jan 2021 when she was admitted to the hospital. Family report scab present on the wound fell off ~3 wks ago, and now the wound has a foul smell. They state that hey are concerned for progressing infection. Family denies fevers, N/V, abd pain, or rapid progression of the wound. She is not taking any abx.

## 2021-08-06 NOTE — H&P ADULT - ASSESSMENT
80 F PMHx of dementia, HTN, CVA, Afib on Xarelto, DM presenting to ED for ulceration of left heel c/f OM.

## 2021-08-06 NOTE — H&P ADULT - HISTORY OF PRESENT ILLNESS
80 F PMHx of dementia, HTN, CVA, Afib on Xarelto, DM presenting to ED for ulceration of left heel. Patient confused and minimall cooperative with exam at this time. Collateral obtained from patient's son, Jose Garvey.  Per patient's son, patient was recently hospitalized in January 2021 at OSH, and was weak during hospitalization and sent to rehab facility for 3 months. Patient developed L heel ulcer while at rehab as well as bed sores which have been present for approximately 6 months. Patient has had visiting nurse coming to the home and her family reports that her bed sores have since healed but her heel wound has not. Family noted that recently pus was coming from the wound. Otherwise they deny noticing the patient having any fevers, chills, diaphoresis, new rashes or diarrhea. They have noted that the patient has had chronic right knee pain. Family notes that baseline patient is able to recognize family members occasionally, and occasionally follows commands. Usually is conversant with family however does often have incoherent dialogue. However they do note that her mental status has been worsening over the past few months.    In the ED: Patient given vancomycin and cefepime. Foot XR performed. Escharectomy performed by podiatry with no pus expression and negative probe to bone.

## 2021-08-06 NOTE — H&P ADULT - ATTENDING COMMENTS
Briefly, pt is 80 y.o. F with PMH of dementia (A&O x 0-1 at baseline), CVA, Afib on Xarelto, T2DM who presents with L infected heel ulcer. No systemic signs of infection; ESR wnl, CRP mildly elevated, and CXR is without finding findings of osteomyelitis. Pt was found to be hypernatremic to 152.     Patient mumbles but is not able to answer questions or provide history. She appears comfortable on exam.     #Infected L foot ulcer:   -c/w vanco/cefepime  -podiatry following  -f/u MRI for r/o osteo    #Hypernatremia  -in the setting of poor PO intake  -agree with LR @100 cc /hr x 10 hours  -repeat BMP in am     #Dementia:   -Zyprexa 1.25 mg IM q6h PRN agitation    #Afib:   -continue with Xarelto and BB  -can add diltiazem if rate remains uncontrolled     #T2DM:   -ISS

## 2021-08-07 LAB
A1C WITH ESTIMATED AVERAGE GLUCOSE RESULT: 6.4 % — HIGH (ref 4–5.6)
ANION GAP SERPL CALC-SCNC: 15 MMOL/L — HIGH (ref 7–14)
BASOPHILS # BLD AUTO: 0.09 K/UL — SIGNIFICANT CHANGE UP (ref 0–0.2)
BASOPHILS NFR BLD AUTO: 1.3 % — SIGNIFICANT CHANGE UP (ref 0–2)
BUN SERPL-MCNC: 21 MG/DL — SIGNIFICANT CHANGE UP (ref 7–23)
CALCIUM SERPL-MCNC: 9.6 MG/DL — SIGNIFICANT CHANGE UP (ref 8.4–10.5)
CHLORIDE SERPL-SCNC: 111 MMOL/L — HIGH (ref 98–107)
CHLORIDE UR-SCNC: 30 MMOL/L — SIGNIFICANT CHANGE UP
CO2 SERPL-SCNC: 24 MMOL/L — SIGNIFICANT CHANGE UP (ref 22–31)
COVID-19 SPIKE DOMAIN AB INTERP: POSITIVE
COVID-19 SPIKE DOMAIN ANTIBODY RESULT: 34.6 U/ML — HIGH
CREAT SERPL-MCNC: 1.05 MG/DL — SIGNIFICANT CHANGE UP (ref 0.5–1.3)
EOSINOPHIL # BLD AUTO: 0.21 K/UL — SIGNIFICANT CHANGE UP (ref 0–0.5)
EOSINOPHIL NFR BLD AUTO: 3.1 % — SIGNIFICANT CHANGE UP (ref 0–6)
ESTIMATED AVERAGE GLUCOSE: 137 — SIGNIFICANT CHANGE UP
GLUCOSE BLDC GLUCOMTR-MCNC: 113 MG/DL — HIGH (ref 70–99)
GLUCOSE BLDC GLUCOMTR-MCNC: 128 MG/DL — HIGH (ref 70–99)
GLUCOSE BLDC GLUCOMTR-MCNC: 190 MG/DL — HIGH (ref 70–99)
GLUCOSE BLDC GLUCOMTR-MCNC: 196 MG/DL — HIGH (ref 70–99)
GLUCOSE SERPL-MCNC: 143 MG/DL — HIGH (ref 70–99)
HCT VFR BLD CALC: 42.8 % — SIGNIFICANT CHANGE UP (ref 34.5–45)
HGB BLD-MCNC: 13.9 G/DL — SIGNIFICANT CHANGE UP (ref 11.5–15.5)
IANC: 3.28 K/UL — SIGNIFICANT CHANGE UP (ref 1.5–8.5)
IMM GRANULOCYTES NFR BLD AUTO: 0.1 % — SIGNIFICANT CHANGE UP (ref 0–1.5)
LYMPHOCYTES # BLD AUTO: 2.23 K/UL — SIGNIFICANT CHANGE UP (ref 1–3.3)
LYMPHOCYTES # BLD AUTO: 33.3 % — SIGNIFICANT CHANGE UP (ref 13–44)
MAGNESIUM SERPL-MCNC: 2.1 MG/DL — SIGNIFICANT CHANGE UP (ref 1.6–2.6)
MCHC RBC-ENTMCNC: 27.9 PG — SIGNIFICANT CHANGE UP (ref 27–34)
MCHC RBC-ENTMCNC: 32.5 GM/DL — SIGNIFICANT CHANGE UP (ref 32–36)
MCV RBC AUTO: 85.8 FL — SIGNIFICANT CHANGE UP (ref 80–100)
MONOCYTES # BLD AUTO: 0.87 K/UL — SIGNIFICANT CHANGE UP (ref 0–0.9)
MONOCYTES NFR BLD AUTO: 13 % — SIGNIFICANT CHANGE UP (ref 2–14)
NEUTROPHILS # BLD AUTO: 3.28 K/UL — SIGNIFICANT CHANGE UP (ref 1.8–7.4)
NEUTROPHILS NFR BLD AUTO: 49.2 % — SIGNIFICANT CHANGE UP (ref 43–77)
NRBC # BLD: 0 /100 WBCS — SIGNIFICANT CHANGE UP
NRBC # FLD: 0 K/UL — SIGNIFICANT CHANGE UP
OSMOLALITY UR: 411 MOSM/KG — SIGNIFICANT CHANGE UP (ref 50–1200)
PHOSPHATE SERPL-MCNC: 3.7 MG/DL — SIGNIFICANT CHANGE UP (ref 2.5–4.5)
PLATELET # BLD AUTO: 189 K/UL — SIGNIFICANT CHANGE UP (ref 150–400)
POTASSIUM SERPL-MCNC: 3.8 MMOL/L — SIGNIFICANT CHANGE UP (ref 3.5–5.3)
POTASSIUM SERPL-SCNC: 3.8 MMOL/L — SIGNIFICANT CHANGE UP (ref 3.5–5.3)
POTASSIUM UR-SCNC: 55.5 MMOL/L — SIGNIFICANT CHANGE UP
RBC # BLD: 4.99 M/UL — SIGNIFICANT CHANGE UP (ref 3.8–5.2)
RBC # FLD: 16.3 % — HIGH (ref 10.3–14.5)
SARS-COV-2 IGG+IGM SERPL QL IA: 34.6 U/ML — HIGH
SARS-COV-2 IGG+IGM SERPL QL IA: POSITIVE
SODIUM SERPL-SCNC: 150 MMOL/L — HIGH (ref 135–145)
SODIUM UR-SCNC: 29 MMOL/L — SIGNIFICANT CHANGE UP
WBC # BLD: 6.69 K/UL — SIGNIFICANT CHANGE UP (ref 3.8–10.5)
WBC # FLD AUTO: 6.69 K/UL — SIGNIFICANT CHANGE UP (ref 3.8–10.5)

## 2021-08-07 PROCEDURE — 99233 SBSQ HOSP IP/OBS HIGH 50: CPT | Mod: GC

## 2021-08-07 RX ORDER — OLANZAPINE 15 MG/1
1.25 TABLET, FILM COATED ORAL EVERY 6 HOURS
Refills: 0 | Status: DISCONTINUED | OUTPATIENT
Start: 2021-08-07 | End: 2021-08-19

## 2021-08-07 RX ORDER — VANCOMYCIN HCL 1 G
1000 VIAL (EA) INTRAVENOUS ONCE
Refills: 0 | Status: COMPLETED | OUTPATIENT
Start: 2021-08-07 | End: 2021-08-07

## 2021-08-07 RX ORDER — SODIUM CHLORIDE 9 MG/ML
1000 INJECTION, SOLUTION INTRAVENOUS
Refills: 0 | Status: DISCONTINUED | OUTPATIENT
Start: 2021-08-07 | End: 2021-08-08

## 2021-08-07 RX ORDER — NYSTATIN CREAM 100000 [USP'U]/G
1 CREAM TOPICAL
Refills: 0 | Status: DISCONTINUED | OUTPATIENT
Start: 2021-08-07 | End: 2021-08-12

## 2021-08-07 RX ORDER — VANCOMYCIN HCL 1 G
1000 VIAL (EA) INTRAVENOUS EVERY 24 HOURS
Refills: 0 | Status: DISCONTINUED | OUTPATIENT
Start: 2021-08-08 | End: 2021-08-08

## 2021-08-07 RX ORDER — VANCOMYCIN HCL 1 G
VIAL (EA) INTRAVENOUS
Refills: 0 | Status: DISCONTINUED | OUTPATIENT
Start: 2021-08-07 | End: 2021-08-08

## 2021-08-07 RX ORDER — VANCOMYCIN HCL 1 G
1000 VIAL (EA) INTRAVENOUS ONCE
Refills: 0 | Status: DISCONTINUED | OUTPATIENT
Start: 2021-08-07 | End: 2021-08-07

## 2021-08-07 RX ADMIN — Medication 250 MILLIGRAM(S): at 21:29

## 2021-08-07 RX ADMIN — CEFEPIME 100 MILLIGRAM(S): 1 INJECTION, POWDER, FOR SOLUTION INTRAMUSCULAR; INTRAVENOUS at 12:49

## 2021-08-07 RX ADMIN — Medication 25 MILLIGRAM(S): at 12:45

## 2021-08-07 RX ADMIN — NYSTATIN CREAM 1 APPLICATION(S): 100000 CREAM TOPICAL at 06:28

## 2021-08-07 RX ADMIN — Medication 25 MILLIGRAM(S): at 06:33

## 2021-08-07 RX ADMIN — RIVAROXABAN 15 MILLIGRAM(S): KIT at 17:43

## 2021-08-07 RX ADMIN — SODIUM CHLORIDE 100 MILLILITER(S): 9 INJECTION, SOLUTION INTRAVENOUS at 06:33

## 2021-08-07 RX ADMIN — Medication 1: at 12:45

## 2021-08-07 RX ADMIN — SODIUM CHLORIDE 50 MILLILITER(S): 9 INJECTION, SOLUTION INTRAVENOUS at 12:45

## 2021-08-07 RX ADMIN — CEFEPIME 100 MILLIGRAM(S): 1 INJECTION, POWDER, FOR SOLUTION INTRAMUSCULAR; INTRAVENOUS at 03:03

## 2021-08-07 RX ADMIN — NYSTATIN CREAM 1 APPLICATION(S): 100000 CREAM TOPICAL at 17:42

## 2021-08-07 RX ADMIN — SODIUM CHLORIDE 100 MILLILITER(S): 9 INJECTION, SOLUTION INTRAVENOUS at 01:00

## 2021-08-07 NOTE — PROGRESS NOTE ADULT - ATTENDING COMMENTS
c/w broad spectrum abx for L. heel ulcer. f/u bcx, wound cx MR foot and GAURAV/PVR.  wound care prn  hypernatremia 2/2 minimal PO intake, start d5+1/4 NS, repeat BMP 6-8 hours later.

## 2021-08-07 NOTE — PROGRESS NOTE ADULT - PROBLEM SELECTOR PLAN 7
DVT: c/w home xarelto, if patient refusing PO will consider therapeutic lovenox while admitted  Diet: Puree, carb consistent  Dispo: pending PT eval/clinical course

## 2021-08-07 NOTE — PROGRESS NOTE ADULT - PROBLEM SELECTOR PLAN 5
Patient with history of DM  - Last A1C documented 7.8 in Jan 2021  - taking Metformin 500mg BID at home  - f/u A1C  - monitor FS TIDAC  - begin insulin sliding scale

## 2021-08-07 NOTE — PROGRESS NOTE ADULT - ASSESSMENT
79 y/o F patient presents w/ L foot heel wound   - Patient seen and evaluated   - Afebrile, WBC 6.18, CRP 1.35, ESR 23  - GAGE: L foot plantar heel wound beyond subQ but not to bone, no probe to bone, malodor, no tracking, undermining distolateral and proximal, bogginess noted, no periwound erythema, no pus or drainage, L foot lateral malleoli wound with dry scab  - LF XR: no OM, no gas   - Continue IV vancomycin and cefipime   - LF wound culture pending  - LF MRI pending   - Continue Z-flows to offload L heel   - Pod plan for [facility] w/ Dakins pending LF MRI   - Discussed w/ attending

## 2021-08-07 NOTE — PROGRESS NOTE ADULT - PROBLEM SELECTOR PLAN 3
Noted to be A&Ox0-1 at baseline at home  - patient currently alert and speaking however speech incoherent  - patient intermittently able to follow very basic commands  - aspiration precautions  - per son, patient able to tolerate puree diet and glucerna at home  - frequent reorientation  - melatonin qhs  - zyprexa 1.25mg prn for agitation

## 2021-08-07 NOTE — PROGRESS NOTE ADULT - SUBJECTIVE AND OBJECTIVE BOX
PROGRESS NOTE:   Authored By: Clarisa Carranza MD     PGY-3, Internal Medicine  Pager: [phone], [phone] [phone]    Patient is a 80y old  Female who presents with a chief complaint of Left Heel Ulcer (06 Aug 2021 17:16)    OVERNIGHT EVENTS: No acute events overnight. Patient given 1 dose of Zyprexa in the evening.    SUBJECTIVE: Pt seen and examined at bedside this morning.     ADDITIONAL REVIEW OF SYSTEMS:    MEDICATIONS  (STANDING):  cefepime   IVPB 1000 milliGRAM(s) IV Intermittent every 12 hours  Dakins Solution - 1/2 Strength 1 Application(s) Topical Once  dextrose 40% Gel 15 Gram(s) Oral once  dextrose 5%. 1000 milliLiter(s) (50 mL/Hr) IV Continuous <Continuous>  dextrose 5%. 1000 milliLiter(s) (100 mL/Hr) IV Continuous <Continuous>  dextrose 50% Injectable 25 Gram(s) IV Push once  dextrose 50% Injectable 12.5 Gram(s) IV Push once  dextrose 50% Injectable 25 Gram(s) IV Push once  glucagon  Injectable 1 milliGRAM(s) IntraMuscular once  insulin lispro (ADMELOG) corrective regimen sliding scale   SubCutaneous three times a day before meals  lactated ringers. 1000 milliLiter(s) (100 mL/Hr) IV Continuous <Continuous>  metoprolol tartrate 25 milliGRAM(s) Oral three times a day  nystatin Powder 1 Application(s) Topical two times a day  rivaroxaban 15 milliGRAM(s) Oral with dinner  vancomycin  IVPB 1000 milliGRAM(s) IV Intermittent daily    MEDICATIONS  (PRN):  acetaminophen   Tablet .. 650 milliGRAM(s) Oral every 6 hours PRN Temp greater or equal to 38.5C (101.3F), Mild Pain (1 - 3)  aluminum hydroxide/magnesium hydroxide/simethicone Suspension 30 milliLiter(s) Oral every 4 hours PRN Dyspepsia  melatonin 3 milliGRAM(s) Oral at bedtime PRN Insomnia  OLANZapine Injectable 1.25 milliGRAM(s) IntraMuscular every 6 hours PRN Agitation    CAPILLARY BLOOD GLUCOSE    POCT Blood Glucose.: 125 mg/dL (06 Aug 2021 23:12)  POCT Blood Glucose.: 198 mg/dL (06 Aug 2021 12:04)    I&O's Summary    PHYSICAL EXAM:  Vital Signs Last 24 Hrs  T(C): 36.4 (07 Aug 2021 00:47), Max: 36.8 (06 Aug 2021 12:06)  T(F): 97.5 (07 Aug 2021 00:47), Max: 98.2 (06 Aug 2021 12:06)  HR: 100 (07 Aug 2021 00:47) (74 - 106)  BP: 113/65 (07 Aug 2021 00:47) (101/85 - 139/92)  BP(mean): --  RR: 18 (07 Aug 2021 00:47) (18 - 18)  SpO2: 96% (07 Aug 2021 00:47) (96% - 100%)    CONSTITUTIONAL: NAD, frail and elderly woman  HEENT: NC/AT, EOMI  RESPIRATORY: No increased work of breathing, CTAB, no wheezes or crackles appreciated  CARDIOVASCULAR: RRR, S1 and S2 present, no m/r/g  ABDOMEN: soft, NT, ND, bowel sounds present  EXTREMITIES: No LE edema  MUSCULOSKELETAL: no joint swelling or tenderness to palpation  NEURO: A&Ox0, moving all extremities spontaneously    LABS:                        15.6   6.46  )-----------( 176      ( 06 Aug 2021 14:19 )             48.5     08-06    152<H>  |  111<H>  |  22  ----------------------------<  220<H>  4.9   |  26  |  1.13    Ca    10.5      06 Aug 2021 14:19    TPro  9.0<H>  /  Alb  4.1  /  TBili  0.6  /  DBili  x   /  AST  36<H>  /  ALT  18  /  AlkPhos  105  08-06    PT/INR - ( 06 Aug 2021 14:19 )   PT: 11.4 sec;   INR: 0.99 ratio         PTT - ( 06 Aug 2021 14:19 )  PTT:32.0 sec    RADIOLOGY & ADDITIONAL TESTS:  < from: Xray Ankle 2 Views, Left (08.06.21 @ 14:33) >  IMPRESSION:  Posterior heel partial-thickness soft tissue ulceration with thin film of radiodense material along the bed of the ulceration.  No tracking gas collections beyond the ulcer margins. No gross radiographic evidence of osteomyelitis however if clinical concern is high consider further imaging with MRI.    No acute fracture or dislocation.    Joint spaces are maintained without evidence of joint margin erosions or ankle joint effusion.    Diffuse osteopenic changes.    Diffuse lower leg vascular calcifications.    < end of copied text >    Results Reviewed:   Imaging Personally Reviewed:  Electrocardiogram Personally Reviewed:    COORDINATION OF CARE:  Care Discussed with Consultants/Other Providers [Y/N]:  Prior or Outpatient Records Reviewed [Y/N]:   PROGRESS NOTE:   Authored By: Clarisa Carranza MD     PGY-3, Internal Medicine  Pager: [phone], HQN [phone]    Patient is a 80y old  Female who presents with a chief complaint of Left Heel Ulcer (06 Aug 2021 17:16)    OVERNIGHT EVENTS: No acute events overnight. Patient given 1 dose of Zyprexa in the evening.    SUBJECTIVE: Pt seen and examined at bedside this morning. Alert and cooperative with exam today. Patient able to speak however speech is difficult to understand. Mentioned that she "wants to cook," patient occasionally smiling and laughing during interview. Does not appear to be in any distress. Appears more energetic compared to previous.    ADDITIONAL REVIEW OF SYSTEMS:  Unable to obtain 2/2 mental status.    MEDICATIONS  (STANDING):  cefepime   IVPB 1000 milliGRAM(s) IV Intermittent every 12 hours  Dakins Solution - 1/2 Strength 1 Application(s) Topical Once  dextrose 40% Gel 15 Gram(s) Oral once  dextrose 5%. 1000 milliLiter(s) (50 mL/Hr) IV Continuous <Continuous>  dextrose 5%. 1000 milliLiter(s) (100 mL/Hr) IV Continuous <Continuous>  dextrose 50% Injectable 25 Gram(s) IV Push once  dextrose 50% Injectable 12.5 Gram(s) IV Push once  dextrose 50% Injectable 25 Gram(s) IV Push once  glucagon  Injectable 1 milliGRAM(s) IntraMuscular once  insulin lispro (ADMELOG) corrective regimen sliding scale   SubCutaneous three times a day before meals  lactated ringers. 1000 milliLiter(s) (100 mL/Hr) IV Continuous <Continuous>  metoprolol tartrate 25 milliGRAM(s) Oral three times a day  nystatin Powder 1 Application(s) Topical two times a day  rivaroxaban 15 milliGRAM(s) Oral with dinner  vancomycin  IVPB 1000 milliGRAM(s) IV Intermittent daily    MEDICATIONS  (PRN):  acetaminophen   Tablet .. 650 milliGRAM(s) Oral every 6 hours PRN Temp greater or equal to 38.5C (101.3F), Mild Pain (1 - 3)  aluminum hydroxide/magnesium hydroxide/simethicone Suspension 30 milliLiter(s) Oral every 4 hours PRN Dyspepsia  melatonin 3 milliGRAM(s) Oral at bedtime PRN Insomnia  OLANZapine Injectable 1.25 milliGRAM(s) IntraMuscular every 6 hours PRN Agitation    CAPILLARY BLOOD GLUCOSE    POCT Blood Glucose.: 125 mg/dL (06 Aug 2021 23:12)  POCT Blood Glucose.: 198 mg/dL (06 Aug 2021 12:04)    I&O's Summary    PHYSICAL EXAM:  Vital Signs Last 24 Hrs  T(C): 36.4 (07 Aug 2021 00:47), Max: 36.8 (06 Aug 2021 12:06)  T(F): 97.5 (07 Aug 2021 00:47), Max: 98.2 (06 Aug 2021 12:06)  HR: 100 (07 Aug 2021 00:47) (74 - 106)  BP: 113/65 (07 Aug 2021 00:47) (101/85 - 139/92)  BP(mean): --  RR: 18 (07 Aug 2021 00:47) (18 - 18)  SpO2: 96% (07 Aug 2021 00:47) (96% - 100%)    CONSTITUTIONAL: NAD, frail and elderly woman  HEENT: NC/AT, eyes tracking appropriately, poor dentition  RESPIRATORY: No increased work of breathing, CTAB, no wheezes or crackles appreciated  CARDIOVASCULAR: RRR, S1 and S2 present, no m/r/g  ABDOMEN: soft, NT, ND, bowel sounds present  EXTREMITIES: No LE edema appreciated, warm extremities, left heel with dressing in place, c/d/i, patient without apparent discomfort with manipulation of left foot, diminished DP pulses noted over both feet.  MUSCULOSKELETAL: no joint swelling or tenderness to palpation  NEURO: A&Ox0, moving all extremities spontaneously    LABS:                        15.6   6.46  )-----------( 176      ( 06 Aug 2021 14:19 )             48.5     08-06    152<H>  |  111<H>  |  22  ----------------------------<  220<H>  4.9   |  26  |  1.13    Ca    10.5      06 Aug 2021 14:19    TPro  9.0<H>  /  Alb  4.1  /  TBili  0.6  /  DBili  x   /  AST  36<H>  /  ALT  18  /  AlkPhos  105  08-06    PT/INR - ( 06 Aug 2021 14:19 )   PT: 11.4 sec;   INR: 0.99 ratio         PTT - ( 06 Aug 2021 14:19 )  PTT:32.0 sec    RADIOLOGY & ADDITIONAL TESTS:  < from: Xray Ankle 2 Views, Left (08.06.21 @ 14:33) >  IMPRESSION:  Posterior heel partial-thickness soft tissue ulceration with thin film of radiodense material along the bed of the ulceration.  No tracking gas collections beyond the ulcer margins. No gross radiographic evidence of osteomyelitis however if clinical concern is high consider further imaging with MRI.    No acute fracture or dislocation.    Joint spaces are maintained without evidence of joint margin erosions or ankle joint effusion.    Diffuse osteopenic changes.    Diffuse lower leg vascular calcifications.    < end of copied text >    Results Reviewed:   Imaging Personally Reviewed:  Electrocardiogram Personally Reviewed:    COORDINATION OF CARE:  Care Discussed with Consultants/Other Providers [Y/N]:  Prior or Outpatient Records Reviewed [Y/N]:

## 2021-08-07 NOTE — PROGRESS NOTE ADULT - PROBLEM SELECTOR PLAN 2
Patient p/w elevated sodium of 152  - based on history most likely caused by impaired PO intake of fluids 2/2 dementia  - will check urine osm, urine lytes  - patient with calculated free water deficit of approximately 1.5L  - received LR @ 100cc/hr for 10 hours overnight, will reassess need for additional fluids based on BMP  - daily BMP Patient p/w elevated sodium of 152  - based on history most likely caused by impaired PO intake of fluids 2/2 dementia  - will check urine osm, urine lytes  - patient with calculated free water deficit of approximately 1.5L  - received LR @ 100cc/hr for 10 hours overnight  - Na remains elevated to Patient p/w elevated sodium of 152  - based on history most likely caused by impaired PO intake of fluids 2/2 dementia  - will check urine osm, urine lytes  - patient with calculated free water deficit of approximately 1.5L  - received LR @ 100cc/hr for 10 hours overnight  - Na remains elevated to 150 today  - will give additional IVF today, recheck BMP this afternoon

## 2021-08-07 NOTE — PATIENT PROFILE ADULT - NSPROGENPREVTRANSF_GEN_A_NUR
pt confused unable to give info pt confused unable to give info/no history of blood product transfusion

## 2021-08-07 NOTE — PROGRESS NOTE ADULT - SUBJECTIVE AND OBJECTIVE BOX
Patient is a 80y old  Female who presents with a chief complaint of Left Heel Ulcer (07 Aug 2021 05:51)       INTERVAL HPI/OVERNIGHT EVENTS:  Patient seen and evaluated at bedside.  Pt is resting comfortable in NAD. Denies N/V/F/C.    Allergies    No Known Allergies    Intolerances        Vital Signs Last 24 Hrs  T(C): 36.3 (07 Aug 2021 06:26), Max: 36.5 (06 Aug 2021 17:17)  T(F): 97.3 (07 Aug 2021 06:26), Max: 97.7 (06 Aug 2021 17:17)  HR: 110 (07 Aug 2021 06:26) (74 - 110)  BP: 109/64 (07 Aug 2021 06:26) (109/64 - 139/92)  BP(mean): --  RR: 17 (07 Aug 2021 06:26) (17 - 18)  SpO2: 96% (07 Aug 2021 06:26) (96% - 97%)    LABS:                        13.9   6.69  )-----------( 189      ( 07 Aug 2021 07:54 )             42.8     08-07    150<H>  |  111<H>  |  21  ----------------------------<  143<H>  3.8   |  24  |  1.05    Ca    9.6      07 Aug 2021 07:54  Phos  3.7     08-07  Mg     2.10     08-07    TPro  9.0<H>  /  Alb  4.1  /  TBili  0.6  /  DBili  x   /  AST  36<H>  /  ALT  18  /  AlkPhos  105  08-06    PT/INR - ( 06 Aug 2021 14:19 )   PT: 11.4 sec;   INR: 0.99 ratio         PTT - ( 06 Aug 2021 14:19 )  PTT:32.0 sec    CAPILLARY BLOOD GLUCOSE      POCT Blood Glucose.: 196 mg/dL (07 Aug 2021 12:27)  POCT Blood Glucose.: 128 mg/dL (07 Aug 2021 08:39)  POCT Blood Glucose.: 125 mg/dL (06 Aug 2021 23:12)      Lower Extremity Physical Exam:  Vascular: DP 1/4 B/L, PT non-palpable b/l, CFT <3 seconds B/L, Temperature gradient warm to cool, B/L.   Neuro: Epicritic sensation diminished to the level of ankle, B/L.  Musculoskeletal/Ortho: unremarkable  Skin: L foot plantar heel eschar, no probe to bone, malodor, no tracking, undermining distolateral and proximal, bogginess felt at LF plantar heel, no periwound erythema, no pus or drainage, L foot lateral malleoli wound with dry scab    8/6 s/p LF heel escharectomy wound beyond subQ but not to bone, fibrotic base, no purulence or drainage noted    RADIOLOGY & ADDITIONAL TESTS:

## 2021-08-07 NOTE — PATIENT PROFILE ADULT - VISION (WITH CORRECTIVE LENSES IF THE PATIENT USUALLY WEARS THEM):
unble to assess confused not cooperative Partially impaired: cannot see medication labels or newsprint, but can see obstacles in path, and the surrounding layout; can count fingers at arm's length

## 2021-08-07 NOTE — PROGRESS NOTE ADULT - PROBLEM SELECTOR PLAN 1
- Patient with chronic Left Heel wound, now appearing to be worsening over past 3 weeks  - family noted foul smell from wound  - XR foot without gross evidence of OM or fracture  - patient seen and evaluated by podiatry  - s/p escharectomy with no pus noted and negative probe to bone  - patient without signs of systemic infection  - MRI Left Foot pending  - per podiatry recs will c/w vancomycin/cefepime for now - Patient with chronic Left Heel wound, now appearing to be worsening over past 3 weeks  - family noted foul smell from wound  - XR foot without gross evidence of OM or fracture  - patient seen and evaluated by podiatry  - s/p escharectomy with no pus noted and negative probe to bone  - patient without signs of systemic infection  - MRI Left Foot pending  - per podiatry recs will c/w vancomycin/cefepime for now  - will monitor vancomycin levels

## 2021-08-08 LAB
-  AMPICILLIN/SULBACTAM: SIGNIFICANT CHANGE UP
-  CEFAZOLIN: SIGNIFICANT CHANGE UP
-  CLINDAMYCIN: SIGNIFICANT CHANGE UP
-  DAPTOMYCIN: SIGNIFICANT CHANGE UP
-  ERYTHROMYCIN: SIGNIFICANT CHANGE UP
-  GENTAMICIN: SIGNIFICANT CHANGE UP
-  LINEZOLID: SIGNIFICANT CHANGE UP
-  OXACILLIN: SIGNIFICANT CHANGE UP
-  PENICILLIN: SIGNIFICANT CHANGE UP
-  RIFAMPIN: SIGNIFICANT CHANGE UP
-  TETRACYCLINE: SIGNIFICANT CHANGE UP
-  TRIMETHOPRIM/SULFAMETHOXAZOLE: SIGNIFICANT CHANGE UP
-  VANCOMYCIN: SIGNIFICANT CHANGE UP
ANION GAP SERPL CALC-SCNC: 14 MMOL/L — SIGNIFICANT CHANGE UP (ref 7–14)
BUN SERPL-MCNC: 22 MG/DL — SIGNIFICANT CHANGE UP (ref 7–23)
CALCIUM SERPL-MCNC: 8.9 MG/DL — SIGNIFICANT CHANGE UP (ref 8.4–10.5)
CHLORIDE SERPL-SCNC: 109 MMOL/L — HIGH (ref 98–107)
CO2 SERPL-SCNC: 22 MMOL/L — SIGNIFICANT CHANGE UP (ref 22–31)
CREAT SERPL-MCNC: 1.04 MG/DL — SIGNIFICANT CHANGE UP (ref 0.5–1.3)
CULTURE RESULTS: SIGNIFICANT CHANGE UP
GLUCOSE BLDC GLUCOMTR-MCNC: 106 MG/DL — HIGH (ref 70–99)
GLUCOSE BLDC GLUCOMTR-MCNC: 152 MG/DL — HIGH (ref 70–99)
GLUCOSE BLDC GLUCOMTR-MCNC: 158 MG/DL — HIGH (ref 70–99)
GLUCOSE BLDC GLUCOMTR-MCNC: 174 MG/DL — HIGH (ref 70–99)
GLUCOSE SERPL-MCNC: 211 MG/DL — HIGH (ref 70–99)
METHOD TYPE: SIGNIFICANT CHANGE UP
ORGANISM # SPEC MICROSCOPIC CNT: SIGNIFICANT CHANGE UP
ORGANISM # SPEC MICROSCOPIC CNT: SIGNIFICANT CHANGE UP
POTASSIUM SERPL-MCNC: 3.7 MMOL/L — SIGNIFICANT CHANGE UP (ref 3.5–5.3)
POTASSIUM SERPL-SCNC: 3.7 MMOL/L — SIGNIFICANT CHANGE UP (ref 3.5–5.3)
SODIUM SERPL-SCNC: 145 MMOL/L — SIGNIFICANT CHANGE UP (ref 135–145)
SPECIMEN SOURCE: SIGNIFICANT CHANGE UP

## 2021-08-08 PROCEDURE — 99233 SBSQ HOSP IP/OBS HIGH 50: CPT | Mod: GC

## 2021-08-08 RX ORDER — SODIUM HYPOCHLORITE 0.125 %
1 SOLUTION, NON-ORAL MISCELLANEOUS DAILY
Refills: 0 | Status: DISCONTINUED | OUTPATIENT
Start: 2021-08-08 | End: 2021-08-19

## 2021-08-08 RX ORDER — CEFAZOLIN SODIUM 1 G
2000 VIAL (EA) INJECTION EVERY 8 HOURS
Refills: 0 | Status: DISCONTINUED | OUTPATIENT
Start: 2021-08-08 | End: 2021-08-09

## 2021-08-08 RX ORDER — SODIUM CHLORIDE 9 MG/ML
1000 INJECTION, SOLUTION INTRAVENOUS
Refills: 0 | Status: DISCONTINUED | OUTPATIENT
Start: 2021-08-08 | End: 2021-08-10

## 2021-08-08 RX ADMIN — Medication 1 APPLICATION(S): at 17:37

## 2021-08-08 RX ADMIN — Medication 100 MILLIGRAM(S): at 21:26

## 2021-08-08 RX ADMIN — Medication 1: at 09:47

## 2021-08-08 RX ADMIN — Medication 25 MILLIGRAM(S): at 13:40

## 2021-08-08 RX ADMIN — Medication 1: at 17:54

## 2021-08-08 RX ADMIN — NYSTATIN CREAM 1 APPLICATION(S): 100000 CREAM TOPICAL at 05:47

## 2021-08-08 RX ADMIN — Medication 25 MILLIGRAM(S): at 05:46

## 2021-08-08 RX ADMIN — CEFEPIME 100 MILLIGRAM(S): 1 INJECTION, POWDER, FOR SOLUTION INTRAMUSCULAR; INTRAVENOUS at 02:47

## 2021-08-08 RX ADMIN — Medication 25 MILLIGRAM(S): at 21:28

## 2021-08-08 RX ADMIN — Medication 100 MILLIGRAM(S): at 13:40

## 2021-08-08 RX ADMIN — SODIUM CHLORIDE 100 MILLILITER(S): 9 INJECTION, SOLUTION INTRAVENOUS at 12:10

## 2021-08-08 RX ADMIN — RIVAROXABAN 15 MILLIGRAM(S): KIT at 17:37

## 2021-08-08 RX ADMIN — NYSTATIN CREAM 1 APPLICATION(S): 100000 CREAM TOPICAL at 17:38

## 2021-08-08 NOTE — PROGRESS NOTE ADULT - ATTENDING COMMENTS
c/w broad spectrum abx for L. heel ulcer. Bcx NTD, wound cx w/ ?contamination, although showing few staph aureus, suspectibility to follow. Will discuss with podiatry if suspicion for MSSA can consider changing IV abx to ancef. pending MR foot and GAURAV/PVR.  wound care prn  hypernatremia 2/2 minimal PO intake, resolved with d5, c/w MIVF  one episode of hypothermia 94.7 rectally, improved with hypothermia blanket. Will check TSH, suspect d/t infection vs malnutrition. c/w broad spectrum abx for L. heel ulcer. Bcx NTD, wound cx w/ ?contamination, although showing few staph aureus, susceptibility to follow. Will discuss with podiatry if suspicion for MSSA can consider changing IV abx to ancef. pending MR foot and GAURAV/PVR.  wound care prn  hypernatremia 2/2 minimal PO intake, resolved with d5, c/w MIVF  one episode of hypothermia 94.7 rectally, improved with hypothermia blanket. Will check TSH, suspect d/t infection vs malnutrition.     Plan discussed w. COLBY and son Jose 8/8
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
## 2021-08-08 NOTE — PROGRESS NOTE ADULT - PROBLEM SELECTOR PLAN 1
- Patient with chronic Left Heel wound, now appearing to be worsening over past 3 weeks  - family noted foul smell from wound  - XR foot without gross evidence of OM or fracture  - patient seen and evaluated by podiatry  - s/p escharectomy with no pus noted and negative probe to bone  - patient without signs of systemic infection  - MRI Left Foot pending  - per podiatry recs will c/w vancomycin/cefepime for now  - will monitor vancomycin levels - Patient with chronic Left Heel wound, now appearing to be worsening over past 3 weeks  - family noted foul smell from wound  - XR foot without gross evidence of OM or fracture  - patient seen and evaluated by podiatry  - s/p escharectomy with no pus noted and negative probe to bone  - patient without signs of systemic infection  - MRI Left Foot pending  - per podiatry recs will   -d/c vanc /cefepime  - Start ancef given MSSA in abscess

## 2021-08-08 NOTE — DIETITIAN INITIAL EVALUATION ADULT. - SIGNS/SYMPTOMS
mod-severe body fat loss and severe muscle mass loss; wt loss of 39% x 1.5 yr; christine ankles 1+ edema

## 2021-08-08 NOTE — PROGRESS NOTE ADULT - SUBJECTIVE AND OBJECTIVE BOX
Anais Moon MD  PGY 3 Department of Internal Medicine  Pager: [phone] ([facility])   Pager: [phone] ([facility])    Patient is a 80y old  Female who presents with a chief complaint of Left Heel Ulcer (07 Aug 2021 12:46)      SUBJECTIVE / OVERNIGHT EVENTS: Pt seen and examined. No acute overnight events. Alert though difficult to understand given speech impediment     MEDICATIONS  (STANDING):  cefepime   IVPB 1000 milliGRAM(s) IV Intermittent every 12 hours  Dakins Solution - 1/2 Strength 1 Application(s) Topical Once  dextrose 40% Gel 15 Gram(s) Oral once  dextrose 5%. 1000 milliLiter(s) (100 mL/Hr) IV Continuous <Continuous>  dextrose 5%. 1000 milliLiter(s) (50 mL/Hr) IV Continuous <Continuous>  dextrose 50% Injectable 25 Gram(s) IV Push once  dextrose 50% Injectable 12.5 Gram(s) IV Push once  dextrose 50% Injectable 25 Gram(s) IV Push once  glucagon  Injectable 1 milliGRAM(s) IntraMuscular once  insulin lispro (ADMELOG) corrective regimen sliding scale   SubCutaneous three times a day before meals  metoprolol tartrate 25 milliGRAM(s) Oral three times a day  nystatin Powder 1 Application(s) Topical two times a day  rivaroxaban 15 milliGRAM(s) Oral with dinner  vancomycin  IVPB      vancomycin  IVPB 1000 milliGRAM(s) IV Intermittent every 24 hours    MEDICATIONS  (PRN):  acetaminophen   Tablet .. 650 milliGRAM(s) Oral every 6 hours PRN Temp greater or equal to 38.5C (101.3F), Mild Pain (1 - 3)  aluminum hydroxide/magnesium hydroxide/simethicone Suspension 30 milliLiter(s) Oral every 4 hours PRN Dyspepsia  melatonin 3 milliGRAM(s) Oral at bedtime PRN Insomnia  OLANZapine Injectable 1.25 milliGRAM(s) IntraMuscular every 6 hours PRN Agitation      I&O's Summary    06 Aug 2021 07:01  -  07 Aug 2021 07:00  --------------------------------------------------------  IN: 0 mL / OUT: 300 mL / NET: -300 mL        Vital Signs Last 24 Hrs  T(C): 36.3 (08 Aug 2021 02:11), Max: 36.3 (07 Aug 2021 06:26)  T(F): 97.3 (08 Aug 2021 02:11), Max: 97.3 (07 Aug 2021 06:26)  HR: 103 (08 Aug 2021 02:11) (91 - 110)  BP: 118/81 (08 Aug 2021 02:11) (90/65 - 122/77)  BP(mean): --  RR: 16 (08 Aug 2021 02:11) (14 - 17)  SpO2: 100% (08 Aug 2021 02:11) (96% - 100%)    CAPILLARY BLOOD GLUCOSE      POCT Blood Glucose.: 190 mg/dL (07 Aug 2021 22:07)  POCT Blood Glucose.: 113 mg/dL (07 Aug 2021 17:39)  POCT Blood Glucose.: 196 mg/dL (07 Aug 2021 12:27)  POCT Blood Glucose.: 128 mg/dL (07 Aug 2021 08:39)      PHYSICAL EXAM:  CONSTITUTIONAL: NAD  RESPIRATORY:  CTAB, no wheezes, rhonchi or crackles  CARDIOVASCULAR: RRR, S1 and S2 present, no m/r/g  ABDOMEN: soft, NT, ND, bowel sounds present  EXTREMITIES: No LE edema, + left heel with dressing in place, c/d/i  MUSCULOSKELETAL: no joint swelling or tenderness to palpation  NEURO: A&Ox0, moving all extremities spontaneously       LABS:                        13.9   6.69  )-----------( 189      ( 07 Aug 2021 07:54 )             42.8     Auto Eosinophil # 0.21  / Auto Eosinophil % 3.1   / Auto Neutrophil # 3.28  / Auto Neutrophil % 49.2  / BANDS % x                            15.6   6.46  )-----------( 176      ( 06 Aug 2021 14:19 )             48.5     Auto Eosinophil # 0.16  / Auto Eosinophil % 2.5   / Auto Neutrophil # 3.65  / Auto Neutrophil % 56.5  / BANDS % x        08-08    145  |  109<H>  |  22  ----------------------------<  211<H>  3.7   |  22  |  1.04  08-07    150<H>  |  111<H>  |  21  ----------------------------<  143<H>  3.8   |  24  |  1.05  08-06    152<H>  |  111<H>  |  22  ----------------------------<  220<H>  4.9   |  26  |  1.13    Ca    8.9      08 Aug 2021 01:09  Mg     2.10     08-07  Phos  3.7     08-07  TPro  9.0<H>  /  Alb  4.1  /  TBili  0.6  /  DBili  x   /  AST  36<H>  /  ALT  18  /  AlkPhos  105  08-06    PT/INR - ( 06 Aug 2021 14:19 )   PT: 11.4 sec;   INR: 0.99 ratio         PTT - ( 06 Aug 2021 14:19 )  PTT:32.0 sec              RADIOLOGY & ADDITIONAL TESTS:    Imaging Personally Reviewed:    Consultant(s) Notes Reviewed:      Care Discussed with Consultants/Other Providers:

## 2021-08-08 NOTE — DIETITIAN INITIAL EVALUATION ADULT. - ORAL NUTRITION SUPPLEMENTS
1) will send chocolate + vanilla flavor for Glucerna Shake 240mls 2x daily (440kcals, 20g protein); 2) add No Carb Prosource 1 pkt 2x daily (30g protein, 120kcal).

## 2021-08-08 NOTE — PROGRESS NOTE ADULT - PROBLEM SELECTOR PLAN 2
Patient p/w elevated sodium of 152  - based on history most likely caused by impaired PO intake of fluids 2/2 dementia  - Now resolved s/p IVF   - Encourage PO intake  - Monitor Na

## 2021-08-08 NOTE — DIETITIAN INITIAL EVALUATION ADULT. - PERTINENT MEDS FT
MEDICATIONS  (STANDING):  ceFAZolin   IVPB 2000 milliGRAM(s) IV Intermittent every 8 hours  Dakins Solution - 1/2 Strength 1 Application(s) Topical Once  Dakins Solution - 1/4 Strength 1 Application(s) Topical daily  dextrose 40% Gel 15 Gram(s) Oral once  dextrose 5%. 1000 milliLiter(s) (100 mL/Hr) IV Continuous <Continuous>  dextrose 5%. 1000 milliLiter(s) (50 mL/Hr) IV Continuous <Continuous>  dextrose 50% Injectable 25 Gram(s) IV Push once  dextrose 50% Injectable 12.5 Gram(s) IV Push once  dextrose 50% Injectable 25 Gram(s) IV Push once  glucagon  Injectable 1 milliGRAM(s) IntraMuscular once  insulin lispro (ADMELOG) corrective regimen sliding scale   SubCutaneous three times a day before meals  lactated ringers. 1000 milliLiter(s) (100 mL/Hr) IV Continuous <Continuous>  metoprolol tartrate 25 milliGRAM(s) Oral three times a day  nystatin Powder 1 Application(s) Topical two times a day  rivaroxaban 15 milliGRAM(s) Oral with dinner    MEDICATIONS  (PRN):  acetaminophen   Tablet .. 650 milliGRAM(s) Oral every 6 hours PRN Temp greater or equal to 38.5C (101.3F), Mild Pain (1 - 3)  aluminum hydroxide/magnesium hydroxide/simethicone Suspension 30 milliLiter(s) Oral every 4 hours PRN Dyspepsia  melatonin 3 milliGRAM(s) Oral at bedtime PRN Insomnia  OLANZapine Injectable 1.25 milliGRAM(s) IntraMuscular every 6 hours PRN Agitation

## 2021-08-08 NOTE — DIETITIAN INITIAL EVALUATION ADULT. - ADD RECOMMEND
1. Continue current diet order per MD. May consider SLP swallow eval to determine appropriate diet consistency. Defers diet consistency to SLP/MD. 2. Monitor weights, labs, BM's, skin integrity, PO intake/tolerance. 3. Encourage po intake, provide feeding assistance with meals and menu selections, provide alternatives PRN. 4. Consider adding daily Multivitamin for micronutrient coverage.

## 2021-08-08 NOTE — PROGRESS NOTE ADULT - SUBJECTIVE AND OBJECTIVE BOX
Patient is a 80y old  Female who presents with a chief complaint of Left Heel Ulcer (08 Aug 2021 06:07)       INTERVAL HPI/OVERNIGHT EVENTS:  Patient seen and evaluated at bedside.  Pt is resting comfortable in NAD. Denies N/V/F/C.     Allergies    No Known Allergies    Intolerances        Vital Signs Last 24 Hrs  T(C): 36.4 (08 Aug 2021 06:00), Max: 36.4 (08 Aug 2021 06:00)  T(F): 97.5 (08 Aug 2021 06:00), Max: 97.5 (08 Aug 2021 06:00)  HR: 95 (08 Aug 2021 06:00) (91 - 103)  BP: 106/74 (08 Aug 2021 06:00) (90/65 - 122/77)  BP(mean): --  RR: 17 (08 Aug 2021 06:00) (14 - 17)  SpO2: 98% (08 Aug 2021 06:00) (98% - 100%)    LABS:                        13.9   6.69  )-----------( 189      ( 07 Aug 2021 07:54 )             42.8     08-08    145  |  109<H>  |  22  ----------------------------<  211<H>  3.7   |  22  |  1.04    Ca    8.9      08 Aug 2021 01:09  Phos  3.7     08-07  Mg     2.10     08-07    TPro  9.0<H>  /  Alb  4.1  /  TBili  0.6  /  DBili  x   /  AST  36<H>  /  ALT  18  /  AlkPhos  105  08-06    PT/INR - ( 06 Aug 2021 14:19 )   PT: 11.4 sec;   INR: 0.99 ratio         PTT - ( 06 Aug 2021 14:19 )  PTT:32.0 sec    CAPILLARY BLOOD GLUCOSE      POCT Blood Glucose.: 152 mg/dL (08 Aug 2021 09:44)  POCT Blood Glucose.: 190 mg/dL (07 Aug 2021 22:07)  POCT Blood Glucose.: 113 mg/dL (07 Aug 2021 17:39)  POCT Blood Glucose.: 196 mg/dL (07 Aug 2021 12:27)      Lower Extremity Physical Exam:  Vascular: DP 1/4 B/L, PT non-palpable b/l, CFT <3 seconds B/L, Temperature gradient warm to cool, B/L.   Neuro: Epicritic sensation diminished to the level of ankle, B/L.  Musculoskeletal/Ortho: unremarkable  Skin: L plantar heel wound with fibrotic base, no probe to bone, no malodor, no tracking, undermining distolateral and proximal, no bogginess, no periwound erythema, no pus or drainage, L foot lateral malleoli wound with dry scab    8/6 s/p LF heel escharectomy wound beyond subQ but not to bone, fibrotic base, no purulence or drainage noted    RADIOLOGY & ADDITIONAL TESTS:

## 2021-08-08 NOTE — DIETITIAN INITIAL EVALUATION ADULT. - OTHER INFO
Per chart: 80 F PMHx of dementia, HTN, CVA, Afib on Xarelto, DM presenting to ED for ulceration of left heel c/f OM.    RDN unable to interview with patient and obtain pertinent information due to dementia. No family present at visit today. Collateral information obtained from patient's medical chart and RN.   Per chart review -- per son, patient able to tolerate puree diet and glucerna at home. Suspects possible decreased po intake prior to admission due to change in mental status over the past few months. Per RN, patient tolerated po intake with no swallowing difficulties but had suboptimal po intake today upon feeding, refused most of the foods this AM, RDN observed patient only had spoonfuls of Cuban toast this AM per plate waste. RDN also observed few empty bottles of chocolate and vanilla Glucerna shake at bedside table from yesterday. No reports of GI distress (nausea/vomiting/diarrhea/constipation) at this time, last BM 8/7 per chart.     Skin: L foot plantar heel wound, lateral malleoli wound with dry scab, podiatry following.    Edema: L+R ankles 1+ per flowsheet.

## 2021-08-08 NOTE — PROGRESS NOTE ADULT - ASSESSMENT
79 y/o F patient presents w/ L foot heel wound   - Patient seen and evaluated   - Afebrile, no leukocytosis  - GAGE: L foot plantar heel wound beyond subQ but not to bone, no probe to bone, malodor, no tracking, undermining distolateral and proximal, less bogginess noted, no periwound erythema, no pus or drainage, L foot lateral malleoli wound with dry scab  - Continue IV vancomycin and cefipime   - LF wound culture growing MSSA preliminarily   - LF MRI pending   - Continue Z-flows to offload L heel   - Pod plan for [facility]WC w/ Dakins pending LF MRI   - Discussed w/ attending

## 2021-08-08 NOTE — DIETITIAN INITIAL EVALUATION ADULT. - PROBLEM SELECTOR PLAN 5
Patient with history of DM  - Last A1C documented 7.8 in Jan 2021  - taking Metformin 500mg BID at home  - check A1C in AM  - monitor FS TIDAC  - begin insulin sliding scale

## 2021-08-08 NOTE — DIETITIAN INITIAL EVALUATION ADULT. - PERTINENT LABORATORY DATA
08-08 Na145 mmol/L Glu 211 mg/dL<H> K+ 3.7 mmol/L Cr  1.04 mg/dL BUN 22 mg/dL 08-07 Phos 3.7 mg/dL 08-06 Alb 4.1 g/dL  HgbA1C 6.4% (8/7/21)

## 2021-08-09 LAB
ANION GAP SERPL CALC-SCNC: 13 MMOL/L — SIGNIFICANT CHANGE UP (ref 7–14)
BUN SERPL-MCNC: 18 MG/DL — SIGNIFICANT CHANGE UP (ref 7–23)
CALCIUM SERPL-MCNC: 8.8 MG/DL — SIGNIFICANT CHANGE UP (ref 8.4–10.5)
CHLORIDE SERPL-SCNC: 112 MMOL/L — HIGH (ref 98–107)
CO2 SERPL-SCNC: 18 MMOL/L — LOW (ref 22–31)
CREAT SERPL-MCNC: 1.06 MG/DL — SIGNIFICANT CHANGE UP (ref 0.5–1.3)
GLUCOSE BLDC GLUCOMTR-MCNC: 106 MG/DL — HIGH (ref 70–99)
GLUCOSE BLDC GLUCOMTR-MCNC: 114 MG/DL — HIGH (ref 70–99)
GLUCOSE BLDC GLUCOMTR-MCNC: 117 MG/DL — HIGH (ref 70–99)
GLUCOSE BLDC GLUCOMTR-MCNC: 152 MG/DL — HIGH (ref 70–99)
GLUCOSE SERPL-MCNC: 123 MG/DL — HIGH (ref 70–99)
HCT VFR BLD CALC: 37.1 % — SIGNIFICANT CHANGE UP (ref 34.5–45)
HGB BLD-MCNC: 11.6 G/DL — SIGNIFICANT CHANGE UP (ref 11.5–15.5)
MAGNESIUM SERPL-MCNC: 2.1 MG/DL — SIGNIFICANT CHANGE UP (ref 1.6–2.6)
MCHC RBC-ENTMCNC: 28.4 PG — SIGNIFICANT CHANGE UP (ref 27–34)
MCHC RBC-ENTMCNC: 31.3 GM/DL — LOW (ref 32–36)
MCV RBC AUTO: 90.7 FL — SIGNIFICANT CHANGE UP (ref 80–100)
NRBC # BLD: 0 /100 WBCS — SIGNIFICANT CHANGE UP
NRBC # FLD: 0 K/UL — SIGNIFICANT CHANGE UP
PHOSPHATE SERPL-MCNC: 3.8 MG/DL — SIGNIFICANT CHANGE UP (ref 2.5–4.5)
PLATELET # BLD AUTO: 172 K/UL — SIGNIFICANT CHANGE UP (ref 150–400)
POTASSIUM SERPL-MCNC: 4.9 MMOL/L — SIGNIFICANT CHANGE UP (ref 3.5–5.3)
POTASSIUM SERPL-SCNC: 4.9 MMOL/L — SIGNIFICANT CHANGE UP (ref 3.5–5.3)
RBC # BLD: 4.09 M/UL — SIGNIFICANT CHANGE UP (ref 3.8–5.2)
RBC # FLD: 17 % — HIGH (ref 10.3–14.5)
SODIUM SERPL-SCNC: 143 MMOL/L — SIGNIFICANT CHANGE UP (ref 135–145)
TSH SERPL-MCNC: 1.95 UIU/ML — SIGNIFICANT CHANGE UP (ref 0.27–4.2)
WBC # BLD: 5.87 K/UL — SIGNIFICANT CHANGE UP (ref 3.8–10.5)
WBC # FLD AUTO: 5.87 K/UL — SIGNIFICANT CHANGE UP (ref 3.8–10.5)

## 2021-08-09 PROCEDURE — 93923 UPR/LXTR ART STDY 3+ LVLS: CPT | Mod: 26

## 2021-08-09 PROCEDURE — 99222 1ST HOSP IP/OBS MODERATE 55: CPT | Mod: GC

## 2021-08-09 PROCEDURE — 99233 SBSQ HOSP IP/OBS HIGH 50: CPT | Mod: GC

## 2021-08-09 RX ORDER — VANCOMYCIN HCL 1 G
VIAL (EA) INTRAVENOUS
Refills: 0 | Status: DISCONTINUED | OUTPATIENT
Start: 2021-08-09 | End: 2021-08-09

## 2021-08-09 RX ORDER — CEFEPIME 1 G/1
1000 INJECTION, POWDER, FOR SOLUTION INTRAMUSCULAR; INTRAVENOUS EVERY 12 HOURS
Refills: 0 | Status: DISCONTINUED | OUTPATIENT
Start: 2021-08-09 | End: 2021-08-11

## 2021-08-09 RX ORDER — VANCOMYCIN HCL 1 G
1000 VIAL (EA) INTRAVENOUS EVERY 24 HOURS
Refills: 0 | Status: DISCONTINUED | OUTPATIENT
Start: 2021-08-09 | End: 2021-08-12

## 2021-08-09 RX ADMIN — NYSTATIN CREAM 1 APPLICATION(S): 100000 CREAM TOPICAL at 17:45

## 2021-08-09 RX ADMIN — Medication 25 MILLIGRAM(S): at 06:51

## 2021-08-09 RX ADMIN — NYSTATIN CREAM 1 APPLICATION(S): 100000 CREAM TOPICAL at 06:51

## 2021-08-09 RX ADMIN — Medication 1 APPLICATION(S): at 13:56

## 2021-08-09 RX ADMIN — Medication 100 MILLIGRAM(S): at 06:52

## 2021-08-09 RX ADMIN — RIVAROXABAN 15 MILLIGRAM(S): KIT at 17:45

## 2021-08-09 RX ADMIN — Medication 100 MILLIGRAM(S): at 14:05

## 2021-08-09 RX ADMIN — Medication 1: at 17:53

## 2021-08-09 RX ADMIN — Medication 250 MILLIGRAM(S): at 22:48

## 2021-08-09 RX ADMIN — Medication 25 MILLIGRAM(S): at 14:06

## 2021-08-09 NOTE — PROGRESS NOTE ADULT - ATTENDING COMMENTS
Wound culture (+) for MRSA, will start Vancomycine IV and obtain ID consult.  Will obtain MRI to r/o osteomyelitis   D/W HS and MS

## 2021-08-09 NOTE — PROGRESS NOTE ADULT - PROBLEM SELECTOR PLAN 2
Patient p/w elevated sodium of 152, now 143.  - based on history most likely caused by impaired PO intake of fluids 2/2 dementia  - will check urine osm, urine lytes  - patient with calculated free water deficit of approximately 1.5L

## 2021-08-09 NOTE — PROGRESS NOTE ADULT - PROBLEM SELECTOR PLAN 1
Patient with chronic Left Heel wound, now appearing to be worsening over past 3 weeks. Seen and evaluated by podiatry. XR foot without gross evidence of OM or fracture. No signs of systemic infection. Wound culture grew few MRSA.   - consult ID for abx recs  - MRI Left Foot pending  - c/w ancef and add vancomycin 1g qd for MRSA coverage, alter per ID recs

## 2021-08-09 NOTE — PROGRESS NOTE ADULT - SUBJECTIVE AND OBJECTIVE BOX
Internal Medicine Progress Note    Patient is a 80y old  Female who presents with a chief complaint of Left Heel Ulcer (08 Aug 2021 15:28)    OVERNIGHT EVENTS/SUBJECTIVE:    OBJECTIVE:  Vital Signs Last 24 Hrs  T(C): 36.3 (09 Aug 2021 06:15), Max: 36.4 (08 Aug 2021 21:23)  T(F): 97.3 (09 Aug 2021 06:15), Max: 97.5 (08 Aug 2021 21:23)  HR: 84 (09 Aug 2021 06:15) (80 - 94)  BP: 112/68 (09 Aug 2021 06:15) (106/80 - 112/68)  BP(mean): --  RR: 17 (09 Aug 2021 06:15) (17 - 17)  SpO2: 96% (09 Aug 2021 06:15) (96% - 98%)  I&O's Detail    Daily     Daily   Physical Exam:  General: NAD, resting comfortably in bed  Neuro: A&Ox4, 5/5 strength in all ext  HEENT: NC/AT, EOMI, normal hearing, oral mucosa moist, no oral lesions noted, no pharyngeal erythema, uvula midline  Neck: supple, thyroid not enlarged, no LAD  Resp: Breathing comfortably on RA, LCTA b/l  CV: Normal sinus rhythm, S1 and S2, no r/m/g  Abd: soft, non-distended, non-tender. No HSM.  Ext: ROMIx4, no edema, +2 pulses bilaterally  Skin: Warm and dry, no rashes or discolorations  Psych: Appropriate affect    Medications:  MEDICATIONS  (STANDING):  ceFAZolin   IVPB 2000 milliGRAM(s) IV Intermittent every 8 hours  Dakins Solution - 1/2 Strength 1 Application(s) Topical Once  Dakins Solution - 1/4 Strength 1 Application(s) Topical daily  dextrose 40% Gel 15 Gram(s) Oral once  dextrose 5%. 1000 milliLiter(s) (100 mL/Hr) IV Continuous <Continuous>  dextrose 5%. 1000 milliLiter(s) (50 mL/Hr) IV Continuous <Continuous>  dextrose 50% Injectable 25 Gram(s) IV Push once  dextrose 50% Injectable 12.5 Gram(s) IV Push once  dextrose 50% Injectable 25 Gram(s) IV Push once  glucagon  Injectable 1 milliGRAM(s) IntraMuscular once  insulin lispro (ADMELOG) corrective regimen sliding scale   SubCutaneous three times a day before meals  lactated ringers. 1000 milliLiter(s) (100 mL/Hr) IV Continuous <Continuous>  metoprolol tartrate 25 milliGRAM(s) Oral three times a day  nystatin Powder 1 Application(s) Topical two times a day  rivaroxaban 15 milliGRAM(s) Oral with dinner    MEDICATIONS  (PRN):  acetaminophen   Tablet .. 650 milliGRAM(s) Oral every 6 hours PRN Temp greater or equal to 38.5C (101.3F), Mild Pain (1 - 3)  aluminum hydroxide/magnesium hydroxide/simethicone Suspension 30 milliLiter(s) Oral every 4 hours PRN Dyspepsia  melatonin 3 milliGRAM(s) Oral at bedtime PRN Insomnia  OLANZapine Injectable 1.25 milliGRAM(s) IntraMuscular every 6 hours PRN Agitation      Labs:                        11.6   5.87  )-----------( 172      ( 09 Aug 2021 07:31 )             37.1     08-09    x   |  x   |  18  ----------------------------<  123<H>  x    |  18<L>  |  1.06    Ca    8.8      09 Aug 2021 07:31  Phos  3.8     08-09  Mg     2.10     08-09          COVID-19 PCR: Gisselletec (06 Aug 2021 17:49)      Radiology: Internal Medicine Progress Note    Patient is a 80y old  Female who presents with a chief complaint of Left Heel Ulcer (08 Aug 2021 15:28)    OVERNIGHT EVENTS/SUBJECTIVE: No overnight events per team. Pt seen resting in bed. She went for LE doppler this morning and is resistant to physical exam but in no apparent pain or discomfort.     OBJECTIVE:  Vital Signs Last 24 Hrs  T(C): 36.3 (09 Aug 2021 06:15), Max: 36.4 (08 Aug 2021 21:23)  T(F): 97.3 (09 Aug 2021 06:15), Max: 97.5 (08 Aug 2021 21:23)  HR: 84 (09 Aug 2021 06:15) (80 - 94)  BP: 112/68 (09 Aug 2021 06:15) (106/80 - 112/68)  BP(mean): --  RR: 17 (09 Aug 2021 06:15) (17 - 17)  SpO2: 96% (09 Aug 2021 06:15) (96% - 98%)  I&O's Detail    Daily     Physical Exam:  General: NAD, resting comfortably in bed  Majority of physical exam deferred for now. L foot wrapped and in protective boot.    Medications:  MEDICATIONS  (STANDING):  ceFAZolin   IVPB 2000 milliGRAM(s) IV Intermittent every 8 hours  Dakins Solution - 1/2 Strength 1 Application(s) Topical Once  Dakins Solution - 1/4 Strength 1 Application(s) Topical daily  dextrose 40% Gel 15 Gram(s) Oral once  dextrose 5%. 1000 milliLiter(s) (100 mL/Hr) IV Continuous <Continuous>  dextrose 5%. 1000 milliLiter(s) (50 mL/Hr) IV Continuous <Continuous>  dextrose 50% Injectable 25 Gram(s) IV Push once  dextrose 50% Injectable 12.5 Gram(s) IV Push once  dextrose 50% Injectable 25 Gram(s) IV Push once  glucagon  Injectable 1 milliGRAM(s) IntraMuscular once  insulin lispro (ADMELOG) corrective regimen sliding scale   SubCutaneous three times a day before meals  lactated ringers. 1000 milliLiter(s) (100 mL/Hr) IV Continuous <Continuous>  metoprolol tartrate 25 milliGRAM(s) Oral three times a day  nystatin Powder 1 Application(s) Topical two times a day  rivaroxaban 15 milliGRAM(s) Oral with dinner    MEDICATIONS  (PRN):  acetaminophen   Tablet .. 650 milliGRAM(s) Oral every 6 hours PRN Temp greater or equal to 38.5C (101.3F), Mild Pain (1 - 3)  aluminum hydroxide/magnesium hydroxide/simethicone Suspension 30 milliLiter(s) Oral every 4 hours PRN Dyspepsia  melatonin 3 milliGRAM(s) Oral at bedtime PRN Insomnia  OLANZapine Injectable 1.25 milliGRAM(s) IntraMuscular every 6 hours PRN Agitation      Labs:                        11.6   5.87  )-----------( 172      ( 09 Aug 2021 07:31 )             37.1     08-09    x   |  x   |  18  ----------------------------<  123<H>  x    |  18<L>  |  1.06    Ca    8.8      09 Aug 2021 07:31  Phos  3.8     08-09  Mg     2.10     08-09          COVID-19 PCR: Carlos (06 Aug 2021 17:49)      Radiology:

## 2021-08-09 NOTE — CONSULT NOTE ADULT - SUBJECTIVE AND OBJECTIVE BOX
Mimi Bolden - [phone]    Patient is a 80y old  Female who presents with a chief complaint of Left Heel Ulcer (09 Aug 2021 11:58)    HPI:  80F with dementia, HTN, CVA, Afib on Xarelto, DM.  Recent hospitalization at OSH January 2021 for possible stroke.  Discharged to rehab where she was for several months.  Developed sacral decub which has healed and L heel ulcer while at rehab.  Then from there, she went home.  Per chart, visiting nurse coming to the home and her family reports that her bed sores have since healed but her heel wound has not.  The heel wound had a scab that became contracted and then started to drain and was malodorous.  The heel ulcer has been present for over 6 months.  No fever/chills.  not really able to walk since January 2021.  Started on vancomycin and cefepime.  She was seen by podiatry and eschar removed and wound debrided.  Culture (+) MRSA.  MRI ordered and is pending.  Antibiotics changed to vanc/ancef.    ID asked to help management.    prior hospital charts reviewed [ x ]  primary team notes reviewed [ x ]  other consultant notes reviewed [  x]    PAST MEDICAL & SURGICAL HISTORY:  Diabetes Mellitus  Benign Hypertension  dementia    Allergies  No Known Allergies    ANTIMICROBIALS:  cefepime   IVPB (8/6-8/8)    active:  ceFAZolin   IVPB 2000 every 8 hours (8/8-8/9)  vancomycin  IVPB 1000 every 24 hours (8/6, 8/7, 8/9-)    MEDICATIONS  (STANDING):  insulin lispro (ADMELOG) corrective regimen sliding scale  three times a day before meals  metoprolol tartrate 25 three times a day  rivaroxaban 15 with dinner  PRN  acetaminophen   Tablet .. 650 milliGRAM(s) Oral every 6 hours PRN  aluminum hydroxide/magnesium hydroxide/simethicone Suspension 30 milliLiter(s) Oral every 4 hours PRN  melatonin 3 milliGRAM(s) Oral at bedtime PRN  OLANZapine Injectable 1.25 milliGRAM(s) IntraMuscular every 6 hours PRN    SOCIAL HISTORY:  not a smoker    FAMILY HISTORY:  unable to provide history    REVIEW OF SYSTEMS  [ x ] ROS unobtainable because:    [  ] All other systems negative except as noted below:	    Constitutional:  [ ] fever [ ] chills  [ ] weight loss  [ ] weakness  Skin:  [ ] rash [ ] phlebitis	  Eyes: [ ] icterus [ ] pain  [ ] discharge	  ENMT: [ ] sore throat  [ ] thrush [ ] ulcers [ ] exudates  Respiratory: [ ] dyspnea [ ] hemoptysis [ ] cough [ ] sputum	  Cardiovascular:  [ ] chest pain [ ] palpitations [ ] edema	  Gastrointestinal:  [ ] nausea [ ] vomiting [ ] diarrhea [ ] constipation [ ] pain	  Genitourinary:  [ ] dysuria [ ] frequency [ ] hematuria [ ] discharge [ ] flank pain  [ ] incontinence  Musculoskeletal:  [ ] myalgias [ ] arthralgias [ ] arthritis  [ ] back pain  Neurological:  [ ] headache [ ] seizures  [ ] confusion/altered mental status  Psychiatric:  [ ] anxiety [ ] depression	  Hematology/Lymphatics:  [ ] lymphadenopathy  Endocrine:  [ ] adrenal [ ] thyroid  Allergic/Immunologic:	 [ ] transplant [ ] seasonal    Vital Signs Last 24 Hrs  T(F): 98.4 (08-09-21 @ 13:14), Max: 98.4 (08-09-21 @ 13:14)  Vital Signs Last 24 Hrs  HR: 96 (08-09-21 @ 13:14) (80 - 96)  BP: 114/80 (08-09-21 @ 13:14) (106/80 - 114/80)  RR: 18 (08-09-21 @ 13:14)  SpO2: 97% (08-09-21 @ 13:14) (96% - 98%)  Wt(kg): --    PHYSICAL EXAM:  Constitutional: non-toxic, in bed, granddaughter at bedside  HEAD/EYES: anicteric  ENT:  supple  Cardiovascular:   normal S1, S2  Respiratory:  clear BS bilaterally  GI:  soft, non-tender, normal bowel sounds  :  no martinez  Musculoskeletal:  no synovitis, left heel with deep wound, +malodor, tender, no drainage; no cellulitic changes; left lateral malleolar wound with eschar  Neurologic: awake, difficult to assess, dementia  Skin:  no rash  Psychiatric:  awake, appropriate mood                       11.6   5.87  )-----------( 172      ( 09 Aug 2021 07:31 )             37.1     143  |  112<H>  |  18  ----------------------------<  123<H>  4.9   |  18<L>  |  1.06    Ca    8.8      09 Aug 2021 07:31  Phos  3.8     08-09  Mg     2.10     08-09    Sedimentation Rate, Erythrocyte: 23 (08-06 @ 14:19)  C-Reactive Protein, Serum: 13.5 (08-06 @ 14:19)    MICROBIOLOGY:  Culture - Abscess with Gram Stain (collected 06 Aug 2021 21:26)  Source: .Abscess Leg - Left  Final Report (08 Aug 2021 16:43):    Culture yields growth of greater than 3 colony types of    bacteria,  which may indicate contamination and normal patsy    Call client services within 7 days if further workup is clinically    indicated. Culture includes    Few Methicillin Resistant Staphylococcus aureus  Organism: Methicillin resistant Staphylococcus aureus (08 Aug 2021 16:43)  Organism: Methicillin resistant Staphylococcus aureus (08 Aug 2021 16:43)      -  Ampicillin/Sulbactam: R <=8/4      -  Cefazolin: R <=4      -  Clindamycin: R >4      -  Daptomycin: S 0.5      -  Erythromycin: R >4      -  Gentamicin: S <=1 Should not be used as monotherapy      -  Linezolid: S 1      -  Oxacillin: R >2      -  Penicillin: R >8      -  RIF- Rifampin: S <=1 Should not be used as monotherapy      -  Tetra/Doxy: S <=1      -  Trimethoprim/Sulfamethoxazole: S <=0.5/9.5      -  Vancomycin: S 1      Method Type: DEBORAH    Culture - Blood (collected 06 Aug 2021 18:47)  Source: .Blood Blood  Preliminary Report (07 Aug 2021 19:01):    No growth to date.    Culture - Blood (collected 06 Aug 2021 18:47)  Source: .Blood Blood  Preliminary Report (07 Aug 2021 19:01):    No growth to date.    RADIOLOGY:  imaging below personally reviewed and agree with findings    Xray Ankle 2 Views, Left (08.06.21 @ 14:33) >  Xray Foot AP + Lateral, Left (08.06.21 @ 14:32) >  IMPRESSION:  Posterior heel partial-thickness soft tissue ulceration with thin film of radiodense material along the bed of the ulceration.  No tracking gas collections beyond the ulcer margins. No gross radiographic evidence of osteomyelitis however if clinical concern is high consider further imaging with MRI.  No acute fracture or dislocation.  Joint spaces are maintained without evidence of joint margin erosions or ankle joint effusion.  Diffuse osteopenic changes.  Diffuse lower leg vascular calcifications.    CT Head No Cont (02.27.20 @ 11:41) >  Impression: This exam somewhat limited by motion though grossly unremarkable
Podiatry pager #: [phone]/ [phone]    Patient is a 80y old  Female who presents with a chief complaint of L foot heel wound     HPI:   80 F PMHx of dementia, HTN, CVA, Afib on Xarelto, DM presenting to ED for ulceration of left heel ulceration> Unable to obtain significant hx from pt 2/2 to dementia. Pt's son reports, that the pt has an ulcer over her left heal, which has been present since Jan 2021 when she was admitted to the hospital. Family report scab present on the wound fell off ~3 wks ago, and now the wound has a foul smell. They state that hey are concerned for progressing infection. Family denies fevers, N/V, abd pain, or rapid progression of the wound. She is not taking any abx.    PAST MEDICAL & SURGICAL HISTORY:  Diabetes Mellitus    Benign Hypertension    No significant past surgical history        MEDICATIONS  (STANDING):  cefepime   IVPB 1000 milliGRAM(s) IV Intermittent once  lactated ringers Bolus 1000 milliLiter(s) IV Bolus once  vancomycin  IVPB. 1000 milliGRAM(s) IV Intermittent once    MEDICATIONS  (PRN):      Allergies    No Known Allergies    Intolerances        VITALS:    Vital Signs Last 24 Hrs  T(C): 36.8 (06 Aug 2021 12:06), Max: 36.8 (06 Aug 2021 12:06)  T(F): 98.2 (06 Aug 2021 12:06), Max: 98.2 (06 Aug 2021 12:06)  HR: 106 (06 Aug 2021 12:06) (106 - 106)  BP: 101/85 (06 Aug 2021 12:06) (101/85 - 101/85)  BP(mean): --  RR: 18 (06 Aug 2021 12:06) (18 - 18)  SpO2: 100% (06 Aug 2021 12:06) (100% - 100%)    LABS:                          15.6   6.46  )-----------( 176      ( 06 Aug 2021 14:19 )             48.5       08-06    152<H>  |  111<H>  |  22  ----------------------------<  220<H>  4.9   |  26  |  1.13    Ca    10.5      06 Aug 2021 14:19    TPro  9.0<H>  /  Alb  4.1  /  TBili  0.6  /  DBili  x   /  AST  36<H>  /  ALT  18  /  AlkPhos  105  08-06      CAPILLARY BLOOD GLUCOSE      POCT Blood Glucose.: 198 mg/dL (06 Aug 2021 12:04)      PT/INR - ( 06 Aug 2021 14:19 )   PT: 11.4 sec;   INR: 0.99 ratio         PTT - ( 06 Aug 2021 14:19 )  PTT:32.0 sec    LOWER EXTREMITY PHYSICAL EXAM:    Vasular: DP 1/4 B/L, PT non-palpable b/l, CFT <3 seconds B/L, Temperature gradient warm to cool, B/L.   Neuro: Epicritic sensation diminished to the level of ankle, B/L.  Musculoskeletal/Ortho: unremarkable  Skin: L foot plantar heel eschar, no probe to bone, malodor, no tracking, undermining distolateral and proximal, bogginess felt at LF plantar heel, no periwound erythema, no pus or drainage, L foot lateral malleoli wound with dry scab    8/6 s/p LF heel escharectomyL: wound beyond subQ but not to bone, fibrotic base, no purulence or drainage noted    RADIOLOGY & ADDITIONAL STUDIES:

## 2021-08-09 NOTE — PROGRESS NOTE ADULT - SUBJECTIVE AND OBJECTIVE BOX
Podiatry pager #: [phone] ([place])/ [phone] ([facility])    Patient is a 80y old  Female who presents with a chief complaint of Left Heel Ulcer (09 Aug 2021 08:27)       INTERVAL HPI/OVERNIGHT EVENTS:  Patient seen and evaluated at bedside.  Pt is resting comfortable in NAD. Denies N/V/F/C.     Allergies    No Known Allergies    Intolerances        Vital Signs Last 24 Hrs  T(C): 36.3 (09 Aug 2021 06:15), Max: 36.4 (08 Aug 2021 21:23)  T(F): 97.3 (09 Aug 2021 06:15), Max: 97.5 (08 Aug 2021 21:23)  HR: 84 (09 Aug 2021 06:15) (80 - 94)  BP: 112/68 (09 Aug 2021 06:15) (106/80 - 112/68)  BP(mean): --  RR: 17 (09 Aug 2021 06:15) (17 - 17)  SpO2: 96% (09 Aug 2021 06:15) (96% - 98%)    LABS:                        11.6   5.87  )-----------( 172      ( 09 Aug 2021 07:31 )             37.1     08-09    143  |  112<H>  |  18  ----------------------------<  123<H>  4.9   |  18<L>  |  1.06    Ca    8.8      09 Aug 2021 07:31  Phos  3.8     08-09  Mg     2.10     08-09          CAPILLARY BLOOD GLUCOSE      POCT Blood Glucose.: 114 mg/dL (09 Aug 2021 09:18)  POCT Blood Glucose.: 158 mg/dL (08 Aug 2021 21:57)  POCT Blood Glucose.: 174 mg/dL (08 Aug 2021 17:50)  POCT Blood Glucose.: 106 mg/dL (08 Aug 2021 12:33)      Lower Extremity Physical Exam:  Vascular: DP 1/4 B/L, PT non-palpable b/l, CFT <3 seconds B/L, Temperature gradient warm to cool, B/L.   Neuro: Epicritic sensation diminished to the level of ankle, B/L.  Musculoskeletal/Ortho: unremarkable  Skin: L plantar heel wound with fibrotic base, probe to bone, no malodor, no tracking, undermining distolateral and proximal, no bogginess, no periwound erythema, no pus or drainage, L foot lateral malleoli wound to SubQ    8/6 s/p LF heel escharectomy wound beyond subQ but not to bone, fibrotic base, no purulence or drainage noted    RADIOLOGY & ADDITIONAL TESTS:   Podiatry pager #: [phone] ([place])/ [phone] ([facility])    Patient is a 80y old  Female who presents with a chief complaint of Left Heel Ulcer (09 Aug 2021 08:27)       INTERVAL HPI/OVERNIGHT EVENTS:  Patient seen and evaluated at bedside.  Pt is resting comfortable in NAD. Denies N/V/F/C.     Allergies    No Known Allergies    Intolerances        Vital Signs Last 24 Hrs  T(C): 36.3 (09 Aug 2021 06:15), Max: 36.4 (08 Aug 2021 21:23)  T(F): 97.3 (09 Aug 2021 06:15), Max: 97.5 (08 Aug 2021 21:23)  HR: 84 (09 Aug 2021 06:15) (80 - 94)  BP: 112/68 (09 Aug 2021 06:15) (106/80 - 112/68)  BP(mean): --  RR: 17 (09 Aug 2021 06:15) (17 - 17)  SpO2: 96% (09 Aug 2021 06:15) (96% - 98%)    LABS:                        11.6   5.87  )-----------( 172      ( 09 Aug 2021 07:31 )             37.1     08-09    143  |  112<H>  |  18  ----------------------------<  123<H>  4.9   |  18<L>  |  1.06    Ca    8.8      09 Aug 2021 07:31  Phos  3.8     08-09  Mg     2.10     08-09          CAPILLARY BLOOD GLUCOSE      POCT Blood Glucose.: 114 mg/dL (09 Aug 2021 09:18)  POCT Blood Glucose.: 158 mg/dL (08 Aug 2021 21:57)  POCT Blood Glucose.: 174 mg/dL (08 Aug 2021 17:50)  POCT Blood Glucose.: 106 mg/dL (08 Aug 2021 12:33)      Lower Extremity Physical Exam:  Vascular: DP 1/4 B/L, PT non-palpable b/l, CFT <3 seconds B/L, Temperature gradient warm to cool, B/L.   Neuro: Epicritic sensation diminished to the level of ankle, B/L.  Musculoskeletal/Ortho: unremarkable  Skin: L plantar heel wound with fibrotic base, probe to bone, no malodor, no tracking, undermining distolateral and proximal, no bogginess, no periwound erythema, no pus or drainage, L foot lateral malleoli wound to SubQ    8/6 s/p LF heel escharectomy wound to bone, fibrotic base, no purulence or drainage noted    RADIOLOGY & ADDITIONAL TESTS:

## 2021-08-09 NOTE — CONSULT NOTE ADULT - ASSESSMENT
79 y/o F patient presents w/ L foot heel wound   - Patient seen and evaluated   - Afebrile, WBC 6.46, CRP 1.35  - GAGE: L foot plantar heel wound beyond subQ but not to bone, no probe to bone, malodor, no tracking, undermining distolateral and proximal, bogginess felt at LF plantar heel, no periwound erythema, no pus or drainage, L foot lateral malleoli wound with dry scab  - LF XR preliminarily indicates no OM, no gas   - Performed LF heel escharectomy with sterile suture kit and #10 sterile blade w/ no purulence expressed. Pt refused extensive debridement of fibrotic wound base.  - Recommend admit to medicine  - Recommend IV vancomycin and cefipime   - LF wound culture taken  - Ordered LF MRI   - Ordered Z-flows to offload L heel   - Pod plan for LWC w/ Dakins (p) [facility]   - Discussed w/ attending   
80F with dementia, HTN, CVA, Afib on Xarelto, DM.  Recent hospitalization at OSH January 2021 for possible stroke and while at rehab developed sacral decub which resolved and left heel decub which became infected.  No overt OM, however, wound is fairly deep and there is still malodor suggesting active infection.  Agree with MRI to better assess.  If OM present, likely chronic om given chronicity, low ESR.      Infected heel  - r/o OM  - agree with MRI  - would continue vancomycin to cover mrsa  - would also change ancef back to cefepime for broad polymicrobial coverage  - monitor ESR/CRP

## 2021-08-09 NOTE — PROGRESS NOTE ADULT - ASSESSMENT
81 y/o F patient presents w/ L foot heel wound   - Patient seen and evaluated   - Afebrile, no leukocytosis  - GAGE: L foot plantar heel wound  to bone, no probe to bone, malodor present, no tracking, undermining distolateral and proximal,  no periwound erythema, no pus or drainage, L foot lateral malleoli wound to SubQ, no acute SOI  - Continue IV abx   - LF wound culture growing MRSA  - LF MRI pending   - Continue Z-flows to offload L heel   - Pod plan for Local wound care & long-term Abx vs. Partial calcanectomy (p) MRI  - Seen w/ attending  79 y/o F patient presents w/ L foot heel wound   - Patient seen and evaluated   - Afebrile, no leukocytosis  - GAGE: L foot plantar heel wound  to bone, +probe to bone, malodor present, no tracking, undermining distolateral and proximal,  no periwound erythema, no pus or drainage, L foot lateral malleoli wound to SubQ, no acute SOI  - Continue IV abx   - LF wound culture growing MRSA  - LF MRI pending   - Continue Z-flows to offload L heel   - Pod plan for Local wound care & long-term Abx vs. Partial calcanectomy (p) MRI  - Seen w/ attending

## 2021-08-10 LAB
ANION GAP SERPL CALC-SCNC: 17 MMOL/L — HIGH (ref 7–14)
BASOPHILS # BLD AUTO: 0.05 K/UL — SIGNIFICANT CHANGE UP (ref 0–0.2)
BASOPHILS NFR BLD AUTO: 0.9 % — SIGNIFICANT CHANGE UP (ref 0–2)
BUN SERPL-MCNC: 18 MG/DL — SIGNIFICANT CHANGE UP (ref 7–23)
CALCIUM SERPL-MCNC: 9 MG/DL — SIGNIFICANT CHANGE UP (ref 8.4–10.5)
CHLORIDE SERPL-SCNC: 110 MMOL/L — HIGH (ref 98–107)
CO2 SERPL-SCNC: 20 MMOL/L — LOW (ref 22–31)
CREAT SERPL-MCNC: 0.96 MG/DL — SIGNIFICANT CHANGE UP (ref 0.5–1.3)
CRP SERPL-MCNC: 20.4 MG/L — HIGH
CULTURE RESULTS: SIGNIFICANT CHANGE UP
EOSINOPHIL # BLD AUTO: 0.23 K/UL — SIGNIFICANT CHANGE UP (ref 0–0.5)
EOSINOPHIL NFR BLD AUTO: 4.3 % — SIGNIFICANT CHANGE UP (ref 0–6)
GLUCOSE BLDC GLUCOMTR-MCNC: 116 MG/DL — HIGH (ref 70–99)
GLUCOSE BLDC GLUCOMTR-MCNC: 134 MG/DL — HIGH (ref 70–99)
GLUCOSE BLDC GLUCOMTR-MCNC: 164 MG/DL — HIGH (ref 70–99)
GLUCOSE BLDC GLUCOMTR-MCNC: 180 MG/DL — HIGH (ref 70–99)
GLUCOSE SERPL-MCNC: 96 MG/DL — SIGNIFICANT CHANGE UP (ref 70–99)
HCT VFR BLD CALC: 39.7 % — SIGNIFICANT CHANGE UP (ref 34.5–45)
HGB BLD-MCNC: 12.9 G/DL — SIGNIFICANT CHANGE UP (ref 11.5–15.5)
IANC: 2.92 K/UL — SIGNIFICANT CHANGE UP (ref 1.5–8.5)
IMM GRANULOCYTES NFR BLD AUTO: 0.2 % — SIGNIFICANT CHANGE UP (ref 0–1.5)
LYMPHOCYTES # BLD AUTO: 1.64 K/UL — SIGNIFICANT CHANGE UP (ref 1–3.3)
LYMPHOCYTES # BLD AUTO: 30.7 % — SIGNIFICANT CHANGE UP (ref 13–44)
MAGNESIUM SERPL-MCNC: 2 MG/DL — SIGNIFICANT CHANGE UP (ref 1.6–2.6)
MCHC RBC-ENTMCNC: 27.8 PG — SIGNIFICANT CHANGE UP (ref 27–34)
MCHC RBC-ENTMCNC: 32.5 GM/DL — SIGNIFICANT CHANGE UP (ref 32–36)
MCV RBC AUTO: 85.6 FL — SIGNIFICANT CHANGE UP (ref 80–100)
MONOCYTES # BLD AUTO: 0.5 K/UL — SIGNIFICANT CHANGE UP (ref 0–0.9)
MONOCYTES NFR BLD AUTO: 9.3 % — SIGNIFICANT CHANGE UP (ref 2–14)
NEUTROPHILS # BLD AUTO: 2.92 K/UL — SIGNIFICANT CHANGE UP (ref 1.8–7.4)
NEUTROPHILS NFR BLD AUTO: 54.6 % — SIGNIFICANT CHANGE UP (ref 43–77)
NRBC # BLD: 0 /100 WBCS — SIGNIFICANT CHANGE UP
NRBC # FLD: 0 K/UL — SIGNIFICANT CHANGE UP
PHOSPHATE SERPL-MCNC: 3.3 MG/DL — SIGNIFICANT CHANGE UP (ref 2.5–4.5)
PLATELET # BLD AUTO: 126 K/UL — LOW (ref 150–400)
POTASSIUM SERPL-MCNC: 3.8 MMOL/L — SIGNIFICANT CHANGE UP (ref 3.5–5.3)
POTASSIUM SERPL-SCNC: 3.8 MMOL/L — SIGNIFICANT CHANGE UP (ref 3.5–5.3)
RBC # BLD: 4.64 M/UL — SIGNIFICANT CHANGE UP (ref 3.8–5.2)
RBC # FLD: 16.3 % — HIGH (ref 10.3–14.5)
SODIUM SERPL-SCNC: 147 MMOL/L — HIGH (ref 135–145)
VANCOMYCIN TROUGH SERPL-MCNC: 16.9 UG/ML — SIGNIFICANT CHANGE UP (ref 10–20)
WBC # BLD: 5.35 K/UL — SIGNIFICANT CHANGE UP (ref 3.8–10.5)
WBC # FLD AUTO: 5.35 K/UL — SIGNIFICANT CHANGE UP (ref 3.8–10.5)

## 2021-08-10 PROCEDURE — 99233 SBSQ HOSP IP/OBS HIGH 50: CPT | Mod: GC

## 2021-08-10 RX ORDER — SODIUM CHLORIDE 9 MG/ML
500 INJECTION, SOLUTION INTRAVENOUS
Refills: 0 | Status: DISCONTINUED | OUTPATIENT
Start: 2021-08-10 | End: 2021-08-11

## 2021-08-10 RX ADMIN — CEFEPIME 100 MILLIGRAM(S): 1 INJECTION, POWDER, FOR SOLUTION INTRAMUSCULAR; INTRAVENOUS at 13:19

## 2021-08-10 RX ADMIN — NYSTATIN CREAM 1 APPLICATION(S): 100000 CREAM TOPICAL at 05:30

## 2021-08-10 RX ADMIN — CEFEPIME 100 MILLIGRAM(S): 1 INJECTION, POWDER, FOR SOLUTION INTRAMUSCULAR; INTRAVENOUS at 03:16

## 2021-08-10 RX ADMIN — Medication 250 MILLIGRAM(S): at 22:50

## 2021-08-10 RX ADMIN — NYSTATIN CREAM 1 APPLICATION(S): 100000 CREAM TOPICAL at 18:25

## 2021-08-10 RX ADMIN — Medication 1: at 13:18

## 2021-08-10 RX ADMIN — Medication 1 APPLICATION(S): at 12:17

## 2021-08-10 RX ADMIN — RIVAROXABAN 15 MILLIGRAM(S): KIT at 18:24

## 2021-08-10 NOTE — PROGRESS NOTE ADULT - ATTENDING COMMENTS
ID input appreciated.  Will cont Cefepime and Vanco  -Will obtain MRI to r/o osteo  -Will f/u with Podiatry.    D/W HS and MS

## 2021-08-10 NOTE — PROGRESS NOTE ADULT - ASSESSMENT
Pt is an 80 woman PMHx of dementia, HTN, CVA, Afib on Xarelto, DM who presented to ED for ulceration of left heel c/f OM. Wound culture grew few MRSA. Negative blood cultures, no leukocytosis, and vital signs remaining WNL decrease likelihood of systemic infection. Now pending foot MRI.

## 2021-08-10 NOTE — PROGRESS NOTE ADULT - PROBLEM SELECTOR PLAN 2
Patient p/w elevated sodium of 152, now 147 on 8/10.  - based on history most likely caused by impaired PO intake of fluids 2/2 dementia  - will check urine osm, urine lytes  - patient with calculated free water deficit of approximately 1.5L

## 2021-08-10 NOTE — PROGRESS NOTE ADULT - SUBJECTIVE AND OBJECTIVE BOX
Internal Medicine Progress Note    Patient is a 80y old  Female who presents with a chief complaint of Left Heel Ulcer (09 Aug 2021 16:40)    OVERNIGHT EVENTS/SUBJECTIVE:    OBJECTIVE:  Vital Signs Last 24 Hrs  T(C): 36.1 (10 Aug 2021 05:21), Max: 37 (09 Aug 2021 22:34)  T(F): 97 (10 Aug 2021 05:21), Max: 98.6 (09 Aug 2021 22:34)  HR: 94 (10 Aug 2021 05:21) (71 - 96)  BP: 106/61 (10 Aug 2021 05:21) (106/61 - 114/80)  BP(mean): --  RR: 18 (10 Aug 2021 05:21) (18 - 18)  SpO2: 96% (10 Aug 2021 05:21) (96% - 98%)  I&O's Detail    Daily     Daily   Physical Exam:  General: NAD, resting comfortably in bed  Neuro: A&Ox4, 5/5 strength in all ext  HEENT: NC/AT, EOMI, normal hearing, oral mucosa moist, no oral lesions noted, no pharyngeal erythema, uvula midline  Neck: supple, thyroid not enlarged, no LAD  Resp: Breathing comfortably on RA, LCTA b/l  CV: Normal sinus rhythm, S1 and S2, no r/m/g  Abd: soft, non-distended, non-tender. No HSM.  Ext: ROMIx4, no edema, +2 pulses bilaterally  Skin: Warm and dry, no rashes or discolorations  Psych: Appropriate affect    Medications:  MEDICATIONS  (STANDING):  cefepime   IVPB 1000 milliGRAM(s) IV Intermittent every 12 hours  Dakins Solution - 1/2 Strength 1 Application(s) Topical Once  Dakins Solution - 1/4 Strength 1 Application(s) Topical daily  dextrose 40% Gel 15 Gram(s) Oral once  dextrose 5%. 1000 milliLiter(s) (50 mL/Hr) IV Continuous <Continuous>  dextrose 5%. 1000 milliLiter(s) (100 mL/Hr) IV Continuous <Continuous>  dextrose 50% Injectable 25 Gram(s) IV Push once  dextrose 50% Injectable 12.5 Gram(s) IV Push once  dextrose 50% Injectable 25 Gram(s) IV Push once  glucagon  Injectable 1 milliGRAM(s) IntraMuscular once  insulin lispro (ADMELOG) corrective regimen sliding scale   SubCutaneous three times a day before meals  lactated ringers. 1000 milliLiter(s) (100 mL/Hr) IV Continuous <Continuous>  metoprolol tartrate 25 milliGRAM(s) Oral three times a day  nystatin Powder 1 Application(s) Topical two times a day  rivaroxaban 15 milliGRAM(s) Oral with dinner  vancomycin  IVPB 1000 milliGRAM(s) IV Intermittent every 24 hours    MEDICATIONS  (PRN):  acetaminophen   Tablet .. 650 milliGRAM(s) Oral every 6 hours PRN Temp greater or equal to 38.5C (101.3F), Mild Pain (1 - 3)  aluminum hydroxide/magnesium hydroxide/simethicone Suspension 30 milliLiter(s) Oral every 4 hours PRN Dyspepsia  melatonin 3 milliGRAM(s) Oral at bedtime PRN Insomnia  OLANZapine Injectable 1.25 milliGRAM(s) IntraMuscular every 6 hours PRN Agitation      Labs:                        12.9   5.35  )-----------( 126      ( 10 Aug 2021 07:10 )             39.7     08-09    143  |  112<H>  |  18  ----------------------------<  123<H>  4.9   |  18<L>  |  1.06    Ca    8.8      09 Aug 2021 07:31  Phos  3.8     08-09  Mg     2.10     08-09          COVID-19 PCR: Carlos (06 Aug 2021 17:49)      Radiology: Internal Medicine Progress Note    Patient is a 80y old  Female who presents with a chief complaint of Left Heel Ulcer (09 Aug 2021 16:40)    OVERNIGHT EVENTS/SUBJECTIVE: No acute events per staff. Pt is awaiting L foot MRI pending protocol via [facility] radiology. She is refusing physical exam at the moment.     OBJECTIVE:  Vital Signs Last 24 Hrs  T(C): 36.1 (10 Aug 2021 05:21), Max: 37 (09 Aug 2021 22:34)  T(F): 97 (10 Aug 2021 05:21), Max: 98.6 (09 Aug 2021 22:34)  HR: 94 (10 Aug 2021 05:21) (71 - 96)  BP: 106/61 (10 Aug 2021 05:21) (106/61 - 114/80)  BP(mean): --  RR: 18 (10 Aug 2021 05:21) (18 - 18)  SpO2: 96% (10 Aug 2021 05:21) (96% - 98%)  I&O's Detail    Daily     Daily   Physical Exam: refusing exam, pulling blanket over head  General: NAD, resting comfortably in bed      Medications:  MEDICATIONS  (STANDING):  cefepime   IVPB 1000 milliGRAM(s) IV Intermittent every 12 hours  Dakins Solution - 1/2 Strength 1 Application(s) Topical Once  Dakins Solution - 1/4 Strength 1 Application(s) Topical daily  dextrose 40% Gel 15 Gram(s) Oral once  dextrose 5%. 1000 milliLiter(s) (50 mL/Hr) IV Continuous <Continuous>  dextrose 5%. 1000 milliLiter(s) (100 mL/Hr) IV Continuous <Continuous>  dextrose 50% Injectable 25 Gram(s) IV Push once  dextrose 50% Injectable 12.5 Gram(s) IV Push once  dextrose 50% Injectable 25 Gram(s) IV Push once  glucagon  Injectable 1 milliGRAM(s) IntraMuscular once  insulin lispro (ADMELOG) corrective regimen sliding scale   SubCutaneous three times a day before meals  lactated ringers. 1000 milliLiter(s) (100 mL/Hr) IV Continuous <Continuous>  metoprolol tartrate 25 milliGRAM(s) Oral three times a day  nystatin Powder 1 Application(s) Topical two times a day  rivaroxaban 15 milliGRAM(s) Oral with dinner  vancomycin  IVPB 1000 milliGRAM(s) IV Intermittent every 24 hours    MEDICATIONS  (PRN):  acetaminophen   Tablet .. 650 milliGRAM(s) Oral every 6 hours PRN Temp greater or equal to 38.5C (101.3F), Mild Pain (1 - 3)  aluminum hydroxide/magnesium hydroxide/simethicone Suspension 30 milliLiter(s) Oral every 4 hours PRN Dyspepsia  melatonin 3 milliGRAM(s) Oral at bedtime PRN Insomnia  OLANZapine Injectable 1.25 milliGRAM(s) IntraMuscular every 6 hours PRN Agitation      Labs:                        12.9   5.35  )-----------( 126      ( 10 Aug 2021 07:10 )             39.7     08-09    143  |  112<H>  |  18  ----------------------------<  123<H>  4.9   |  18<L>  |  1.06    Ca    8.8      09 Aug 2021 07:31  Phos  3.8     08-09  Mg     2.10     08-09          COVID-19 PCR: Carlos (06 Aug 2021 17:49)      Radiology:

## 2021-08-10 NOTE — PROGRESS NOTE ADULT - PROBLEM SELECTOR PLAN 5
Patient with history of DM. A1C 6.4 on 8/7  - Last A1C documented 7.8 in Jan 2021  - taking Metformin 500mg BID at home  - monitor FS TIDAC  - insulin sliding scale

## 2021-08-10 NOTE — PROGRESS NOTE ADULT - PROBLEM SELECTOR PLAN 1
Patient with chronic Left Heel wound, now appearing to be worsening over past 3 weeks. Seen and evaluated by podiatry. XR foot without gross evidence of OM or fracture. No signs of systemic infection. Wound culture grew few MRSA.   - vancomycin & cefepime, appreciate ID recs   - MRI Left Foot pending

## 2021-08-11 LAB
-  AMIKACIN: SIGNIFICANT CHANGE UP
-  AMIKACIN: SIGNIFICANT CHANGE UP
-  AMOXICILLIN/CLAVULANIC ACID: SIGNIFICANT CHANGE UP
-  AMOXICILLIN/CLAVULANIC ACID: SIGNIFICANT CHANGE UP
-  AMPICILLIN/SULBACTAM: SIGNIFICANT CHANGE UP
-  AMPICILLIN/SULBACTAM: SIGNIFICANT CHANGE UP
-  AMPICILLIN: SIGNIFICANT CHANGE UP
-  AMPICILLIN: SIGNIFICANT CHANGE UP
-  AZTREONAM: SIGNIFICANT CHANGE UP
-  AZTREONAM: SIGNIFICANT CHANGE UP
-  CEFAZOLIN: SIGNIFICANT CHANGE UP
-  CEFAZOLIN: SIGNIFICANT CHANGE UP
-  CEFEPIME: SIGNIFICANT CHANGE UP
-  CEFEPIME: SIGNIFICANT CHANGE UP
-  CEFOXITIN: SIGNIFICANT CHANGE UP
-  CEFOXITIN: SIGNIFICANT CHANGE UP
-  CEFTAZIDIME/AVIBACTAM: SIGNIFICANT CHANGE UP
-  CEFTRIAXONE: SIGNIFICANT CHANGE UP
-  CEFTRIAXONE: SIGNIFICANT CHANGE UP
-  CIPROFLOXACIN: SIGNIFICANT CHANGE UP
-  CIPROFLOXACIN: SIGNIFICANT CHANGE UP
-  ERTAPENEM: SIGNIFICANT CHANGE UP
-  ERTAPENEM: SIGNIFICANT CHANGE UP
-  GENTAMICIN: SIGNIFICANT CHANGE UP
-  GENTAMICIN: SIGNIFICANT CHANGE UP
-  IMIPENEM: SIGNIFICANT CHANGE UP
-  IMIPENEM: SIGNIFICANT CHANGE UP
-  LEVOFLOXACIN: SIGNIFICANT CHANGE UP
-  LEVOFLOXACIN: SIGNIFICANT CHANGE UP
-  MEROPENEM: SIGNIFICANT CHANGE UP
-  MEROPENEM: SIGNIFICANT CHANGE UP
-  PIPERACILLIN/TAZOBACTAM: SIGNIFICANT CHANGE UP
-  PIPERACILLIN/TAZOBACTAM: SIGNIFICANT CHANGE UP
-  TOBRAMYCIN: SIGNIFICANT CHANGE UP
-  TOBRAMYCIN: SIGNIFICANT CHANGE UP
-  TRIMETHOPRIM/SULFAMETHOXAZOLE: SIGNIFICANT CHANGE UP
-  TRIMETHOPRIM/SULFAMETHOXAZOLE: SIGNIFICANT CHANGE UP
ANION GAP SERPL CALC-SCNC: 11 MMOL/L — SIGNIFICANT CHANGE UP (ref 7–14)
BUN SERPL-MCNC: 14 MG/DL — SIGNIFICANT CHANGE UP (ref 7–23)
CALCIUM SERPL-MCNC: 8.6 MG/DL — SIGNIFICANT CHANGE UP (ref 8.4–10.5)
CHLORIDE SERPL-SCNC: 113 MMOL/L — HIGH (ref 98–107)
CO2 SERPL-SCNC: 22 MMOL/L — SIGNIFICANT CHANGE UP (ref 22–31)
CREAT SERPL-MCNC: 0.96 MG/DL — SIGNIFICANT CHANGE UP (ref 0.5–1.3)
CULTURE RESULTS: SIGNIFICANT CHANGE UP
GLUCOSE BLDC GLUCOMTR-MCNC: 122 MG/DL — HIGH (ref 70–99)
GLUCOSE BLDC GLUCOMTR-MCNC: 125 MG/DL — HIGH (ref 70–99)
GLUCOSE BLDC GLUCOMTR-MCNC: 186 MG/DL — HIGH (ref 70–99)
GLUCOSE SERPL-MCNC: 118 MG/DL — HIGH (ref 70–99)
HCT VFR BLD CALC: 33.7 % — LOW (ref 34.5–45)
HGB BLD-MCNC: 11.1 G/DL — LOW (ref 11.5–15.5)
MAGNESIUM SERPL-MCNC: 1.9 MG/DL — SIGNIFICANT CHANGE UP (ref 1.6–2.6)
MCHC RBC-ENTMCNC: 27.8 PG — SIGNIFICANT CHANGE UP (ref 27–34)
MCHC RBC-ENTMCNC: 32.9 GM/DL — SIGNIFICANT CHANGE UP (ref 32–36)
MCV RBC AUTO: 84.5 FL — SIGNIFICANT CHANGE UP (ref 80–100)
METHOD TYPE: SIGNIFICANT CHANGE UP
METHOD TYPE: SIGNIFICANT CHANGE UP
NRBC # BLD: 0 /100 WBCS — SIGNIFICANT CHANGE UP
NRBC # FLD: 0 K/UL — SIGNIFICANT CHANGE UP
ORGANISM # SPEC MICROSCOPIC CNT: SIGNIFICANT CHANGE UP
PHOSPHATE SERPL-MCNC: 2.6 MG/DL — SIGNIFICANT CHANGE UP (ref 2.5–4.5)
PLATELET # BLD AUTO: 156 K/UL — SIGNIFICANT CHANGE UP (ref 150–400)
POTASSIUM SERPL-MCNC: 3.5 MMOL/L — SIGNIFICANT CHANGE UP (ref 3.5–5.3)
POTASSIUM SERPL-SCNC: 3.5 MMOL/L — SIGNIFICANT CHANGE UP (ref 3.5–5.3)
RBC # BLD: 3.99 M/UL — SIGNIFICANT CHANGE UP (ref 3.8–5.2)
RBC # FLD: 15.9 % — HIGH (ref 10.3–14.5)
SODIUM SERPL-SCNC: 146 MMOL/L — HIGH (ref 135–145)
SPECIMEN SOURCE: SIGNIFICANT CHANGE UP
SPECIMEN SOURCE: SIGNIFICANT CHANGE UP
WBC # BLD: 5.37 K/UL — SIGNIFICANT CHANGE UP (ref 3.8–10.5)
WBC # FLD AUTO: 5.37 K/UL — SIGNIFICANT CHANGE UP (ref 3.8–10.5)

## 2021-08-11 PROCEDURE — 99233 SBSQ HOSP IP/OBS HIGH 50: CPT

## 2021-08-11 PROCEDURE — 99233 SBSQ HOSP IP/OBS HIGH 50: CPT | Mod: GC

## 2021-08-11 RX ORDER — COLLAGENASE CLOSTRIDIUM HIST. 250 UNIT/G
1 OINTMENT (GRAM) TOPICAL DAILY
Refills: 0 | Status: DISCONTINUED | OUTPATIENT
Start: 2021-08-11 | End: 2021-08-19

## 2021-08-11 RX ORDER — SODIUM CHLORIDE 9 MG/ML
500 INJECTION, SOLUTION INTRAVENOUS
Refills: 0 | Status: DISCONTINUED | OUTPATIENT
Start: 2021-08-11 | End: 2021-08-12

## 2021-08-11 RX ORDER — ERTAPENEM SODIUM 1 G/1
1000 INJECTION, POWDER, LYOPHILIZED, FOR SOLUTION INTRAMUSCULAR; INTRAVENOUS EVERY 24 HOURS
Refills: 0 | Status: COMPLETED | OUTPATIENT
Start: 2021-08-11 | End: 2021-08-17

## 2021-08-11 RX ADMIN — RIVAROXABAN 15 MILLIGRAM(S): KIT at 18:33

## 2021-08-11 RX ADMIN — ERTAPENEM SODIUM 120 MILLIGRAM(S): 1 INJECTION, POWDER, LYOPHILIZED, FOR SOLUTION INTRAMUSCULAR; INTRAVENOUS at 18:32

## 2021-08-11 RX ADMIN — Medication 250 MILLIGRAM(S): at 21:38

## 2021-08-11 RX ADMIN — CEFEPIME 100 MILLIGRAM(S): 1 INJECTION, POWDER, FOR SOLUTION INTRAMUSCULAR; INTRAVENOUS at 01:35

## 2021-08-11 RX ADMIN — Medication 1 APPLICATION(S): at 18:33

## 2021-08-11 RX ADMIN — SODIUM CHLORIDE 50 MILLILITER(S): 9 INJECTION, SOLUTION INTRAVENOUS at 18:55

## 2021-08-11 RX ADMIN — Medication 1 APPLICATION(S): at 13:26

## 2021-08-11 RX ADMIN — NYSTATIN CREAM 1 APPLICATION(S): 100000 CREAM TOPICAL at 18:33

## 2021-08-11 NOTE — PROGRESS NOTE ADULT - PROBLEM SELECTOR PLAN 2
Patient p/w elevated sodium of 152, now 146 on 8/11.  - based on history most likely caused by impaired PO intake of fluids 2/2 dementia  - patient with calculated free water deficit of approximately 1.5L

## 2021-08-11 NOTE — PROGRESS NOTE ADULT - ASSESSMENT
81 y/o F patient presents w/ L foot heel wound   - Patient seen and evaluated   - Afebrile, no leukocytosis  - GAGE: L foot plantar heel wound  to bone, +probe to bone, malodor present, no tracking, undermining distolateral and proximal,  no periwound erythema, no pus or drainage, L foot lateral malleoli wound to SubQ, no acute SOI  - Continue IV abx   - LF wound culture growing MRSA  - LF MRI pending   - Continue Z-flows to offload L heel   - Pod plan for Local wound care & long-term Abx vs. Partial calcanectomy and bone biopsy (p) MRI results  - Discussed w/ attending

## 2021-08-11 NOTE — PROGRESS NOTE ADULT - ASSESSMENT
Pt is an 80 woman PMHx of dementia, HTN, CVA, Afib on Xarelto, DM who presented to ED for ulceration of left heel c/f OM. Wound culture was polymicrobial including few MRSA. Negative blood cultures, no leukocytosis, and vital signs remaining WNL decrease likelihood of systemic infection. Now pending foot MRI.

## 2021-08-11 NOTE — PROGRESS NOTE ADULT - ASSESSMENT
80F with dementia, HTN, CVA, Afib on Xarelto, DM.  Recent hospitalization at OSH January 2021 for possible stroke and while at rehab developed sacral decub which resolved and left heel decub which became infected.  No overt OM, however, wound is fairly deep and there is still malodor suggesting active infection.  Agree with MRI to better assess.  If OM present, likely chronic om given chronicity, low ESR.      Infected heel  - r/o OM  - await MRI  - would continue vancomycin to cover mrsa  - please change cefepime to ertapenem 1g daily    I have discussed plan of care as detailed above with housestaff

## 2021-08-11 NOTE — PROGRESS NOTE ADULT - SUBJECTIVE AND OBJECTIVE BOX
Internal Medicine Progress Note    Patient is a 80y old  Female who presents with a chief complaint of Left Heel Ulcer (10 Aug 2021 08:01)    OVERNIGHT EVENTS/SUBJECTIVE:    OBJECTIVE:  Vital Signs Last 24 Hrs  T(C): 36.7 (11 Aug 2021 06:05), Max: 36.7 (11 Aug 2021 06:05)  T(F): 98.1 (11 Aug 2021 06:05), Max: 98.1 (11 Aug 2021 06:05)  HR: 66 (11 Aug 2021 06:05) (66 - 109)  BP: 96/60 (11 Aug 2021 06:05) (96/60 - 100/52)  BP(mean): --  RR: 19 (11 Aug 2021 06:05) (17 - 19)  SpO2: 100% (11 Aug 2021 06:05) (100% - 100%)  I&O's Detail    10 Aug 2021 07:01  -  11 Aug 2021 07:00  --------------------------------------------------------  IN:    dextrose 5% + sodium chloride 0.45%: 350 mL    IV PiggyBack: 50 mL    Oral Fluid: 280 mL  Total IN: 680 mL    OUT:    Voided (mL): 900 mL  Total OUT: 900 mL    Total NET: -220 mL        Daily     Daily   Physical Exam:  General: NAD, resting comfortably in bed  Neuro: A&Ox4, 5/5 strength in all ext  HEENT: NC/AT, EOMI, normal hearing, oral mucosa moist, no oral lesions noted, no pharyngeal erythema, uvula midline  Neck: supple, thyroid not enlarged, no LAD  Resp: Breathing comfortably on RA, LCTA b/l  CV: Normal sinus rhythm, S1 and S2, no r/m/g  Abd: soft, non-distended, non-tender. No HSM.  Ext: ROMIx4, no edema, +2 pulses bilaterally  Skin: Warm and dry, no rashes or discolorations  Psych: Appropriate affect    Medications:  MEDICATIONS  (STANDING):  cefepime   IVPB 1000 milliGRAM(s) IV Intermittent every 12 hours  Dakins Solution - 1/2 Strength 1 Application(s) Topical Once  Dakins Solution - 1/4 Strength 1 Application(s) Topical daily  dextrose 40% Gel 15 Gram(s) Oral once  dextrose 5% + sodium chloride 0.45%. 500 milliLiter(s) (50 mL/Hr) IV Continuous <Continuous>  dextrose 5%. 1000 milliLiter(s) (100 mL/Hr) IV Continuous <Continuous>  dextrose 5%. 1000 milliLiter(s) (50 mL/Hr) IV Continuous <Continuous>  dextrose 50% Injectable 25 Gram(s) IV Push once  dextrose 50% Injectable 12.5 Gram(s) IV Push once  dextrose 50% Injectable 25 Gram(s) IV Push once  glucagon  Injectable 1 milliGRAM(s) IntraMuscular once  insulin lispro (ADMELOG) corrective regimen sliding scale   SubCutaneous three times a day before meals  metoprolol tartrate 25 milliGRAM(s) Oral three times a day  nystatin Powder 1 Application(s) Topical two times a day  rivaroxaban 15 milliGRAM(s) Oral with dinner  vancomycin  IVPB 1000 milliGRAM(s) IV Intermittent every 24 hours    MEDICATIONS  (PRN):  acetaminophen   Tablet .. 650 milliGRAM(s) Oral every 6 hours PRN Temp greater or equal to 38.5C (101.3F), Mild Pain (1 - 3)  aluminum hydroxide/magnesium hydroxide/simethicone Suspension 30 milliLiter(s) Oral every 4 hours PRN Dyspepsia  melatonin 3 milliGRAM(s) Oral at bedtime PRN Insomnia  OLANZapine Injectable 1.25 milliGRAM(s) IntraMuscular every 6 hours PRN Agitation      Labs:                        11.1   5.37  )-----------( 156      ( 11 Aug 2021 07:43 )             33.7     08-10    147<H>  |  110<H>  |  18  ----------------------------<  96  3.8   |  20<L>  |  0.96    Ca    9.0      10 Aug 2021 07:10  Phos  3.3     08-10  Mg     2.00     08-10          COVID-19 PCR: NotDetec (06 Aug 2021 17:49)      Radiology: Internal Medicine Progress Note    Patient is a 80y old  Female who presents with a chief complaint of Left Heel Ulcer (10 Aug 2021 08:01)    OVERNIGHT EVENTS/SUBJECTIVE: No overnight events. Pt seen resting in bed, more receptive to being approached this morning.     OBJECTIVE:  Vital Signs Last 24 Hrs  T(C): 36.7 (11 Aug 2021 06:05), Max: 36.7 (11 Aug 2021 06:05)  T(F): 98.1 (11 Aug 2021 06:05), Max: 98.1 (11 Aug 2021 06:05)  HR: 66 (11 Aug 2021 06:05) (66 - 109)  BP: 96/60 (11 Aug 2021 06:05) (96/60 - 100/52)  BP(mean): --  RR: 19 (11 Aug 2021 06:05) (17 - 19)  SpO2: 100% (11 Aug 2021 06:05) (100% - 100%)  I&O's Detail    10 Aug 2021 07:01  -  11 Aug 2021 07:00  --------------------------------------------------------  IN:    dextrose 5% + sodium chloride 0.45%: 350 mL    IV PiggyBack: 50 mL    Oral Fluid: 280 mL  Total IN: 680 mL    OUT:    Voided (mL): 900 mL  Total OUT: 900 mL    Total NET: -220 mL        Daily     Physical Exam:  General: NAD, resting comfortably in bed  HEENT: NC/AT, EOMI, normal hearing, oral mucosa moist   Resp: Breathing comfortably on RA, LCTA b/l  CV: Tachycardic, S1 and S2, no r/m/g  Abd: soft, non-distended, non-tender  Skin: Warm and dry, no rashes or discolorations    Medications:  MEDICATIONS  (STANDING):  cefepime   IVPB 1000 milliGRAM(s) IV Intermittent every 12 hours  Dakins Solution - 1/2 Strength 1 Application(s) Topical Once  Dakins Solution - 1/4 Strength 1 Application(s) Topical daily  dextrose 40% Gel 15 Gram(s) Oral once  dextrose 5% + sodium chloride 0.45%. 500 milliLiter(s) (50 mL/Hr) IV Continuous <Continuous>  dextrose 5%. 1000 milliLiter(s) (100 mL/Hr) IV Continuous <Continuous>  dextrose 5%. 1000 milliLiter(s) (50 mL/Hr) IV Continuous <Continuous>  dextrose 50% Injectable 25 Gram(s) IV Push once  dextrose 50% Injectable 12.5 Gram(s) IV Push once  dextrose 50% Injectable 25 Gram(s) IV Push once  glucagon  Injectable 1 milliGRAM(s) IntraMuscular once  insulin lispro (ADMELOG) corrective regimen sliding scale   SubCutaneous three times a day before meals  metoprolol tartrate 25 milliGRAM(s) Oral three times a day  nystatin Powder 1 Application(s) Topical two times a day  rivaroxaban 15 milliGRAM(s) Oral with dinner  vancomycin  IVPB 1000 milliGRAM(s) IV Intermittent every 24 hours    MEDICATIONS  (PRN):  acetaminophen   Tablet .. 650 milliGRAM(s) Oral every 6 hours PRN Temp greater or equal to 38.5C (101.3F), Mild Pain (1 - 3)  aluminum hydroxide/magnesium hydroxide/simethicone Suspension 30 milliLiter(s) Oral every 4 hours PRN Dyspepsia  melatonin 3 milliGRAM(s) Oral at bedtime PRN Insomnia  OLANZapine Injectable 1.25 milliGRAM(s) IntraMuscular every 6 hours PRN Agitation      Labs:                        11.1   5.37  )-----------( 156      ( 11 Aug 2021 07:43 )             33.7     08-10    147<H>  |  110<H>  |  18  ----------------------------<  96  3.8   |  20<L>  |  0.96    Ca    9.0      10 Aug 2021 07:10  Phos  3.3     08-10  Mg     2.00     08-10          COVID-19 PCR: Gisselletec (06 Aug 2021 17:49)      Radiology:

## 2021-08-11 NOTE — PROGRESS NOTE ADULT - ATTENDING COMMENTS
80 y.o. F with h/o dementia, A-fib on A/C, DM2, HTN admitted for infected left heel ulcer.  1. left heel ulcer, (+) MRSA, on Vanco and Ertapenem  -Obtain MRI to r/o Osteo  -F/U with Podiatry and ID  2. Hypernatremia.   -1/2 NS at 75 cc/H X 24 h  -Monitor BMP in AM  3. DM2: FS well controlled.  -Cont. FSSS with Admelog covergae  -Monitor FS  4. HTN:  -Cont current meds   -Monitor BP   5. A fib, rate well controlled.  - Cont cardizam  -Cont Xarelto   6. Dementia. MS at baseline.    D/W HS and MS, agree with the above progress note.

## 2021-08-11 NOTE — PROGRESS NOTE ADULT - PROBLEM SELECTOR PLAN 3
Noted to be A&Ox0-1 at baseline at home. Has been eating significant portions of her meals.  - patient currently alert and speaking however speech incoherent  - patient intermittently able to follow very basic commands  - aspiration precautions  - per son, patient able to tolerate puree diet and glucerna at home  - frequent reorientation  - melatonin qhs

## 2021-08-11 NOTE — PROGRESS NOTE ADULT - SUBJECTIVE AND OBJECTIVE BOX
Patient is a 80y old  Female who presents with a chief complaint of Left Heel Ulcer (09 Aug 2021 11:58)    f/u heel infection    Interval History/ROS:  no fever.  ROS difficult as she has dementia and does not answer questions.      PAST MEDICAL & SURGICAL HISTORY:  Diabetes Mellitus  Benign Hypertension  dementia    Allergies  No Known Allergies    ANTIMICROBIALS:  ceFAZolin   IVPB 2000 every 8 hours (8/8-8/9)    active:  cefepime   IVPB 1000 every 12 hours (8/6-8/8, 8/9-)  vancomycin  IVPB 1000 every 24 hours (8/6, 8/7, 8/9-)    MEDICATIONS  (STANDING):  insulin lispro (ADMELOG) corrective regimen sliding scale  three times a day before meals  metoprolol tartrate 25 three times a day  rivaroxaban 15 with dinner    Vital Signs Last 24 Hrs  T(F): 98.1 (08-11-21 @ 06:05), Max: 98.1 (08-11-21 @ 06:05)  HR: 66 (08-11-21 @ 06:05)  BP: 96/60 (08-11-21 @ 06:05)  RR: 19 (08-11-21 @ 06:05)  SpO2: 100% (08-11-21 @ 06:05) (100% - 100%)    PHYSICAL EXAM:  Constitutional: non-toxic, in bed, pulled out her IV  HEAD/EYES: anicteric  ENT:  supple  Cardiovascular:   normal S1, S2  Respiratory:  clear BS bilaterally  GI:  soft, non-tender, normal bowel sounds  :  no martinez  Musculoskeletal:  no synovitis, left heel with deep wound, previously with malodor, tenderness  Neurologic: awake, difficult to assess, dementia  Skin:  no rash, PIV out  Psychiatric:  awake, appropriate mood                               11.1   5.37  )-----------( 156      ( 11 Aug 2021 07:43 )             33.7 08-11    146  |  113  |  14  ----------------------------<  118  3.5   |  22  |  0.96  Ca    8.6      11 Aug 2021 07:43Phos  2.6     08-11Mg     1.90     08-11    Sedimentation Rate, Erythrocyte: 23 (08-06 @ 14:19)  C-Reactive Protein, Serum: 13.5 (08-06 @ 14:19)    MICROBIOLOGY:    Culture - Abscess with Gram Stain (collected 08-06-21 @ 21:26)  Source: .Abscess Leg - Left  Final Report (08-10-21 @ 20:44):    Few Methicillin Resistant Staphylococcus aureus    Moderate Enterobacter aerogenes Susceptibility to follow.    Moderate Escherichia coli Susceptibility to follow.    Moderate Bacteroides fragilis . "Susceptibilities not performed"  Organism: Methicillin resistant Staphylococcus aureus (08-08-21 @ 16:43)      -  Ampicillin/Sulbactam: R <=8/4      -  Cefazolin: R <=4      -  Clindamycin: R >4      -  Daptomycin: S 0.5      -  Erythromycin: R >4      -  Gentamicin: S <=1 Should not be used as monotherapy      -  Linezolid: S 1      -  Oxacillin: R >2      -  Penicillin: R >8      -  RIF- Rifampin: S <=1 Should not be used as monotherapy      -  Tetra/Doxy: S <=1      -  Trimethoprim/Sulfamethoxazole: S <=0.5/9.5      -  Vancomycin: S 1      Method Type: DEBORAH    Culture - Blood (collected 08-06-21 @ 18:47)  Source: .Blood Blood  Preliminary Report (08-07-21 @ 19:01):    No growth to date.    Culture - Blood (collected 08-06-21 @ 18:47)  Source: .Blood Blood  Preliminary Report (08-07-21 @ 19:01):    No growth to date.    RADIOLOGY:  imaging below personally reviewed and agree with findings    Xray Ankle 2 Views, Left (08.06.21 @ 14:33) >  Xray Foot AP + Lateral, Left (08.06.21 @ 14:32) >  IMPRESSION:  Posterior heel partial-thickness soft tissue ulceration with thin film of radiodense material along the bed of the ulceration.  No tracking gas collections beyond the ulcer margins. No gross radiographic evidence of osteomyelitis however if clinical concern is high consider further imaging with MRI.  No acute fracture or dislocation.  Joint spaces are maintained without evidence of joint margin erosions or ankle joint effusion.  Diffuse osteopenic changes.  Diffuse lower leg vascular calcifications.    CT Head No Cont (02.27.20 @ 11:41) >  Impression: This exam somewhat limited by motion though grossly unremarkable

## 2021-08-11 NOTE — PROGRESS NOTE ADULT - PROBLEM SELECTOR PLAN 1
Patient with chronic Left Heel wound, now appearing to be worsening over past 3 weeks. Seen and evaluated by podiatry. XR foot without gross evidence of OM or fracture. No signs of systemic infection. Wound culture grew few MRSA.   - c/w vancomycin  - change cefepime to ertapenem, appreciate ID recs   - MRI Left Foot pending

## 2021-08-11 NOTE — PROGRESS NOTE ADULT - SUBJECTIVE AND OBJECTIVE BOX
Patient is a 80y old  Female who presents with a chief complaint of Left Heel Ulcer (11 Aug 2021 08:13)       INTERVAL HPI/OVERNIGHT EVENTS:  Patient seen and evaluated at bedside.  Pt is resting comfortable in NAD. Denies N/V/F/C.  Pain rated at X/10    Allergies    No Known Allergies    Intolerances        Vital Signs Last 24 Hrs  T(C): 36.7 (11 Aug 2021 06:05), Max: 36.7 (11 Aug 2021 06:05)  T(F): 98.1 (11 Aug 2021 06:05), Max: 98.1 (11 Aug 2021 06:05)  HR: 66 (11 Aug 2021 06:05) (66 - 109)  BP: 96/60 (11 Aug 2021 06:05) (96/60 - 100/52)  BP(mean): --  RR: 19 (11 Aug 2021 06:05) (17 - 19)  SpO2: 100% (11 Aug 2021 06:05) (100% - 100%)    LABS:                        11.1   5.37  )-----------( 156      ( 11 Aug 2021 07:43 )             33.7     08-11    146<H>  |  113<H>  |  14  ----------------------------<  118<H>  3.5   |  22  |  0.96    Ca    8.6      11 Aug 2021 07:43  Phos  2.6     08-11  Mg     1.90     08-11          CAPILLARY BLOOD GLUCOSE      POCT Blood Glucose.: 164 mg/dL (10 Aug 2021 22:25)  POCT Blood Glucose.: 134 mg/dL (10 Aug 2021 18:02)  POCT Blood Glucose.: 180 mg/dL (10 Aug 2021 12:36)      Lower Extremity Physical Exam:  Lower Extremity Physical Exam:  Vascular: DP 1/4 B/L, PT non-palpable b/l, CFT <3 seconds B/L, Temperature gradient warm to cool, B/L.   Neuro: Epicritic sensation diminished to the level of ankle, B/L.  Musculoskeletal/Ortho: unremarkable  Skin: L plantar heel wound with fibrotic base, probe to bone, no malodor, no tracking, undermining distolateral and proximal, no bogginess, no periwound erythema, no pus or drainage, L foot lateral malleoli wound to SubQ    8/6 s/p LF heel escharectomy wound to bone, fibrotic base, no purulence or drainage noted      RADIOLOGY & ADDITIONAL TESTS:

## 2021-08-12 LAB
ANION GAP SERPL CALC-SCNC: 10 MMOL/L — SIGNIFICANT CHANGE UP (ref 7–14)
BUN SERPL-MCNC: 12 MG/DL — SIGNIFICANT CHANGE UP (ref 7–23)
CALCIUM SERPL-MCNC: 9 MG/DL — SIGNIFICANT CHANGE UP (ref 8.4–10.5)
CHLORIDE SERPL-SCNC: 112 MMOL/L — HIGH (ref 98–107)
CO2 SERPL-SCNC: 25 MMOL/L — SIGNIFICANT CHANGE UP (ref 22–31)
CREAT SERPL-MCNC: 0.81 MG/DL — SIGNIFICANT CHANGE UP (ref 0.5–1.3)
GLUCOSE BLDC GLUCOMTR-MCNC: 109 MG/DL — HIGH (ref 70–99)
GLUCOSE BLDC GLUCOMTR-MCNC: 133 MG/DL — HIGH (ref 70–99)
GLUCOSE BLDC GLUCOMTR-MCNC: 134 MG/DL — HIGH (ref 70–99)
GLUCOSE BLDC GLUCOMTR-MCNC: 86 MG/DL — SIGNIFICANT CHANGE UP (ref 70–99)
GLUCOSE SERPL-MCNC: 117 MG/DL — HIGH (ref 70–99)
MAGNESIUM SERPL-MCNC: 2 MG/DL — SIGNIFICANT CHANGE UP (ref 1.6–2.6)
PHOSPHATE SERPL-MCNC: 2.4 MG/DL — LOW (ref 2.5–4.5)
POTASSIUM SERPL-MCNC: 3.4 MMOL/L — LOW (ref 3.5–5.3)
POTASSIUM SERPL-SCNC: 3.4 MMOL/L — LOW (ref 3.5–5.3)
SODIUM SERPL-SCNC: 147 MMOL/L — HIGH (ref 135–145)
VANCOMYCIN TROUGH SERPL-MCNC: 21.8 UG/ML — HIGH (ref 10–20)

## 2021-08-12 PROCEDURE — 99232 SBSQ HOSP IP/OBS MODERATE 35: CPT | Mod: GC

## 2021-08-12 RX ORDER — OLANZAPINE 15 MG/1
1.25 TABLET, FILM COATED ORAL ONCE
Refills: 0 | Status: DISCONTINUED | OUTPATIENT
Start: 2021-08-12 | End: 2021-08-15

## 2021-08-12 RX ORDER — SODIUM CHLORIDE 9 MG/ML
1000 INJECTION, SOLUTION INTRAVENOUS
Refills: 0 | Status: DISCONTINUED | OUTPATIENT
Start: 2021-08-12 | End: 2021-08-13

## 2021-08-12 RX ORDER — VANCOMYCIN HCL 1 G
750 VIAL (EA) INTRAVENOUS DAILY
Refills: 0 | Status: DISCONTINUED | OUTPATIENT
Start: 2021-08-13 | End: 2021-08-16

## 2021-08-12 RX ADMIN — SODIUM CHLORIDE 50 MILLILITER(S): 9 INJECTION, SOLUTION INTRAVENOUS at 18:56

## 2021-08-12 RX ADMIN — Medication 25 MILLIGRAM(S): at 23:10

## 2021-08-12 RX ADMIN — Medication 25 MILLIGRAM(S): at 05:29

## 2021-08-12 RX ADMIN — RIVAROXABAN 15 MILLIGRAM(S): KIT at 18:56

## 2021-08-12 RX ADMIN — Medication 25 MILLIGRAM(S): at 13:44

## 2021-08-12 RX ADMIN — ERTAPENEM SODIUM 120 MILLIGRAM(S): 1 INJECTION, POWDER, LYOPHILIZED, FOR SOLUTION INTRAMUSCULAR; INTRAVENOUS at 18:56

## 2021-08-12 RX ADMIN — Medication 1 APPLICATION(S): at 13:45

## 2021-08-12 NOTE — PROGRESS NOTE ADULT - SUBJECTIVE AND OBJECTIVE BOX
Patient is a 80y old  Female who presents with a chief complaint of Left Heel Ulcer (12 Aug 2021 13:00)       INTERVAL HPI/OVERNIGHT EVENTS:  Patient seen and evaluated at bedside.  Pt is resting comfortable in NAD. Denies N/V/F/C.      Allergies    No Known Allergies    Intolerances        Vital Signs Last 24 Hrs  T(C): 36.2 (12 Aug 2021 05:21), Max: 36.7 (11 Aug 2021 21:30)  T(F): 97.1 (12 Aug 2021 05:21), Max: 98 (11 Aug 2021 21:30)  HR: 107 (12 Aug 2021 05:21) (106 - 107)  BP: 120/66 (12 Aug 2021 13:44) (92/58 - 120/66)  BP(mean): --  RR: 17 (12 Aug 2021 05:21) (17 - 19)  SpO2: 100% (12 Aug 2021 05:21) (100% - 100%)    LABS:                        11.1   5.37  )-----------( 156      ( 11 Aug 2021 07:43 )             33.7     08-11    146<H>  |  113<H>  |  14  ----------------------------<  118<H>  3.5   |  22  |  0.96    Ca    8.6      11 Aug 2021 07:43  Phos  2.6     08-11  Mg     1.90     08-11          CAPILLARY BLOOD GLUCOSE      POCT Blood Glucose.: 133 mg/dL (12 Aug 2021 12:08)  POCT Blood Glucose.: 109 mg/dL (12 Aug 2021 09:14)  POCT Blood Glucose.: 186 mg/dL (11 Aug 2021 22:14)  POCT Blood Glucose.: 122 mg/dL (11 Aug 2021 18:04)      Lower Extremity Physical Exam:  Vascular: DP 1/4 B/L, PT non-palpable b/l, CFT <3 seconds B/L, Temperature gradient warm to cool, B/L.   Neuro: Epicritic sensation diminished to the level of ankle, B/L.  Musculoskeletal/Ortho: unremarkable  Skin: L plantar heel wound with fibrotic base, probe to bone, no malodor, no tracking, undermining distolateral and proximal, no bogginess, no periwound erythema, no pus or drainage, L foot lateral malleoli wound to SubQ    8/6 s/p LF heel escharectomy wound to bone, fibrotic base, no purulence or drainage noted    RADIOLOGY & ADDITIONAL TESTS:

## 2021-08-12 NOTE — PROGRESS NOTE ADULT - ATTENDING COMMENTS
Pt clinically improving, MS at baseline. Vitals stable. Afebrile. Awaiting MRI to r/o osteo  Will continue abx as per ID  will expedite MRI  Will f/u Podiatry rec.  Cont wound care.     D/W HS and MS Pt clinically improving, MS at baseline. Denies any pain, Vitals stable. Afebrile. Awaiting MRI to r/o osteo  Will continue abx as per ID  will expedite MRI  Will f/u Podiatry rec.  Cont wound care.   Hypernatremia on 1/2NS, will f/u BMP and adjust IVF     D/W HS and MS

## 2021-08-12 NOTE — PROGRESS NOTE ADULT - ASSESSMENT
81 y/o F patient presents w/ L foot heel wound   - Patient seen and evaluated   - Afebrile, no leukocytosis  - GAGE: L foot plantar heel wound  to bone, +probe to bone, malodor present, no tracking, undermining distolateral and proximal,  no periwound erythema, no pus or drainage, L foot lateral malleoli wound to SubQ, no acute SOI  - Continue IV abx   - LF wound culture growing MRSA  - LF MRI unable to be completed given patients mental status  - Continue Z-flows to offload L heel   - Pod plan for LWC with IV ABx, per ID recs  - Discussed w/ attending

## 2021-08-12 NOTE — PROGRESS NOTE ADULT - SUBJECTIVE AND OBJECTIVE BOX
80y Female    Reason for consult: Polymicrobial OM    Overnight/Subjective:  -No acute events overnight, awaiting MRI.  -Unable to obtain subjective data 2/2 patient's MS    Objective:    Meds:  ertapenem  IVPB 1000 milliGRAM(s) IV Intermittent every 24 hours  vancomycin  IVPB 1000 milliGRAM(s) IV Intermittent every 24 hours    Allergies    No Known Allergies    Intolerances        VITALS:  Vital Signs Last 24 Hrs  T(C): 36.2 (12 Aug 2021 05:21), Max: 36.7 (11 Aug 2021 21:30)  T(F): 97.1 (12 Aug 2021 05:21), Max: 98 (11 Aug 2021 21:30)  HR: 107 (12 Aug 2021 05:21) (106 - 107)  BP: 104/57 (12 Aug 2021 05:21) (92/58 - 104/57)  BP(mean): --  RR: 17 (12 Aug 2021 05:21) (17 - 19)  SpO2: 100% (12 Aug 2021 05:21) (100% - 100%)    LABS/DIAGNOSTIC TESTS:                          11.1   5.37  )-----------( 156      ( 11 Aug 2021 07:43 )             33.7         08-11    146<H>  |  113<H>  |  14  ----------------------------<  118<H>  3.5   |  22  |  0.96    Ca    8.6      11 Aug 2021 07:43  Phos  2.6     08-11  Mg     1.90     08-11            CULTURES: .Abscess Leg - Left  08-06 @ 21:26   Few Methicillin Resistant Staphylococcus aureus  Moderate Enterobacter aerogenes  Moderate Escherichia coli ESBL  Moderate Bacteroides fragilis . "Susceptibilities not performed"  --  Enterobacter aerogenes  Escherichia coli ESBL  Methicillin resistant Staphylococcus aureus      .Blood Blood  08-06 @ 15:26   No Growth Final  --  --            RADIOLOGY:      ROS:  [ X ] UNABLE TO ELICIT 80y Female    Reason for consult: Polymicrobial OM    Overnight/Subjective:  -No acute events overnight, awaiting MRI.  -Unable to obtain subjective data 2/2 patient's MS    Objective:    Meds:  ertapenem  IVPB 1000 milliGRAM(s) IV Intermittent every 24 hours  vancomycin  IVPB 1000 milliGRAM(s) IV Intermittent every 24 hours    Allergies    No Known Allergies    Intolerances    VITALS:   T(C): 36.2 (08-12-21 @ 05:21), Max: 36.7 (08-11-21 @ 21:30)  HR: 107 (08-12-21 @ 05:21) (106 - 107)  BP: 104/57 (08-12-21 @ 05:21) (92/58 - 104/57)  RR: 17 (08-12-21 @ 05:21) (17 - 19)  SpO2: 100% (08-12-21 @ 05:21) (100% - 100%)    GENERAL: no acute distress  HEAD:  Atraumatic, Normocephalic  EYES: EOMI, PERRLA, conjunctiva and sclera clear  ENT: Moist mucous membranes  NECK: Supple, No JVD  CHEST/LUNG: Clear to auscultation bilaterally; No rales, rhonchi, wheezing, or rubs. Unlabored respirations  HEART: tachycardic, regular rhythm; No murmurs, rubs, or gallops  ABDOMEN: BSx4; Soft, nontender, nondistended  EXTREMITIES:  L. foot wrapped with maladorous odor, wound not assessed 2/2 pain  NERVOUS SYSTEM:  A&Ox0, unintelligible speach    LABS/DIAGNOSTIC TESTS:                          11.1   5.37  )-----------( 156      ( 11 Aug 2021 07:43 )             33.7         08-11    146<H>  |  113<H>  |  14  ----------------------------<  118<H>  3.5   |  22  |  0.96    Ca    8.6      11 Aug 2021 07:43  Phos  2.6     08-11  Mg     1.90     08-11            CULTURES: .Abscess Leg - Left  08-06 @ 21:26   Few Methicillin Resistant Staphylococcus aureus  Moderate Enterobacter aerogenes  Moderate Escherichia coli ESBL  Moderate Bacteroides fragilis . "Susceptibilities not performed"  --  Enterobacter aerogenes  Escherichia coli ESBL  Methicillin resistant Staphylococcus aureus      .Blood Blood  08-06 @ 15:26   No Growth Final  --  --            RADIOLOGY:      ROS:  [ X ] UNABLE TO ELICIT 80y Female    Reason for consult: Polymicrobial OM    Overnight/Subjective:  -No acute events overnight, awaiting MRI.  -Unable to obtain subjective data 2/2 patient's MS    PAST MEDICAL & SURGICAL HISTORY:  Diabetes Mellitus  Benign Hypertension  dementia    Allergies  No Known Allergies    ANTIMICROBIALS:  ceFAZolin   IVPB 2000 every 8 hours (8/8-8/9)  cefepime   IVPB 1000 every 12 hours (8/6-8/8, 8/9-8/11)    active:  vancomycin  IVPB 1000 every 24 hours (8/6, 8/7, 8/9-)  ertapenem  IVPB 1000 every 24 hours (8/11-)    MEDICATIONS  (STANDING):  insulin lispro (ADMELOG) corrective regimen sliding scale  three times a day before meals  metoprolol tartrate 25 three times a day  rivaroxaban 15 with dinner    VITALS:   T(C): 36.2 (08-12-21 @ 05:21), Max: 36.7 (08-11-21 @ 21:30)  HR: 107 (08-12-21 @ 05:21) (106 - 107)  BP: 104/57 (08-12-21 @ 05:21) (92/58 - 104/57)  RR: 17 (08-12-21 @ 05:21) (17 - 19)  SpO2: 100% (08-12-21 @ 05:21) (100% - 100%)    GENERAL: no acute distress  HEAD:  Atraumatic, Normocephalic  EYES: EOMI, PERRLA, conjunctiva and sclera clear  ENT: Moist mucous membranes  NECK: Supple, No JVD  CHEST/LUNG: Clear to auscultation bilaterally; No rales, rhonchi, wheezing, or rubs. Unlabored respirations  HEART: tachycardic, regular rhythm; No murmurs, rubs, or gallops  ABDOMEN: BSx4; Soft, nontender, nondistended  EXTREMITIES:  L. foot wrapped with maladorous odor, wound not assessed 2/2 pain  NERVOUS SYSTEM:  A&Ox0, unintelligible speach                      11.1   5.37  )-----------( 156      ( 11 Aug 2021 07:43 )             33.7     146<H>  |  113<H>  |  14  ----------------------------<  118<H>  3.5   |  22  |  0.96    Ca    8.6      11 Aug 2021 07:43  Phos  2.6     08-11  Mg     1.90     08-11    C-Reactive Protein, Serum: 20.4 (08-10 @ 07:10)  C-Reactive Protein, Serum: 13.5 (08-06 @ 14:19)    Sedimentation Rate, Erythrocyte: 23 (08-06 @ 14:19)    MICROBIOLOGY:  Culture - Abscess with Gram Stain (collected 08-06-21 @ 21:26)  Source: .Abscess Leg - Left  Final Report (08-10-21 @ 20:44):    Few Methicillin Resistant Staphylococcus aureus    Moderate Enterobacter aerogenes Susceptibility to follow.    Moderate Escherichia coli Susceptibility to follow.    Moderate Bacteroides fragilis . "Susceptibilities not performed"  Organism: Methicillin resistant Staphylococcus aureus (08-08-21 @ 16:43)      -  Ampicillin/Sulbactam: R <=8/4      -  Cefazolin: R <=4      -  Clindamycin: R >4      -  Daptomycin: S 0.5      -  Erythromycin: R >4      -  Gentamicin: S <=1 Should not be used as monotherapy      -  Linezolid: S 1      -  Oxacillin: R >2      -  Penicillin: R >8      -  RIF- Rifampin: S <=1 Should not be used as monotherapy      -  Tetra/Doxy: S <=1      -  Trimethoprim/Sulfamethoxazole: S <=0.5/9.5      -  Vancomycin: S 1      Method Type: DEBORAH    Culture - Blood (collected 08-06-21 @ 18:47)  Source: .Blood Blood  Preliminary Report (08-07-21 @ 19:01):    No growth to date.    Culture - Blood (collected 08-06-21 @ 18:47)  Source: .Blood Blood  Preliminary Report (08-07-21 @ 19:01):    No growth to date.    RADIOLOGY:  imaging below personally reviewed and agree with findings    Xray Ankle 2 Views, Left (08.06.21 @ 14:33) >  Xray Foot AP + Lateral, Left (08.06.21 @ 14:32) >  IMPRESSION:  Posterior heel partial-thickness soft tissue ulceration with thin film of radiodense material along the bed of the ulceration.  No tracking gas collections beyond the ulcer margins. No gross radiographic evidence of osteomyelitis however if clinical concern is high consider further imaging with MRI.  No acute fracture or dislocation.  Joint spaces are maintained without evidence of joint margin erosions or ankle joint effusion.  Diffuse osteopenic changes.  Diffuse lower leg vascular calcifications.    CT Head No Cont (02.27.20 @ 11:41) >  Impression: This exam somewhat limited by motion though grossly unremarkable

## 2021-08-12 NOTE — PROGRESS NOTE ADULT - PROBLEM SELECTOR PLAN 5
Patient with history of DM. A1C 6.4 on 8/7. Fingerstick 109 on 8/12  - Last A1C documented 7.8 in Jan 2021  - taking Metformin 500mg BID at home  - monitor FS TIDAC  - insulin sliding scale

## 2021-08-12 NOTE — PROGRESS NOTE ADULT - ASSESSMENT
80F with dementia, HTN, CVA, Afib on Xarelto, DM.  Recent hospitalization at OSH January 2021 for possible stroke and while at rehab developed sacral decub which resolved and left heel decub which became infected.  No overt OM, however, wound is fairly deep and there is still malodor suggesting active infection.  Agree with MRI to better assess.  If OM present, likely chronic om given chronicity, low ESR.      Infected heel  - r/o OM  - Wound culture polymicrobial  - await MRI  - Continue vancomycin 1g q24h, please obtain trough before 3rd dose  - Continue ertapenem 1g q24h  - Pending definitive plan with podiatry +/- MRI        D/w attending Dr. Bolden

## 2021-08-12 NOTE — PROGRESS NOTE ADULT - PROBLEM SELECTOR PLAN 1
Patient with chronic Left Heel wound, now appearing to be worsening over past 3 weeks. Seen and evaluated by podiatry. XR foot without gross evidence of OM or fracture. No signs of systemic infection. Wound culture grew few MRSA.   - c/w vancomycin  - c/w ertapenem, appreciate ID recs   - MRI Left Foot pending

## 2021-08-12 NOTE — PROGRESS NOTE ADULT - PROBLEM SELECTOR PLAN 2
Patient p/w elevated sodium of 152, down to 146 on 8/11.  - based on history most likely caused by impaired PO intake of fluids 2/2 dementia

## 2021-08-12 NOTE — PROGRESS NOTE ADULT - SUBJECTIVE AND OBJECTIVE BOX
Internal Medicine Progress Note    Patient is a 80y old  Female who presents with a chief complaint of Left Heel Ulcer (11 Aug 2021 11:11)    OVERNIGHT EVENTS/SUBJECTIVE:    OBJECTIVE:  Vital Signs Last 24 Hrs  T(C): 36.2 (12 Aug 2021 05:21), Max: 36.7 (11 Aug 2021 21:30)  T(F): 97.1 (12 Aug 2021 05:21), Max: 98 (11 Aug 2021 21:30)  HR: 107 (12 Aug 2021 05:21) (70 - 107)  BP: 104/57 (12 Aug 2021 05:21) (92/58 - 104/57)  BP(mean): --  RR: 17 (12 Aug 2021 05:21) (17 - 19)  SpO2: 100% (12 Aug 2021 05:21) (98% - 100%)  I&O's Detail    11 Aug 2021 07:01  -  12 Aug 2021 07:00  --------------------------------------------------------  IN:    dextrose 5% + sodium chloride 0.45%: 150 mL    IV PiggyBack: 50 mL    Oral Fluid: 250 mL  Total IN: 450 mL    OUT:  Total OUT: 0 mL    Total NET: 450 mL        Daily     Daily   Physical Exam:  General: NAD, resting comfortably in bed  Neuro: A&Ox4, 5/5 strength in all ext  HEENT: NC/AT, EOMI, normal hearing, oral mucosa moist, no oral lesions noted, no pharyngeal erythema, uvula midline  Neck: supple, thyroid not enlarged, no LAD  Resp: Breathing comfortably on RA, LCTA b/l  CV: Normal sinus rhythm, S1 and S2, no r/m/g  Abd: soft, non-distended, non-tender. No HSM.  Ext: ROMIx4, no edema, +2 pulses bilaterally  Skin: Warm and dry, no rashes or discolorations  Psych: Appropriate affect    Medications:  MEDICATIONS  (STANDING):  collagenase Ointment 1 Application(s) Topical daily  Dakins Solution - 1/2 Strength 1 Application(s) Topical Once  Dakins Solution - 1/4 Strength 1 Application(s) Topical daily  dextrose 40% Gel 15 Gram(s) Oral once  dextrose 5% + sodium chloride 0.45%. 500 milliLiter(s) (50 mL/Hr) IV Continuous <Continuous>  dextrose 5%. 1000 milliLiter(s) (100 mL/Hr) IV Continuous <Continuous>  dextrose 5%. 1000 milliLiter(s) (50 mL/Hr) IV Continuous <Continuous>  dextrose 50% Injectable 25 Gram(s) IV Push once  dextrose 50% Injectable 12.5 Gram(s) IV Push once  dextrose 50% Injectable 25 Gram(s) IV Push once  ertapenem  IVPB 1000 milliGRAM(s) IV Intermittent every 24 hours  glucagon  Injectable 1 milliGRAM(s) IntraMuscular once  insulin lispro (ADMELOG) corrective regimen sliding scale   SubCutaneous three times a day before meals  metoprolol tartrate 25 milliGRAM(s) Oral three times a day  nystatin Powder 1 Application(s) Topical two times a day  rivaroxaban 15 milliGRAM(s) Oral with dinner  vancomycin  IVPB 1000 milliGRAM(s) IV Intermittent every 24 hours    MEDICATIONS  (PRN):  acetaminophen   Tablet .. 650 milliGRAM(s) Oral every 6 hours PRN Temp greater or equal to 38.5C (101.3F), Mild Pain (1 - 3)  aluminum hydroxide/magnesium hydroxide/simethicone Suspension 30 milliLiter(s) Oral every 4 hours PRN Dyspepsia  melatonin 3 milliGRAM(s) Oral at bedtime PRN Insomnia  OLANZapine Injectable 1.25 milliGRAM(s) IntraMuscular every 6 hours PRN Agitation      Labs:                        11.1   5.37  )-----------( 156      ( 11 Aug 2021 07:43 )             33.7     08-11    146<H>  |  113<H>  |  14  ----------------------------<  118<H>  3.5   |  22  |  0.96    Ca    8.6      11 Aug 2021 07:43  Phos  2.6     08-11  Mg     1.90     08-11          COVID-19 PCR: Gisselletec (06 Aug 2021 17:49)      Radiology: Internal Medicine Progress Note    Patient is a 80y old  Female who presents with a chief complaint of Left Heel Ulcer (11 Aug 2021 11:11)    OVERNIGHT EVENTS/SUBJECTIVE: No acute events. She is scheduled for MRI this morning and will receive zyprexa beforehand. She is is in no apparent pain or discomfort.    OBJECTIVE:  Vital Signs Last 24 Hrs  T(C): 36.2 (12 Aug 2021 05:21), Max: 36.7 (11 Aug 2021 21:30)  T(F): 97.1 (12 Aug 2021 05:21), Max: 98 (11 Aug 2021 21:30)  HR: 107 (12 Aug 2021 05:21) (70 - 107)  BP: 104/57 (12 Aug 2021 05:21) (92/58 - 104/57)  BP(mean): --  RR: 17 (12 Aug 2021 05:21) (17 - 19)  SpO2: 100% (12 Aug 2021 05:21) (98% - 100%)  I&O's Detail    11 Aug 2021 07:01  -  12 Aug 2021 07:00  --------------------------------------------------------  IN:    dextrose 5% + sodium chloride 0.45%: 150 mL    IV PiggyBack: 50 mL    Oral Fluid: 250 mL  Total IN: 450 mL    OUT:  Total OUT: 0 mL    Total NET: 450 mL         Daily   Physical Exam:  General: NAD, resting comfortably in bed  Neuro: responsive to verbal cues  HEENT: NC/AT, EOMI, normal hearing  Resp: Breathing comfortably on RA, LCTA b/l  CV: irregular rhythm, no r/m/g    Medications:  MEDICATIONS  (STANDING):  collagenase Ointment 1 Application(s) Topical daily  Dakins Solution - 1/2 Strength 1 Application(s) Topical Once  Dakins Solution - 1/4 Strength 1 Application(s) Topical daily  dextrose 40% Gel 15 Gram(s) Oral once  dextrose 5% + sodium chloride 0.45%. 500 milliLiter(s) (50 mL/Hr) IV Continuous <Continuous>  dextrose 5%. 1000 milliLiter(s) (100 mL/Hr) IV Continuous <Continuous>  dextrose 5%. 1000 milliLiter(s) (50 mL/Hr) IV Continuous <Continuous>  dextrose 50% Injectable 25 Gram(s) IV Push once  dextrose 50% Injectable 12.5 Gram(s) IV Push once  dextrose 50% Injectable 25 Gram(s) IV Push once  ertapenem  IVPB 1000 milliGRAM(s) IV Intermittent every 24 hours  glucagon  Injectable 1 milliGRAM(s) IntraMuscular once  insulin lispro (ADMELOG) corrective regimen sliding scale   SubCutaneous three times a day before meals  metoprolol tartrate 25 milliGRAM(s) Oral three times a day  nystatin Powder 1 Application(s) Topical two times a day  rivaroxaban 15 milliGRAM(s) Oral with dinner  vancomycin  IVPB 1000 milliGRAM(s) IV Intermittent every 24 hours    MEDICATIONS  (PRN):  acetaminophen   Tablet .. 650 milliGRAM(s) Oral every 6 hours PRN Temp greater or equal to 38.5C (101.3F), Mild Pain (1 - 3)  aluminum hydroxide/magnesium hydroxide/simethicone Suspension 30 milliLiter(s) Oral every 4 hours PRN Dyspepsia  melatonin 3 milliGRAM(s) Oral at bedtime PRN Insomnia  OLANZapine Injectable 1.25 milliGRAM(s) IntraMuscular every 6 hours PRN Agitation      Labs:                        11.1   5.37  )-----------( 156      ( 11 Aug 2021 07:43 )             33.7     08-11    146<H>  |  113<H>  |  14  ----------------------------<  118<H>  3.5   |  22  |  0.96    Ca    8.6      11 Aug 2021 07:43  Phos  2.6     08-11  Mg     1.90     08-11          COVID-19 PCR: NotDetec (06 Aug 2021 17:49)      Radiology:

## 2021-08-12 NOTE — PROGRESS NOTE ADULT - ATTENDING COMMENTS
80F with dementia, HTN, CVA, Afib on Xarelto, DM.  Recent hospitalization at OSH January 2021 for possible stroke and while at rehab developed sacral decub which resolved and left heel decub which became infected.  No overt OM, however, wound is fairly deep and there is still malodor suggesting active infection.  Agree with MRI to better assess.  If OM present, likely chronic om given chronicity, low ESR.      Infected heel  - r/o OM  - await MRI  - would continue vancomycin to cover mrsa  - continue ertapenem 1g daily  - duration pending MRI

## 2021-08-13 DIAGNOSIS — F03.91 UNSPECIFIED DEMENTIA WITH BEHAVIORAL DISTURBANCE: ICD-10-CM

## 2021-08-13 LAB
ANION GAP SERPL CALC-SCNC: 11 MMOL/L — SIGNIFICANT CHANGE UP (ref 7–14)
BUN SERPL-MCNC: 12 MG/DL — SIGNIFICANT CHANGE UP (ref 7–23)
CALCIUM SERPL-MCNC: 8.9 MG/DL — SIGNIFICANT CHANGE UP (ref 8.4–10.5)
CHLORIDE SERPL-SCNC: 110 MMOL/L — HIGH (ref 98–107)
CO2 SERPL-SCNC: 24 MMOL/L — SIGNIFICANT CHANGE UP (ref 22–31)
CREAT SERPL-MCNC: 0.67 MG/DL — SIGNIFICANT CHANGE UP (ref 0.5–1.3)
GLUCOSE BLDC GLUCOMTR-MCNC: 102 MG/DL — HIGH (ref 70–99)
GLUCOSE BLDC GLUCOMTR-MCNC: 112 MG/DL — HIGH (ref 70–99)
GLUCOSE BLDC GLUCOMTR-MCNC: 133 MG/DL — HIGH (ref 70–99)
GLUCOSE BLDC GLUCOMTR-MCNC: 188 MG/DL — HIGH (ref 70–99)
GLUCOSE SERPL-MCNC: 219 MG/DL — HIGH (ref 70–99)
MAGNESIUM SERPL-MCNC: 2.1 MG/DL — SIGNIFICANT CHANGE UP (ref 1.6–2.6)
PHOSPHATE SERPL-MCNC: 2.3 MG/DL — LOW (ref 2.5–4.5)
POTASSIUM SERPL-MCNC: 4 MMOL/L — SIGNIFICANT CHANGE UP (ref 3.5–5.3)
POTASSIUM SERPL-SCNC: 4 MMOL/L — SIGNIFICANT CHANGE UP (ref 3.5–5.3)
SODIUM SERPL-SCNC: 145 MMOL/L — SIGNIFICANT CHANGE UP (ref 135–145)

## 2021-08-13 PROCEDURE — 99233 SBSQ HOSP IP/OBS HIGH 50: CPT

## 2021-08-13 PROCEDURE — 90792 PSYCH DIAG EVAL W/MED SRVCS: CPT

## 2021-08-13 PROCEDURE — 99233 SBSQ HOSP IP/OBS HIGH 50: CPT | Mod: GC

## 2021-08-13 RX ORDER — SODIUM CHLORIDE 9 MG/ML
1000 INJECTION, SOLUTION INTRAVENOUS
Refills: 0 | Status: DISCONTINUED | OUTPATIENT
Start: 2021-08-13 | End: 2021-08-13

## 2021-08-13 RX ORDER — LACTOBACILLUS ACIDOPHILUS 100MM CELL
1 CAPSULE ORAL THREE TIMES A DAY
Refills: 0 | Status: DISCONTINUED | OUTPATIENT
Start: 2021-08-13 | End: 2021-08-19

## 2021-08-13 RX ORDER — LANOLIN ALCOHOL/MO/W.PET/CERES
3 CREAM (GRAM) TOPICAL
Refills: 0 | Status: DISCONTINUED | OUTPATIENT
Start: 2021-08-13 | End: 2021-08-19

## 2021-08-13 RX ORDER — OLANZAPINE 15 MG/1
1.25 TABLET, FILM COATED ORAL ONCE
Refills: 0 | Status: DISCONTINUED | OUTPATIENT
Start: 2021-08-13 | End: 2021-08-13

## 2021-08-13 RX ORDER — SODIUM,POTASSIUM PHOSPHATES 278-250MG
1 POWDER IN PACKET (EA) ORAL ONCE
Refills: 0 | Status: COMPLETED | OUTPATIENT
Start: 2021-08-13 | End: 2021-08-13

## 2021-08-13 RX ORDER — SODIUM CHLORIDE 9 MG/ML
500 INJECTION, SOLUTION INTRAVENOUS
Refills: 0 | Status: DISCONTINUED | OUTPATIENT
Start: 2021-08-13 | End: 2021-08-14

## 2021-08-13 RX ADMIN — Medication 1 APPLICATION(S): at 13:46

## 2021-08-13 RX ADMIN — Medication 3 MILLIGRAM(S): at 21:03

## 2021-08-13 RX ADMIN — Medication 1 TABLET(S): at 22:29

## 2021-08-13 RX ADMIN — Medication 1: at 18:42

## 2021-08-13 RX ADMIN — Medication 25 MILLIGRAM(S): at 06:13

## 2021-08-13 RX ADMIN — Medication 250 MILLIGRAM(S): at 15:10

## 2021-08-13 RX ADMIN — ERTAPENEM SODIUM 120 MILLIGRAM(S): 1 INJECTION, POWDER, LYOPHILIZED, FOR SOLUTION INTRAMUSCULAR; INTRAVENOUS at 18:43

## 2021-08-13 RX ADMIN — OLANZAPINE 1.25 MILLIGRAM(S): 15 TABLET, FILM COATED ORAL at 21:05

## 2021-08-13 RX ADMIN — SODIUM CHLORIDE 50 MILLILITER(S): 9 INJECTION, SOLUTION INTRAVENOUS at 15:10

## 2021-08-13 RX ADMIN — Medication 1 PACKET(S): at 18:43

## 2021-08-13 RX ADMIN — RIVAROXABAN 15 MILLIGRAM(S): KIT at 18:44

## 2021-08-13 RX ADMIN — Medication 25 MILLIGRAM(S): at 21:03

## 2021-08-13 NOTE — PROGRESS NOTE ADULT - SUBJECTIVE AND OBJECTIVE BOX
Progress Note    BRIGETTE ALANIS 80y (1940) Female [id number]  08-06-21 (7d)    Dr. Naisr Denney,  PGY1  Pager# [phone]    Chief Complaint: Left Heel Ulcer      Overnight events:  pt pulled out IV 4x w/ wrapping. RN was unable to place another one and was told by night team to give pt 50cc water every hr PO.     Subjective:  Patient seen and examined at bedside.      Physical exam:            PAST MEDICAL & SURGICAL HISTORY:  Diabetes Mellitus [250.00]    Benign Hypertension [401.1]    No significant past surgical history [213964503]      acetaminophen   Tablet .. 650 milliGRAM(s) Oral every 6 hours PRN  aluminum hydroxide/magnesium hydroxide/simethicone Suspension 30 milliLiter(s) Oral every 4 hours PRN  collagenase Ointment 1 Application(s) Topical daily  Dakins Solution - 1/2 Strength 1 Application(s) Topical Once  Dakins Solution - 1/4 Strength 1 Application(s) Topical daily  dextrose 40% Gel 15 Gram(s) Oral once  dextrose 5%. 1000 milliLiter(s) IV Continuous <Continuous>  dextrose 5%. 1000 milliLiter(s) IV Continuous <Continuous>  dextrose 50% Injectable 25 Gram(s) IV Push once  dextrose 50% Injectable 12.5 Gram(s) IV Push once  dextrose 50% Injectable 25 Gram(s) IV Push once  ertapenem  IVPB 1000 milliGRAM(s) IV Intermittent every 24 hours  glucagon  Injectable 1 milliGRAM(s) IntraMuscular once  insulin lispro (ADMELOG) corrective regimen sliding scale   SubCutaneous three times a day before meals  melatonin 3 milliGRAM(s) Oral at bedtime PRN  metoprolol tartrate 25 milliGRAM(s) Oral three times a day  OLANZapine Injectable 1.25 milliGRAM(s) IntraMuscular every 6 hours PRN  OLANZapine Injectable 1.25 milliGRAM(s) IntraMuscular once PRN  rivaroxaban 15 milliGRAM(s) Oral with dinner  sodium chloride 0.45%. 1000 milliLiter(s) IV Continuous <Continuous>  vancomycin  IVPB 750 milliGRAM(s) IV Intermittent daily    Objective:  T(C): 36.1 (08-13-21 @ 06:10), Max: 36.1 (08-12-21 @ 21:48)  HR: 69 (08-13-21 @ 06:10) (69 - 98)  BP: 119/79 (08-13-21 @ 06:10) (119/79 - 131/72)  RR: 18 (08-13-21 @ 06:10) (18 - 18)  SpO2: 100% (08-13-21 @ 06:10) (100% - 100%)      08-12-21 @ 07:01  -  08-13-21 @ 07:00  --------------------------------------------------------  IN: 150 mL / OUT: 0 mL / NET: 150 mL        CAPILLARY BLOOD GLUCOSE      (08-11 @ 07:43)                      11.1  5.37 )-----------( 156                 33.7    Neutrophils = -- (--%)  Lymphocytes = -- (--%)  Eosinophils = -- (--%)  Basophils = -- (--%)  Monocytes = -- (--%)  Bands = --%    08-12    147<H>  |  112<H>  |  12  ----------------------------<  117<H>  3.4<L>   |  25  |  0.81    Ca    9.0      12 Aug 2021 15:33  Phos  2.4     08-12  Mg     2.00     08-12            RVP:          Tox:             WBC Trend: 5.37<--, 5.35<--, 5.87<--    Hb Trend: 11.1<--, 12.9<--, 11.6<--, 13.9<--, 15.6<--        New imaging in last 24 hours:  Consult notes reviewed: Progress Note    BRIGETTE ALANIS 80y (1940) Female [id number]  08-06-21 (7d)    Dr. Nasir Denney,  PGY1  Pager# [phone]    Chief Complaint: Left Heel Ulcer      Overnight events:  pt pulled out IV 4x w/ wrapping. RN was unable to place another one and was told by night team to give pt 50cc water every hr PO.     Subjective:  Patient seen and examined at bedside.       Physical Exam:  General: NAD, lying comfortably in bed   HEENT: no scleral icterus  CV: RRR, normal S1 and S2, no m/r/g  Lungs: CTAB, no wheezes, rales, or rhonchi  Abd: soft, nontender, nondistended  Ext: no edema, 2+ peripheral pulses, L heel dressing c/d/i   Neuro: AAOx0. Alert, responds to verbal cues, mumbles but nonsensical           PAST MEDICAL & SURGICAL HISTORY:  Diabetes Mellitus [250.00]    Benign Hypertension [401.1]    No significant past surgical history [[id number]]      acetaminophen   Tablet .. 650 milliGRAM(s) Oral every 6 hours PRN  aluminum hydroxide/magnesium hydroxide/simethicone Suspension 30 milliLiter(s) Oral every 4 hours PRN  collagenase Ointment 1 Application(s) Topical daily  Dakins Solution - 1/2 Strength 1 Application(s) Topical Once  Dakins Solution - 1/4 Strength 1 Application(s) Topical daily  dextrose 40% Gel 15 Gram(s) Oral once  dextrose 5%. 1000 milliLiter(s) IV Continuous <Continuous>  dextrose 5%. 1000 milliLiter(s) IV Continuous <Continuous>  dextrose 50% Injectable 25 Gram(s) IV Push once  dextrose 50% Injectable 12.5 Gram(s) IV Push once  dextrose 50% Injectable 25 Gram(s) IV Push once  ertapenem  IVPB 1000 milliGRAM(s) IV Intermittent every 24 hours  glucagon  Injectable 1 milliGRAM(s) IntraMuscular once  insulin lispro (ADMELOG) corrective regimen sliding scale   SubCutaneous three times a day before meals  melatonin 3 milliGRAM(s) Oral at bedtime PRN  metoprolol tartrate 25 milliGRAM(s) Oral three times a day  OLANZapine Injectable 1.25 milliGRAM(s) IntraMuscular every 6 hours PRN  OLANZapine Injectable 1.25 milliGRAM(s) IntraMuscular once PRN  rivaroxaban 15 milliGRAM(s) Oral with dinner  sodium chloride 0.45%. 1000 milliLiter(s) IV Continuous <Continuous>  vancomycin  IVPB 750 milliGRAM(s) IV Intermittent daily    Objective:  T(C): 36.1 (08-13-21 @ 06:10), Max: 36.1 (08-12-21 @ 21:48)  HR: 69 (08-13-21 @ 06:10) (69 - 98)  BP: 119/79 (08-13-21 @ 06:10) (119/79 - 131/72)  RR: 18 (08-13-21 @ 06:10) (18 - 18)  SpO2: 100% (08-13-21 @ 06:10) (100% - 100%)      08-12-21 @ 07:01  -  08-13-21 @ 07:00  --------------------------------------------------------  IN: 150 mL / OUT: 0 mL / NET: 150 mL        CAPILLARY BLOOD GLUCOSE      (08-11 @ 07:43)                      11.1  5.37 )-----------( 156                 33.7    Neutrophils = -- (--%)  Lymphocytes = -- (--%)  Eosinophils = -- (--%)  Basophils = -- (--%)  Monocytes = -- (--%)  Bands = --%    08-12    147<H>  |  112<H>  |  12  ----------------------------<  117<H>  3.4<L>   |  25  |  0.81    Ca    9.0      12 Aug 2021 15:33  Phos  2.4     08-12  Mg     2.00     08-12            RVP:          Tox:             WBC Trend: 5.37<--, 5.35<--, 5.87<--    Hb Trend: 11.1<--, 12.9<--, 11.6<--, 13.9<--, 15.6<--        New imaging in last 24 hours:  Consult notes reviewed:

## 2021-08-13 NOTE — BH CONSULTATION LIAISON ASSESSMENT NOTE - HPI (INCLUDE ILLNESS QUALITY, SEVERITY, DURATION, TIMING, CONTEXT, MODIFYING FACTORS, ASSOCIATED SIGNS AND SYMPTOMS)
Pt is an 80 woman PMHx of dementia, HTN, CVA, Afib on Xarelto, DM who presented to ED for ulceration of left heel c/f OM. Wound culture was polymicrobial including few MRSA. Negative blood cultures, no leukocytosis, and vital signs remaining WNL decrease likelihood of systemic infection. Now pending foot MRI. Patient has not been aggressive or agitated, but has been removing her IV access x 3. Psychiatry has been called for medication management.     Met with patient. States her name but otherwise no logical conversation. Holds MD's hands, smiles, and speaks gibberish. Confused, Pleasant, not agitated, is comfortable.

## 2021-08-13 NOTE — PROGRESS NOTE ADULT - PROBLEM SELECTOR PLAN 2
Patient p/w elevated sodium of 152, down to 147 on 8/12.  - based on history most likely caused by impaired PO intake of fluids 2/2 dementia    - pt pulled out IV repeatedly last night, was unable to receive IVF- rn gave 50cc/hr water PO instead Patient p/w elevated sodium of 152, down to 147 on 8/12.  - based on history most likely caused by impaired PO intake of fluids 2/2 dementia    - pt pulled out IV repeatedly last night, was unable to receive IVF- rn gave 50cc/hr water PO instead  - f/u today's BMP Patient p/w elevated sodium of 152, down to 147 on 8/12.  - based on history most likely caused by impaired PO intake of fluids 2/2 dementia    - pt pulled out IV repeatedly last night, was unable to receive IVF- rn gave 50cc/hr water PO instead  - f/u today's BMP and IVF accordingly Patient p/w elevated sodium of 152, down to 147 on 8/12.  - based on history most likely caused by impaired PO intake of fluids 2/2 dementia    - pt pulled out IV repeatedly last night, was unable to receive IVF- rn gave 50cc/hr water PO instead  - Na today 145, free water deficit of 0.4L --> will give 1/2NS 500cc @50cc/hr  - cont monitor

## 2021-08-13 NOTE — BH CONSULTATION LIAISON ASSESSMENT NOTE - NSBHCHARTREVIEWLAB_PSY_A_CORE FT
08-12    147<H>  |  112<H>  |  12  ----------------------------<  117<H>  3.4<L>   |  25  |  0.81    Ca    9.0      12 Aug 2021 15:33  Phos  2.4     08-12  Mg     2.00     08-12

## 2021-08-13 NOTE — PROGRESS NOTE ADULT - SUBJECTIVE AND OBJECTIVE BOX
Patient is a 80y old  Female who presents with a chief complaint of Left Heel Ulcer (09 Aug 2021 11:58)    f/u Polymicrobial OM    Interval History/ROS:  no fever.  non-verbal.  ROS unable due to dementia.    PAST MEDICAL & SURGICAL HISTORY:  Diabetes Mellitus  Benign Hypertension  dementia    Allergies  No Known Allergies    ANTIMICROBIALS:  ceFAZolin   IVPB 2000 every 8 hours (8/8-8/9)  cefepime   IVPB 1000 every 12 hours (8/6-8/8, 8/9-8/11)    active:  vancomycin  IVPB 750 every 24 hours (8/6, 8/7, 8/9-)  ertapenem  IVPB 1000 every 24 hours (8/11-)    MEDICATIONS  (STANDING):  insulin lispro (ADMELOG) corrective regimen sliding scale  three times a day before meals  metoprolol tartrate 25 three times a day  rivaroxaban 15 with dinner    Vital Signs Last 24 Hrs  T(F): 97 (08-13-21 @ 06:10), Max: 97 (08-12-21 @ 21:48)  HR: 69 (08-13-21 @ 06:10)  BP: 119/79 (08-13-21 @ 06:10)  RR: 18 (08-13-21 @ 06:10)  SpO2: 100% (08-13-21 @ 06:10) (100% - 100%)    PHYSICAL EXAM:  Constitutional: non-toxic, in bed  HEAD/EYES: anicteric  ENT:  supple  Cardiovascular:   normal S1, S2  Respiratory:  clear BS bilaterally  GI:  soft, non-tender, normal bowel sounds  :  no martinez  Musculoskeletal:  no synovitis, left heel with deep wound, + malodor, tenderness  Neurologic: awake, confused, difficult to assess  Skin:  no rash  Psychiatric:  awake, appropriate mood  147  |  112  |  12  ----------------------------<  117  3.4   |  25  |  0.81  Ca    9.0      12 Aug 2021 15:33Phos  2.4     08-12Mg     2.00     08-12    C-Reactive Protein, Serum: 20.4 (08-10 @ 07:10)  C-Reactive Protein, Serum: 13.5 (08-06 @ 14:19)    Sedimentation Rate, Erythrocyte: 23 (08-06 @ 14:19)    MICROBIOLOGY:  Vancomycin Level, Trough: 21.8 (08-12 @ 15:33)  Vancomycin Level, Trough: 16.9 (08-10 @ 07:10)    Culture - Abscess with Gram Stain (collected 08-06-21 @ 21:26)  Source: .Abscess Leg - Left  Final Report (08-10-21 @ 20:44):    Few Methicillin Resistant Staphylococcus aureus    Moderate Enterobacter aerogenes Susceptibility to follow.    Moderate Escherichia coli Susceptibility to follow.    Moderate Bacteroides fragilis . "Susceptibilities not performed"  Organism: Methicillin resistant Staphylococcus aureus (08-08-21 @ 16:43)      -  Ampicillin/Sulbactam: R <=8/4      -  Cefazolin: R <=4      -  Clindamycin: R >4      -  Daptomycin: S 0.5      -  Erythromycin: R >4      -  Gentamicin: S <=1 Should not be used as monotherapy      -  Linezolid: S 1      -  Oxacillin: R >2      -  Penicillin: R >8      -  RIF- Rifampin: S <=1 Should not be used as monotherapy      -  Tetra/Doxy: S <=1      -  Trimethoprim/Sulfamethoxazole: S <=0.5/9.5      -  Vancomycin: S 1      Method Type: DEBORAH    Culture - Blood (collected 08-06-21 @ 18:47)  Source: .Blood Blood  Preliminary Report (08-07-21 @ 19:01):    No growth to date.    Culture - Blood (collected 08-06-21 @ 18:47)  Source: .Blood Blood  Preliminary Report (08-07-21 @ 19:01):    No growth to date.    RADIOLOGY:  imaging below personally reviewed and agree with findings    Xray Ankle 2 Views, Left (08.06.21 @ 14:33) >  Xray Foot AP + Lateral, Left (08.06.21 @ 14:32) >  IMPRESSION:  Posterior heel partial-thickness soft tissue ulceration with thin film of radiodense material along the bed of the ulceration.  No tracking gas collections beyond the ulcer margins. No gross radiographic evidence of osteomyelitis however if clinical concern is high consider further imaging with MRI.  No acute fracture or dislocation.  Joint spaces are maintained without evidence of joint margin erosions or ankle joint effusion.  Diffuse osteopenic changes.  Diffuse lower leg vascular calcifications.    CT Head No Cont (02.27.20 @ 11:41) >  Impression: This exam somewhat limited by motion though grossly unremarkable

## 2021-08-13 NOTE — PROGRESS NOTE ADULT - ATTENDING COMMENTS
Pt still has episodes of agitation ripped off her IV. Will obtain Psych consult for her dementia with behavioral disturbances   Will f/u BMP today and adjust IVF    D/W HS

## 2021-08-13 NOTE — PROGRESS NOTE ADULT - PROBLEM SELECTOR PLAN 1
Patient with chronic Left Heel wound, now appearing to be worsening over past 3 weeks. Seen and evaluated by podiatry. XR foot without gross evidence of OM or fracture. No signs of systemic infection. Wound culture grew few MRSA.   - c/w vancomycin  - c/w ertapenem, appreciate ID recs   - MRI Left Foot pending - pt pulled out IV yesterday just prior to MRI; also pt may not be able to hold still long enough for good study, podiatry aware. awaiting recs Patient with chronic Left Heel wound, now appearing to be worsening over past 3 weeks. Seen and evaluated by podiatry. XR foot without gross evidence of OM or fracture. No signs of systemic infection. Wound culture grew few MRSA.   - c/w vancomycin 1g, ertapenem 1g  - vanc trough 21.8  - MRI Left Foot pending - pt pulled out IV yesterday just prior to MRI; also pt may not be able to hold still long enough for good study, podiatry aware. awaiting recs    - pt repeatedly pulls out IV even when wrapped-- will give zyprexa 1.25mg x1 to keep IV in for IVF and abx Patient with chronic Left Heel wound, now appearing to be worsening over past 3 weeks. Seen and evaluated by podiatry. XR foot without gross evidence of OM or fracture. No signs of systemic infection. Wound culture grew few MRSA.   - vanc trough elevated at 21.8 --> vanc decreased to 750mg per ID. cont monitor trough   - c/w ertapenem 1g   - MRI Left Foot pending - pt pulled out IV yesterday just prior to MRI; also pt may not be able to hold still long enough for good study, podiatry aware. awaiting recs    - pt repeatedly pulls out IV even when wrapped-- will give zyprexa 1.25mg x1 to keep IV in for IVF and abx

## 2021-08-13 NOTE — BH CONSULTATION LIAISON ASSESSMENT NOTE - NSBHCHARTREVIEWVS_PSY_A_CORE FT
Vital Signs Last 24 Hrs  T(C): 36.3 (13 Aug 2021 13:51), Max: 36.3 (13 Aug 2021 13:51)  T(F): 97.3 (13 Aug 2021 13:51), Max: 97.3 (13 Aug 2021 13:51)  HR: 81 (13 Aug 2021 13:51) (69 - 98)  BP: 104/66 (13 Aug 2021 13:51) (104/66 - 131/72)  BP(mean): --  RR: 17 (13 Aug 2021 13:51) (17 - 18)  SpO2: 100% (13 Aug 2021 13:51) (100% - 100%)

## 2021-08-13 NOTE — BH CONSULTATION LIAISON ASSESSMENT NOTE - RISK ASSESSMENT
chronic risks but not in imminent risk to intentionally hurt self or others= older woman, dementia, likely superimposed delirium, confused, no insight.

## 2021-08-13 NOTE — PROGRESS NOTE ADULT - ASSESSMENT
80F with dementia, HTN, CVA, Afib on Xarelto, DM.  Recent hospitalization at [facility]SH January 2021 for possible stroke and while at rehab developed sacral decub which resolved and left heel decub which became infected.  No overt OM, however, wound is fairly deep and there is still malodor suggesting active infection.  Agree with MRI to better assess.  If OM present, likely chronic om given chronicity, low ESR.      Infected heel  - probable OM  - patient not able tolerate MRI  - would continue vancomycin to cover mrsa  - continue ertapenem 1g daily  - would pursue with 6 weeks via PICC    Elevated vancomycin level  - vanc decreased to 750mg q12  - continue to monitor trough    Please call the ID service [phone] with questions or concerns over the weekend

## 2021-08-13 NOTE — PROGRESS NOTE ADULT - PROBLEM SELECTOR PLAN 5
Patient with history of DM. A1C 6.4 on 8/7. Fingerstick  on 8/13  - Last A1C documented 7.8 in Jan 2021  - taking Metformin 500mg BID at home  - monitor FS TIDAC  - insulin sliding scale

## 2021-08-13 NOTE — BH CONSULTATION LIAISON ASSESSMENT NOTE - SUMMARY
Pt is an 80 woman PMHx of dementia, HTN, CVA, Afib on Xarelto, DM who presented to ED for ulceration of left heel c/f OM. Wound culture was polymicrobial including few MRSA. Negative blood cultures, no leukocytosis, and vital signs remaining WNL decrease likelihood of systemic infection. Now pending foot MRI. Patient has not been aggressive or agitated, but has been removing her IV access x 3. Psychiatry has been called for medication management.     Patient is a confused, and does not engage in a conversation in a logical manner. She is pleasant, not agitated, is comfortable.     Recommendations  - No indications for a psychiatric 1:1co  - Since she has been removing her IV access, medicine team to consider having a staff member sit by side to redirect and ensure that she does not.   - Ensure that TSH, vitamin b12 level, folate level is checked.   - Coordinate care with family  - Melatonin 3mg q 8pm PO.   - ONLY FOR TRUE COMBATIVE BEHAVIORS IF FAMILY IS IN AGREEMENT----Zyprexa 1.25mg q 8hrs prn IM/PO as already ordered by medicine team

## 2021-08-13 NOTE — PROGRESS NOTE ADULT - PROBLEM SELECTOR PLAN 3
Noted to be A&Ox0-1 at baseline at home. Has been eating significant portions of her meals.  - patient currently alert and speaking however speech incoherent  - patient intermittently able to follow very basic commands  - aspiration precautions  - per son, patient able to tolerate puree diet and glucerna at home  - frequent reorientation  - melatonin qhs Noted to be A&Ox0-1 at baseline at home. Has been eating significant portions of her meals.  - patient currently alert and speaking however speech incoherent  - patient intermittently able to follow very basic commands  - aspiration precautions  - per son, patient able to tolerate puree diet and glucerna at home  - frequent reorientation  - melatonin qhs    - has zyprexa 1.25mg prn agitation but has not required it  - pt repeatedly pulling out IV even when wrapped  - will consult psych for behavioral management recommendations b/c need IV for IVF and abx Noted to be A&Ox0-1 at baseline at home. Has been eating significant portions of her meals.  - patient currently alert and speaking however speech incoherent  - patient intermittently able to follow very basic commands  - aspiration precautions  - per son, patient able to tolerate puree diet and glucerna at home  - frequent reorientation  - melatonin qhs    - has zyprexa 1.25mg prn agitation but has not required it  - pt repeatedly pulling out IV even when wrapped  - will try to get additional supervision to redirect pt- has been difficult due to staffing. will try unsecured mittens for now so that pt can get IV abx  - will consult psych for behavioral management recommendations b/c need IV for IVF and abx

## 2021-08-14 LAB
GLUCOSE BLDC GLUCOMTR-MCNC: 109 MG/DL — HIGH (ref 70–99)
GLUCOSE BLDC GLUCOMTR-MCNC: 115 MG/DL — HIGH (ref 70–99)
GLUCOSE BLDC GLUCOMTR-MCNC: 136 MG/DL — HIGH (ref 70–99)
GLUCOSE BLDC GLUCOMTR-MCNC: 183 MG/DL — HIGH (ref 70–99)

## 2021-08-14 PROCEDURE — 99232 SBSQ HOSP IP/OBS MODERATE 35: CPT | Mod: GC

## 2021-08-14 RX ORDER — SODIUM CHLORIDE 9 MG/ML
1000 INJECTION, SOLUTION INTRAVENOUS
Refills: 0 | Status: DISCONTINUED | OUTPATIENT
Start: 2021-08-14 | End: 2021-08-15

## 2021-08-14 RX ADMIN — Medication 250 MILLIGRAM(S): at 14:44

## 2021-08-14 RX ADMIN — Medication 1 TABLET(S): at 06:46

## 2021-08-14 RX ADMIN — RIVAROXABAN 15 MILLIGRAM(S): KIT at 18:25

## 2021-08-14 RX ADMIN — Medication 1 TABLET(S): at 14:44

## 2021-08-14 RX ADMIN — SODIUM CHLORIDE 50 MILLILITER(S): 9 INJECTION, SOLUTION INTRAVENOUS at 18:25

## 2021-08-14 RX ADMIN — Medication 25 MILLIGRAM(S): at 14:44

## 2021-08-14 RX ADMIN — ERTAPENEM SODIUM 120 MILLIGRAM(S): 1 INJECTION, POWDER, LYOPHILIZED, FOR SOLUTION INTRAMUSCULAR; INTRAVENOUS at 18:25

## 2021-08-14 RX ADMIN — SODIUM CHLORIDE 50 MILLILITER(S): 9 INJECTION, SOLUTION INTRAVENOUS at 21:46

## 2021-08-14 RX ADMIN — Medication 25 MILLIGRAM(S): at 06:46

## 2021-08-14 NOTE — PROGRESS NOTE ADULT - SUBJECTIVE AND OBJECTIVE BOX
PROGRESS NOTE:   Authored By: Clarisa Carranza MD     PGY-3, Internal Medicine  Pager: [phone], [phone]    Patient is a 80y old  Female who presents with a chief complaint of Left Heel Ulcer (13 Aug 2021 13:43)    OVERNIGHT EVENTS: No acute events overnight.    SUBJECTIVE: Pt seen and examined at bedside this morning.     ADDITIONAL REVIEW OF SYSTEMS:  Unable to obtain.    MEDICATIONS  (STANDING):  collagenase Ointment 1 Application(s) Topical daily  Dakins Solution - 1/2 Strength 1 Application(s) Topical Once  Dakins Solution - 1/4 Strength 1 Application(s) Topical daily  dextrose 40% Gel 15 Gram(s) Oral once  dextrose 5%. 1000 milliLiter(s) (50 mL/Hr) IV Continuous <Continuous>  dextrose 5%. 1000 milliLiter(s) (100 mL/Hr) IV Continuous <Continuous>  dextrose 50% Injectable 25 Gram(s) IV Push once  dextrose 50% Injectable 12.5 Gram(s) IV Push once  dextrose 50% Injectable 25 Gram(s) IV Push once  ertapenem  IVPB 1000 milliGRAM(s) IV Intermittent every 24 hours  glucagon  Injectable 1 milliGRAM(s) IntraMuscular once  insulin lispro (ADMELOG) corrective regimen sliding scale   SubCutaneous three times a day before meals  lactobacillus acidophilus 1 Tablet(s) Oral three times a day  melatonin 3 milliGRAM(s) Oral <User Schedule>  metoprolol tartrate 25 milliGRAM(s) Oral three times a day  rivaroxaban 15 milliGRAM(s) Oral with dinner  sodium chloride 0.45%. 500 milliLiter(s) (50 mL/Hr) IV Continuous <Continuous>  vancomycin  IVPB 750 milliGRAM(s) IV Intermittent daily    MEDICATIONS  (PRN):  acetaminophen   Tablet .. 650 milliGRAM(s) Oral every 6 hours PRN Temp greater or equal to 38.5C (101.3F), Mild Pain (1 - 3)  aluminum hydroxide/magnesium hydroxide/simethicone Suspension 30 milliLiter(s) Oral every 4 hours PRN Dyspepsia  OLANZapine Injectable 1.25 milliGRAM(s) IntraMuscular every 6 hours PRN Agitation  OLANZapine Injectable 1.25 milliGRAM(s) IntraMuscular once PRN Agitation before MRI    CAPILLARY BLOOD GLUCOSE    POCT Blood Glucose.: 112 mg/dL (13 Aug 2021 22:21)  POCT Blood Glucose.: 188 mg/dL (13 Aug 2021 18:28)  POCT Blood Glucose.: 133 mg/dL (13 Aug 2021 12:30)  POCT Blood Glucose.: 102 mg/dL (13 Aug 2021 09:01)    I&O's Summary    12 Aug 2021 07:01  -  13 Aug 2021 07:00  --------------------------------------------------------  IN: 350 mL / OUT: 0 mL / NET: 350 mL    13 Aug 2021 07:01  -  14 Aug 2021 06:21  --------------------------------------------------------  IN: 700 mL / OUT: 0 mL / NET: 700 mL    PHYSICAL EXAM:  Vital Signs Last 24 Hrs  T(C): 36.1 (13 Aug 2021 20:32), Max: 36.3 (13 Aug 2021 13:51)  T(F): 97 (13 Aug 2021 20:32), Max: 97.3 (13 Aug 2021 13:51)  HR: 70 (13 Aug 2021 20:32) (70 - 81)  BP: 106/86 (13 Aug 2021 20:32) (104/66 - 106/86)  BP(mean): --  RR: 18 (13 Aug 2021 20:32) (17 - 18)  SpO2: 98% (13 Aug 2021 20:32) (98% - 100%)    CONSTITUTIONAL: NAD  RESPIRATORY:  CTAB, no wheezes, rhonchi or crackles  CARDIOVASCULAR: RRR, S1 and S2 present, no m/r/g  ABDOMEN: soft, NT, ND, bowel sounds present  EXTREMITIES: No LE edema, + left heel with dressing in place, c/d/i  MUSCULOSKELETAL: no joint swelling or tenderness to palpation  NEURO: A&Ox0, moving all extremities spontaneously    LABS:    08-13    145  |  110<H>  |  12  ----------------------------<  219<H>  4.0   |  24  |  0.67    Ca    8.9      13 Aug 2021 16:36  Phos  2.3     08-13  Mg     2.10     08-13    RADIOLOGY & ADDITIONAL TESTS:    Results Reviewed:   Imaging Personally Reviewed:  Electrocardiogram Personally Reviewed:    COORDINATION OF CARE:  Care Discussed with Consultants/Other Providers [Y/N]:  Prior or Outpatient Records Reviewed [Y/N]:   PROGRESS NOTE:   Authored By: Clarisa Carranza MD     PGY-3, Internal Medicine  Pager: [phone], [phone]    Patient is a 80y old  Female who presents with a chief complaint of Left Heel Ulcer (13 Aug 2021 13:43)    OVERNIGHT EVENTS: No acute events overnight.    SUBJECTIVE: Pt seen and examined at bedside this morning. Able to state that she feels "fine." However remainder of interview unrevealing. Otherwise calm and cooperative.    ADDITIONAL REVIEW OF SYSTEMS:  Unable to obtain.    MEDICATIONS  (STANDING):  collagenase Ointment 1 Application(s) Topical daily  Dakins Solution - 1/2 Strength 1 Application(s) Topical Once  Dakins Solution - 1/4 Strength 1 Application(s) Topical daily  dextrose 40% Gel 15 Gram(s) Oral once  dextrose 5%. 1000 milliLiter(s) (50 mL/Hr) IV Continuous <Continuous>  dextrose 5%. 1000 milliLiter(s) (100 mL/Hr) IV Continuous <Continuous>  dextrose 50% Injectable 25 Gram(s) IV Push once  dextrose 50% Injectable 12.5 Gram(s) IV Push once  dextrose 50% Injectable 25 Gram(s) IV Push once  ertapenem  IVPB 1000 milliGRAM(s) IV Intermittent every 24 hours  glucagon  Injectable 1 milliGRAM(s) IntraMuscular once  insulin lispro (ADMELOG) corrective regimen sliding scale   SubCutaneous three times a day before meals  lactobacillus acidophilus 1 Tablet(s) Oral three times a day  melatonin 3 milliGRAM(s) Oral <User Schedule>  metoprolol tartrate 25 milliGRAM(s) Oral three times a day  rivaroxaban 15 milliGRAM(s) Oral with dinner  sodium chloride 0.45%. 500 milliLiter(s) (50 mL/Hr) IV Continuous <Continuous>  vancomycin  IVPB 750 milliGRAM(s) IV Intermittent daily    MEDICATIONS  (PRN):  acetaminophen   Tablet .. 650 milliGRAM(s) Oral every 6 hours PRN Temp greater or equal to 38.5C (101.3F), Mild Pain (1 - 3)  aluminum hydroxide/magnesium hydroxide/simethicone Suspension 30 milliLiter(s) Oral every 4 hours PRN Dyspepsia  OLANZapine Injectable 1.25 milliGRAM(s) IntraMuscular every 6 hours PRN Agitation  OLANZapine Injectable 1.25 milliGRAM(s) IntraMuscular once PRN Agitation before MRI    CAPILLARY BLOOD GLUCOSE    POCT Blood Glucose.: 112 mg/dL (13 Aug 2021 22:21)  POCT Blood Glucose.: 188 mg/dL (13 Aug 2021 18:28)  POCT Blood Glucose.: 133 mg/dL (13 Aug 2021 12:30)  POCT Blood Glucose.: 102 mg/dL (13 Aug 2021 09:01)    I&O's Summary    12 Aug 2021 07:01  -  13 Aug 2021 07:00  --------------------------------------------------------  IN: 350 mL / OUT: 0 mL / NET: 350 mL    13 Aug 2021 07:01  -  14 Aug 2021 06:21  --------------------------------------------------------  IN: 700 mL / OUT: 0 mL / NET: 700 mL    PHYSICAL EXAM:  Vital Signs Last 24 Hrs  T(C): 36.1 (13 Aug 2021 20:32), Max: 36.3 (13 Aug 2021 13:51)  T(F): 97 (13 Aug 2021 20:32), Max: 97.3 (13 Aug 2021 13:51)  HR: 70 (13 Aug 2021 20:32) (70 - 81)  BP: 106/86 (13 Aug 2021 20:32) (104/66 - 106/86)  BP(mean): --  RR: 18 (13 Aug 2021 20:32) (17 - 18)  SpO2: 98% (13 Aug 2021 20:32) (98% - 100%)    CONSTITUTIONAL: NAD  RESPIRATORY:  CTAB, no wheezes, rhonchi or crackles  CARDIOVASCULAR: RRR, S1 and S2 present, no m/r/g  ABDOMEN: soft, NT, ND, bowel sounds present  EXTREMITIES: No LE edema, + left heel with dressing in place, c/d/i  MUSCULOSKELETAL: no joint swelling or tenderness to palpation  NEURO: A&Ox0, moving all extremities spontaneously    LABS:    08-13    145  |  110<H>  |  12  ----------------------------<  219<H>  4.0   |  24  |  0.67    Ca    8.9      13 Aug 2021 16:36  Phos  2.3     08-13  Mg     2.10     08-13    RADIOLOGY & ADDITIONAL TESTS:    Results Reviewed:   Imaging Personally Reviewed:  Electrocardiogram Personally Reviewed:    COORDINATION OF CARE:  Care Discussed with Consultants/Other Providers [Y/N]:  Prior or Outpatient Records Reviewed [Y/N]:

## 2021-08-14 NOTE — PROGRESS NOTE ADULT - PROBLEM SELECTOR PLAN 2
Patient p/w elevated sodium of 152, down to 147 on 8/12.  - based on history most likely caused by impaired PO intake of fluids 2/2 dementia    - pt pulled out IV repeatedly last night, was unable to receive IVF- rn gave 50cc/hr water PO instead  - Na today 145, free water deficit of 0.4L --> will give 1/2NS 500cc @50cc/hr  - cont monitor

## 2021-08-14 NOTE — PROGRESS NOTE ADULT - PROBLEM SELECTOR PLAN 1
Patient with chronic Left Heel wound, now appearing to be worsening over past 3 weeks. Seen and evaluated by podiatry. XR foot without gross evidence of OM or fracture. No signs of systemic infection. Wound culture grew few MRSA.   - vanc trough elevated at 21.8 --> vanc decreased to 750mg per ID. cont monitor trough   - c/w ertapenem 1g   - MRI Left Foot pending - pt pulled out IV yesterday just prior to MRI; also pt may not be able to hold still long enough for good study, podiatry aware. awaiting final pods/ID recs, patient may require long term IV abx    - pt repeatedly pulls out IV even when wrapped-- will give zyprexa 1.25mg x1 to keep IV in for IVF and abx Patient with chronic Left Heel wound, now appearing to be worsening over past 3 weeks. Seen and evaluated by podiatry. XR foot without gross evidence of OM or fracture. No signs of systemic infection. Wound culture grew few MRSA.   - vanc trough elevated at 21.8 --> vanc decreased to 750mg per ID. cont monitor trough   - c/w ertapenem 1g   - MRI Left Foot pending - pt pulled out IV yesterday just prior to MRI; also pt may not be able to hold still long enough for good study, podiatry aware. awaiting final pods/ID recs, patient may require long term IV abx

## 2021-08-14 NOTE — PROGRESS NOTE ADULT - PROBLEM SELECTOR PLAN 3
Noted to be A&Ox0-1 at baseline at home. Has been eating significant portions of her meals.  - patient currently alert and speaking however speech incoherent  - patient intermittently able to follow very basic commands  - aspiration precautions  - per son, patient able to tolerate puree diet and glucerna at home  - frequent reorientation  - melatonin qhs  - has zyprexa 1.25mg prn agitation but has not required it  - pt repeatedly pulling out IV even when wrapped  - will try to get additional supervision to redirect pt- has been difficult due to staffing. will try unsecured mittens for now so that pt can get IV abx  - will f/u psych for behavioral management recommendations

## 2021-08-15 LAB
ANION GAP SERPL CALC-SCNC: 12 MMOL/L — SIGNIFICANT CHANGE UP (ref 7–14)
BUN SERPL-MCNC: 9 MG/DL — SIGNIFICANT CHANGE UP (ref 7–23)
CALCIUM SERPL-MCNC: 8.6 MG/DL — SIGNIFICANT CHANGE UP (ref 8.4–10.5)
CHLORIDE SERPL-SCNC: 109 MMOL/L — HIGH (ref 98–107)
CO2 SERPL-SCNC: 22 MMOL/L — SIGNIFICANT CHANGE UP (ref 22–31)
CREAT SERPL-MCNC: 0.83 MG/DL — SIGNIFICANT CHANGE UP (ref 0.5–1.3)
GLUCOSE BLDC GLUCOMTR-MCNC: 108 MG/DL — HIGH (ref 70–99)
GLUCOSE BLDC GLUCOMTR-MCNC: 118 MG/DL — HIGH (ref 70–99)
GLUCOSE BLDC GLUCOMTR-MCNC: 136 MG/DL — HIGH (ref 70–99)
GLUCOSE SERPL-MCNC: 129 MG/DL — HIGH (ref 70–99)
HCT VFR BLD CALC: 34.7 % — SIGNIFICANT CHANGE UP (ref 34.5–45)
HGB BLD-MCNC: 11.6 G/DL — SIGNIFICANT CHANGE UP (ref 11.5–15.5)
MAGNESIUM SERPL-MCNC: 1.8 MG/DL — SIGNIFICANT CHANGE UP (ref 1.6–2.6)
MCHC RBC-ENTMCNC: 28.2 PG — SIGNIFICANT CHANGE UP (ref 27–34)
MCHC RBC-ENTMCNC: 33.4 GM/DL — SIGNIFICANT CHANGE UP (ref 32–36)
MCV RBC AUTO: 84.2 FL — SIGNIFICANT CHANGE UP (ref 80–100)
NRBC # BLD: 0 /100 WBCS — SIGNIFICANT CHANGE UP
NRBC # FLD: 0 K/UL — SIGNIFICANT CHANGE UP
PHOSPHATE SERPL-MCNC: 2.6 MG/DL — SIGNIFICANT CHANGE UP (ref 2.5–4.5)
PLATELET # BLD AUTO: 159 K/UL — SIGNIFICANT CHANGE UP (ref 150–400)
POTASSIUM SERPL-MCNC: 4.2 MMOL/L — SIGNIFICANT CHANGE UP (ref 3.5–5.3)
POTASSIUM SERPL-SCNC: 4.2 MMOL/L — SIGNIFICANT CHANGE UP (ref 3.5–5.3)
RBC # BLD: 4.12 M/UL — SIGNIFICANT CHANGE UP (ref 3.8–5.2)
RBC # FLD: 16.4 % — HIGH (ref 10.3–14.5)
SODIUM SERPL-SCNC: 143 MMOL/L — SIGNIFICANT CHANGE UP (ref 135–145)
WBC # BLD: 5.28 K/UL — SIGNIFICANT CHANGE UP (ref 3.8–10.5)
WBC # FLD AUTO: 5.28 K/UL — SIGNIFICANT CHANGE UP (ref 3.8–10.5)

## 2021-08-15 PROCEDURE — 99232 SBSQ HOSP IP/OBS MODERATE 35: CPT | Mod: GC

## 2021-08-15 RX ORDER — SODIUM CHLORIDE 9 MG/ML
1000 INJECTION, SOLUTION INTRAVENOUS
Refills: 0 | Status: DISCONTINUED | OUTPATIENT
Start: 2021-08-15 | End: 2021-08-19

## 2021-08-15 RX ADMIN — Medication 1 TABLET(S): at 06:04

## 2021-08-15 RX ADMIN — Medication 25 MILLIGRAM(S): at 13:31

## 2021-08-15 RX ADMIN — SODIUM CHLORIDE 50 MILLILITER(S): 9 INJECTION, SOLUTION INTRAVENOUS at 17:12

## 2021-08-15 RX ADMIN — Medication 250 MILLIGRAM(S): at 13:25

## 2021-08-15 RX ADMIN — Medication 25 MILLIGRAM(S): at 21:37

## 2021-08-15 RX ADMIN — Medication 1 TABLET(S): at 21:37

## 2021-08-15 RX ADMIN — Medication 25 MILLIGRAM(S): at 06:04

## 2021-08-15 RX ADMIN — Medication 1 TABLET(S): at 13:24

## 2021-08-15 RX ADMIN — RIVAROXABAN 15 MILLIGRAM(S): KIT at 17:13

## 2021-08-15 RX ADMIN — ERTAPENEM SODIUM 120 MILLIGRAM(S): 1 INJECTION, POWDER, LYOPHILIZED, FOR SOLUTION INTRAMUSCULAR; INTRAVENOUS at 17:40

## 2021-08-15 NOTE — PROGRESS NOTE ADULT - ASSESSMENT
Pt is an 80 woman PMHx of dementia, HTN, CVA, Afib on Xarelto, DM who presented to ED for ulceration of left heel c/f OM. Wound culture was polymicrobial including few MRSA. Negative blood cultures, no leukocytosis, and vital signs remaining WNL decrease likelihood of systemic infection. Patient with inability to tolerate MRI, pending planning regarding biopsy vs long term abx.

## 2021-08-15 NOTE — PROGRESS NOTE ADULT - ASSESSMENT
79 y/o F patient presents w/ L foot heel wound   - Patient seen and evaluated   - Afebrile, no leukocytosis  - GAGE: L foot plantar heel wound  to bone, +probe to bone, malodor present, no tracking, undermining distolateral and proximal,  no periwound erythema, no pus or drainage, L foot lateral malleoli wound to SubQ, no acute SOI  - Continue IV abx   - LF wound culture growing MRSA  - LF MRI unable to be completed given patients mental status, recommend Versed prior to MRI   - Continue Z-flows to offload L heel   - Pod plan for LWC with IV ABx, per ID recs  - Discussed w/ attending

## 2021-08-15 NOTE — PROVIDER CONTACT NOTE (CRITICAL VALUE NOTIFICATION) - SITUATION
Abscess culture from 8/6 showing few MRSA, moderate anterobacter aerogenes, moderate e.coli and moderate bacteroids fargilis
Blood culture, Few MRSA, moderate Enterobacter Aerogenes, moderate . Coli ESBL,moderate Bacteroids Sargilis

## 2021-08-15 NOTE — PROGRESS NOTE ADULT - PROBLEM SELECTOR PLAN 3
Noted to be A&Ox0-1 at baseline at home. Has been eating significant portions of her meals.  - patient currently alert and speaking however speech incoherent  - patient intermittently able to follow very basic commands  - aspiration precautions  - per son, patient able to tolerate puree diet and glucerna at home  - frequent reorientation  - melatonin qhs  - has zyprexa 1.25mg prn for agitation but has not required it  - pt repeatedly pulling out IV even when wrapped  - will try to get additional supervision to redirect pt- has been difficult due to staffing. will try unsecured mittens for now so that pt can get IV abx  - will f/u psych for behavioral management recommendations

## 2021-08-15 NOTE — PROGRESS NOTE ADULT - SUBJECTIVE AND OBJECTIVE BOX
Patient is a 80y old  Female who presents with a chief complaint of Left Heel Ulcer (14 Aug 2021 06:20)       INTERVAL HPI/OVERNIGHT EVENTS:  Patient seen and evaluated at bedside.  Pt is resting comfortable in NAD. Denies N/V/F/C.  Pain rated at X/10    Allergies    No Known Allergies    Intolerances        Vital Signs Last 24 Hrs  T(C): 37.3 (15 Aug 2021 05:55), Max: 37.3 (15 Aug 2021 05:55)  T(F): 99.2 (15 Aug 2021 05:55), Max: 99.2 (15 Aug 2021 05:55)  HR: 111 (15 Aug 2021 05:55) (98 - 114)  BP: 109/83 (15 Aug 2021 05:55) (109/83 - 114/58)  BP(mean): --  RR: 17 (15 Aug 2021 05:55) (12 - 19)  SpO2: 98% (15 Aug 2021 05:55) (92% - 100%)    LABS:                        11.6   5.28  )-----------( 159      ( 15 Aug 2021 08:31 )             34.7     08-15    143  |  109<H>  |  9   ----------------------------<  129<H>  4.2   |  22  |  0.83    Ca    8.6      15 Aug 2021 08:31  Phos  2.6     08-15  Mg     1.80     08-15          CAPILLARY BLOOD GLUCOSE      POCT Blood Glucose.: 183 mg/dL (14 Aug 2021 22:26)  POCT Blood Glucose.: 136 mg/dL (14 Aug 2021 18:15)  POCT Blood Glucose.: 109 mg/dL (14 Aug 2021 12:36)      Lower Extremity Physical Exam:    Lower Extremity Physical Exam:  Vascular: DP 1/4 B/L, PT non-palpable b/l, CFT <3 seconds B/L, Temperature gradient warm to cool, B/L.   Neuro: Epicritic sensation diminished to the level of ankle, B/L.  Musculoskeletal/Ortho: unremarkable  Skin: L plantar heel wound with fibrotic base, probe to bone, malodor, no tracking, undermining distolateral and proximal, no bogginess, no periwound erythema, no pus or drainage, L foot lateral malleoli wound to SubQ    RADIOLOGY & ADDITIONAL TESTS:

## 2021-08-15 NOTE — PROGRESS NOTE ADULT - PROBLEM SELECTOR PLAN 2
Patient p/w elevated sodium of 152, down to 147 on 8/12.  - based on history most likely caused by impaired PO intake of fluids 2/2 dementia  - Na today 143, improved from previous  - will give additional gentle hydration with 1/2NS at 50cc/hr as patient with minimal PO intake

## 2021-08-15 NOTE — PROGRESS NOTE ADULT - PROBLEM SELECTOR PLAN 1
Patient with chronic Left Heel wound, now appearing to be worsening over past 3 weeks. Seen and evaluated by podiatry. XR foot without gross evidence of OM or fracture. No signs of systemic infection. Wound culture grew few MRSA.   - vanc trough elevated at 21.8 --> vanc decreased to 750mg per ID. cont monitor trough   - c/w ertapenem 1g   - MRI Left Foot pending - pt pulled out IV yesterday just prior to MRI; also pt may not be able to hold still long enough for good study.  - will d/w podiatry and ID regarding MRI with sedation vs empiric long term abx treatment

## 2021-08-15 NOTE — PROVIDER CONTACT NOTE (CRITICAL VALUE NOTIFICATION) - TEST AND RESULT REPORTED:
Blood cultures from 8-6-21
Abscess culture from 8/6 showing few MRSA, moderate anterobacter aerogenes, moderate e.coli and moderate bacteroids fargilis

## 2021-08-15 NOTE — PROGRESS NOTE ADULT - SUBJECTIVE AND OBJECTIVE BOX
PROGRESS NOTE:   Authored By: Clarisa Carranza MD     PGY-3, Internal Medicine  Pager: [phone], [phone]    Patient is a 80y old  Female who presents with a chief complaint of Left Heel Ulcer (15 Aug 2021 10:39)    OVERNIGHT EVENTS: No acute events overnight.    SUBJECTIVE: Pt seen and examined at bedside this morning. Seen sleeping comfortably, arousable to light touch and voice. Patient tracking and mouthing words, however difficult to understand.    ADDITIONAL REVIEW OF SYSTEMS:  Unable to obtain 2/2 mental status    MEDICATIONS  (STANDING):  collagenase Ointment 1 Application(s) Topical daily  Dakins Solution - 1/2 Strength 1 Application(s) Topical Once  Dakins Solution - 1/4 Strength 1 Application(s) Topical daily  dextrose 40% Gel 15 Gram(s) Oral once  dextrose 5%. 1000 milliLiter(s) (50 mL/Hr) IV Continuous <Continuous>  dextrose 5%. 1000 milliLiter(s) (100 mL/Hr) IV Continuous <Continuous>  dextrose 50% Injectable 25 Gram(s) IV Push once  dextrose 50% Injectable 12.5 Gram(s) IV Push once  dextrose 50% Injectable 25 Gram(s) IV Push once  ertapenem  IVPB 1000 milliGRAM(s) IV Intermittent every 24 hours  glucagon  Injectable 1 milliGRAM(s) IntraMuscular once  insulin lispro (ADMELOG) corrective regimen sliding scale   SubCutaneous three times a day before meals  lactobacillus acidophilus 1 Tablet(s) Oral three times a day  melatonin 3 milliGRAM(s) Oral <User Schedule>  metoprolol tartrate 25 milliGRAM(s) Oral three times a day  rivaroxaban 15 milliGRAM(s) Oral with dinner  sodium chloride 0.45%. 1000 milliLiter(s) (50 mL/Hr) IV Continuous <Continuous>  vancomycin  IVPB 750 milliGRAM(s) IV Intermittent daily    MEDICATIONS  (PRN):  acetaminophen   Tablet .. 650 milliGRAM(s) Oral every 6 hours PRN Temp greater or equal to 38.5C (101.3F), Mild Pain (1 - 3)  aluminum hydroxide/magnesium hydroxide/simethicone Suspension 30 milliLiter(s) Oral every 4 hours PRN Dyspepsia  OLANZapine Injectable 1.25 milliGRAM(s) IntraMuscular every 6 hours PRN Agitation  OLANZapine Injectable 1.25 milliGRAM(s) IntraMuscular once PRN Agitation before MRI    CAPILLARY BLOOD GLUCOSE    POCT Blood Glucose.: 183 mg/dL (14 Aug 2021 22:26)  POCT Blood Glucose.: 136 mg/dL (14 Aug 2021 18:15)  POCT Blood Glucose.: 109 mg/dL (14 Aug 2021 12:36)    I&O's Summary    14 Aug 2021 07:01  -  15 Aug 2021 07:00  --------------------------------------------------------  IN: 650 mL / OUT: 0 mL / NET: 650 mL    PHYSICAL EXAM:  Vital Signs Last 24 Hrs  T(C): 37.3 (15 Aug 2021 05:55), Max: 37.3 (15 Aug 2021 05:55)  T(F): 99.2 (15 Aug 2021 05:55), Max: 99.2 (15 Aug 2021 05:55)  HR: 111 (15 Aug 2021 05:55) (98 - 114)  BP: 109/83 (15 Aug 2021 05:55) (109/83 - 114/58)  BP(mean): --  RR: 17 (15 Aug 2021 05:55) (12 - 19)  SpO2: 98% (15 Aug 2021 05:55) (92% - 100%)    CONSTITUTIONAL: NAD, frail, elderly woman  HEENT: NC/AT, eyes tracking  RESPIRATORY:  CTAB, no wheezes, rhonchi or crackles  CARDIOVASCULAR: RRR, S1 and S2 present, no m/r/g  ABDOMEN: soft, NT, ND, bowel sounds present  EXTREMITIES: No LE edema, left heel with dressing in place, c/d/i  MUSCULOSKELETAL: no joint swelling or tenderness to palpation  NEURO: A&Ox0, moving all extremities spontaneously    LABS:                        11.6   5.28  )-----------( 159      ( 15 Aug 2021 08:31 )             34.7     08-15    143  |  109<H>  |  9   ----------------------------<  129<H>  4.2   |  22  |  0.83    Ca    8.6      15 Aug 2021 08:31  Phos  2.6     08-15  Mg     1.80     08-15    RADIOLOGY & ADDITIONAL TESTS:    Results Reviewed:   Imaging Personally Reviewed:  Electrocardiogram Personally Reviewed:    COORDINATION OF CARE:  Care Discussed with Consultants/Other Providers [Y/N]:  Prior or Outpatient Records Reviewed [Y/N]:

## 2021-08-16 ENCOUNTER — TRANSCRIPTION ENCOUNTER (OUTPATIENT)
Age: 81
End: 2021-08-16

## 2021-08-16 LAB
ANION GAP SERPL CALC-SCNC: 10 MMOL/L — SIGNIFICANT CHANGE UP (ref 7–14)
BASOPHILS # BLD AUTO: 0.05 K/UL — SIGNIFICANT CHANGE UP (ref 0–0.2)
BASOPHILS NFR BLD AUTO: 0.9 % — SIGNIFICANT CHANGE UP (ref 0–2)
BUN SERPL-MCNC: 11 MG/DL — SIGNIFICANT CHANGE UP (ref 7–23)
CALCIUM SERPL-MCNC: 8.6 MG/DL — SIGNIFICANT CHANGE UP (ref 8.4–10.5)
CHLORIDE SERPL-SCNC: 108 MMOL/L — HIGH (ref 98–107)
CO2 SERPL-SCNC: 22 MMOL/L — SIGNIFICANT CHANGE UP (ref 22–31)
CREAT SERPL-MCNC: 0.63 MG/DL — SIGNIFICANT CHANGE UP (ref 0.5–1.3)
EOSINOPHIL # BLD AUTO: 0.16 K/UL — SIGNIFICANT CHANGE UP (ref 0–0.5)
EOSINOPHIL NFR BLD AUTO: 2.8 % — SIGNIFICANT CHANGE UP (ref 0–6)
GLUCOSE BLDC GLUCOMTR-MCNC: 103 MG/DL — HIGH (ref 70–99)
GLUCOSE BLDC GLUCOMTR-MCNC: 123 MG/DL — HIGH (ref 70–99)
GLUCOSE BLDC GLUCOMTR-MCNC: 159 MG/DL — HIGH (ref 70–99)
GLUCOSE BLDC GLUCOMTR-MCNC: 75 MG/DL — SIGNIFICANT CHANGE UP (ref 70–99)
GLUCOSE SERPL-MCNC: 171 MG/DL — HIGH (ref 70–99)
HCT VFR BLD CALC: 35 % — SIGNIFICANT CHANGE UP (ref 34.5–45)
HGB BLD-MCNC: 11.5 G/DL — SIGNIFICANT CHANGE UP (ref 11.5–15.5)
IANC: 3.27 K/UL — SIGNIFICANT CHANGE UP (ref 1.5–8.5)
IMM GRANULOCYTES NFR BLD AUTO: 0.3 % — SIGNIFICANT CHANGE UP (ref 0–1.5)
LYMPHOCYTES # BLD AUTO: 1.56 K/UL — SIGNIFICANT CHANGE UP (ref 1–3.3)
LYMPHOCYTES # BLD AUTO: 27 % — SIGNIFICANT CHANGE UP (ref 13–44)
MAGNESIUM SERPL-MCNC: 1.9 MG/DL — SIGNIFICANT CHANGE UP (ref 1.6–2.6)
MCHC RBC-ENTMCNC: 27.6 PG — SIGNIFICANT CHANGE UP (ref 27–34)
MCHC RBC-ENTMCNC: 32.9 GM/DL — SIGNIFICANT CHANGE UP (ref 32–36)
MCV RBC AUTO: 84.1 FL — SIGNIFICANT CHANGE UP (ref 80–100)
MONOCYTES # BLD AUTO: 0.71 K/UL — SIGNIFICANT CHANGE UP (ref 0–0.9)
MONOCYTES NFR BLD AUTO: 12.3 % — SIGNIFICANT CHANGE UP (ref 2–14)
NEUTROPHILS # BLD AUTO: 3.27 K/UL — SIGNIFICANT CHANGE UP (ref 1.8–7.4)
NEUTROPHILS NFR BLD AUTO: 56.7 % — SIGNIFICANT CHANGE UP (ref 43–77)
NRBC # BLD: 0 /100 WBCS — SIGNIFICANT CHANGE UP
NRBC # FLD: 0 K/UL — SIGNIFICANT CHANGE UP
PHOSPHATE SERPL-MCNC: 2.8 MG/DL — SIGNIFICANT CHANGE UP (ref 2.5–4.5)
PLATELET # BLD AUTO: 160 K/UL — SIGNIFICANT CHANGE UP (ref 150–400)
POTASSIUM SERPL-MCNC: 3.6 MMOL/L — SIGNIFICANT CHANGE UP (ref 3.5–5.3)
POTASSIUM SERPL-SCNC: 3.6 MMOL/L — SIGNIFICANT CHANGE UP (ref 3.5–5.3)
RBC # BLD: 4.16 M/UL — SIGNIFICANT CHANGE UP (ref 3.8–5.2)
RBC # FLD: 16.1 % — HIGH (ref 10.3–14.5)
SODIUM SERPL-SCNC: 140 MMOL/L — SIGNIFICANT CHANGE UP (ref 135–145)
VANCOMYCIN TROUGH SERPL-MCNC: 12.6 UG/ML — SIGNIFICANT CHANGE UP (ref 10–20)
WBC # BLD: 5.77 K/UL — SIGNIFICANT CHANGE UP (ref 3.8–10.5)
WBC # FLD AUTO: 5.77 K/UL — SIGNIFICANT CHANGE UP (ref 3.8–10.5)

## 2021-08-16 PROCEDURE — 99232 SBSQ HOSP IP/OBS MODERATE 35: CPT

## 2021-08-16 RX ORDER — VANCOMYCIN HCL 1 G
1000 VIAL (EA) INTRAVENOUS EVERY 24 HOURS
Refills: 0 | Status: DISCONTINUED | OUTPATIENT
Start: 2021-08-16 | End: 2021-08-19

## 2021-08-16 RX ADMIN — Medication 250 MILLIGRAM(S): at 18:38

## 2021-08-16 RX ADMIN — OLANZAPINE 1.25 MILLIGRAM(S): 15 TABLET, FILM COATED ORAL at 08:41

## 2021-08-16 RX ADMIN — Medication 1 TABLET(S): at 14:27

## 2021-08-16 RX ADMIN — Medication 25 MILLIGRAM(S): at 22:12

## 2021-08-16 RX ADMIN — ERTAPENEM SODIUM 120 MILLIGRAM(S): 1 INJECTION, POWDER, LYOPHILIZED, FOR SOLUTION INTRAMUSCULAR; INTRAVENOUS at 19:35

## 2021-08-16 RX ADMIN — Medication 25 MILLIGRAM(S): at 05:48

## 2021-08-16 RX ADMIN — Medication 1: at 18:39

## 2021-08-16 RX ADMIN — RIVAROXABAN 15 MILLIGRAM(S): KIT at 18:42

## 2021-08-16 RX ADMIN — Medication 1 TABLET(S): at 22:14

## 2021-08-16 RX ADMIN — Medication 1 APPLICATION(S): at 14:25

## 2021-08-16 RX ADMIN — Medication 1 TABLET(S): at 05:48

## 2021-08-16 RX ADMIN — Medication 3 MILLIGRAM(S): at 22:12

## 2021-08-16 NOTE — DISCHARGE NOTE PROVIDER - NSDCMRMEDTOKEN_GEN_ALL_CORE_FT
dilTIAZem 60 mg oral tablet: 1 tab(s) orally 4 times a day (Last filled Peggy/2021 x 1 month supply)  metFORMIN 500 mg oral tablet: 1 tab(s) orally 2 times a day (Last filled Peggy/2021 x 1 month supply)  metoprolol tartrate 25 mg oral tablet: 1 tab(s) orally 3 times a day (Last filled Peggy/2021 x 1 month supply)  rivaroxaban 15 mg oral tablet: 1 tab(s) orally once a day (in the evening) (Last filled Peggy/2021 x 1 month supply)   Basic Metabolic Panel: Check BMP once a week until 9/16/21 and fax to [phone]  C-Reactive Protein: Check CRP once a week until 9/16/21 and fax to [phone]  Complete Blood Count: Check CBC once a week until 9/16/21 and fax to [phone]  dilTIAZem 60 mg oral tablet: 1 tab(s) orally 4 times a day (Last filled Peggy/2021 x 1 month supply)  ertapenem 1 g injection: 1 gram(s) intravenously once a day until 9/16/21  Erythrocyte Sedimentation Rate: Check ESR once a week until 9/16/21 and fax to [phone]  metFORMIN 500 mg oral tablet: 1 tab(s) orally 2 times a day (Last filled Peggy/2021 x 1 month supply)  metoprolol tartrate 25 mg oral tablet: 1 tab(s) orally 3 times a day (Last filled Peggy/2021 x 1 month supply)  rivaroxaban 15 mg oral tablet: 1 tab(s) orally once a day (in the evening) (Last filled Peggy/2021 x 1 month supply)  vancomycin 1 g intravenous injection: 1 gram(s) intravenously once a day until 9/16/21  Vancomycin Level, Trough: Check Vancomycin level once a week until 9/16/21 and fax to [phone]   ertapenem 1 g injection: 1 gram(s) injectable once a day  metFORMIN 500 mg oral tablet: 1 tab(s) orally 2 times a day (Last filled Peggy/2021 x 1 month supply)  metoprolol tartrate 25 mg oral tablet: 1 tab(s) orally 3 times a day (Last filled Peggy/2021 x 1 month supply)  rivaroxaban 15 mg oral tablet: 1 tab(s) orally once a day (in the evening) (Last filled Peggy/2021 x 1 month supply)  vancomycin 1 g intravenous injection: 1 gram(s) intravenous every 24 hours

## 2021-08-16 NOTE — DISCHARGE NOTE PROVIDER - CARE PROVIDER_API CALL
Grant Suarez (DPM)  Surgery  [address] [place] [address]  Phone: [phone]  Fax: [phone]  Follow Up Time: 1 week

## 2021-08-16 NOTE — PROGRESS NOTE ADULT - PROBLEM SELECTOR PLAN 3
Noted to be A&Ox0-1 at baseline at home. Has been eating significant portions of her meals.  - patient currently alert and speaking however speech incoherent  - patient intermittently able to follow very basic commands  - aspiration precautions  - per son, patient able to tolerate puree diet and glucerna at home  - frequent reorientation  - melatonin qhs  - has zyprexa 1.25mg prn for agitation but has not required it  - unsecured mittens to prevent self-discontinuation of IVs  - will f/u psych for behavioral management recommendations

## 2021-08-16 NOTE — PROGRESS NOTE ADULT - ASSESSMENT
80F with dementia, HTN, CVA, Afib on Xarelto, DM.  Recent hospitalization at [facility]SH January 2021 for possible stroke and while at rehab developed sacral decub which resolved and left heel decub which became infected.  No overt OM, however, wound is fairly deep and there is still malodor suggesting active infection.  Agree with MRI to better assess.  If OM present, likely chronic om given chronicity, low ESR.      Infected heel  - probable OM  - f/u MRI done today  - would continue vancomycin to cover mrsa  - continue ertapenem 1g daily  - would pursue with 6 weeks via PICC until about 9/16    Elevated vancomycin level  - vanc decreased to 750mg q12  - please check vancomycin trough

## 2021-08-16 NOTE — PROGRESS NOTE ADULT - ATTENDING COMMENTS
Will f/u Podiatry rec re: MRI vs. bone bx   Patient not tolerating MRI due to restlessness and agitation   ID recs appreciated -> probable OM, would pursue 6 weeks of abx via PICC, vanc/ertapenem    D'c home w/ IV abx via PICC pending final podiatry recs

## 2021-08-16 NOTE — DISCHARGE NOTE PROVIDER - NSFOLLOWUPCLINICS_GEN_ALL_ED_FT
[facility]  Internal Medicine  [address] [place] [address]  Phone: [phone]  Fax: [phone]  Follow Up Time: 2 weeks

## 2021-08-16 NOTE — PROGRESS NOTE ADULT - PROBLEM SELECTOR PLAN 4
Patient with history of atrial fibrillation  - currently on rate control with metoprolol 25mg TID, and diltiazem 60mg QID at home  - will resume metoprolol 25mg TID  - will judiciously add on diltiazem as patient with c/f ongoing infection and BPs have been running soft  - will c/w home xarelto for AC Patient with history of atrial fibrillation  - currently on rate control with metoprolol 25mg TID, and diltiazem 60mg QID at home  - continue w/ metoprolol 25mg TID  - will judiciously add on diltiazem as patient with c/f ongoing infection and BPs have been running soft  - will c/w home xarelto for AC

## 2021-08-16 NOTE — DISCHARGE NOTE PROVIDER - NSDCCPCAREPLAN_GEN_ALL_CORE_FT
PRINCIPAL DISCHARGE DIAGNOSIS  Diagnosis: Heel ulceration, left, with unspecified severity  Assessment and Plan of Treatment: You were admitted to the hospital for management of an infection of your heel. You were seen by our podiatrists and were recommeneded for an MRI to assess if you have an infection in the bone. As you were unable to tolerate the MRI study, we decided to treat you empirically. We placed a PICC line in your arm which will allow you to receive IV antibiotics at home. You will continue to receive IV antibiotics until September 16. You will have weekly labs drawn and these will be used to make adjustments to your antibiotics. Please follow up with your primary care doctor as well as the podiatrist. If you experience fevers, worsening pain or notice increasing drainage from your wound, please return to the hospital.      SECONDARY DISCHARGE DIAGNOSES  Diagnosis: Hypernatremia  Assessment and Plan of Treatment: In the hospital you were noted to have an elevated sodium level. To treat this high sodium level, you were given intravenous fluids and your sodium levels improved. It is important that you continue to have good fluid intake while at home.    Diagnosis: Hypertension  Assessment and Plan of Treatment: While in the hospital your blood pressures have remained normal to low. We noted that you were on two medications that could affect your blood pressure, the diltiazem and the metoprolol. In the hospital we only kept you on the metoprolol and your blood pressures remained adequate. Please STOP taking the diltiazem and only take the metoprolol 25mg three times a day.    Diagnosis: Atrial fibrillation  Assessment and Plan of Treatment: Please continue to take your anticoagulation medication, Xarelto, as prescribed to prevent blood clots. Please continue to take your home dose of metoprolol 25mg three times a day to control your heart rate.     PRINCIPAL DISCHARGE DIAGNOSIS  Diagnosis: Heel ulceration, left, with unspecified severity  Assessment and Plan of Treatment: You were admitted to the hospital for management of an infection of your heel. You were seen by our podiatrists and were recommeneded for an MRI to assess if you have an infection in the bone. As you were unable to tolerate the MRI study, we decided to treat you empirically. We placed a PICC line in your arm which will allow you to receive IV antibiotics at home. You will continue to receive IV antibiotics until September 16. You will have weekly labs drawn and these will be used to make adjustments to your antibiotics. Please be sure to take steps to keep your IV line protected. Make sure that the dressing remains clean and dry. Please follow up with your primary care doctor as well as the podiatrist. If you experience fevers, worsening pain or notice increasing drainage from your wound, please return to the hospital. In the event you have issues with your IV line, such as blockage or bleeding, please contact your physician or return to the hospital.      SECONDARY DISCHARGE DIAGNOSES  Diagnosis: Hypernatremia  Assessment and Plan of Treatment: In the hospital you were noted to have an elevated sodium level. To treat this high sodium level, you were given intravenous fluids and your sodium levels improved. It is important that you continue to have good fluid intake while at home.    Diagnosis: Hypertension  Assessment and Plan of Treatment: While in the hospital your blood pressures have remained normal to low. We noted that you were on two medications that could affect your blood pressure, the diltiazem and the metoprolol. In the hospital we only kept you on the metoprolol and your blood pressures remained adequate. Please STOP taking the diltiazem and only take the metoprolol 25mg three times a day.    Diagnosis: Atrial fibrillation  Assessment and Plan of Treatment: Please continue to take your anticoagulation medication, Xarelto, as prescribed to prevent blood clots. Please continue to take your home dose of metoprolol 25mg three times a day to control your heart rate.    Diagnosis: Diabetes  Assessment and Plan of Treatment: In the hospital your blood sugar level was controlled with insulin. At home you can resume your metformin medication to help control your diabetes. Be sure to follow up with your primary care doctor for further care.

## 2021-08-16 NOTE — PROGRESS NOTE ADULT - PROBLEM SELECTOR PLAN 7
DVT: c/w home xarelto  Diet: Puree, carb consistent  Dispo: PT rec'd for rehab however family prefers home Asthma    Cancer of rectum    Hypertension    RBBB (right bundle branch block)

## 2021-08-16 NOTE — DISCHARGE NOTE PROVIDER - HOSPITAL COURSE
Patient was admitted to medicine for management of progressively worsening Left Heel Wound. She was seen and evaluated by podiatry and recommended for left foot MRI to further assess for possible OM. Attempts were made to obtain MRI of foot however the patient was unable to tolerate MRI scan and could not stay still for imaging. Wound culture was obtained from the patient's heel wound and she was found to have MRSA, Bacteroides and enterobacter. She was started on antibiotic therapy with Vancomycin and Ertapenem which will be continued as an outpatient. Her course was complicated by hypernatremia, believed to be 2/2 poor oral intake, which was treated with IV fluids. Patient had a PICC line placed by interventional radiology on 8/18 and is planned to go home with IV antibiotic therapy until 9/16.    Patient is currently hemodynamically stable for discharge to home with IV antibiotics.

## 2021-08-16 NOTE — DISCHARGE NOTE PROVIDER - NSDCFUADDAPPT_GEN_ALL_CORE_FT
Please follow up with your primary care doctor within 2 weeks of discharge. If you do not have a primary care doctor you can establish care with the [facility] for primary care.    Please follow up with podiatry, Dr. Suarez, within 1 week of discharge.

## 2021-08-16 NOTE — PROGRESS NOTE ADULT - PROBLEM SELECTOR PLAN 1
Patient with chronic Left Heel wound, now appearing to be worsening over past 3 weeks. Seen and evaluated by podiatry. XR foot without gross evidence of OM or fracture. No signs of systemic infection. Wound culture grew few MRSA as well as bacteroides and enterobacter aerogenes  - vanc trough previously elevated at 21.8 --> vanc decreased to 750mg per ID. cont to monitor trough   - c/w ertapenem 1g qd  - MRI Left Foot attempted once again today, patient unable to remain still for scan despite premedication  - will d/w podiatry and ID regarding MRI with sedation vs empiric long term abx treatment  - if plan for long term abx, will plan for PICC placement Patient with chronic Left Heel wound, now appearing to be worsening over past 3 weeks. Seen and evaluated by podiatry. XR foot without gross evidence of OM or fracture. No signs of systemic infection. Wound culture grew few MRSA as well as bacteroides and enterobacter aerogenes  - vanc trough 12.6. Increasing vancomycin dose to 1000mg daily  - c/w ertapenem 1g qd  - MRI Left Foot attempted once again today, patient unable to remain still for scan despite premedication  - will d/w podiatry and ID regarding MRI with sedation vs empiric long term abx treatment  - if plan for long term abx, will plan for PICC placement

## 2021-08-16 NOTE — PROGRESS NOTE ADULT - SUBJECTIVE AND OBJECTIVE BOX
Patient is a 80y old  Female who presents with a chief complaint of Left Heel Ulcer (09 Aug 2021 11:58)    f/u Polymicrobial OM    Interval History/ROS:  no fever.  ROS unable due to dementia.    PAST MEDICAL & SURGICAL HISTORY:  Diabetes Mellitus  Benign Hypertension  dementia    Allergies  No Known Allergies    ANTIMICROBIALS:  ceFAZolin   IVPB 2000 every 8 hours (8/8-8/9)  cefepime   IVPB 1000 every 12 hours (8/6-8/8, 8/9-8/11)    active:  vancomycin  IVPB 750 every 24 hours (8/6, 8/7, 8/9-)  ertapenem  IVPB 1000 every 24 hours (8/11-)    MEDICATIONS  (STANDING):  insulin lispro (ADMELOG) corrective regimen sliding scale  three times a day before meals  melatonin 3 <User Schedule>  metoprolol tartrate 25 three times a day  rivaroxaban 15 with dinner    Vital Signs Last 24 Hrs  T(F): 97.5 (08-16-21 @ 05:46), Max: 99 (08-15-21 @ 13:29)  HR: 100 (08-16-21 @ 09:40)  BP: 90/51 (08-16-21 @ 09:40)  RR: 18 (08-16-21 @ 09:40)  SpO2: 98% (08-16-21 @ 09:40) (98% - 100%)    PHYSICAL EXAM:  Constitutional: non-toxic, in bed  HEAD/EYES: anicteric  ENT:  supple  Cardiovascular:   normal S1, S2  Respiratory:  clear BS bilaterally  GI:  soft, non-tender, normal bowel sounds  :  no martinez  Musculoskeletal:  no synovitis, left heel with deep wound, + malodor, tenderness  Neurologic: awake, confused, difficult to assess  Skin:  no rash  Psychiatric:  awake, appropriate mood                        11.6   5.28  )-----------( 159      ( 15 Aug 2021 08:31 )             34.7 08-15    143  |  109  |  9   ----------------------------<  129  4.2   |  22  |  0.83  Ca    8.6      15 Aug 2021 08:31Phos  2.6     08-15Mg     1.80     08-15    C-Reactive Protein, Serum: 20.4 (08-10 @ 07:10)  C-Reactive Protein, Serum: 13.5 (08-06 @ 14:19)    Sedimentation Rate, Erythrocyte: 23 (08-06 @ 14:19)    MICROBIOLOGY:  Vancomycin Level, Trough: 21.8 (08-12 @ 15:33)    Culture - Abscess with Gram Stain (collected 08-06-21 @ 21:26)  Source: .Abscess Leg - Left  Final Report (08-10-21 @ 20:44):    Few Methicillin Resistant Staphylococcus aureus    Moderate Enterobacter aerogenes Susceptibility to follow.    Moderate Escherichia coli Susceptibility to follow.    Moderate Bacteroides fragilis . "Susceptibilities not performed"  Organism: Methicillin resistant Staphylococcus aureus (08-08-21 @ 16:43)      -  Ampicillin/Sulbactam: R <=8/4      -  Cefazolin: R <=4      -  Clindamycin: R >4      -  Daptomycin: S 0.5      -  Erythromycin: R >4      -  Gentamicin: S <=1 Should not be used as monotherapy      -  Linezolid: S 1      -  Oxacillin: R >2      -  Penicillin: R >8      -  RIF- Rifampin: S <=1 Should not be used as monotherapy      -  Tetra/Doxy: S <=1      -  Trimethoprim/Sulfamethoxazole: S <=0.5/9.5      -  Vancomycin: S 1      Method Type: DEBORAH    Culture - Blood (collected 08-06-21 @ 18:47)  Source: .Blood Blood  Preliminary Report (08-07-21 @ 19:01):    No growth to date.    Culture - Blood (collected 08-06-21 @ 18:47)  Source: .Blood Blood  Preliminary Report (08-07-21 @ 19:01):    No growth to date.    RADIOLOGY:  imaging below personally reviewed and agree with findings    MRI done today, results pending    Xray Ankle 2 Views, Left (08.06.21 @ 14:33) >  Xray Foot AP + Lateral, Left (08.06.21 @ 14:32) >  IMPRESSION:  Posterior heel partial-thickness soft tissue ulceration with thin film of radiodense material along the bed of the ulceration.  No tracking gas collections beyond the ulcer margins. No gross radiographic evidence of osteomyelitis however if clinical concern is high consider further imaging with MRI.  No acute fracture or dislocation.  Joint spaces are maintained without evidence of joint margin erosions or ankle joint effusion.  Diffuse osteopenic changes.  Diffuse lower leg vascular calcifications.    CT Head No Cont (02.27.20 @ 11:41) >  Impression: This exam somewhat limited by motion though grossly unremarkable

## 2021-08-16 NOTE — PROGRESS NOTE ADULT - PROBLEM SELECTOR PLAN 2
Patient p/w elevated sodium of 152, down to 147 on 8/12.  - based on history most likely caused by impaired PO intake of fluids 2/2 dementia  - Na pending, will check labs today, if elevated will restart IVF Resolved  Patient p/w elevated sodium of 152, down to 147 on 8/12->140 today  - based on history most likely caused by impaired PO intake of fluids 2/2 dementia  - will hold off on further IVF

## 2021-08-16 NOTE — DISCHARGE NOTE PROVIDER - NSDCFUADDINST_GEN_ALL_CORE_FT
Podiatry Discharge Instructions:  Follow up: Please follow up with Dr. Suarez within 1 week of discharge from the hospital, please call [phone] for appointment and discuss that you recently were seen in the hospital.  Wound Care: Please apply santyl daily to left heel wound, dress w/ 4x4 gauze and kerlix.   Weight bearing: Please weight z-flow pillow boots at all times while in bed to offload both feet.   Antibiotics: Please continue as instructed.

## 2021-08-16 NOTE — PROGRESS NOTE ADULT - SUBJECTIVE AND OBJECTIVE BOX
PROGRESS NOTE:   Authored By: Clarisa Carranza MD     PGY-3, Internal Medicine  Pager: [phone], [phone]    Patient is a 80y old  Female who presents with a chief complaint of Left Heel Ulcer (16 Aug 2021 12:14)    OVERNIGHT EVENTS: No acute events overnight.    SUBJECTIVE: Pt seen and examined at bedside this morning. Arousable to name and touch. Eyes tracking.    Patient went for MRI today, however unable to remain still and tolerate scan despite premedication given prior to scan.    ADDITIONAL REVIEW OF SYSTEMS:  Unable to assess.    MEDICATIONS  (STANDING):  collagenase Ointment 1 Application(s) Topical daily  Dakins Solution - 1/2 Strength 1 Application(s) Topical Once  Dakins Solution - 1/4 Strength 1 Application(s) Topical daily  dextrose 40% Gel 15 Gram(s) Oral once  dextrose 5%. 1000 milliLiter(s) (50 mL/Hr) IV Continuous <Continuous>  dextrose 5%. 1000 milliLiter(s) (100 mL/Hr) IV Continuous <Continuous>  dextrose 50% Injectable 25 Gram(s) IV Push once  dextrose 50% Injectable 12.5 Gram(s) IV Push once  dextrose 50% Injectable 25 Gram(s) IV Push once  ertapenem  IVPB 1000 milliGRAM(s) IV Intermittent every 24 hours  glucagon  Injectable 1 milliGRAM(s) IntraMuscular once  insulin lispro (ADMELOG) corrective regimen sliding scale   SubCutaneous three times a day before meals  lactobacillus acidophilus 1 Tablet(s) Oral three times a day  melatonin 3 milliGRAM(s) Oral <User Schedule>  metoprolol tartrate 25 milliGRAM(s) Oral three times a day  rivaroxaban 15 milliGRAM(s) Oral with dinner  sodium chloride 0.45%. 1000 milliLiter(s) (50 mL/Hr) IV Continuous <Continuous>  vancomycin  IVPB 750 milliGRAM(s) IV Intermittent daily    MEDICATIONS  (PRN):  acetaminophen   Tablet .. 650 milliGRAM(s) Oral every 6 hours PRN Temp greater or equal to 38.5C (101.3F), Mild Pain (1 - 3)  aluminum hydroxide/magnesium hydroxide/simethicone Suspension 30 milliLiter(s) Oral every 4 hours PRN Dyspepsia  OLANZapine Injectable 1.25 milliGRAM(s) IntraMuscular every 6 hours PRN Agitation    CAPILLARY BLOOD GLUCOSE    POCT Blood Glucose.: 123 mg/dL (16 Aug 2021 12:29)  POCT Blood Glucose.: 75 mg/dL (16 Aug 2021 09:09)  POCT Blood Glucose.: 108 mg/dL (15 Aug 2021 22:23)  POCT Blood Glucose.: 136 mg/dL (15 Aug 2021 18:02)  POCT Blood Glucose.: 118 mg/dL (15 Aug 2021 13:15)    I&O's Summary    15 Aug 2021 07:01  -  16 Aug 2021 07:00  --------------------------------------------------------  IN: 1000 mL / OUT: 0 mL / NET: 1000 mL    PHYSICAL EXAM:  Vital Signs Last 24 Hrs  T(C): 36.4 (16 Aug 2021 05:46), Max: 37.2 (15 Aug 2021 13:29)  T(F): 97.5 (16 Aug 2021 05:46), Max: 99 (15 Aug 2021 13:29)  HR: 100 (16 Aug 2021 09:40) (100 - 118)  BP: 90/51 (16 Aug 2021 09:40) (90/51 - 116/72)  BP(mean): --  RR: 18 (16 Aug 2021 09:40) (17 - 18)  SpO2: 98% (16 Aug 2021 09:40) (98% - 100%)    CONSTITUTIONAL: NAD, frail, elderly woman  HEENT: NC/AT, eyes tracking  RESPIRATORY:  CTAB, no wheezes, rhonchi or crackles  CARDIOVASCULAR: RRR, S1 and S2 present, no m/r/g  ABDOMEN: soft, NT, ND, bowel sounds present  EXTREMITIES: No LE edema, left heel with dressing in place, c/d/i  MUSCULOSKELETAL: no joint swelling or tenderness to palpation  NEURO: A&Ox0, moving all extremities spontaneously    LABS:                        11.6   5.28  )-----------( 159      ( 15 Aug 2021 08:31 )             34.7     08-15    143  |  109<H>  |  9   ----------------------------<  129<H>  4.2   |  22  |  0.83    Ca    8.6      15 Aug 2021 08:31  Phos  2.6     08-15  Mg     1.80     08-15    RADIOLOGY & ADDITIONAL TESTS:    Results Reviewed:   Imaging Personally Reviewed:  Electrocardiogram Personally Reviewed:    COORDINATION OF CARE:  Care Discussed with Consultants/Other Providers [Y/N]:  Prior or Outpatient Records Reviewed [Y/N]:   PROGRESS NOTE:   Authored By: Clarisa Carranza MD     PGY-3, Internal Medicine  Pager: [phone], [phone]    Patient is a 80y old  Female who presents with a chief complaint of Left Heel Ulcer (16 Aug 2021 12:14)    OVERNIGHT EVENTS: No acute events overnight.    SUBJECTIVE: Pt seen and examined at bedside this morning. Arousable to name and touch. Eyes tracking.    Patient went for MRI today, however unable to remain still and tolerate scan despite premedication given prior to scan.    ADDITIONAL REVIEW OF SYSTEMS:  Unable to assess.    MEDICATIONS  (STANDING):  collagenase Ointment 1 Application(s) Topical daily  Dakins Solution - 1/2 Strength 1 Application(s) Topical Once  Dakins Solution - 1/4 Strength 1 Application(s) Topical daily  dextrose 40% Gel 15 Gram(s) Oral once  dextrose 5%. 1000 milliLiter(s) (50 mL/Hr) IV Continuous <Continuous>  dextrose 5%. 1000 milliLiter(s) (100 mL/Hr) IV Continuous <Continuous>  dextrose 50% Injectable 25 Gram(s) IV Push once  dextrose 50% Injectable 12.5 Gram(s) IV Push once  dextrose 50% Injectable 25 Gram(s) IV Push once  ertapenem  IVPB 1000 milliGRAM(s) IV Intermittent every 24 hours  glucagon  Injectable 1 milliGRAM(s) IntraMuscular once  insulin lispro (ADMELOG) corrective regimen sliding scale   SubCutaneous three times a day before meals  lactobacillus acidophilus 1 Tablet(s) Oral three times a day  melatonin 3 milliGRAM(s) Oral <User Schedule>  metoprolol tartrate 25 milliGRAM(s) Oral three times a day  rivaroxaban 15 milliGRAM(s) Oral with dinner  sodium chloride 0.45%. 1000 milliLiter(s) (50 mL/Hr) IV Continuous <Continuous>  vancomycin  IVPB 750 milliGRAM(s) IV Intermittent daily    MEDICATIONS  (PRN):  acetaminophen   Tablet .. 650 milliGRAM(s) Oral every 6 hours PRN Temp greater or equal to 38.5C (101.3F), Mild Pain (1 - 3)  aluminum hydroxide/magnesium hydroxide/simethicone Suspension 30 milliLiter(s) Oral every 4 hours PRN Dyspepsia  OLANZapine Injectable 1.25 milliGRAM(s) IntraMuscular every 6 hours PRN Agitation    CAPILLARY BLOOD GLUCOSE    POCT Blood Glucose.: 123 mg/dL (16 Aug 2021 12:29)  POCT Blood Glucose.: 75 mg/dL (16 Aug 2021 09:09)  POCT Blood Glucose.: 108 mg/dL (15 Aug 2021 22:23)  POCT Blood Glucose.: 136 mg/dL (15 Aug 2021 18:02)  POCT Blood Glucose.: 118 mg/dL (15 Aug 2021 13:15)    I&O's Summary    15 Aug 2021 07:01  -  16 Aug 2021 07:00  --------------------------------------------------------  IN: 1000 mL / OUT: 0 mL / NET: 1000 mL    PHYSICAL EXAM:  Vital Signs Last 24 Hrs  T(C): 36.4 (16 Aug 2021 05:46), Max: 37.2 (15 Aug 2021 13:29)  T(F): 97.5 (16 Aug 2021 05:46), Max: 99 (15 Aug 2021 13:29)  HR: 100 (16 Aug 2021 09:40) (100 - 118)  BP: 90/51 (16 Aug 2021 09:40) (90/51 - 116/72)  BP(mean): --  RR: 18 (16 Aug 2021 09:40) (17 - 18)  SpO2: 98% (16 Aug 2021 09:40) (98% - 100%)    CONSTITUTIONAL: NAD, frail, elderly woman  HEENT: NC/AT, eyes tracking  RESPIRATORY:  CTAB, no wheezes, rhonchi or crackles  CARDIOVASCULAR: RRR, S1 and S2 present, no m/r/g  ABDOMEN: soft, NT, ND, bowel sounds present  EXTREMITIES: No LE edema, left heel with dressing in place, c/d/i  MUSCULOSKELETAL: no joint swelling or tenderness to palpation  NEURO: A&Ox0, moving all extremities spontaneously    LABS:                        11.6   5.28  )-----------( 159      ( 15 Aug 2021 08:31 )             34.7     08-15    143  |  109<H>  |  9   ----------------------------<  129<H>  4.2   |  22  |  0.83    Ca    8.6      15 Aug 2021 08:31  Phos  2.6     08-15  Mg     1.80     08-15    RADIOLOGY & ADDITIONAL TESTS:    Results Reviewed:   Imaging Personally Reviewed:  Electrocardiogram Personally Reviewed:    COORDINATION OF CARE:  Care Discussed with Consultants/Other Providers [Y/N]: Y  Prior or Outpatient Records Reviewed [Y/N]: Y

## 2021-08-17 DIAGNOSIS — R68.0 HYPOTHERMIA, NOT ASSOCIATED WITH LOW ENVIRONMENTAL TEMPERATURE: ICD-10-CM

## 2021-08-17 LAB
ANION GAP SERPL CALC-SCNC: 12 MMOL/L — SIGNIFICANT CHANGE UP (ref 7–14)
BUN SERPL-MCNC: 10 MG/DL — SIGNIFICANT CHANGE UP (ref 7–23)
CALCIUM SERPL-MCNC: 8.5 MG/DL — SIGNIFICANT CHANGE UP (ref 8.4–10.5)
CHLORIDE SERPL-SCNC: 106 MMOL/L — SIGNIFICANT CHANGE UP (ref 98–107)
CO2 SERPL-SCNC: 24 MMOL/L — SIGNIFICANT CHANGE UP (ref 22–31)
CREAT SERPL-MCNC: 0.63 MG/DL — SIGNIFICANT CHANGE UP (ref 0.5–1.3)
GLUCOSE BLDC GLUCOMTR-MCNC: 155 MG/DL — HIGH (ref 70–99)
GLUCOSE BLDC GLUCOMTR-MCNC: 91 MG/DL — SIGNIFICANT CHANGE UP (ref 70–99)
GLUCOSE BLDC GLUCOMTR-MCNC: 92 MG/DL — SIGNIFICANT CHANGE UP (ref 70–99)
GLUCOSE BLDC GLUCOMTR-MCNC: 97 MG/DL — SIGNIFICANT CHANGE UP (ref 70–99)
GLUCOSE SERPL-MCNC: 96 MG/DL — SIGNIFICANT CHANGE UP (ref 70–99)
HCT VFR BLD CALC: 32.7 % — LOW (ref 34.5–45)
HGB BLD-MCNC: 11 G/DL — LOW (ref 11.5–15.5)
INR BLD: 1.58 RATIO — HIGH (ref 0.88–1.16)
MAGNESIUM SERPL-MCNC: 1.8 MG/DL — SIGNIFICANT CHANGE UP (ref 1.6–2.6)
MCHC RBC-ENTMCNC: 28.1 PG — SIGNIFICANT CHANGE UP (ref 27–34)
MCHC RBC-ENTMCNC: 33.6 GM/DL — SIGNIFICANT CHANGE UP (ref 32–36)
MCV RBC AUTO: 83.4 FL — SIGNIFICANT CHANGE UP (ref 80–100)
NRBC # BLD: 0 /100 WBCS — SIGNIFICANT CHANGE UP
NRBC # FLD: 0 K/UL — SIGNIFICANT CHANGE UP
PHOSPHATE SERPL-MCNC: 2.6 MG/DL — SIGNIFICANT CHANGE UP (ref 2.5–4.5)
PLATELET # BLD AUTO: 164 K/UL — SIGNIFICANT CHANGE UP (ref 150–400)
POTASSIUM SERPL-MCNC: 4 MMOL/L — SIGNIFICANT CHANGE UP (ref 3.5–5.3)
POTASSIUM SERPL-SCNC: 4 MMOL/L — SIGNIFICANT CHANGE UP (ref 3.5–5.3)
PROTHROM AB SERPL-ACNC: 17.8 SEC — HIGH (ref 10.6–13.6)
RBC # BLD: 3.92 M/UL — SIGNIFICANT CHANGE UP (ref 3.8–5.2)
RBC # FLD: 16 % — HIGH (ref 10.3–14.5)
SODIUM SERPL-SCNC: 142 MMOL/L — SIGNIFICANT CHANGE UP (ref 135–145)
WBC # BLD: 5.1 K/UL — SIGNIFICANT CHANGE UP (ref 3.8–10.5)
WBC # FLD AUTO: 5.1 K/UL — SIGNIFICANT CHANGE UP (ref 3.8–10.5)

## 2021-08-17 PROCEDURE — 99232 SBSQ HOSP IP/OBS MODERATE 35: CPT | Mod: GC

## 2021-08-17 PROCEDURE — 99232 SBSQ HOSP IP/OBS MODERATE 35: CPT

## 2021-08-17 RX ORDER — METOPROLOL TARTRATE 50 MG
25 TABLET ORAL
Refills: 0 | Status: DISCONTINUED | OUTPATIENT
Start: 2021-08-17 | End: 2021-08-17

## 2021-08-17 RX ORDER — METOPROLOL TARTRATE 50 MG
25 TABLET ORAL THREE TIMES A DAY
Refills: 0 | Status: DISCONTINUED | OUTPATIENT
Start: 2021-08-17 | End: 2021-08-19

## 2021-08-17 RX ADMIN — Medication 25 MILLIGRAM(S): at 22:19

## 2021-08-17 RX ADMIN — Medication 1 TABLET(S): at 13:06

## 2021-08-17 RX ADMIN — Medication 1 TABLET(S): at 06:31

## 2021-08-17 RX ADMIN — Medication 1 APPLICATION(S): at 13:07

## 2021-08-17 RX ADMIN — Medication 250 MILLIGRAM(S): at 18:41

## 2021-08-17 RX ADMIN — Medication 1 TABLET(S): at 22:19

## 2021-08-17 RX ADMIN — Medication 25 MILLIGRAM(S): at 13:06

## 2021-08-17 RX ADMIN — ERTAPENEM SODIUM 120 MILLIGRAM(S): 1 INJECTION, POWDER, LYOPHILIZED, FOR SOLUTION INTRAMUSCULAR; INTRAVENOUS at 17:33

## 2021-08-17 RX ADMIN — Medication 1 APPLICATION(S): at 13:06

## 2021-08-17 NOTE — PROVIDER CONTACT NOTE (OTHER) - REASON
Hypotensive & hypothermia
rectal temp 95.1
pt refused bedtime meds, despite multiple attempts to administer

## 2021-08-17 NOTE — PROVIDER CONTACT NOTE (OTHER) - ACTION/TREATMENT ORDERED:
hypothermia blanket
Md made aware.
Repeat /89, P 101, MD at bedside. Continue IVF, Hypothermia blanket placed.

## 2021-08-17 NOTE — PROGRESS NOTE ADULT - SUBJECTIVE AND OBJECTIVE BOX
Internal Medicine Progress Note    Patient is a 80y old  Female who presents with a chief complaint of Left Heel Ulcer (16 Aug 2021 12:33)    OVERNIGHT EVENTS/SUBJECTIVE:    OBJECTIVE:  Vital Signs Last 24 Hrs  T(C): 37.1 (17 Aug 2021 06:31), Max: 37.1 (17 Aug 2021 06:31)  T(F): 98.8 (17 Aug 2021 06:31), Max: 98.8 (17 Aug 2021 06:31)  HR: 120 (17 Aug 2021 06:31) (99 - 120)  BP: 96/66 (17 Aug 2021 06:31) (90/51 - 105/64)  BP(mean): --  RR: 20 (17 Aug 2021 06:31) (18 - 20)  SpO2: 100% (17 Aug 2021 06:31) (98% - 100%)  I&O's Detail    Daily     Daily   Physical Exam:  General: NAD, resting comfortably in bed  Neuro: A&Ox4, 5/5 strength in all ext  HEENT: NC/AT, EOMI, normal hearing, oral mucosa moist, no oral lesions noted, no pharyngeal erythema, uvula midline  Neck: supple, thyroid not enlarged, no LAD  Resp: Breathing comfortably on RA, LCTA b/l  CV: Normal sinus rhythm, S1 and S2, no r/m/g  Abd: soft, non-distended, non-tender. No HSM.  Ext: ROMIx4, no edema, +2 pulses bilaterally  Skin: Warm and dry, no rashes or discolorations  Psych: Appropriate affect    Medications:  MEDICATIONS  (STANDING):  collagenase Ointment 1 Application(s) Topical daily  Dakins Solution - 1/2 Strength 1 Application(s) Topical Once  Dakins Solution - 1/4 Strength 1 Application(s) Topical daily  dextrose 40% Gel 15 Gram(s) Oral once  dextrose 5%. 1000 milliLiter(s) (100 mL/Hr) IV Continuous <Continuous>  dextrose 5%. 1000 milliLiter(s) (50 mL/Hr) IV Continuous <Continuous>  dextrose 50% Injectable 25 Gram(s) IV Push once  dextrose 50% Injectable 12.5 Gram(s) IV Push once  dextrose 50% Injectable 25 Gram(s) IV Push once  ertapenem  IVPB 1000 milliGRAM(s) IV Intermittent every 24 hours  glucagon  Injectable 1 milliGRAM(s) IntraMuscular once  insulin lispro (ADMELOG) corrective regimen sliding scale   SubCutaneous three times a day before meals  lactobacillus acidophilus 1 Tablet(s) Oral three times a day  melatonin 3 milliGRAM(s) Oral <User Schedule>  metoprolol tartrate 25 milliGRAM(s) Oral three times a day  rivaroxaban 15 milliGRAM(s) Oral with dinner  sodium chloride 0.45%. 1000 milliLiter(s) (50 mL/Hr) IV Continuous <Continuous>  vancomycin  IVPB 1000 milliGRAM(s) IV Intermittent every 24 hours    MEDICATIONS  (PRN):  acetaminophen   Tablet .. 650 milliGRAM(s) Oral every 6 hours PRN Temp greater or equal to 38.5C (101.3F), Mild Pain (1 - 3)  aluminum hydroxide/magnesium hydroxide/simethicone Suspension 30 milliLiter(s) Oral every 4 hours PRN Dyspepsia  OLANZapine Injectable 1.25 milliGRAM(s) IntraMuscular every 6 hours PRN Agitation      Labs:                        11.5   5.77  )-----------( 160      ( 16 Aug 2021 14:44 )             35.0     08-16    140  |  108<H>  |  11  ----------------------------<  171<H>  3.6   |  22  |  0.63    Ca    8.6      16 Aug 2021 14:44  Phos  2.8     08-16  Mg     1.90     08-16          COVID-19 PCR: Carlos (06 Aug 2021 17:49)      Radiology: Internal Medicine Progress Note    Patient is a 80y old  Female who presents with a chief complaint of Left Heel Ulcer (16 Aug 2021 12:33)    OVERNIGHT EVENTS/SUBJECTIVE: No overnight events. Yesterday she was unable to tolerate MRI. Per podiatry and ID, plan is to have PICC placed and continue antibiotic treatment for osteomyelitis. ROS not obtained due to dementia.     OBJECTIVE:  Vital Signs Last 24 Hrs  T(C): 37.1 (17 Aug 2021 06:31), Max: 37.1 (17 Aug 2021 06:31)  T(F): 98.8 (17 Aug 2021 06:31), Max: 98.8 (17 Aug 2021 06:31)  HR: 120 (17 Aug 2021 06:31) (99 - 120)  BP: 96/66 (17 Aug 2021 06:31) (90/51 - 105/64)  BP(mean): --  RR: 20 (17 Aug 2021 06:31) (18 - 20)  SpO2: 100% (17 Aug 2021 06:31) (98% - 100%)  I&O's Detail      Daily   Physical Exam:  General: NAD, resting comfortably in bed  Neuro: A&Ox1  HEENT: NC/AT, EOMI, normal hearing  Resp: Breathing comfortably on RA, LCTA b/l  CV: Irregular rhythm, no r/m/g  Abd: soft, non-distended, non-tender, normoactive bowel sounds  Skin: Warm and dry, no rashes or discolorations      Medications:  MEDICATIONS  (STANDING):  collagenase Ointment 1 Application(s) Topical daily  Dakins Solution - 1/2 Strength 1 Application(s) Topical Once  Dakins Solution - 1/4 Strength 1 Application(s) Topical daily  dextrose 40% Gel 15 Gram(s) Oral once  dextrose 5%. 1000 milliLiter(s) (100 mL/Hr) IV Continuous <Continuous>  dextrose 5%. 1000 milliLiter(s) (50 mL/Hr) IV Continuous <Continuous>  dextrose 50% Injectable 25 Gram(s) IV Push once  dextrose 50% Injectable 12.5 Gram(s) IV Push once  dextrose 50% Injectable 25 Gram(s) IV Push once  ertapenem  IVPB 1000 milliGRAM(s) IV Intermittent every 24 hours  glucagon  Injectable 1 milliGRAM(s) IntraMuscular once  insulin lispro (ADMELOG) corrective regimen sliding scale   SubCutaneous three times a day before meals  lactobacillus acidophilus 1 Tablet(s) Oral three times a day  melatonin 3 milliGRAM(s) Oral <User Schedule>  metoprolol tartrate 25 milliGRAM(s) Oral three times a day  rivaroxaban 15 milliGRAM(s) Oral with dinner  sodium chloride 0.45%. 1000 milliLiter(s) (50 mL/Hr) IV Continuous <Continuous>  vancomycin  IVPB 1000 milliGRAM(s) IV Intermittent every 24 hours    MEDICATIONS  (PRN):  acetaminophen   Tablet .. 650 milliGRAM(s) Oral every 6 hours PRN Temp greater or equal to 38.5C (101.3F), Mild Pain (1 - 3)  aluminum hydroxide/magnesium hydroxide/simethicone Suspension 30 milliLiter(s) Oral every 4 hours PRN Dyspepsia  OLANZapine Injectable 1.25 milliGRAM(s) IntraMuscular every 6 hours PRN Agitation      Labs:                        11.5   5.77  )-----------( 160      ( 16 Aug 2021 14:44 )             35.0     08-16    140  |  108<H>  |  11  ----------------------------<  171<H>  3.6   |  22  |  0.63    Ca    8.6      16 Aug 2021 14:44  Phos  2.8     08-16  Mg     1.90     08-16          COVID-19 PCR: Carlos (06 Aug 2021 17:49)      Radiology:

## 2021-08-17 NOTE — ADVANCED PRACTICE NURSE CONSULT - RECOMMEDATIONS
Topical recommendations:     Recommend external female urinary  (Primafit) for urinary incontinence.     Perineal area extending to bilateral buttocks and sacral/gluteal fold- Cleanse with skin cleanser, pat dry. Apply TRIAD moisture barrier paste twice a day and PRN with incontinent episodes.     Left foot- recommendations as per Podiatry team.     Continue low air loss bed therapy, continue or initiate seat cushion, heel elevation with offloading boots, turn & reposition q2h with Z-flow fluidized pillow, continue moisture management with barrier creams & single breathable pad, continue measures to decrease friction/shear.   Plan discussed with primary RN at bedside and primary team.     Please contact Wound Care Service Line if we can be of further assistance (ext [phone]).

## 2021-08-17 NOTE — PROVIDER CONTACT NOTE (OTHER) - BACKGROUND
80 yr old female admitted for L heel wound
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
patient admitted for diabetic ulcer

## 2021-08-17 NOTE — PROGRESS NOTE ADULT - ATTENDING COMMENTS
Patient not tolerating MRI due to restlessness and agitation   Plant to empirically treat for osteo w/ 6 weeks IV abx vanc + ertapenem   Order placed for PICC line placement    D'c home w/ IV abx via PICC, patient pending PICC line placement     Rest of plan as detailed above.

## 2021-08-17 NOTE — ADVANCED PRACTICE NURSE CONSULT - REASON FOR CONSULT
Patient seen by Podiatry services with topical recommendations in consult note.
Patient seen on skin care rounds after wound care referral received for assessment of skin impairment and recommendations of topical management. Chart reviewed: WBC 5.10, H/H 11.0/32.7, platelets 164, INR 1.58, BMI 21.8, Col[place] 13. Patient H/O of dementia, HTN, CVA, Afib on Xarelto, DM who presented to ED for ulceration of left heel c/f OM followed by Podiatry. Wound culture was polymicrobial including few MRSA.  Patient with inability to tolerate MRI, pending planning regarding biopsy vs long term abx.

## 2021-08-17 NOTE — PROGRESS NOTE ADULT - SUBJECTIVE AND OBJECTIVE BOX
Patient is a 80y old  Female who presents with a chief complaint of Left Heel Ulcer (09 Aug 2021 11:58)    f/u Polymicrobial OM    Interval History/ROS:  no fever.  went for MRI yesterday, but unable to stay still for it.  ROS unable due to dementia.    PAST MEDICAL & SURGICAL HISTORY:  Diabetes Mellitus  Benign Hypertension  dementia    Allergies  No Known Allergies    ANTIMICROBIALS:  ceFAZolin   IVPB 2000 every 8 hours (8/8-8/9)  cefepime   IVPB 1000 every 12 hours (8/6-8/8, 8/9-8/11)    active:  ertapenem  IVPB 1000 every 24 hours (8/11-)  vancomycin  IVPB 1000 every 24 hours (8/6-)    MEDICATIONS  (STANDING):  insulin lispro (ADMELOG) corrective regimen sliding scale  three times a day before meals  melatonin 3 <User Schedule>  metoprolol tartrate 25 two times a day    Vital Signs Last 24 Hrs  T(F): 98.8 (08-17-21 @ 06:31), Max: 98.8 (08-17-21 @ 06:31)  HR: 120 (08-17-21 @ 06:31)  BP: 96/66 (08-17-21 @ 06:31)  RR: 20 (08-17-21 @ 06:31)  SpO2: 100% (08-17-21 @ 06:31) (100% - 100%)    PHYSICAL EXAM:  Constitutional: non-toxic, in bed  HEAD/EYES: anicteric  ENT:  supple  Cardiovascular:   normal S1, S2  Respiratory:  clear BS bilaterally  GI:  soft, non-tender, normal bowel sounds  :  no martinez  Musculoskeletal:  no synovitis, left heel with deep wound, + malodor, tenderness  Neurologic: awake, confused, difficult to assess  Skin:  no rash  Psychiatric:  awake, appropriate mood                                 11.0   5.10  )-----------( 164      ( 17 Aug 2021 08:34 )             32.7 08-17    142  |  106  |  10  ----------------------------<  96  4.0   |  24  |  0.63  Ca    8.5      17 Aug 2021 08:34Phos  2.6     08-17Mg     1.80     08-17    C-Reactive Protein, Serum: 20.4 (08-10 @ 07:10)  C-Reactive Protein, Serum: 13.5 (08-06 @ 14:19)    Sedimentation Rate, Erythrocyte: 23 (08-06 @ 14:19)    MICROBIOLOGY:  Vancomycin Level, Trough: 12.6 (08-16 @ 14:44)  Vancomycin Level, Trough: 21.8 (08-12 @ 15:33)    Culture - Abscess with Gram Stain (collected 08-06-21 @ 21:26)  Source: .Abscess Leg - Left  Final Report (08-10-21 @ 20:44):    Few Methicillin Resistant Staphylococcus aureus    Moderate Enterobacter aerogenes Susceptibility to follow.    Moderate Escherichia coli Susceptibility to follow.    Moderate Bacteroides fragilis . "Susceptibilities not performed"  Organism: Methicillin resistant Staphylococcus aureus (08-08-21 @ 16:43)      -  Ampicillin/Sulbactam: R <=8/4      -  Cefazolin: R <=4      -  Clindamycin: R >4      -  Daptomycin: S 0.5      -  Erythromycin: R >4      -  Gentamicin: S <=1 Should not be used as monotherapy      -  Linezolid: S 1      -  Oxacillin: R >2      -  Penicillin: R >8      -  RIF- Rifampin: S <=1 Should not be used as monotherapy      -  Tetra/Doxy: S <=1      -  Trimethoprim/Sulfamethoxazole: S <=0.5/9.5      -  Vancomycin: S 1      Method Type: DEBORAH    Culture - Blood (collected 08-06-21 @ 18:47)  Source: .Blood Blood  Preliminary Report (08-07-21 @ 19:01):    No growth to date.    Culture - Blood (collected 08-06-21 @ 18:47)  Source: .Blood Blood  Preliminary Report (08-07-21 @ 19:01):    No growth to date.    RADIOLOGY:  imaging below personally reviewed and agree with findings    MRI done today, results pending    Xray Ankle 2 Views, Left (08.06.21 @ 14:33) >  Xray Foot AP + Lateral, Left (08.06.21 @ 14:32) >  IMPRESSION:  Posterior heel partial-thickness soft tissue ulceration with thin film of radiodense material along the bed of the ulceration.  No tracking gas collections beyond the ulcer margins. No gross radiographic evidence of osteomyelitis however if clinical concern is high consider further imaging with MRI.  No acute fracture or dislocation.  Joint spaces are maintained without evidence of joint margin erosions or ankle joint effusion.  Diffuse osteopenic changes.  Diffuse lower leg vascular calcifications.    CT Head No Cont (02.27.20 @ 11:41) >  Impression: This exam somewhat limited by motion though grossly unremarkable

## 2021-08-17 NOTE — PROVIDER CONTACT NOTE (OTHER) - SITUATION
pt refused bedtime meds, despite multiple attempts to administer
rectal temp 95.1
BP 90/65, P- 91, R -14, Temp 94.7 rectal

## 2021-08-17 NOTE — PROGRESS NOTE ADULT - PROBLEM SELECTOR PLAN 5
Patient with history of DM. A1C 6.4 on 8/7. Serume glucose 96 on 8/17  - Last A1C documented 7.8 in Jan 2021  - taking Metformin 500mg BID at home  - monitor FS TIDAC  - insulin sliding scale Patient with history of atrial fibrillation  - currently on rate control with metoprolol 25mg TID, and diltiazem 60mg QID at home  - continue w/ metoprolol 25mg TID  - will judiciously add on diltiazem as patient with c/f ongoing infection and BPs have been running soft  - c/w home xarelto for AC

## 2021-08-17 NOTE — PROGRESS NOTE ADULT - SUBJECTIVE AND OBJECTIVE BOX
Patient is a 80y old  Female who presents with a chief complaint of Left Heel Ulcer (17 Aug 2021 07:37)       INTERVAL HPI/OVERNIGHT EVENTS:  Patient seen and evaluated at bedside.  Pt is resting comfortable in NAD. Denies N/V/F/C.    Allergies    No Known Allergies    Intolerances        Vital Signs Last 24 Hrs  T(C): 37.1 (17 Aug 2021 06:31), Max: 37.1 (17 Aug 2021 06:31)  T(F): 98.8 (17 Aug 2021 06:31), Max: 98.8 (17 Aug 2021 06:31)  HR: 120 (17 Aug 2021 06:31) (99 - 120)  BP: 96/66 (17 Aug 2021 06:31) (90/51 - 105/64)  BP(mean): --  RR: 20 (17 Aug 2021 06:31) (18 - 20)  SpO2: 100% (17 Aug 2021 06:31) (98% - 100%)    LABS:                        11.0   5.10  )-----------( 164      ( 17 Aug 2021 08:34 )             32.7     08-17    142  |  106  |  10  ----------------------------<  96  4.0   |  24  |  0.63    Ca    8.5      17 Aug 2021 08:34  Phos  2.6     08-17  Mg     1.80     08-17      PT/INR - ( 17 Aug 2021 08:34 )   PT: 17.8 sec;   INR: 1.58 ratio             CAPILLARY BLOOD GLUCOSE      POCT Blood Glucose.: 92 mg/dL (17 Aug 2021 09:01)  POCT Blood Glucose.: 103 mg/dL (16 Aug 2021 22:05)  POCT Blood Glucose.: 159 mg/dL (16 Aug 2021 18:02)  POCT Blood Glucose.: 123 mg/dL (16 Aug 2021 12:29)      Lower Extremity Physical Exam:  Vascular: DP 1/4 B/L, PT non-palpable b/l, CFT <3 seconds B/L, Temperature gradient warm to cool, B/L.   Neuro: Epicritic sensation diminished to the level of ankle, B/L.  Musculoskeletal/Ortho: unremarkable  Skin: L plantar heel wound with fibrotic base, probe to bone, malodor, no tracking, undermining distolateral and proximal, no bogginess, no periwound erythema, no pus or drainage, L foot lateral malleoli wound to SubQ    RADIOLOGY & ADDITIONAL TESTS:

## 2021-08-17 NOTE — ADVANCED PRACTICE NURSE CONSULT - ASSESSMENT
General: Patient disoriented, unable to state name when asked, unrestrained mittens, bedbound, incontinent of urine and stool- perineal care provided for episode of fecal and urinary incontinence. Skin warm, dry poor skin turgor, scattered areas of hyperpigmentation and hypopigmentation, Blanchable erythema on right heel.     Left foot- DSD followed by podiatry.     Perineal area extending to bilateral buttocks and sacral/gluteal fold with moisture/Incontinence associated dermatitis presenting with chronic hyperpigmentation and an area of denudation to right perineum. Hypopigmentation noted on bilateral inner buttocks- unable to be seen in natural anatomical position, mirroring effect noted. Increased moisture noted. No suspicion of candidiasis.

## 2021-08-17 NOTE — PROVIDER CONTACT NOTE (OTHER) - ASSESSMENT
Pt cool to touch,
pt refused bedtime meds, despite multiple attempts to administer. pt sleepy, not alert enough for PO intake
rectal temp 95.1

## 2021-08-17 NOTE — PROGRESS NOTE ADULT - PROBLEM SELECTOR PLAN 3
Noted to be A&Ox0-1 at baseline at home. Has been eating significant portions of her meals.  - patient currently alert and speaking however speech incoherent  - patient intermittently able to follow very basic commands  - aspiration precautions  - per son, patient able to tolerate puree diet and glucerna at home  - frequent reorientation  - melatonin qhs  - has zyprexa 1.25mg prn for agitation but has not required it  - unsecured mittens to prevent self-discontinuation of IVs  - will f/u psych for behavioral management recommendations Temp to 94.8 1:30am 8/17, now normal.  -Trial without warming blanket tonight, ensure no drop in temperature before discharge. Temp to 94.8 1:30am 8/17, now normal.  -Trial without warming blanket tonight, ensure no drop in temperature before discharge.  - hypothermia likely driven by infection and age

## 2021-08-17 NOTE — PROGRESS NOTE ADULT - ASSESSMENT
Pt is an 80 woman PMHx of dementia, HTN, CVA, Afib on Xarelto, DM who presented to ED for ulceration of left heel c/f OM. Wound culture was polymicrobial including few MRSA. Negative blood cultures, no leukocytosis, and vital signs remaining WNL decrease likelihood of systemic infection. Patient with inability to tolerate MRI, pending PICC line placement for long term abx.

## 2021-08-17 NOTE — PROGRESS NOTE ADULT - PROBLEM SELECTOR PLAN 6
Patient with longstanding history of hypertension  - c/w home metoprolol 25mg TID  - will add on home diltiazem 60mg QID if BP tolerates Patient with history of DM. A1C 6.4 on 8/7. Serume glucose 96 on 8/17  - Last A1C documented 7.8 in Jan 2021  - taking Metformin 500mg BID at home  - monitor FS TIDAC  - insulin sliding scale

## 2021-08-17 NOTE — PROGRESS NOTE ADULT - PROBLEM SELECTOR PLAN 1
Patient with chronic Left Heel wound, appeared to be worsening over 3 weeks prior to admission. Seen and evaluated by podiatry. XR foot without gross evidence of OM or fracture. No signs of systemic infection. Wound culture grew few MRSA as well as bacteroides and enterobacter aerogenes  - vanc trough 12.6 on 8/16. Vancomycin dose 1000mg daily.  - c/w ertapenem 1g qd  - MRI Left Foot attempted once again 8/16 patient unable to remain still for scan despite premedication  - discussed with podiatry and ID, plan is empiric long term abx treatment  - PICC placement today 8/17 for 6 week vancomycin/ertapenem therapy until 9/16.

## 2021-08-17 NOTE — PROGRESS NOTE ADULT - PROBLEM SELECTOR PLAN 7
DVT: c/w home xarelto  Diet: Puree, carb consistent  Dispo: PT rec'd for rehab however family prefers home Patient with longstanding history of hypertension  - c/w home metoprolol 25mg TID  - will add on home diltiazem 60mg QID if BP tolerates

## 2021-08-17 NOTE — PROGRESS NOTE ADULT - ASSESSMENT
81 y/o F patient presents w/ L foot heel wound   - Patient seen and evaluated   - Afebrile, no leukocytosis  - GAGE: L foot plantar heel wound  to bone, +probe to bone, malodor present, no tracking, undermining distolateral and proximal,  no periwound erythema, no pus or drainage, L foot lateral malleoli wound to SubQ, no acute SOI  - Continue IV abx and follow ID final recs with IV PICC for 6 weeks (appreciated)  - LF MRI unable to be completed given patients mental status, recommend Versed prior to MRI   - Continue Z-flows to offload L heel   - Stable for discharge pending IV final recs and PICC placement  - Discussed w/ attending

## 2021-08-17 NOTE — PROGRESS NOTE ADULT - PROBLEM SELECTOR PLAN 2
Resolved  Patient p/w elevated sodium of 152, down to 147 on 8/12->142 today  - based on history most likely caused by impaired PO intake of fluids 2/2 dementia  - will hold off on further IVF

## 2021-08-17 NOTE — PROGRESS NOTE ADULT - PROBLEM SELECTOR PLAN 4
Patient with history of atrial fibrillation  - currently on rate control with metoprolol 25mg TID, and diltiazem 60mg QID at home  - continue w/ metoprolol 25mg TID  - will judiciously add on diltiazem as patient with c/f ongoing infection and BPs have been running soft  - c/w home xarelto for AC Noted to be A&Ox0-1 at baseline at home. Has been eating significant portions of her meals.  - patient currently alert and speaking however speech incoherent  - patient intermittently able to follow very basic commands  - aspiration precautions  - per son, patient able to tolerate puree diet and glucerna at home  - frequent reorientation  - melatonin qhs  - has zyprexa 1.25mg prn for agitation but has not required it  - unsecured mittens to prevent self-discontinuation of IVs  - will f/u psych for behavioral management recommendations

## 2021-08-17 NOTE — PROGRESS NOTE ADULT - ASSESSMENT
80F with dementia, HTN, CVA, Afib on Xarelto, DM.  Recent hospitalization at [facility]SH January 2021 for possible stroke and while at rehab developed sacral decub which resolved and left heel decub which became infected.  No overt OM, however, wound is fairly deep and there is still malodor suggesting active infection.  Unable to tolerate MRI.  Episodes of hypothermia.  Suspect acute exacerbation of chronic OM.      Infected heel  - probable OM  - continue vancomycin to cover mrsa  - continue ertapenem 1g daily  - would pursue with 6 weeks via PICC until about 9/16  - weekly - cbc, BMP, esr, crp, vancomycin trough fax results to [phone]    Elevated vancomycin level  - continue to monitor vancomycin trough    I have discussed plan of care as detailed above with housestaff

## 2021-08-18 LAB
ANION GAP SERPL CALC-SCNC: 10 MMOL/L — SIGNIFICANT CHANGE UP (ref 7–14)
BUN SERPL-MCNC: 10 MG/DL — SIGNIFICANT CHANGE UP (ref 7–23)
CALCIUM SERPL-MCNC: 8.4 MG/DL — SIGNIFICANT CHANGE UP (ref 8.4–10.5)
CHLORIDE SERPL-SCNC: 109 MMOL/L — HIGH (ref 98–107)
CO2 SERPL-SCNC: 23 MMOL/L — SIGNIFICANT CHANGE UP (ref 22–31)
CREAT SERPL-MCNC: 0.72 MG/DL — SIGNIFICANT CHANGE UP (ref 0.5–1.3)
GLUCOSE BLDC GLUCOMTR-MCNC: 108 MG/DL — HIGH (ref 70–99)
GLUCOSE BLDC GLUCOMTR-MCNC: 124 MG/DL — HIGH (ref 70–99)
GLUCOSE BLDC GLUCOMTR-MCNC: 127 MG/DL — HIGH (ref 70–99)
GLUCOSE BLDC GLUCOMTR-MCNC: 134 MG/DL — HIGH (ref 70–99)
GLUCOSE SERPL-MCNC: 118 MG/DL — HIGH (ref 70–99)
HCT VFR BLD CALC: 32.9 % — LOW (ref 34.5–45)
HGB BLD-MCNC: 11 G/DL — LOW (ref 11.5–15.5)
MAGNESIUM SERPL-MCNC: 1.8 MG/DL — SIGNIFICANT CHANGE UP (ref 1.6–2.6)
MCHC RBC-ENTMCNC: 28 PG — SIGNIFICANT CHANGE UP (ref 27–34)
MCHC RBC-ENTMCNC: 33.4 GM/DL — SIGNIFICANT CHANGE UP (ref 32–36)
MCV RBC AUTO: 83.7 FL — SIGNIFICANT CHANGE UP (ref 80–100)
NRBC # BLD: 0 /100 WBCS — SIGNIFICANT CHANGE UP
NRBC # FLD: 0 K/UL — SIGNIFICANT CHANGE UP
PHOSPHATE SERPL-MCNC: 2.9 MG/DL — SIGNIFICANT CHANGE UP (ref 2.5–4.5)
PLATELET # BLD AUTO: 161 K/UL — SIGNIFICANT CHANGE UP (ref 150–400)
POTASSIUM SERPL-MCNC: 3.9 MMOL/L — SIGNIFICANT CHANGE UP (ref 3.5–5.3)
POTASSIUM SERPL-SCNC: 3.9 MMOL/L — SIGNIFICANT CHANGE UP (ref 3.5–5.3)
RBC # BLD: 3.93 M/UL — SIGNIFICANT CHANGE UP (ref 3.8–5.2)
RBC # FLD: 16 % — HIGH (ref 10.3–14.5)
SODIUM SERPL-SCNC: 142 MMOL/L — SIGNIFICANT CHANGE UP (ref 135–145)
WBC # BLD: 5.32 K/UL — SIGNIFICANT CHANGE UP (ref 3.8–10.5)
WBC # FLD AUTO: 5.32 K/UL — SIGNIFICANT CHANGE UP (ref 3.8–10.5)

## 2021-08-18 PROCEDURE — 99232 SBSQ HOSP IP/OBS MODERATE 35: CPT | Mod: GC

## 2021-08-18 PROCEDURE — 99232 SBSQ HOSP IP/OBS MODERATE 35: CPT

## 2021-08-18 PROCEDURE — 36573 INSJ PICC RS&I 5 YR+: CPT

## 2021-08-18 RX ORDER — SODIUM CHLORIDE 9 MG/ML
10 INJECTION INTRAMUSCULAR; INTRAVENOUS; SUBCUTANEOUS
Refills: 0 | Status: DISCONTINUED | OUTPATIENT
Start: 2021-08-18 | End: 2021-08-19

## 2021-08-18 RX ORDER — VANCOMYCIN HCL 1 G
1 VIAL (EA) INTRAVENOUS
Qty: 30 | Refills: 0
Start: 2021-08-18

## 2021-08-18 RX ORDER — ERTAPENEM SODIUM 1 G/1
1000 INJECTION, POWDER, LYOPHILIZED, FOR SOLUTION INTRAMUSCULAR; INTRAVENOUS EVERY 24 HOURS
Refills: 0 | Status: DISCONTINUED | OUTPATIENT
Start: 2021-08-18 | End: 2021-08-19

## 2021-08-18 RX ORDER — CHLORHEXIDINE GLUCONATE 213 G/1000ML
1 SOLUTION TOPICAL
Refills: 0 | Status: DISCONTINUED | OUTPATIENT
Start: 2021-08-18 | End: 2021-08-19

## 2021-08-18 RX ORDER — ERTAPENEM SODIUM 1 G/1
1 INJECTION, POWDER, LYOPHILIZED, FOR SOLUTION INTRAMUSCULAR; INTRAVENOUS
Qty: 30 | Refills: 0
Start: 2021-08-18

## 2021-08-18 RX ORDER — RIVAROXABAN 15 MG-20MG
15 KIT ORAL
Refills: 0 | Status: DISCONTINUED | OUTPATIENT
Start: 2021-08-18 | End: 2021-08-19

## 2021-08-18 RX ADMIN — Medication 1 APPLICATION(S): at 14:04

## 2021-08-18 RX ADMIN — Medication 250 MILLIGRAM(S): at 18:33

## 2021-08-18 RX ADMIN — OLANZAPINE 1.25 MILLIGRAM(S): 15 TABLET, FILM COATED ORAL at 12:37

## 2021-08-18 RX ADMIN — Medication 1 APPLICATION(S): at 14:05

## 2021-08-18 RX ADMIN — Medication 1 TABLET(S): at 14:04

## 2021-08-18 RX ADMIN — Medication 1 TABLET(S): at 21:49

## 2021-08-18 RX ADMIN — Medication 1 TABLET(S): at 06:57

## 2021-08-18 RX ADMIN — Medication 25 MILLIGRAM(S): at 21:50

## 2021-08-18 RX ADMIN — RIVAROXABAN 15 MILLIGRAM(S): KIT at 18:34

## 2021-08-18 RX ADMIN — Medication 3 MILLIGRAM(S): at 21:50

## 2021-08-18 RX ADMIN — Medication 25 MILLIGRAM(S): at 14:04

## 2021-08-18 RX ADMIN — Medication 25 MILLIGRAM(S): at 06:58

## 2021-08-18 RX ADMIN — ERTAPENEM SODIUM 120 MILLIGRAM(S): 1 INJECTION, POWDER, LYOPHILIZED, FOR SOLUTION INTRAMUSCULAR; INTRAVENOUS at 14:06

## 2021-08-18 RX ADMIN — Medication 3 MILLIGRAM(S): at 01:46

## 2021-08-18 NOTE — PROGRESS NOTE ADULT - ASSESSMENT
Pt is an 80 woman PMHx of dementia, HTN, CVA, Afib on Xarelto, DM who presented to ED for ulceration of left heel c/f OM. Wound culture was polymicrobial including few MRSA. Negative blood cultures, no leukocytosis, and vital signs remaining WNL decrease likelihood of systemic infection. Patient with inability to tolerate MRI, pending PICC line placement for long term ertapenem/vancomycin for OM.

## 2021-08-18 NOTE — CHART NOTE - NSCHARTNOTEFT_GEN_A_CORE
Discussed patients current plan of care with her son and primary caretaker, Mr. Jose Garvey. Informed Mr. Garvey that patient is currently being planned for PICC line placement and discharge with IV antibiotics. Informed Mr. Garvey that patient in the hospital has required supervision and restraints to keep her from pulling at her lines and IVs. I also expressed to him that the patient would need to be closely monitored at home and that her PICC line would need to be carefully protected to prevent it from being dislodged by the patient. Informed Mr. Garvey of the risks of a displaced PICC line, such as bleeding or infection that may require hospital level of care and he expressed understanding. Mr. Garvey states that at this time his goal is to get the patient back home and he would not want her to go to another facility for closer monitoring. He believes that he will be able to prevent the patient from pulling at her PICC line and will plan to keep her supervised for the duration it is in place. Will continue with current plan of care at this time.    Clarisa Carranza MD  PGY-3, Internal Medicine  Pager: [phone]
Extensive discussion held with patient's son and primary caretaker (Jose Garvey: [phone]) regarding patient's current diagnosis as well as her plan of care. Explained that patient is currently receiving IV antibiotics for her left heel wound. Informed Mr. Garvey that patient is pending MRI which will help guide further management including possible podiatric intervention as well as future antibiotic therapy. Also discussed with Mr. Garvey about patient's overall clinical condition, especially in terms of her poor functional status and advanced dementia. Informed Mr. Garvey that her poor functional and mental status is likely to predispose her to further wounds and infections in the future which can be life-threatening. Mr. Garvey showed good understanding of his mother's overall health and prognosis. Explained that DNR/DNI status may be appropriate for Ms. Lewis considering her advanced comorbidities and informed Mr. Garvey that DNR/DNI status does not mean that we will stop current medical treatments. Mr. Garvey very appreciative of this information and plans to further discuss advanced directives with his siblings. Until a decision is made on the matter, Ms. Lewis is to remain full code and current treatment plan is to proceed as mentioned above. Informed Mr. Garvey that we will be available to answer any questions and that he should inform the primary team if there are any changes to the family's current wishes.     Clarisa Carranza MD  PGY-3, Internal Medicine  Pager: [phone]
Pt seen for malnutrition follow up.    Medical Course:  Per chart, pt is 80 year old female PMHx dementia, HTN, CVA, Afib on Xarelto, type 2 DM presenting for ulceration of left heel concerning for OM. Wound culture was polymicrobial including few MRSA. Inability to tolerate MRI, pending PICC line placement for long term ertapenem/vancomycin for OM.     Nutrition Course:  Pt unable to meaningfully contribute to interview.  Per PCA at bedside and flowsheet documentation, pt with continued suboptimal PO intake. Lethargic, requires extended amount of time for feedings (total feed). No noted GI distress, last BM 8/17 per flowsheets. No new weights available at this time.     Diet Prescription:   Pureed, Consistent Carbohydrate {Evening Snack} +Glucerna Shake 1 PO 3x daily    Pertinent Medications:   ertapenem IVPB, ADMELOG corrective regimen sliding scale, lactobacillus acidophilus, sodium chloride 0.45% IV Continuous, vancomycin IVPB, aluminum hydroxide/magnesium hydroxide/simethicone Suspension PRN    Pertinent Labs:   (8/18) Na 142 mmol/L Glu 118 mg/dL<H> K+ 3.9 mmol/L Cr 0.72 mg/dL BUN 10 mg/dL Phos 2.9 mg/dL  (8/7) HbA1c 6.4%<H>  POCT: (8/18) 108-124, (8/17)     Weight: (8/7 dosing) 131.1 lbs / 59.5 kg   Height: 65 in / 165.1 cm  IBW: 125 lbs / 56.8 kg +/-10%  BMI: 21.8 kg/m^2    Physical Assessment, per flowsheets:  Edema: 1+ L ankle/L foot.  Pressure Injury: per Advanced Practice Nurse Consult (8/17), none noted. Podiatry following for L heel wound.    Estimated Needs:   [X] No change since previous assessment, based on dosing weight 131.1 lbs / 59.5 kg   1926-7457 kcal daily @30-35 kcal/kg, 83.3-95.2 gm protein daily @1.4-1.6 gm/kg     Previous Nutrition Diagnosis: [X] Severe malnutrition, ongoing  New Nutrition Diagnosis: [X] N/A    Interventions:   1) Recommend continue current diet to assist with glycemic management, texture/consistency per medical discretion.  2) Continue Glucerna 1 PO 3x (provides 220 kcal, 10 gm protein per 8oz serving) to assist pt in meeting estimated nutrition needs.  3) Continue to provide assistance at meal times, document % PO intake in flowsheets.  4) Obtain weekly weights, as able.    Monitor & Evaluate:  PO intake, tolerance to diet/supplement, nutrition related lab values, weight trends, BMs/GI distress, hydration status, skin integrity.  Leann Francisco RDN, CDN #[id number]
PRE-INTERVENTIONAL RADIOLOGY PROCEDURE NOTE    Patient Age: 80    Patient Gender: F    Procedure: PICC Line Placement    Diagnosis/Indication: Left Heel Infection c/f OM    Interventional Radiology Attending Physician:     Ordering Attending Physician: Dr. Valerie Moeller    Pertinent Medical History: DM, Dementia    Pertinent labs:                      11.0   5.32  )-----------( 161      ( 18 Aug 2021 08:05 )             32.9     08-18    142  |  109<H>  |  10  ----------------------------<  118<H>  3.9   |  23  |  0.72    Ca    8.4      18 Aug 2021 08:05  Phos  2.9     08-18  Mg     1.80     08-18    PT/INR - ( 17 Aug 2021 08:34 )   PT: 17.8 sec;   INR: 1.58 ratio      Patient and Family Aware ? Yes (d/w patient's son Jose Garvey)

## 2021-08-18 NOTE — PHYSICAL THERAPY INITIAL EVALUATION ADULT - FOLLOWS COMMANDS/ANSWERS QUESTIONS, REHAB EVAL
unable to follow commands/unable to follow multi-step instructions/unable to answer questions/speech unintelligible

## 2021-08-18 NOTE — PROGRESS NOTE ADULT - SUBJECTIVE AND OBJECTIVE BOX
Patient is a 80y old  Female who presents with a chief complaint of Left Heel Ulcer (09 Aug 2021 11:58)    f/u Polymicrobial OM    Interval History/ROS:  no fever.      PAST MEDICAL & SURGICAL HISTORY:  Diabetes Mellitus  Benign Hypertension  dementia    Allergies  No Known Allergies    ANTIMICROBIALS:  ceFAZolin   IVPB 2000 every 8 hours (8/8-8/9)  cefepime   IVPB 1000 every 12 hours (8/6-8/8, 8/9-8/11)    active:  ertapenem  IVPB 1000 every 24 hours (8/11-)  vancomycin  IVPB 1000 every 24 hours (8/6-)    MEDICATIONS  (STANDING):  insulin lispro (ADMELOG) corrective regimen sliding scale  three times a day before meals  melatonin 3 <User Schedule>  metoprolol tartrate 25 three times a day    Vital Signs Last 24 Hrs  T(F): 97.1 (08-18-21 @ 06:45), Max: 98.7 (08-17-21 @ 22:19)  HR: 95 (08-18-21 @ 06:45)  BP: 125/74 (08-18-21 @ 06:45)  RR: 17 (08-18-21 @ 06:45)  SpO2: 98% (08-18-21 @ 06:45) (98% - 100%)  Wt(kg): --    PHYSICAL EXAM:                                              11.0   5.32  )-----------( 161      ( 18 Aug 2021 08:05 )             32.9 08-18    142  |  109  |  10  ----------------------------<  118  3.9   |  23  |  0.72  Ca    8.4      18 Aug 2021 08:05Phos  2.9     08-18Mg     1.80     08-18    C-Reactive Protein, Serum: 20.4 (08-10 @ 07:10)  C-Reactive Protein, Serum: 13.5 (08-06 @ 14:19)    Sedimentation Rate, Erythrocyte: 23 (08-06 @ 14:19)    MICROBIOLOGY:  Vancomycin Level, Trough: 12.6 (08-16 @ 14:44)  Vancomycin Level, Trough: 21.8 (08-12 @ 15:33)    Culture - Abscess with Gram Stain (collected 08-06-21 @ 21:26)  Source: .Abscess Leg - Left  Final Report (08-10-21 @ 20:44):    Few Methicillin Resistant Staphylococcus aureus    Moderate Enterobacter aerogenes Susceptibility to follow.    Moderate Escherichia coli Susceptibility to follow.    Moderate Bacteroides fragilis . "Susceptibilities not performed"  Organism: Methicillin resistant Staphylococcus aureus (08-08-21 @ 16:43)      -  Ampicillin/Sulbactam: R <=8/4      -  Cefazolin: R <=4      -  Clindamycin: R >4      -  Daptomycin: S 0.5      -  Erythromycin: R >4      -  Gentamicin: S <=1 Should not be used as monotherapy      -  Linezolid: S 1      -  Oxacillin: R >2      -  Penicillin: R >8      -  RIF- Rifampin: S <=1 Should not be used as monotherapy      -  Tetra/Doxy: S <=1      -  Trimethoprim/Sulfamethoxazole: S <=0.5/9.5      -  Vancomycin: S 1      Method Type: DEBORAH    Culture - Blood (collected 08-06-21 @ 18:47)  Source: .Blood Blood  Preliminary Report (08-07-21 @ 19:01):    No growth to date.    Culture - Blood (collected 08-06-21 @ 18:47)  Source: .Blood Blood  Preliminary Report (08-07-21 @ 19:01):    No growth to date.    RADIOLOGY:  imaging below personally reviewed and agree with findings    MRI done today, results pending    Xray Ankle 2 Views, Left (08.06.21 @ 14:33) >  Xray Foot AP + Lateral, Left (08.06.21 @ 14:32) >  IMPRESSION:  Posterior heel partial-thickness soft tissue ulceration with thin film of radiodense material along the bed of the ulceration.  No tracking gas collections beyond the ulcer margins. No gross radiographic evidence of osteomyelitis however if clinical concern is high consider further imaging with MRI.  No acute fracture or dislocation.  Joint spaces are maintained without evidence of joint margin erosions or ankle joint effusion.  Diffuse osteopenic changes.  Diffuse lower leg vascular calcifications.    CT Head No Cont (02.27.20 @ 11:41) >  Impression: This exam somewhat limited by motion though grossly unremarkable Patient is a 80y old  Female who presents with a chief complaint of Left Heel Ulcer (09 Aug 2021 11:58)    f/u Polymicrobial OM    Interval History/ROS:  no fever.  non-verbal.  ROS diffucult.    PAST MEDICAL & SURGICAL HISTORY:  Diabetes Mellitus  Benign Hypertension  dementia    Allergies  No Known Allergies    ANTIMICROBIALS:  ceFAZolin   IVPB 2000 every 8 hours (8/8-8/9)  cefepime   IVPB 1000 every 12 hours (8/6-8/8, 8/9-8/11)    active:  ertapenem  IVPB 1000 every 24 hours (8/11-)  vancomycin  IVPB 1000 every 24 hours (8/6-)    MEDICATIONS  (STANDING):  insulin lispro (ADMELOG) corrective regimen sliding scale  three times a day before meals  melatonin 3 <User Schedule>  metoprolol tartrate 25 three times a day    Vital Signs Last 24 Hrs  T(F): 97.1 (08-18-21 @ 06:45), Max: 98.7 (08-17-21 @ 22:19)  HR: 95 (08-18-21 @ 06:45)  BP: 125/74 (08-18-21 @ 06:45)  RR: 17 (08-18-21 @ 06:45)  SpO2: 98% (08-18-21 @ 06:45) (98% - 100%)  Wt(kg): --    PHYSICAL EXAM:  Constitutional: non-toxic, in bed  HEAD/EYES: anicteric  ENT:  supple  Cardiovascular:   normal S1, S2  Respiratory:  clear BS bilaterally  GI:  soft, non-tender, normal bowel sounds  :  no martinez  Musculoskeletal:  no synovitis, left heel with deep wound, + malodor, tenderness  Neurologic: awake, confused, difficult to assess  Skin:  no rash  Psychiatric:  awake, appropriate mood                                          11.0   5.32  )-----------( 161      ( 18 Aug 2021 08:05 )             32.9 08-18    142  |  109  |  10  ----------------------------<  118  3.9   |  23  |  0.72  Ca    8.4      18 Aug 2021 08:05Phos  2.9     08-18Mg     1.80     08-18    C-Reactive Protein, Serum: 20.4 (08-10 @ 07:10)  C-Reactive Protein, Serum: 13.5 (08-06 @ 14:19)    Sedimentation Rate, Erythrocyte: 23 (08-06 @ 14:19)    MICROBIOLOGY:  Vancomycin Level, Trough: 12.6 (08-16 @ 14:44)  Vancomycin Level, Trough: 21.8 (08-12 @ 15:33)    Culture - Abscess with Gram Stain (collected 08-06-21 @ 21:26)  Source: .Abscess Leg - Left  Final Report (08-10-21 @ 20:44):    Few Methicillin Resistant Staphylococcus aureus    Moderate Enterobacter aerogenes Susceptibility to follow.    Moderate Escherichia coli Susceptibility to follow.    Moderate Bacteroides fragilis . "Susceptibilities not performed"  Organism: Methicillin resistant Staphylococcus aureus (08-08-21 @ 16:43)      -  Ampicillin/Sulbactam: R <=8/4      -  Cefazolin: R <=4      -  Clindamycin: R >4      -  Daptomycin: S 0.5      -  Erythromycin: R >4      -  Gentamicin: S <=1 Should not be used as monotherapy      -  Linezolid: S 1      -  Oxacillin: R >2      -  Penicillin: R >8      -  RIF- Rifampin: S <=1 Should not be used as monotherapy      -  Tetra/Doxy: S <=1      -  Trimethoprim/Sulfamethoxazole: S <=0.5/9.5      -  Vancomycin: S 1      Method Type: DEBORAH    Culture - Blood (collected 08-06-21 @ 18:47)  Source: .Blood Blood  Preliminary Report (08-07-21 @ 19:01):    No growth to date.    Culture - Blood (collected 08-06-21 @ 18:47)  Source: .Blood Blood  Preliminary Report (08-07-21 @ 19:01):    No growth to date.    RADIOLOGY:  imaging below personally reviewed and agree with findings    MRI done today, results pending    Xray Ankle 2 Views, Left (08.06.21 @ 14:33) >  Xray Foot AP + Lateral, Left (08.06.21 @ 14:32) >  IMPRESSION:  Posterior heel partial-thickness soft tissue ulceration with thin film of radiodense material along the bed of the ulceration.  No tracking gas collections beyond the ulcer margins. No gross radiographic evidence of osteomyelitis however if clinical concern is high consider further imaging with MRI.  No acute fracture or dislocation.  Joint spaces are maintained without evidence of joint margin erosions or ankle joint effusion.  Diffuse osteopenic changes.  Diffuse lower leg vascular calcifications.    CT Head No Cont (02.27.20 @ 11:41) >  Impression: This exam somewhat limited by motion though grossly unremarkable

## 2021-08-18 NOTE — CHART NOTE - NSCHARTNOTESELECT_GEN_ALL_CORE
IR Pre-Procedure Note/Event Note
Discharge Planning/Event Note
Event Note
Follow Up/Nutrition Services

## 2021-08-18 NOTE — PROGRESS NOTE ADULT - SUBJECTIVE AND OBJECTIVE BOX
Internal Medicine Progress Note    Patient is a 80y old  Female who presents with a chief complaint of Left Heel Ulcer (17 Aug 2021 11:23)    OVERNIGHT EVENTS/SUBJECTIVE:    OBJECTIVE:  Vital Signs Last 24 Hrs  T(C): 37.1 (17 Aug 2021 22:19), Max: 37.1 (17 Aug 2021 22:19)  T(F): 98.7 (17 Aug 2021 22:19), Max: 98.7 (17 Aug 2021 22:19)  HR: 89 (17 Aug 2021 22:19) (89 - 104)  BP: 111/63 (17 Aug 2021 22:19) (111/63 - 141/72)  BP(mean): --  RR: 18 (17 Aug 2021 22:19) (18 - 19)  SpO2: 98% (17 Aug 2021 22:19) (98% - 100%)  I&O's Detail    Daily     Daily   Physical Exam:  General: NAD, resting comfortably in bed  Neuro: A&Ox4, 5/5 strength in all ext  HEENT: NC/AT, EOMI, normal hearing, oral mucosa moist, no oral lesions noted, no pharyngeal erythema, uvula midline  Neck: supple, thyroid not enlarged, no LAD  Resp: Breathing comfortably on RA, LCTA b/l  CV: Normal sinus rhythm, S1 and S2, no r/m/g  Abd: soft, non-distended, non-tender. No HSM.  Ext: ROMIx4, no edema, +2 pulses bilaterally  Skin: Warm and dry, no rashes or discolorations  Psych: Appropriate affect    Medications:  MEDICATIONS  (STANDING):  collagenase Ointment 1 Application(s) Topical daily  Dakins Solution - 1/2 Strength 1 Application(s) Topical Once  Dakins Solution - 1/4 Strength 1 Application(s) Topical daily  dextrose 40% Gel 15 Gram(s) Oral once  dextrose 5%. 1000 milliLiter(s) (50 mL/Hr) IV Continuous <Continuous>  dextrose 5%. 1000 milliLiter(s) (100 mL/Hr) IV Continuous <Continuous>  dextrose 50% Injectable 25 Gram(s) IV Push once  dextrose 50% Injectable 12.5 Gram(s) IV Push once  dextrose 50% Injectable 25 Gram(s) IV Push once  glucagon  Injectable 1 milliGRAM(s) IntraMuscular once  insulin lispro (ADMELOG) corrective regimen sliding scale   SubCutaneous three times a day before meals  lactobacillus acidophilus 1 Tablet(s) Oral three times a day  melatonin 3 milliGRAM(s) Oral <User Schedule>  metoprolol tartrate 25 milliGRAM(s) Oral three times a day  sodium chloride 0.45%. 1000 milliLiter(s) (50 mL/Hr) IV Continuous <Continuous>  vancomycin  IVPB 1000 milliGRAM(s) IV Intermittent every 24 hours    MEDICATIONS  (PRN):  acetaminophen   Tablet .. 650 milliGRAM(s) Oral every 6 hours PRN Temp greater or equal to 38.5C (101.3F), Mild Pain (1 - 3)  aluminum hydroxide/magnesium hydroxide/simethicone Suspension 30 milliLiter(s) Oral every 4 hours PRN Dyspepsia  OLANZapine Injectable 1.25 milliGRAM(s) IntraMuscular every 6 hours PRN Agitation      Labs:                        11.0   5.10  )-----------( 164      ( 17 Aug 2021 08:34 )             32.7     08-17    142  |  106  |  10  ----------------------------<  96  4.0   |  24  |  0.63    Ca    8.5      17 Aug 2021 08:34  Phos  2.6     08-17  Mg     1.80     08-17      PT/INR - ( 17 Aug 2021 08:34 )   PT: 17.8 sec;   INR: 1.58 ratio             COVID-19 PCR: Gisselletec (06 Aug 2021 17:49)      Radiology: Internal Medicine Progress Note    Patient is a 80y old  Female who presents with a chief complaint of Left Heel Ulcer (17 Aug 2021 11:23)    OVERNIGHT EVENTS/SUBJECTIVE: No overnight events. She is scheduled to have a PICC line placed for long term antibiotic therapy before discharge.     OBJECTIVE:  Vital Signs Last 24 Hrs  T(C): 37.1 (17 Aug 2021 22:19), Max: 37.1 (17 Aug 2021 22:19)  T(F): 98.7 (17 Aug 2021 22:19), Max: 98.7 (17 Aug 2021 22:19)  HR: 89 (17 Aug 2021 22:19) (89 - 104)  BP: 111/63 (17 Aug 2021 22:19) (111/63 - 141/72)  BP(mean): --  RR: 18 (17 Aug 2021 22:19) (18 - 19)  SpO2: 98% (17 Aug 2021 22:19) (98% - 100%)  I&O's Detail      Daily   Physical Exam:  General: NAD, resting comfortably in bed  Neuro: A&Ox1  HEENT: NC/AT, EOMI, normal hearing  Resp: Breathing comfortably on RA, LCTA b/l  CV: Irregular rhythm, no r/m/g  Abd: soft, non-distended, non-tender  Skin: Warm and dry, no rashes or discolorations      Medications:  MEDICATIONS  (STANDING):  collagenase Ointment 1 Application(s) Topical daily  Dakins Solution - 1/2 Strength 1 Application(s) Topical Once  Dakins Solution - 1/4 Strength 1 Application(s) Topical daily  dextrose 40% Gel 15 Gram(s) Oral once  dextrose 5%. 1000 milliLiter(s) (50 mL/Hr) IV Continuous <Continuous>  dextrose 5%. 1000 milliLiter(s) (100 mL/Hr) IV Continuous <Continuous>  dextrose 50% Injectable 25 Gram(s) IV Push once  dextrose 50% Injectable 12.5 Gram(s) IV Push once  dextrose 50% Injectable 25 Gram(s) IV Push once  glucagon  Injectable 1 milliGRAM(s) IntraMuscular once  insulin lispro (ADMELOG) corrective regimen sliding scale   SubCutaneous three times a day before meals  lactobacillus acidophilus 1 Tablet(s) Oral three times a day  melatonin 3 milliGRAM(s) Oral <User Schedule>  metoprolol tartrate 25 milliGRAM(s) Oral three times a day  sodium chloride 0.45%. 1000 milliLiter(s) (50 mL/Hr) IV Continuous <Continuous>  vancomycin  IVPB 1000 milliGRAM(s) IV Intermittent every 24 hours    MEDICATIONS  (PRN):  acetaminophen   Tablet .. 650 milliGRAM(s) Oral every 6 hours PRN Temp greater or equal to 38.5C (101.3F), Mild Pain (1 - 3)  aluminum hydroxide/magnesium hydroxide/simethicone Suspension 30 milliLiter(s) Oral every 4 hours PRN Dyspepsia  OLANZapine Injectable 1.25 milliGRAM(s) IntraMuscular every 6 hours PRN Agitation      Labs:                        11.0   5.10  )-----------( 164      ( 17 Aug 2021 08:34 )             32.7     08-17    142  |  106  |  10  ----------------------------<  96  4.0   |  24  |  0.63    Ca    8.5      17 Aug 2021 08:34  Phos  2.6     08-17  Mg     1.80     08-17      PT/INR - ( 17 Aug 2021 08:34 )   PT: 17.8 sec;   INR: 1.58 ratio             COVID-19 PCR: NotDetec (06 Aug 2021 17:49)      Radiology:

## 2021-08-18 NOTE — PROGRESS NOTE ADULT - ATTENDING COMMENTS
Patient not tolerating MRI due to restlessness and agitation   Plant to empirically treat for osteo w/ 6 weeks IV abx vanc + ertapenem   PICC line placed today    D'c home w/ IV abx via PICC, anticipate d'c 8/19-8/20    Rest of plan as detailed above.

## 2021-08-18 NOTE — PHYSICAL THERAPY INITIAL EVALUATION ADULT - GENERAL OBSERVATIONS, REHAB EVAL
patient received in bed in NAD +[facility] elevated 30deg, bilateral mitten restraints, bilateral z-flow boots

## 2021-08-18 NOTE — PROGRESS NOTE ADULT - PROBLEM SELECTOR PLAN 1
Patient with chronic Left Heel wound, appeared to be worsening over 3 weeks prior to admission. Seen and evaluated by podiatry. XR foot without gross evidence of OM or fracture. No signs of systemic infection. Wound culture grew few MRSA as well as bacteroides and enterobacter aerogenes  - vanc trough 12.6 on 8/16. Vancomycin dose 1000mg daily.  - c/w ertapenem 1g qd  - MRI Left Foot attempted once again 8/16 patient unable to remain still for scan despite premedication  - discussed with podiatry and ID, plan is empiric long term abx treatment  - PICC placement today 8/18 for 6 week vancomycin/ertapenem therapy until 9/16.  - will need to trend CBC, BMP, ESR, CRP, vanc trough weekly and fax results to [phone], per ID

## 2021-08-18 NOTE — PHYSICAL THERAPY INITIAL EVALUATION ADULT - RANGE OF MOTION EXAMINATION, REHAB EVAL
AROM not tested as patient did not follow commands or demonstrations/bilateral upper extremity ROM was WFL (within functional limits)/bilateral lower extremity ROM was WFL (within functional limits)

## 2021-08-18 NOTE — PROGRESS NOTE ADULT - ASSESSMENT
80F with dementia, HTN, CVA, Afib on Xarelto, DM.  Recent hospitalization at [facility]SH January 2021 for possible stroke and while at rehab developed sacral decub which resolved and left heel decub which became infected.  No overt OM, however, wound is fairly deep and there is still malodor suggesting active infection.  Unable to tolerate MRI.  Episodes of hypothermia.  Suspect acute exacerbation of chronic OM.      Infected heel  - probable OM  - continue vancomycin to cover mrsa  - continue ertapenem 1g daily  - would pursue with 6 weeks via PICC until about 9/16  - weekly - cbc, BMP, esr, crp, vancomycin trough fax results to [phone]    Elevated vancomycin level  - continue to monitor vancomycin trough         80F with dementia, HTN, CVA, Afib on Xarelto, DM.  Recent hospitalization at [facility] January 2021 for possible stroke and while at rehab developed sacral decub which resolved and left heel decub which became infected.  No overt OM, however, wound is fairly deep and there is still malodor suggesting active infection.  Unable to tolerate MRI.  Episodes of hypothermia.  Suspect acute exacerbation of chronic OM.      Infected heel  - probable OM  - continue vancomycin to cover mrsa  - continue ertapenem 1g daily  - would pursue with 6 weeks via midline/PICC until about 9/16  - weekly - cbc, BMP, esr, crp, vancomycin trough fax results to [phone]    Elevated vancomycin level  - continue to monitor vancomycin trough  - 12.6 on 8/16

## 2021-08-18 NOTE — PROGRESS NOTE ADULT - PROBLEM SELECTOR PLAN 5
Patient with history of atrial fibrillation  - currently on rate control with metoprolol 25mg TID, and diltiazem 60mg QID at home  - continue w/ metoprolol 25mg TID  - may judiciously add on diltiazem as patient with c/f ongoing infection and BPs have been running soft  - c/w home xarelto for AC

## 2021-08-18 NOTE — PHYSICAL THERAPY INITIAL EVALUATION ADULT - MANUAL MUSCLE TESTING RESULTS, REHAB EVAL
formal MMT not performed due to mental status. patient resistive during passive ROM/not tested due to

## 2021-08-18 NOTE — PHYSICAL THERAPY INITIAL EVALUATION ADULT - ADDITIONAL COMMENTS
unable to attain history from patient due to mental status. as per chart she was in rehab facility already, where she developed bed sores and heel sores. she is currently unable to follow commands to participate in PT and is not appropriate for skilled PT

## 2021-08-18 NOTE — PROGRESS NOTE ADULT - PROBLEM SELECTOR PLAN 3
Temp to 94.8 1:30am 8/17, now resolved and WNL on 8/18.  - hypothermia likely driven by infection and age

## 2021-08-18 NOTE — PROGRESS NOTE ADULT - PROBLEM SELECTOR PLAN 6
Patient with history of DM. A1C 6.4 on 8/7. Serum glucose 118 on 8/18  - Last A1C documented 7.8 in Jan 2021  - taking Metformin 500mg BID at home  - monitor FS TIDAC  - insulin sliding scale

## 2021-08-18 NOTE — PROGRESS NOTE ADULT - PROBLEM SELECTOR PLAN 4
Noted to be A&Ox0-1 at baseline at home. Has been eating significant portions of her meals.  - patient currently alert and speaking however speech incoherent  - patient intermittently able to follow very basic commands  - aspiration precautions  - per son, patient able to tolerate puree diet and glucerna at home  - frequent reorientation  - melatonin qhs  - has zyprexa 1.25mg prn for agitation but has not required it  - unsecured mittens to prevent self-discontinuation of IVs, importance of protecting PICC line conveyed to son

## 2021-08-18 NOTE — PHYSICAL THERAPY INITIAL EVALUATION ADULT - DISCHARGE DISPOSITION, PT EVAL
Patient at this time is not a rehab candidate and is unable to actively participate in PT, therefore discharge from PT program at this time. home with total assist vs long term care facility

## 2021-08-18 NOTE — PROGRESS NOTE ADULT - PROBLEM SELECTOR PLAN 2
Resolved  Patient p/w elevated sodium of 152, down to 147 on 8/12->142 on 8/17  - based on history most likely caused by impaired PO intake of fluids 2/2 dementia  - will hold off on further IVF

## 2021-08-19 ENCOUNTER — TRANSCRIPTION ENCOUNTER (OUTPATIENT)
Age: 81
End: 2021-08-19

## 2021-08-19 VITALS
OXYGEN SATURATION: 96 % | TEMPERATURE: 98 F | DIASTOLIC BLOOD PRESSURE: 65 MMHG | SYSTOLIC BLOOD PRESSURE: 108 MMHG | HEART RATE: 75 BPM | RESPIRATION RATE: 18 BRPM

## 2021-08-19 LAB
ACTH SER-ACNC: 14.5 PG/ML — SIGNIFICANT CHANGE UP (ref 7.2–63.3)
ANION GAP SERPL CALC-SCNC: 9 MMOL/L — SIGNIFICANT CHANGE UP (ref 7–14)
BUN SERPL-MCNC: 9 MG/DL — SIGNIFICANT CHANGE UP (ref 7–23)
CALCIUM SERPL-MCNC: 8.7 MG/DL — SIGNIFICANT CHANGE UP (ref 8.4–10.5)
CHLORIDE SERPL-SCNC: 107 MMOL/L — SIGNIFICANT CHANGE UP (ref 98–107)
CO2 SERPL-SCNC: 24 MMOL/L — SIGNIFICANT CHANGE UP (ref 22–31)
CREAT SERPL-MCNC: 0.67 MG/DL — SIGNIFICANT CHANGE UP (ref 0.5–1.3)
GLUCOSE BLDC GLUCOMTR-MCNC: 102 MG/DL — HIGH (ref 70–99)
GLUCOSE BLDC GLUCOMTR-MCNC: 76 MG/DL — SIGNIFICANT CHANGE UP (ref 70–99)
GLUCOSE BLDC GLUCOMTR-MCNC: 83 MG/DL — SIGNIFICANT CHANGE UP (ref 70–99)
GLUCOSE SERPL-MCNC: 112 MG/DL — HIGH (ref 70–99)
HCT VFR BLD CALC: 32.6 % — LOW (ref 34.5–45)
HGB BLD-MCNC: 11.1 G/DL — LOW (ref 11.5–15.5)
MAGNESIUM SERPL-MCNC: 2 MG/DL — SIGNIFICANT CHANGE UP (ref 1.6–2.6)
MCHC RBC-ENTMCNC: 28.4 PG — SIGNIFICANT CHANGE UP (ref 27–34)
MCHC RBC-ENTMCNC: 34 GM/DL — SIGNIFICANT CHANGE UP (ref 32–36)
MCV RBC AUTO: 83.4 FL — SIGNIFICANT CHANGE UP (ref 80–100)
NRBC # BLD: 0 /100 WBCS — SIGNIFICANT CHANGE UP
NRBC # FLD: 0 K/UL — SIGNIFICANT CHANGE UP
PHOSPHATE SERPL-MCNC: 2.8 MG/DL — SIGNIFICANT CHANGE UP (ref 2.5–4.5)
PLATELET # BLD AUTO: 196 K/UL — SIGNIFICANT CHANGE UP (ref 150–400)
POTASSIUM SERPL-MCNC: 3.6 MMOL/L — SIGNIFICANT CHANGE UP (ref 3.5–5.3)
POTASSIUM SERPL-SCNC: 3.6 MMOL/L — SIGNIFICANT CHANGE UP (ref 3.5–5.3)
RBC # BLD: 3.91 M/UL — SIGNIFICANT CHANGE UP (ref 3.8–5.2)
RBC # FLD: 16.1 % — HIGH (ref 10.3–14.5)
SODIUM SERPL-SCNC: 140 MMOL/L — SIGNIFICANT CHANGE UP (ref 135–145)
WBC # BLD: 5.7 K/UL — SIGNIFICANT CHANGE UP (ref 3.8–10.5)
WBC # FLD AUTO: 5.7 K/UL — SIGNIFICANT CHANGE UP (ref 3.8–10.5)

## 2021-08-19 PROCEDURE — 99232 SBSQ HOSP IP/OBS MODERATE 35: CPT

## 2021-08-19 PROCEDURE — 99239 HOSP IP/OBS DSCHRG MGMT >30: CPT

## 2021-08-19 RX ORDER — VANCOMYCIN HCL 1 G
1 VIAL (EA) INTRAVENOUS
Qty: 0 | Refills: 0 | DISCHARGE
Start: 2021-08-19

## 2021-08-19 RX ORDER — DILTIAZEM HCL 120 MG
1 CAPSULE, EXT RELEASE 24 HR ORAL
Qty: 0 | Refills: 0 | DISCHARGE

## 2021-08-19 RX ORDER — ERTAPENEM SODIUM 1 G/1
1 INJECTION, POWDER, LYOPHILIZED, FOR SOLUTION INTRAMUSCULAR; INTRAVENOUS
Qty: 0 | Refills: 0 | DISCHARGE
Start: 2021-08-19

## 2021-08-19 RX ADMIN — Medication 25 MILLIGRAM(S): at 13:02

## 2021-08-19 RX ADMIN — CHLORHEXIDINE GLUCONATE 1 APPLICATION(S): 213 SOLUTION TOPICAL at 05:42

## 2021-08-19 RX ADMIN — Medication 25 MILLIGRAM(S): at 05:42

## 2021-08-19 RX ADMIN — Medication 1 APPLICATION(S): at 13:42

## 2021-08-19 RX ADMIN — Medication 1 APPLICATION(S): at 13:01

## 2021-08-19 RX ADMIN — ERTAPENEM SODIUM 120 MILLIGRAM(S): 1 INJECTION, POWDER, LYOPHILIZED, FOR SOLUTION INTRAMUSCULAR; INTRAVENOUS at 13:03

## 2021-08-19 RX ADMIN — Medication 1 TABLET(S): at 13:02

## 2021-08-19 RX ADMIN — Medication 1 TABLET(S): at 05:42

## 2021-08-19 NOTE — PROGRESS NOTE ADULT - PROBLEM SELECTOR PLAN 2
Resolved  Patient p/w elevated sodium of 152, down to 147 on 8/12->140 on 8/19  - based on history most likely caused by impaired PO intake of fluids 2/2 dementia  - will hold off on further IVF

## 2021-08-19 NOTE — PROGRESS NOTE ADULT - ASSESSMENT
Pt is an 80 woman PMHx of dementia, HTN, CVA, Afib on Xarelto, DM who presented to ED for ulceration of left heel c/f OM. Wound culture was polymicrobial including few MRSA. Negative blood cultures, no leukocytosis, and vital signs remaining WNL decrease likelihood of systemic infection. Patient with inability to tolerate MRI, now s/p PICC line placement for 6 week ertapenem/vancomyin therapy for OM and ready for discharge.

## 2021-08-19 NOTE — DISCHARGE NOTE NURSING/CASE MANAGEMENT/SOCIAL WORK - NSDCFUADDAPPT_GEN_ALL_CORE_FT
Please follow up with your primary care doctor within 2 weeks of discharge. If you do not have a primary care doctor you can establish care with the [facility] for primary care.    Please follow up with podiatry, Dr. Suarez, within 1 week of discharge.

## 2021-08-19 NOTE — PROGRESS NOTE ADULT - REASON FOR ADMISSION
Left Heel Ulcer

## 2021-08-19 NOTE — PROGRESS NOTE ADULT - PROBLEM SELECTOR PLAN 1
Patient with chronic Left Heel wound, appeared to be worsening over 3 weeks prior to admission. Seen and evaluated by podiatry. XR foot without gross evidence of OM or fracture. No signs of systemic infection. Wound culture grew few MRSA as well as bacteroides and enterobacter aerogenes  - vanc trough 12.6 on 8/16. Vancomycin dose 1000mg daily.  - c/w ertapenem 1g qd  - MRI Left Foot attempted once again 8/16 patient unable to remain still for scan despite premedication  - discussed with podiatry and ID, plan is empiric long term abx treatment  - PICC placement done 8/18 for 6 week vancomycin/ertapenem therapy until 9/16.  - will need to trend CBC, BMP, ESR, CRP, vanc trough weekly and fax results to [phone], per ID

## 2021-08-19 NOTE — DISCHARGE NOTE NURSING/CASE MANAGEMENT/SOCIAL WORK - NSSCNAMETXT_GEN_ALL_CORE
[facility] for IV antibiotics, RN to start care 8/19/2021; [facility] for wound care, RN to start care 8/20/2021

## 2021-08-19 NOTE — PROGRESS NOTE ADULT - ATTENDING SUPERVISION STATEMENT
Resident
Student
Resident
Resident
Student
Resident
Student
Resident
Resident

## 2021-08-19 NOTE — DISCHARGE NOTE NURSING/CASE MANAGEMENT/SOCIAL WORK - PATIENT PORTAL LINK FT
You can access the FollowMyHealth Patient Portal offered by [facility] by registering at the following website: [email or web address]. By joining [employer]’s FollowMyHealth portal, you will also be able to view your health information using other applications (apps) compatible with our system.

## 2021-08-19 NOTE — PROGRESS NOTE ADULT - PROBLEM SELECTOR PLAN 3
Temp to 94.8 1:30am 8/17, now resolved and WNL on 8/19.  - hypothermia likely driven by infection and age

## 2021-08-19 NOTE — PROGRESS NOTE ADULT - PROVIDER SPECIALTY LIST ADULT
Internal Medicine
Internal Medicine
Podiatry
Podiatry
Infectious Disease
Internal Medicine
Podiatry
Infectious Disease
Infectious Disease
Internal Medicine
Podiatry
Infectious Disease
Internal Medicine
Podiatry
Internal Medicine

## 2021-08-19 NOTE — PROGRESS NOTE ADULT - PROBLEM SELECTOR PLAN 8
DVT: c/w home xarelto  Diet: Puree, carb consistent  Dispo: PT rec'd for rehab however family prefers home. PT consulted, appreciate recs
DVT: c/w home xarelto  Diet: Puree, carb consistent  Dispo: PT rec'd for rehab however family prefers home. PT consulted, appreciate recs
DVT: c/w home xarelto  Diet: Puree, carb consistent  Dispo: PT rec'd for rehab however family prefers home

## 2021-08-19 NOTE — PROGRESS NOTE ADULT - ATTENDING COMMENTS
Patient not tolerating MRI due to restlessness and agitation   Plan to empirically treat for osteo w/ 6 weeks IV abx vanc + ertapenem   PICC line placed     D'c home w/ IV abx via PICC    Rest of plan as detailed above.

## 2021-08-19 NOTE — PROGRESS NOTE ADULT - ASSESSMENT
80F with dementia, HTN, CVA, Afib on Xarelto, DM.  Recent hospitalization at OSH January 2021 for possible stroke and while at rehab developed sacral decub which resolved and left heel decub which became infected.  No overt OM, however, wound is fairly deep and there is still malodor suggesting active infection.  Unable to tolerate MRI.  Episodes of hypothermia.  Suspect acute exacerbation of chronic OM.      Infected heel  - probable OM  - continue vancomycin to cover mrsa  - continue ertapenem 1g daily  - would pursue with 6 weeks via midline/PICC until about 9/16  - weekly - cbc, BMP, esr, crp, vancomycin trough fax results to [phone] if going home  - outpatient f/u     Elevated vancomycin level  - continue to monitor vancomycin trough  - 12.6 on 8/16    likely discharge to rehab today  please have patient schedule outpatient f/u with ID

## 2021-08-19 NOTE — PROGRESS NOTE ADULT - NUTRITIONAL ASSESSMENT
This patient has been assessed with a concern for Malnutrition and has been determined to have a diagnosis/diagnoses of Severe protein-calorie malnutrition.    This patient is being managed with:   Diet Pureed-  Consistent Carbohydrate {Evening Snack} (CSTCHOSN)  Supplement Feeding Modality:  Oral  Glucerna Shake Cans or Servings Per Day:  1       Frequency:  Three Times a day  Entered: Aug  6 2021  7:58PM    

## 2021-08-19 NOTE — PROGRESS NOTE ADULT - SUBJECTIVE AND OBJECTIVE BOX
Patient is a 80y old  Female who presents with a chief complaint of Left Heel Ulcer (09 Aug 2021 11:58)    f/u Polymicrobial OM    Interval History/ROS:  no fever.  non-verbal.  ROS diffucult.    PAST MEDICAL & SURGICAL HISTORY:  Diabetes Mellitus  Benign Hypertension  dementia    Allergies  No Known Allergies    ANTIMICROBIALS:  ceFAZolin   IVPB 2000 every 8 hours (8/8-8/9)  cefepime   IVPB 1000 every 12 hours (8/6-8/8, 8/9-8/11)    active:  ertapenem  IVPB 1000 every 24 hours (8/11-)  vancomycin  IVPB 1000 every 24 hours (8/6-)    MEDICATIONS  (STANDING):  insulin lispro (ADMELOG) corrective regimen sliding scale  three times a day before meals  melatonin 3 <User Schedule>  metoprolol tartrate 25 three times a day  rivaroxaban 15 with dinner    Vital Signs Last 24 Hrs  T(F): 97.6 (08-19-21 @ 12:58), Max: 97.6 (08-19-21 @ 12:58)  HR: 75 (08-19-21 @ 12:58)  BP: 108/65 (08-19-21 @ 12:58)  RR: 18 (08-19-21 @ 12:58)  SpO2: 96% (08-19-21 @ 12:58) (95% - 96%)    PHYSICAL EXAM:  Constitutional: non-toxic, in bed  HEAD/EYES: anicteric  ENT:  supple  Cardiovascular:   normal S1, S2  Respiratory:  clear BS bilaterally  GI:  soft, non-tender, normal bowel sounds  :  no martinez  Musculoskeletal:  no synovitis, left heel with deep wound, + malodor, tenderness  Neurologic: awake, confused, difficult to assess  Skin:  no rash  Psychiatric:  awake, appropriate mood                                                   11.1   5.70  )-----------( 196      ( 19 Aug 2021 07:39 )             32.6 08-19    140  |  107  |  9   ----------------------------<  112  3.6   |  24  |  0.67  Ca    8.7      19 Aug 2021 07:39Phos  2.8     08-19Mg     2.00     08-19    C-Reactive Protein, Serum: 20.4 (08-10 @ 07:10)  C-Reactive Protein, Serum: 13.5 (08-06 @ 14:19)    Sedimentation Rate, Erythrocyte: 23 (08-06 @ 14:19)    MICROBIOLOGY:  Vancomycin Level, Trough: 12.6 (08-16 @ 14:44)    Culture - Abscess with Gram Stain (collected 08-06-21 @ 21:26)  Source: .Abscess Leg - Left  Final Report (08-10-21 @ 20:44):    Few Methicillin Resistant Staphylococcus aureus    Moderate Enterobacter aerogenes Susceptibility to follow.    Moderate Escherichia coli Susceptibility to follow.    Moderate Bacteroides fragilis . "Susceptibilities not performed"  Organism: Methicillin resistant Staphylococcus aureus (08-08-21 @ 16:43)      -  Ampicillin/Sulbactam: R <=8/4      -  Cefazolin: R <=4      -  Clindamycin: R >4      -  Daptomycin: S 0.5      -  Erythromycin: R >4      -  Gentamicin: S <=1 Should not be used as monotherapy      -  Linezolid: S 1      -  Oxacillin: R >2      -  Penicillin: R >8      -  RIF- Rifampin: S <=1 Should not be used as monotherapy      -  Tetra/Doxy: S <=1      -  Trimethoprim/Sulfamethoxazole: S <=0.5/9.5      -  Vancomycin: S 1      Method Type: DEBORAH    Culture - Blood (collected 08-06-21 @ 18:47)  Source: .Blood Blood  Preliminary Report (08-07-21 @ 19:01):    No growth to date.    Culture - Blood (collected 08-06-21 @ 18:47)  Source: .Blood Blood  Preliminary Report (08-07-21 @ 19:01):    No growth to date.    RADIOLOGY:  imaging below personally reviewed and agree with findings    MRI done today, results pending    Xray Ankle 2 Views, Left (08.06.21 @ 14:33) >  Xray Foot AP + Lateral, Left (08.06.21 @ 14:32) >  IMPRESSION:  Posterior heel partial-thickness soft tissue ulceration with thin film of radiodense material along the bed of the ulceration.  No tracking gas collections beyond the ulcer margins. No gross radiographic evidence of osteomyelitis however if clinical concern is high consider further imaging with MRI.  No acute fracture or dislocation.  Joint spaces are maintained without evidence of joint margin erosions or ankle joint effusion.  Diffuse osteopenic changes.  Diffuse lower leg vascular calcifications.    CT Head No Cont (02.27.20 @ 11:41) >  Impression: This exam somewhat limited by motion though grossly unremarkable

## 2021-08-19 NOTE — PROGRESS NOTE ADULT - PROBLEM SELECTOR PROBLEM 1
Heel ulceration, left, with unspecified severity

## 2021-08-19 NOTE — PROGRESS NOTE ADULT - PROBLEM SELECTOR PROBLEM 2
Hypernatremia

## 2021-08-19 NOTE — DISCHARGE NOTE NURSING/CASE MANAGEMENT/SOCIAL WORK - NSDCPEFALRISK_GEN_ALL_CORE
For information on Fall & injury Prevention, visit [email or web address]

## 2021-08-19 NOTE — PROGRESS NOTE ADULT - PROBLEM SELECTOR PLAN 6
Patient with history of DM. A1C 6.4 on 8/7. Serum glucose 140 on 8/19  - Last A1C documented 7.8 in Jan 2021  - taking Metformin 500mg BID at home  - monitor FS TIDAC  - insulin sliding scale

## 2021-08-20 RX ORDER — COLLAGENASE CLOSTRIDIUM HIST. 250 UNIT/G
1 OINTMENT (GRAM) TOPICAL
Qty: 30 | Refills: 0
Start: 2021-08-20 | End: 2021-09-18

## 2021-08-24 ENCOUNTER — EMERGENCY (EMERGENCY)
Facility: HOSPITAL | Age: 81
LOS: 0 days | Discharge: HOME | End: 2021-08-25
Attending: EMERGENCY MEDICINE | Admitting: INTERNAL MEDICINE
Payer: MEDICARE

## 2021-08-24 VITALS
HEIGHT: 63 IN | DIASTOLIC BLOOD PRESSURE: 78 MMHG | HEART RATE: 110 BPM | TEMPERATURE: 98 F | OXYGEN SATURATION: 97 % | WEIGHT: 160.06 LBS | RESPIRATION RATE: 17 BRPM | SYSTOLIC BLOOD PRESSURE: 120 MMHG

## 2021-08-24 DIAGNOSIS — I48.91 UNSPECIFIED ATRIAL FIBRILLATION: ICD-10-CM

## 2021-08-24 DIAGNOSIS — Z86.16 PERSONAL HISTORY OF COVID-19: ICD-10-CM

## 2021-08-24 DIAGNOSIS — R76.0 RAISED ANTIBODY TITER: ICD-10-CM

## 2021-08-24 DIAGNOSIS — Z20.822 CONTACT WITH AND (SUSPECTED) EXPOSURE TO COVID-19: ICD-10-CM

## 2021-08-24 DIAGNOSIS — Z29.9 ENCOUNTER FOR PROPHYLACTIC MEASURES, UNSPECIFIED: ICD-10-CM

## 2021-08-24 DIAGNOSIS — L97.429 NON-PRESSURE CHRONIC ULCER OF LEFT HEEL AND MIDFOOT WITH UNSPECIFIED SEVERITY: ICD-10-CM

## 2021-08-24 DIAGNOSIS — Z79.84 LONG TERM (CURRENT) USE OF ORAL HYPOGLYCEMIC DRUGS: ICD-10-CM

## 2021-08-24 DIAGNOSIS — E11.9 TYPE 2 DIABETES MELLITUS WITHOUT COMPLICATIONS: ICD-10-CM

## 2021-08-24 DIAGNOSIS — F03.90 UNSPECIFIED DEMENTIA WITHOUT BEHAVIORAL DISTURBANCE: ICD-10-CM

## 2021-08-24 DIAGNOSIS — M79.601 PAIN IN RIGHT ARM: ICD-10-CM

## 2021-08-24 DIAGNOSIS — L08.9 LOCAL INFECTION OF THE SKIN AND SUBCUTANEOUS TISSUE, UNSPECIFIED: ICD-10-CM

## 2021-08-24 DIAGNOSIS — G47.00 INSOMNIA, UNSPECIFIED: ICD-10-CM

## 2021-08-24 LAB
ALBUMIN SERPL ELPH-MCNC: 2.1 G/DL — LOW (ref 3.3–5)
ALP SERPL-CCNC: 263 U/L — HIGH (ref 40–120)
ALT FLD-CCNC: 32 U/L — SIGNIFICANT CHANGE UP (ref 12–78)
ANION GAP SERPL CALC-SCNC: 6 MMOL/L — SIGNIFICANT CHANGE UP (ref 5–17)
AST SERPL-CCNC: 55 U/L — HIGH (ref 15–37)
BASOPHILS # BLD AUTO: 0.09 K/UL — SIGNIFICANT CHANGE UP (ref 0–0.2)
BASOPHILS NFR BLD AUTO: 1.1 % — SIGNIFICANT CHANGE UP (ref 0–2)
BILIRUB SERPL-MCNC: 0.6 MG/DL — SIGNIFICANT CHANGE UP (ref 0.2–1.2)
BUN SERPL-MCNC: 8 MG/DL — SIGNIFICANT CHANGE UP (ref 7–23)
CALCIUM SERPL-MCNC: 8.5 MG/DL — SIGNIFICANT CHANGE UP (ref 8.5–10.1)
CHLORIDE SERPL-SCNC: 111 MMOL/L — HIGH (ref 96–108)
CO2 SERPL-SCNC: 27 MMOL/L — SIGNIFICANT CHANGE UP (ref 22–31)
CREAT SERPL-MCNC: 0.83 MG/DL — SIGNIFICANT CHANGE UP (ref 0.5–1.3)
EOSINOPHIL # BLD AUTO: 0.25 K/UL — SIGNIFICANT CHANGE UP (ref 0–0.5)
EOSINOPHIL NFR BLD AUTO: 3 % — SIGNIFICANT CHANGE UP (ref 0–6)
FLUAV AG NPH QL: SIGNIFICANT CHANGE UP
FLUBV AG NPH QL: SIGNIFICANT CHANGE UP
GLUCOSE BLDC GLUCOMTR-MCNC: 127 MG/DL — HIGH (ref 70–99)
GLUCOSE SERPL-MCNC: 111 MG/DL — HIGH (ref 70–99)
HCT VFR BLD CALC: 34.8 % — SIGNIFICANT CHANGE UP (ref 34.5–45)
HGB BLD-MCNC: 11.9 G/DL — SIGNIFICANT CHANGE UP (ref 11.5–15.5)
IMM GRANULOCYTES NFR BLD AUTO: 0.1 % — SIGNIFICANT CHANGE UP (ref 0–1.5)
LACTATE SERPL-SCNC: 1.4 MMOL/L — SIGNIFICANT CHANGE UP (ref 0.7–2)
LYMPHOCYTES # BLD AUTO: 2.81 K/UL — SIGNIFICANT CHANGE UP (ref 1–3.3)
LYMPHOCYTES # BLD AUTO: 33.9 % — SIGNIFICANT CHANGE UP (ref 13–44)
MCHC RBC-ENTMCNC: 28.5 PG — SIGNIFICANT CHANGE UP (ref 27–34)
MCHC RBC-ENTMCNC: 34.2 GM/DL — SIGNIFICANT CHANGE UP (ref 32–36)
MCV RBC AUTO: 83.5 FL — SIGNIFICANT CHANGE UP (ref 80–100)
MONOCYTES # BLD AUTO: 0.74 K/UL — SIGNIFICANT CHANGE UP (ref 0–0.9)
MONOCYTES NFR BLD AUTO: 8.9 % — SIGNIFICANT CHANGE UP (ref 2–14)
NEUTROPHILS # BLD AUTO: 4.39 K/UL — SIGNIFICANT CHANGE UP (ref 1.8–7.4)
NEUTROPHILS NFR BLD AUTO: 53 % — SIGNIFICANT CHANGE UP (ref 43–77)
NRBC # BLD: 0 /100 WBCS — SIGNIFICANT CHANGE UP (ref 0–0)
PLATELET # BLD AUTO: 214 K/UL — SIGNIFICANT CHANGE UP (ref 150–400)
POTASSIUM SERPL-MCNC: 4 MMOL/L — SIGNIFICANT CHANGE UP (ref 3.5–5.3)
POTASSIUM SERPL-SCNC: 4 MMOL/L — SIGNIFICANT CHANGE UP (ref 3.5–5.3)
PROT SERPL-MCNC: 6.5 GM/DL — SIGNIFICANT CHANGE UP (ref 6–8.3)
RBC # BLD: 4.17 M/UL — SIGNIFICANT CHANGE UP (ref 3.8–5.2)
RBC # FLD: 16.2 % — HIGH (ref 10.3–14.5)
SARS-COV-2 RNA SPEC QL NAA+PROBE: SIGNIFICANT CHANGE UP
SODIUM SERPL-SCNC: 144 MMOL/L — SIGNIFICANT CHANGE UP (ref 135–145)
WBC # BLD: 8.29 K/UL — SIGNIFICANT CHANGE UP (ref 3.8–10.5)
WBC # FLD AUTO: 8.29 K/UL — SIGNIFICANT CHANGE UP (ref 3.8–10.5)

## 2021-08-24 PROCEDURE — 99222 1ST HOSP IP/OBS MODERATE 55: CPT

## 2021-08-24 PROCEDURE — 93010 ELECTROCARDIOGRAM REPORT: CPT

## 2021-08-24 PROCEDURE — 71045 X-RAY EXAM CHEST 1 VIEW: CPT | Mod: 26

## 2021-08-24 PROCEDURE — 99285 EMERGENCY DEPT VISIT HI MDM: CPT

## 2021-08-24 RX ORDER — DEXTROSE 50 % IN WATER 50 %
25 SYRINGE (ML) INTRAVENOUS ONCE
Refills: 0 | Status: DISCONTINUED | OUTPATIENT
Start: 2021-08-24 | End: 2021-08-25

## 2021-08-24 RX ORDER — GLUCAGON INJECTION, SOLUTION 0.5 MG/.1ML
1 INJECTION, SOLUTION SUBCUTANEOUS ONCE
Refills: 0 | Status: DISCONTINUED | OUTPATIENT
Start: 2021-08-24 | End: 2021-08-25

## 2021-08-24 RX ORDER — DEXTROSE 50 % IN WATER 50 %
15 SYRINGE (ML) INTRAVENOUS ONCE
Refills: 0 | Status: DISCONTINUED | OUTPATIENT
Start: 2021-08-24 | End: 2021-08-25

## 2021-08-24 RX ORDER — RIVAROXABAN 15 MG-20MG
15 KIT ORAL
Refills: 0 | Status: DISCONTINUED | OUTPATIENT
Start: 2021-08-24 | End: 2021-08-25

## 2021-08-24 RX ORDER — NYSTATIN CREAM 100000 [USP'U]/G
1 CREAM TOPICAL THREE TIMES A DAY
Refills: 0 | Status: DISCONTINUED | OUTPATIENT
Start: 2021-08-24 | End: 2021-08-25

## 2021-08-24 RX ORDER — SODIUM CHLORIDE 9 MG/ML
1000 INJECTION, SOLUTION INTRAVENOUS
Refills: 0 | Status: DISCONTINUED | OUTPATIENT
Start: 2021-08-24 | End: 2021-08-25

## 2021-08-24 RX ORDER — ERTAPENEM SODIUM 1 G/1
1000 INJECTION, POWDER, LYOPHILIZED, FOR SOLUTION INTRAMUSCULAR; INTRAVENOUS ONCE
Refills: 0 | Status: COMPLETED | OUTPATIENT
Start: 2021-08-24 | End: 2021-08-24

## 2021-08-24 RX ORDER — SODIUM CHLORIDE 9 MG/ML
1000 INJECTION INTRAMUSCULAR; INTRAVENOUS; SUBCUTANEOUS ONCE
Refills: 0 | Status: COMPLETED | OUTPATIENT
Start: 2021-08-24 | End: 2021-08-24

## 2021-08-24 RX ORDER — METOPROLOL TARTRATE 50 MG
50 TABLET ORAL EVERY 12 HOURS
Refills: 0 | Status: DISCONTINUED | OUTPATIENT
Start: 2021-08-24 | End: 2021-08-25

## 2021-08-24 RX ORDER — VANCOMYCIN HCL 1 G
1000 VIAL (EA) INTRAVENOUS ONCE
Refills: 0 | Status: COMPLETED | OUTPATIENT
Start: 2021-08-24 | End: 2021-08-24

## 2021-08-24 RX ORDER — INSULIN LISPRO 100/ML
VIAL (ML) SUBCUTANEOUS
Refills: 0 | Status: DISCONTINUED | OUTPATIENT
Start: 2021-08-24 | End: 2021-08-25

## 2021-08-24 RX ORDER — VANCOMYCIN HCL 1 G
1000 VIAL (EA) INTRAVENOUS EVERY 24 HOURS
Refills: 0 | Status: DISCONTINUED | OUTPATIENT
Start: 2021-08-25 | End: 2021-08-25

## 2021-08-24 RX ORDER — LANOLIN ALCOHOL/MO/W.PET/CERES
3 CREAM (GRAM) TOPICAL AT BEDTIME
Refills: 0 | Status: DISCONTINUED | OUTPATIENT
Start: 2021-08-24 | End: 2021-08-25

## 2021-08-24 RX ORDER — DEXTROSE 50 % IN WATER 50 %
12.5 SYRINGE (ML) INTRAVENOUS ONCE
Refills: 0 | Status: DISCONTINUED | OUTPATIENT
Start: 2021-08-24 | End: 2021-08-25

## 2021-08-24 RX ORDER — INSULIN LISPRO 100/ML
VIAL (ML) SUBCUTANEOUS AT BEDTIME
Refills: 0 | Status: DISCONTINUED | OUTPATIENT
Start: 2021-08-24 | End: 2021-08-25

## 2021-08-24 RX ORDER — ERTAPENEM SODIUM 1 G/1
1000 INJECTION, POWDER, LYOPHILIZED, FOR SOLUTION INTRAMUSCULAR; INTRAVENOUS EVERY 24 HOURS
Refills: 0 | Status: DISCONTINUED | OUTPATIENT
Start: 2021-08-25 | End: 2021-08-25

## 2021-08-24 RX ORDER — LACTOBACILLUS ACIDOPHILUS 100MM CELL
1 CAPSULE ORAL THREE TIMES A DAY
Refills: 0 | Status: DISCONTINUED | OUTPATIENT
Start: 2021-08-24 | End: 2021-08-25

## 2021-08-24 RX ORDER — ACETAMINOPHEN 500 MG
650 TABLET ORAL EVERY 6 HOURS
Refills: 0 | Status: DISCONTINUED | OUTPATIENT
Start: 2021-08-24 | End: 2021-08-25

## 2021-08-24 RX ADMIN — Medication 250 MILLIGRAM(S): at 20:00

## 2021-08-24 RX ADMIN — ERTAPENEM SODIUM 120 MILLIGRAM(S): 1 INJECTION, POWDER, LYOPHILIZED, FOR SOLUTION INTRAMUSCULAR; INTRAVENOUS at 19:10

## 2021-08-24 RX ADMIN — Medication 1 TABLET(S): at 23:08

## 2021-08-24 RX ADMIN — Medication 3 MILLIGRAM(S): at 23:08

## 2021-08-24 RX ADMIN — SODIUM CHLORIDE 1000 MILLILITER(S): 9 INJECTION INTRAMUSCULAR; INTRAVENOUS; SUBCUTANEOUS at 17:49

## 2021-08-24 NOTE — ED ADULT NURSE NOTE - NSIMPLEMENTINTERV_GEN_ALL_ED
Implemented All Fall Risk Interventions:  [place] to call system. Call bell, personal items and telephone within reach. Instruct patient to call for assistance. Room bathroom lighting operational. Non-slip footwear when patient is off stretcher. Physically safe environment: no spills, clutter or unnecessary equipment. Stretcher in lowest position, wheels locked, appropriate side rails in place. Provide visual cue, wrist band, yellow gown, etc. Monitor gait and stability. Monitor for mental status changes and reorient to person, place, and time. Review medications for side effects contributing to fall risk. Reinforce activity limits and safety measures with patient and family.

## 2021-08-24 NOTE — ED PROVIDER NOTE - OBJECTIVE STATEMENT
81 years old female by ems Dr. Gutierrez [phone] c/o three days of right side chest pain sob. Pt sts has a hx of 81 years old female by ems c/o pulled out the right arm picc line. Son Jose [phone] is notified sts pt was admitted to [facility] for left foot ulcer infection and started with vano 1 gram and Ertapenem 1 gm then discharged 5 days ago for out pt iv antibiotics but did not get them this morning.  Pt sts has a hx of atrial fib dm hypertension and dementia. Pt is unable to give detail hx. Son ning pt is at her base line mental status.

## 2021-08-24 NOTE — H&P ADULT - PROBLEM SELECTOR PLAN 1
admit to medicine  NPO after midnight for PICC  IR for PICC insertion  I gram of vancomycin and ertapenam  consider ID consult   vanco trough in am  repeat cbc and cmp in am

## 2021-08-24 NOTE — ED PROVIDER NOTE - COVID-19 RESULT
NEGATIVE Mohs Rapid Report Verbiage: The area of clinically evident tumor was marked with skin marking ink and appropriately hatched.  The initial incision was made following the Mohs approach through the skin.  The specimen was taken to the lab, divided into the necessary number of pieces, chromacoded and processed according to the Mohs protocol.  This was repeated in successive stages until a tumor free defect was achieved.

## 2021-08-24 NOTE — H&P ADULT - ASSESSMENT
Patient is 81 yr old female with PMH of a fib on Xarelto, Htn, DM, Insomia BIBEMS s/p pulled out PICC line from right arm today 8/24  which was placed by [facility] prior to d/c 8/19. Patient will require admission for placement of PICC line for continuous IV abx for left heel ulcer/chronic OM

## 2021-08-24 NOTE — ED ADULT NURSE NOTE - OBJECTIVE STATEMENT
Pt received in bed alert and confuse and resting in bed. As per EMS pt's family members states that pt pulled out picc-line. Pt needs picc for IV antibiotics. pt stable and safety maintained. Nursing care ongoing and safety maintained.

## 2021-08-24 NOTE — H&P ADULT - ATTENDING COMMENTS
81F with pmh of dementia, Afib on xarelto presents to the ED s/p removal of her PICC line.  Was receiving Vancomycin and Ertapenem due to L foot ulcer.  Will continue antibiotics for now.  ID shanell in the AM.  IR for PICC replacement in the am.  Admit to med surg.

## 2021-08-24 NOTE — H&P ADULT - NSHPLABSRESULTS_GEN_ALL_CORE
Recent Vitals  T(C): 36.6 (08-24-21 @ 22:46), Max: 36.6 (08-24-21 @ 14:32)  HR: 80 (08-24-21 @ 22:46) (80 - 110)  BP: 134/72 (08-24-21 @ 22:46) (120/78 - 135/73)  RR: 18 (08-24-21 @ 22:46) (12 - 18)  SpO2: 98% (08-24-21 @ 22:46) (95% - 100%)                        11.9   8.29  )-----------( 214      ( 24 Aug 2021 17:50 )             34.8     08-24    144  |  111<H>  |  8   ----------------------------<  111<H>  4.0   |  27  |  0.83    Ca    8.5      24 Aug 2021 21:52    TPro  6.5  /  Alb  2.1<L>  /  TBili  0.6  /  DBili  x   /  AST  55<H>  /  ALT  32  /  AlkPhos  263<H>  08-24      LIVER FUNCTIONS - ( 24 Aug 2021 21:52 )  Alb: 2.1 g/dL / Pro: 6.5 gm/dL / ALK PHOS: 263 U/L / ALT: 32 U/L / AST: 55 U/L / GGT: x               Home Medications:  ertapenem 1 g injection: 1 gram(s) intravenous once a day (24 Aug 2021 21:33)  lactobacillus acidophilus oral tablet: 1 tab(s) orally 3 times a day (24 Aug 2021 21:33)  Lopressor 50 mg oral tablet: 1 tab(s) orally 2 times a day (24 Aug 2021 21:33)  melatonin 3 mg oral tablet: 1 tab(s) orally once a day (at bedtime) (24 Aug 2021 21:33)  Tylenol 325 mg oral tablet: 2 tab(s) orally every 6 hours, As Needed (24 Aug 2021 21:33)  vancomycin 1 g intravenous injection: 1 gram(s) intravenous once a day (24 Aug 2021 21:33)  Xarelto 15 mg oral tablet: 1 tab(s) orally once a day (in the evening) (24 Aug 2021 21:33)

## 2021-08-24 NOTE — PATIENT PROFILE ADULT - CAREGIVER OBTAIN DETAILS
patient is not giving proper answer airway patent/breath sounds equal/good air movement/respirations non-labored/clear to auscultation bilaterally

## 2021-08-24 NOTE — H&P ADULT - HISTORY OF PRESENT ILLNESS
Patient is 81 yr old with PMH of A fib ( on Xarelto) , DM, Dementia, Htn, Insomnia, BIB EMS s/p as per son Jose ([phone]) pulled out PICC line which was placed at [facility] prior to d/c 8/19. Patient was placed on 1 gram of Vancomycin and 1 gram of Ertapenem for left foot ulcer.  Patient is 81 yr old with PMH of A fib ( on Xarelto) , DM, Dementia, Htn, Insomnia, BIB EMS s/p as per son Jose ([phone]) pulled out PICC line which was placed at [facility] prior to d/c 8/19. Patient was placed on 1 gram of Vancomycin and 1 gram of Ertapenem for left foot ulcer/chronic OM. As per son, patient is at baseline mentally.  Son denies fever, chills, nausea, vomiting, diarrhea, constipation, abd pain , cold, cough, sob, palp, cp or SOB.    Er Doc spoke with Dr Vázquez who accepted the patient to hospitalist service.  Will admit .  Patient is 81 yr old female with PMH of a fib on Xarelto, Htn, DM, Insomia BIBEMS s/p pulled out PICC line from right arm today 8/24  which was placed by [facility] prior to d/c 8/19.  Patient was placed on 1 gram of Vancomycin and 1 gram of Ertapenem for left foot ulcer/chronic OM. As per son, patient is at baseline mentally.  Son denies fever, chills, nausea, vomiting, diarrhea, constipation, abd pain , cold, cough, sob, palp, cp or SOB.    Er Doc spoke with Dr Vázquez who accepted the patient to hospitalist service.  Will admit .  Patient is 81 yr old female with PMH of a fib on Xarelto, Htn, DM, Insomia BIBEMS s/p pulled out PICC line from right arm today 8/24  which was placed by [facility] prior to d/c 8/19.  Patient was placed on 1 gram of Vancomycin and 1 gram of Ertapenem for left foot ulcer/chronic OM. As per son, patient is at baseline mentally.  Son denies fever, chills, nausea, vomiting, diarrhea, constipation, abd pain , cold, cough, sob, palp, cp or SOB.     Er Doc spoke with Dr Vázquez who accepted the patient to hospitalist service.  Will admit .  Patient is 81 yr old female with PMH of a fib on Xarelto, Htn, CVA,  DM, Insomia,  BIBEMS s/p pulled out PICC line from right arm today 8/24  which was placed by [facility] prior to d/c 8/19.  Patient was placed on 1 gram of Vancomycin and 1 gram of Ertapenem for left foot ulcer/chronic OM. As per son, patient is at baseline mentally and wound appears better .  Son denies fever, chills, nausea, vomiting, diarrhea, constipation, abd pain , cold, cough, sob, palp, cp or SOB.  As per ID from [facility] , vanco daily to cover MRSA and ertapenem daily for 6 weeks via PICC until 9/16. Last  vancomycin trough 8/16 12.6 .     Er Doc spoke with Dr Vázquez who accepted the patient to hospitalist service.  Will admit  for placement of PICC line.

## 2021-08-25 ENCOUNTER — TRANSCRIPTION ENCOUNTER (OUTPATIENT)
Age: 81
End: 2021-08-25

## 2021-08-25 VITALS
RESPIRATION RATE: 18 BRPM | DIASTOLIC BLOOD PRESSURE: 73 MMHG | TEMPERATURE: 97 F | OXYGEN SATURATION: 99 % | HEART RATE: 60 BPM | SYSTOLIC BLOOD PRESSURE: 127 MMHG

## 2021-08-25 LAB
ANION GAP SERPL CALC-SCNC: 2 MMOL/L — LOW (ref 5–17)
APPEARANCE UR: ABNORMAL
BACTERIA # UR AUTO: ABNORMAL
BILIRUB UR-MCNC: NEGATIVE — SIGNIFICANT CHANGE UP
BUN SERPL-MCNC: 8 MG/DL — SIGNIFICANT CHANGE UP (ref 7–23)
CALCIUM SERPL-MCNC: 7.9 MG/DL — LOW (ref 8.5–10.1)
CHLORIDE SERPL-SCNC: 110 MMOL/L — HIGH (ref 96–108)
CO2 SERPL-SCNC: 31 MMOL/L — SIGNIFICANT CHANGE UP (ref 22–31)
COD CRY URNS QL: ABNORMAL
COLOR SPEC: YELLOW — SIGNIFICANT CHANGE UP
COVID-19 SPIKE DOMAIN AB INTERP: POSITIVE
COVID-19 SPIKE DOMAIN ANTIBODY RESULT: 32.2 U/ML — HIGH
CREAT SERPL-MCNC: 0.66 MG/DL — SIGNIFICANT CHANGE UP (ref 0.5–1.3)
DIFF PNL FLD: ABNORMAL
EPI CELLS # UR: ABNORMAL
GLUCOSE BLDC GLUCOMTR-MCNC: 69 MG/DL — LOW (ref 70–99)
GLUCOSE BLDC GLUCOMTR-MCNC: 70 MG/DL — SIGNIFICANT CHANGE UP (ref 70–99)
GLUCOSE BLDC GLUCOMTR-MCNC: 92 MG/DL — SIGNIFICANT CHANGE UP (ref 70–99)
GLUCOSE SERPL-MCNC: 84 MG/DL — SIGNIFICANT CHANGE UP (ref 70–99)
GLUCOSE UR QL: NEGATIVE MG/DL — SIGNIFICANT CHANGE UP
HCT VFR BLD CALC: 32.9 % — LOW (ref 34.5–45)
HGB BLD-MCNC: 11.1 G/DL — LOW (ref 11.5–15.5)
KETONES UR-MCNC: ABNORMAL
LEUKOCYTE ESTERASE UR-ACNC: ABNORMAL
MAGNESIUM SERPL-MCNC: 1.9 MG/DL — SIGNIFICANT CHANGE UP (ref 1.6–2.6)
MCHC RBC-ENTMCNC: 28.2 PG — SIGNIFICANT CHANGE UP (ref 27–34)
MCHC RBC-ENTMCNC: 33.7 GM/DL — SIGNIFICANT CHANGE UP (ref 32–36)
MCV RBC AUTO: 83.5 FL — SIGNIFICANT CHANGE UP (ref 80–100)
NITRITE UR-MCNC: POSITIVE
NRBC # BLD: 0 /100 WBCS — SIGNIFICANT CHANGE UP (ref 0–0)
PH UR: 6 — SIGNIFICANT CHANGE UP (ref 5–8)
PHOSPHATE SERPL-MCNC: 3 MG/DL — SIGNIFICANT CHANGE UP (ref 2.5–4.5)
PLATELET # BLD AUTO: 190 K/UL — SIGNIFICANT CHANGE UP (ref 150–400)
POTASSIUM SERPL-MCNC: 3.5 MMOL/L — SIGNIFICANT CHANGE UP (ref 3.5–5.3)
POTASSIUM SERPL-SCNC: 3.5 MMOL/L — SIGNIFICANT CHANGE UP (ref 3.5–5.3)
PROT UR-MCNC: 100 MG/DL
RBC # BLD: 3.94 M/UL — SIGNIFICANT CHANGE UP (ref 3.8–5.2)
RBC # FLD: 16 % — HIGH (ref 10.3–14.5)
RBC CASTS # UR COMP ASSIST: ABNORMAL /HPF (ref 0–4)
SARS-COV-2 IGG+IGM SERPL QL IA: 32.2 U/ML — HIGH
SARS-COV-2 IGG+IGM SERPL QL IA: POSITIVE
SODIUM SERPL-SCNC: 143 MMOL/L — SIGNIFICANT CHANGE UP (ref 135–145)
SP GR SPEC: 1.02 — SIGNIFICANT CHANGE UP (ref 1.01–1.02)
UROBILINOGEN FLD QL: 1 MG/DL
VANCOMYCIN TROUGH SERPL-MCNC: 26.1 UG/ML — CRITICAL HIGH (ref 10–20)
WBC # BLD: 5.12 K/UL — SIGNIFICANT CHANGE UP (ref 3.8–10.5)
WBC # FLD AUTO: 5.12 K/UL — SIGNIFICANT CHANGE UP (ref 3.8–10.5)
WBC UR QL: ABNORMAL

## 2021-08-25 PROCEDURE — 99239 HOSP IP/OBS DSCHRG MGMT >30: CPT

## 2021-08-25 PROCEDURE — 36573 INSJ PICC RS&I 5 YR+: CPT

## 2021-08-25 RX ADMIN — ERTAPENEM SODIUM 120 MILLIGRAM(S): 1 INJECTION, POWDER, LYOPHILIZED, FOR SOLUTION INTRAMUSCULAR; INTRAVENOUS at 09:39

## 2021-08-25 RX ADMIN — Medication 1 TABLET(S): at 05:18

## 2021-08-25 RX ADMIN — NYSTATIN CREAM 1 APPLICATION(S): 100000 CREAM TOPICAL at 05:18

## 2021-08-25 RX ADMIN — Medication 50 MILLIGRAM(S): at 05:18

## 2021-08-25 RX ADMIN — NYSTATIN CREAM 1 APPLICATION(S): 100000 CREAM TOPICAL at 14:58

## 2021-08-25 RX ADMIN — Medication 1 TABLET(S): at 14:57

## 2021-08-25 NOTE — DISCHARGE NOTE NURSING/CASE MANAGEMENT/SOCIAL WORK - PATIENT PORTAL LINK FT
You can access the FollowMyHealth Patient Portal offered by [facility] by registering at the following website: [email or web address]. By joining [employer]’s FollowMyHealth portal, you will also be able to view your health information using other applications (apps) compatible with our system.

## 2021-08-25 NOTE — PROCEDURE NOTE - ADDITIONAL PROCEDURE DETAILS
s/p US/Fluoro guided PICC placement.  Inserted into the left brachial vein.  Catheter tip at the cavo-atrial junction.  4fr x 42cm SL.  No complications  -PICC can be accessed

## 2021-08-25 NOTE — DISCHARGE NOTE PROVIDER - HOSPITAL COURSE
80F with dementia, HTN, CVA, Afib on Xarelto, DM.  Recent hospitalization at OSH January 2021 for possible stroke and while at rehab developed sacral decub which resolved and left heel decub which became infected. Recent admission to [facility] for Infected heel. No overt OM, however, wound was fairly deep and there is still malodor suggesting active infection. Episodes of hypothermia. Suspect acute exacerbation of chronic OM. Wound culture grew few MRSA as well as bacteroides and enterobacter aerogenes. Negative blood cultures, no leukocytosis, and vital signs remaining WNL decrease likelihood of systemic infection. s/p PICC line placement for 6 week ertapenem/vancomyin therapy for OM and discharged home w/ IV abx via PICC. Pt admitted here after she pulled out picc line from rt arm. Labs noted essentially normal.     Infected heel/Heel ulceration, left, with unspecified severity  - MRI Left Foot attempted once again 8/16 patient unable to remain still for scan despite premedication  - probable OM; plan is empiric long term abx treatment  - continue vancomycin to cover mrsa, Vancomycin dose 1000mg daily.  - continue ertapenem 1g daily  - would pursue with 6 weeks via midline/PICC until about 9/16  - Plan for PICC placement by IR  - weekly - cbc, BMP, esr, crp, vancomycin trough fax results to [phone] if going home  - outpatient f/u     Elevated vancomycin level  - continue to monitor vancomycin trough  - 26.1 on 8/16    Chronic Atrial fibrillation  - currently on rate control with metoprolol 50mg BID  - may judiciously add on diltiazem as patient with c/f ongoing infection and BPs have been running soft  - c/w home xarelto for AC.     Dysphagia, Severe protein-calorie malnutrition.  - Diet Pureed, Glucerna Shake Cans, aspiration precaution    DM2. A1C 6.4 on 8/7.   - taking Metformin 500mg BID at home  - monitor FS TIDAC  - insulin sliding scale.    Hypertension.  Plan: Pt with longstanding history of hypertension  -c/w metoprolol 50mg bid  -holding diltiazem for now.     Dementia.  Plan: Noted to be A&Ox0-1 at baseline at home. Has been eating significant portions of her meals.  - patient currently alert and speaking however speech incoherent  - patient intermittently able to follow very basic commands  - aspiration precautions  - per son, patient able to tolerate puree diet and glucerna at home  - frequent reorientation  - melatonin qhs  - has zyprexa 1.25mg prn for agitation but has not required it  - unsecured mittens to prevent self-discontinuation of IVs, importance of protecting PICC line conveyed to son.     Dispo; D'c home w/ IV abx via PICC

## 2021-08-25 NOTE — DISCHARGE NOTE PROVIDER - NSDCCPCAREPLAN_GEN_ALL_CORE_FT
PRINCIPAL DISCHARGE DIAGNOSIS  Diagnosis: Acute osteomyelitis of left ankle or foot  Assessment and Plan of Treatment:       SECONDARY DISCHARGE DIAGNOSES  Diagnosis: Ulcer of left heel and midfoot  Assessment and Plan of Treatment:     Diagnosis: Severe protein-calorie malnutrition  Assessment and Plan of Treatment:     Diagnosis: Chronic atrial fibrillation  Assessment and Plan of Treatment:     Diagnosis: Dysphagia  Assessment and Plan of Treatment:     Diagnosis: Type 2 diabetes mellitus with hyperglycemia  Assessment and Plan of Treatment:

## 2021-08-25 NOTE — PROGRESS NOTE ADULT - SUBJECTIVE AND OBJECTIVE BOX
Patient is a 81y old  Female who presents with a chief complaint of pt pulled out picc line from rt arm (24 Aug 2021 21:30)      OVERNIGHT EVENTS:    REVIEW OF SYSTEMS: denies chest pain/SOB, diaphoresis, no F/C, cough, dizziness, headache, blurry vision, nausea, vomiting, abdominal pain. All others review of systems negative     MEDICATIONS  (STANDING):  dextrose 40% Gel 15 Gram(s) Oral once  dextrose 5%. 1000 milliLiter(s) (50 mL/Hr) IV Continuous <Continuous>  dextrose 5%. 1000 milliLiter(s) (100 mL/Hr) IV Continuous <Continuous>  dextrose 50% Injectable 25 Gram(s) IV Push once  dextrose 50% Injectable 12.5 Gram(s) IV Push once  dextrose 50% Injectable 25 Gram(s) IV Push once  ertapenem  IVPB 1000 milliGRAM(s) IV Intermittent every 24 hours  glucagon  Injectable 1 milliGRAM(s) IntraMuscular once  insulin lispro (ADMELOG) corrective regimen sliding scale   SubCutaneous three times a day before meals  insulin lispro (ADMELOG) corrective regimen sliding scale   SubCutaneous at bedtime  lactobacillus acidophilus 1 Tablet(s) Oral three times a day  metoprolol tartrate 50 milliGRAM(s) Oral every 12 hours  nystatin Powder 1 Application(s) Topical three times a day  rivaroxaban 15 milliGRAM(s) Oral with dinner  vancomycin  IVPB 1000 milliGRAM(s) IV Intermittent every 24 hours    MEDICATIONS  (PRN):  acetaminophen   Tablet .. 650 milliGRAM(s) Oral every 6 hours PRN Mild Pain (1 - 3)  melatonin 3 milliGRAM(s) Oral at bedtime PRN Sleep      Allergies    No Known Allergies    Intolerances        T(F): 96.8 (21 @ 05:23), Max: 97.9 (21 @ 14:32)  HR: 89 (21 @ 05:23) (80 - 110)  BP: 127/65 (21 @ 05:23) (120/78 - 135/73)  RR: 18 (21 @ 05:23) (12 - 18)  SpO2: 98% (21 @ 05:23) (95% - 100%)  Wt(kg): --    PHYSICAL EXAM:  GENERAL: NAD  HEAD:  Atraumatic, Normocephalic  EYES: PERRLA, conjunctiva and sclera clear  ENMT: Moist mucous membranes  NECK: Supple, No JVD, Normal thyroid  NERVOUS SYSTEM:  Alert & Awake  CHEST/LUNG: Clear to percussion bilaterally;   HEART: Regular rate and rhythm;   ABDOMEN: Soft, Nontender, Nondistended; Bowel sounds present  EXTREMITIES:  no edema BL LE  SKIN: moist    LABS:                        11.1   5.12  )-----------( 190      ( 25 Aug 2021 07:30 )             32.9     08-    143  |  110<H>  |  8   ----------------------------<  84  3.5   |  31  |  0.66    Ca    7.9<L>      25 Aug 2021 07:30  Phos  3.0       Mg     1.9         TPro  6.5  /  Alb  2.1<L>  /  TBili  0.6  /  DBili  x   /  AST  55<H>  /  ALT  32  /  AlkPhos  263<H>  08-24      Urinalysis Basic - ( 25 Aug 2021 05:35 )    Color: Yellow / Appearance: very cloudy / S.020 / pH: x  Gluc: x / Ketone: Trace  / Bili: Negative / Urobili: 1 mg/dL   Blood: x / Protein: 100 mg/dL / Nitrite: Positive   Leuk Esterase: Moderate / RBC: 3-5 /HPF / WBC 6-10   Sq Epi: x / Non Sq Epi: Many / Bacteria: TNTC      Cultures;   CAPILLARY BLOOD GLUCOSE      POCT Blood Glucose.: 70 mg/dL (25 Aug 2021 07:59)  POCT Blood Glucose.: 127 mg/dL (24 Aug 2021 22:11)    Lipid panel:           RADIOLOGY & ADDITIONAL TESTS:    Imaging Personally Reviewed:  [x ] YES      Consultant(s) Notes Reviewed:  [x ] YES     Care Discussed with [x ] Consultants [X ] Patient [ ] Family  [x ]    [x ]  Other; RN

## 2021-08-25 NOTE — DISCHARGE NOTE NURSING/CASE MANAGEMENT/SOCIAL WORK - NSDCPEFALRISK_GEN_ALL_CORE
For information on Fall & injury Prevention, visit [email or web address]

## 2021-08-25 NOTE — DISCHARGE NOTE PROVIDER - NSDCMRMEDTOKEN_GEN_ALL_CORE_FT
ertapenem 1 g injection: 1 gram(s) intravenous once a day  lactobacillus acidophilus oral tablet: 1 tab(s) orally 3 times a day  Lopressor 50 mg oral tablet: 1 tab(s) orally 2 times a day  melatonin 3 mg oral tablet: 1 tab(s) orally once a day (at bedtime)  vancomycin 1 g intravenous injection: 1 gram(s) intravenous once a day  Xarelto 15 mg oral tablet: 1 tab(s) orally once a day (in the evening)

## 2021-08-25 NOTE — PROGRESS NOTE ADULT - ASSESSMENT
80F with dementia, HTN, CVA, Afib on Xarelto, DM.  Recent hospitalization at OSH January 2021 for possible stroke and while at rehab developed sacral decub which resolved and left heel decub which became infected. Recent admission to [facility] for Infected heel. No overt OM, however, wound was fairly deep and there is still malodor suggesting active infection. Episodes of hypothermia. Suspect acute exacerbation of chronic OM. Wound culture grew few MRSA as well as bacteroides and enterobacter aerogenes. Negative blood cultures, no leukocytosis, and vital signs remaining WNL decrease likelihood of systemic infection. s/p PICC line placement for 6 week ertapenem/vancomyin therapy for OM and discharged home w/ IV abx via PICC. Pt admitted here after she pulled out picc line from rt arm. Labs noted essentially normal.     Infected heel/Heel ulceration, left, with unspecified severity  - MRI Left Foot attempted once again 8/16 patient unable to remain still for scan despite premedication  - probable OM; plan is empiric long term abx treatment  - continue vancomycin to cover mrsa, Vancomycin dose 1000mg daily.  - continue ertapenem 1g daily  - would pursue with 6 weeks via midline/PICC until about 9/16  - Plan for PICC placement by IR  - weekly - cbc, BMP, esr, crp, vancomycin trough fax results to [phone] if going home  - outpatient f/u     Elevated vancomycin level  - continue to monitor vancomycin trough  - 26.1 on 8/16    Chronic Atrial fibrillation  - currently on rate control with metoprolol 50mg BID  - may judiciously add on diltiazem as patient with c/f ongoing infection and BPs have been running soft  - c/w home xarelto for AC.     Dysphagia, Severe protein-calorie malnutrition.  - Diet Pureed, Glucerna Shake Cans, aspiration precaution    DM2. A1C 6.4 on 8/7.   - taking Metformin 500mg BID at home  - monitor FS TIDAC  - insulin sliding scale.    Hypertension.  Plan: Pt with longstanding history of hypertension  -c/w metoprolol 50mg bid  -holding diltiazem for now.     Dementia.  Plan: Noted to be A&Ox0-1 at baseline at home. Has been eating significant portions of her meals.  - patient currently alert and speaking however speech incoherent  - patient intermittently able to follow very basic commands  - aspiration precautions  - per son, patient able to tolerate puree diet and glucerna at home  - frequent reorientation  - melatonin qhs  - has zyprexa 1.25mg prn for agitation but has not required it  - unsecured mittens to prevent self-discontinuation of IVs, importance of protecting PICC line conveyed to son.     Dispo; D'c home w/ IV abx via PICC

## 2021-08-26 LAB
CULTURE RESULTS: SIGNIFICANT CHANGE UP
SPECIMEN SOURCE: SIGNIFICANT CHANGE UP

## 2021-08-30 ENCOUNTER — INPATIENT (INPATIENT)
Facility: HOSPITAL | Age: 81
LOS: 1 days | Discharge: HOME HEALTH | End: 2021-09-01
Attending: STUDENT IN AN ORGANIZED HEALTH CARE EDUCATION/TRAINING PROGRAM | Admitting: STUDENT IN AN ORGANIZED HEALTH CARE EDUCATION/TRAINING PROGRAM
Payer: MEDICARE

## 2021-08-30 VITALS
HEART RATE: 87 BPM | OXYGEN SATURATION: 100 % | SYSTOLIC BLOOD PRESSURE: 139 MMHG | DIASTOLIC BLOOD PRESSURE: 81 MMHG | HEIGHT: 65 IN | RESPIRATION RATE: 16 BRPM | TEMPERATURE: 98 F

## 2021-08-30 DIAGNOSIS — Z45.2 ENCOUNTER FOR ADJUSTMENT AND MANAGEMENT OF VASCULAR ACCESS DEVICE: ICD-10-CM

## 2021-08-30 DIAGNOSIS — A41.9 SEPSIS, UNSPECIFIED ORGANISM: ICD-10-CM

## 2021-08-30 DIAGNOSIS — L89.152 PRESSURE ULCER OF SACRAL REGION, STAGE 2: ICD-10-CM

## 2021-08-30 DIAGNOSIS — Z29.9 ENCOUNTER FOR PROPHYLACTIC MEASURES, UNSPECIFIED: ICD-10-CM

## 2021-08-30 DIAGNOSIS — I10 ESSENTIAL (PRIMARY) HYPERTENSION: ICD-10-CM

## 2021-08-30 DIAGNOSIS — F03.90 UNSPECIFIED DEMENTIA, UNSPECIFIED SEVERITY, WITHOUT BEHAVIORAL DISTURBANCE, PSYCHOTIC DISTURBANCE, MOOD DISTURBANCE, AND ANXIETY: ICD-10-CM

## 2021-08-30 DIAGNOSIS — M86.9 OSTEOMYELITIS, UNSPECIFIED: ICD-10-CM

## 2021-08-30 DIAGNOSIS — S91.302A UNSPECIFIED OPEN WOUND, LEFT FOOT, INITIAL ENCOUNTER: ICD-10-CM

## 2021-08-30 DIAGNOSIS — I48.20 CHRONIC ATRIAL FIBRILLATION, UNSPECIFIED: ICD-10-CM

## 2021-08-30 DIAGNOSIS — Z95.828 PRESENCE OF OTHER VASCULAR IMPLANTS AND GRAFTS: ICD-10-CM

## 2021-08-30 DIAGNOSIS — E11.9 TYPE 2 DIABETES MELLITUS WITHOUT COMPLICATIONS: ICD-10-CM

## 2021-08-30 LAB
ALBUMIN SERPL ELPH-MCNC: 2.6 G/DL — LOW (ref 3.3–5)
ALP SERPL-CCNC: 160 U/L — HIGH (ref 40–120)
ALT FLD-CCNC: 14 U/L — SIGNIFICANT CHANGE UP (ref 4–33)
ANION GAP SERPL CALC-SCNC: 8 MMOL/L — SIGNIFICANT CHANGE UP (ref 7–14)
APPEARANCE UR: ABNORMAL
APTT BLD: 39.5 SEC — HIGH (ref 27–36.3)
AST SERPL-CCNC: 32 U/L — SIGNIFICANT CHANGE UP (ref 4–32)
BASOPHILS # BLD AUTO: 0.08 K/UL — SIGNIFICANT CHANGE UP (ref 0–0.2)
BASOPHILS NFR BLD AUTO: 1.4 % — SIGNIFICANT CHANGE UP (ref 0–2)
BILIRUB SERPL-MCNC: 0.6 MG/DL — SIGNIFICANT CHANGE UP (ref 0.2–1.2)
BILIRUB UR-MCNC: NEGATIVE — SIGNIFICANT CHANGE UP
BLOOD GAS VENOUS COMPREHENSIVE RESULT: SIGNIFICANT CHANGE UP
BUN SERPL-MCNC: 7 MG/DL — SIGNIFICANT CHANGE UP (ref 7–23)
CALCIUM SERPL-MCNC: 8.6 MG/DL — SIGNIFICANT CHANGE UP (ref 8.4–10.5)
CHLORIDE SERPL-SCNC: 103 MMOL/L — SIGNIFICANT CHANGE UP (ref 98–107)
CO2 SERPL-SCNC: 23 MMOL/L — SIGNIFICANT CHANGE UP (ref 22–31)
COLOR SPEC: YELLOW — SIGNIFICANT CHANGE UP
CREAT SERPL-MCNC: 0.76 MG/DL — SIGNIFICANT CHANGE UP (ref 0.5–1.3)
DIFF PNL FLD: NEGATIVE — SIGNIFICANT CHANGE UP
EOSINOPHIL # BLD AUTO: 0.29 K/UL — SIGNIFICANT CHANGE UP (ref 0–0.5)
EOSINOPHIL NFR BLD AUTO: 5.2 % — SIGNIFICANT CHANGE UP (ref 0–6)
GLUCOSE BLDC GLUCOMTR-MCNC: 70 MG/DL — SIGNIFICANT CHANGE UP (ref 70–99)
GLUCOSE BLDC GLUCOMTR-MCNC: 72 MG/DL — SIGNIFICANT CHANGE UP (ref 70–99)
GLUCOSE BLDC GLUCOMTR-MCNC: 87 MG/DL — SIGNIFICANT CHANGE UP (ref 70–99)
GLUCOSE SERPL-MCNC: 83 MG/DL — SIGNIFICANT CHANGE UP (ref 70–99)
GLUCOSE UR QL: NEGATIVE — SIGNIFICANT CHANGE UP
HCT VFR BLD CALC: 33.4 % — LOW (ref 34.5–45)
HGB BLD-MCNC: 11.6 G/DL — SIGNIFICANT CHANGE UP (ref 11.5–15.5)
IANC: 2.67 K/UL — SIGNIFICANT CHANGE UP (ref 1.5–8.5)
IMM GRANULOCYTES NFR BLD AUTO: 0.2 % — SIGNIFICANT CHANGE UP (ref 0–1.5)
INR BLD: 1.39 RATIO — HIGH (ref 0.88–1.16)
KETONES UR-MCNC: ABNORMAL
LEUKOCYTE ESTERASE UR-ACNC: NEGATIVE — SIGNIFICANT CHANGE UP
LYMPHOCYTES # BLD AUTO: 1.96 K/UL — SIGNIFICANT CHANGE UP (ref 1–3.3)
LYMPHOCYTES # BLD AUTO: 35.1 % — SIGNIFICANT CHANGE UP (ref 13–44)
MCHC RBC-ENTMCNC: 28.7 PG — SIGNIFICANT CHANGE UP (ref 27–34)
MCHC RBC-ENTMCNC: 34.7 GM/DL — SIGNIFICANT CHANGE UP (ref 32–36)
MCV RBC AUTO: 82.7 FL — SIGNIFICANT CHANGE UP (ref 80–100)
MONOCYTES # BLD AUTO: 0.57 K/UL — SIGNIFICANT CHANGE UP (ref 0–0.9)
MONOCYTES NFR BLD AUTO: 10.2 % — SIGNIFICANT CHANGE UP (ref 2–14)
NEUTROPHILS # BLD AUTO: 2.67 K/UL — SIGNIFICANT CHANGE UP (ref 1.8–7.4)
NEUTROPHILS NFR BLD AUTO: 47.9 % — SIGNIFICANT CHANGE UP (ref 43–77)
NITRITE UR-MCNC: NEGATIVE — SIGNIFICANT CHANGE UP
NRBC # BLD: 0 /100 WBCS — SIGNIFICANT CHANGE UP
NRBC # FLD: 0 K/UL — SIGNIFICANT CHANGE UP
PH UR: 7 — SIGNIFICANT CHANGE UP (ref 5–8)
PLATELET # BLD AUTO: 184 K/UL — SIGNIFICANT CHANGE UP (ref 150–400)
POTASSIUM SERPL-MCNC: 5 MMOL/L — SIGNIFICANT CHANGE UP (ref 3.5–5.3)
POTASSIUM SERPL-SCNC: 5 MMOL/L — SIGNIFICANT CHANGE UP (ref 3.5–5.3)
PROT SERPL-MCNC: 6.1 G/DL — SIGNIFICANT CHANGE UP (ref 6–8.3)
PROT UR-MCNC: ABNORMAL
PROTHROM AB SERPL-ACNC: 15.6 SEC — HIGH (ref 10.6–13.6)
RBC # BLD: 4.04 M/UL — SIGNIFICANT CHANGE UP (ref 3.8–5.2)
RBC # FLD: 15.4 % — HIGH (ref 10.3–14.5)
SARS-COV-2 RNA SPEC QL NAA+PROBE: SIGNIFICANT CHANGE UP
SODIUM SERPL-SCNC: 134 MMOL/L — LOW (ref 135–145)
SP GR SPEC: 1.01 — SIGNIFICANT CHANGE UP (ref 1–1.05)
UROBILINOGEN FLD QL: SIGNIFICANT CHANGE UP
WBC # BLD: 5.58 K/UL — SIGNIFICANT CHANGE UP (ref 3.8–10.5)
WBC # FLD AUTO: 5.58 K/UL — SIGNIFICANT CHANGE UP (ref 3.8–10.5)

## 2021-08-30 PROCEDURE — 99285 EMERGENCY DEPT VISIT HI MDM: CPT | Mod: 25

## 2021-08-30 PROCEDURE — 99221 1ST HOSP IP/OBS SF/LOW 40: CPT

## 2021-08-30 PROCEDURE — 71045 X-RAY EXAM CHEST 1 VIEW: CPT | Mod: 26

## 2021-08-30 PROCEDURE — 73630 X-RAY EXAM OF FOOT: CPT | Mod: 26,LT

## 2021-08-30 PROCEDURE — 99222 1ST HOSP IP/OBS MODERATE 55: CPT

## 2021-08-30 PROCEDURE — 93010 ELECTROCARDIOGRAM REPORT: CPT

## 2021-08-30 RX ORDER — GLUCAGON INJECTION, SOLUTION 0.5 MG/.1ML
1 INJECTION, SOLUTION SUBCUTANEOUS ONCE
Refills: 0 | Status: DISCONTINUED | OUTPATIENT
Start: 2021-08-30 | End: 2021-08-31

## 2021-08-30 RX ORDER — DEXTROSE 50 % IN WATER 50 %
25 SYRINGE (ML) INTRAVENOUS ONCE
Refills: 0 | Status: DISCONTINUED | OUTPATIENT
Start: 2021-08-30 | End: 2021-08-31

## 2021-08-30 RX ORDER — INSULIN LISPRO 100/ML
VIAL (ML) SUBCUTANEOUS
Refills: 0 | Status: DISCONTINUED | OUTPATIENT
Start: 2021-08-30 | End: 2021-08-31

## 2021-08-30 RX ORDER — ERTAPENEM SODIUM 1 G/1
1000 INJECTION, POWDER, LYOPHILIZED, FOR SOLUTION INTRAMUSCULAR; INTRAVENOUS ONCE
Refills: 0 | Status: COMPLETED | OUTPATIENT
Start: 2021-08-30 | End: 2021-08-30

## 2021-08-30 RX ORDER — COLLAGENASE CLOSTRIDIUM HIST. 250 UNIT/G
1 OINTMENT (GRAM) TOPICAL DAILY
Refills: 0 | Status: DISCONTINUED | OUTPATIENT
Start: 2021-08-30 | End: 2021-09-01

## 2021-08-30 RX ORDER — ACETAMINOPHEN 500 MG
650 TABLET ORAL EVERY 6 HOURS
Refills: 0 | Status: DISCONTINUED | OUTPATIENT
Start: 2021-08-30 | End: 2021-09-01

## 2021-08-30 RX ORDER — SODIUM CHLORIDE 9 MG/ML
1000 INJECTION, SOLUTION INTRAVENOUS
Refills: 0 | Status: DISCONTINUED | OUTPATIENT
Start: 2021-08-30 | End: 2021-08-31

## 2021-08-30 RX ORDER — DEXTROSE 50 % IN WATER 50 %
15 SYRINGE (ML) INTRAVENOUS ONCE
Refills: 0 | Status: DISCONTINUED | OUTPATIENT
Start: 2021-08-30 | End: 2021-08-31

## 2021-08-30 RX ORDER — ERTAPENEM SODIUM 1 G/1
1000 INJECTION, POWDER, LYOPHILIZED, FOR SOLUTION INTRAMUSCULAR; INTRAVENOUS EVERY 24 HOURS
Refills: 0 | Status: DISCONTINUED | OUTPATIENT
Start: 2021-08-31 | End: 2021-09-01

## 2021-08-30 RX ORDER — INSULIN LISPRO 100/ML
VIAL (ML) SUBCUTANEOUS AT BEDTIME
Refills: 0 | Status: DISCONTINUED | OUTPATIENT
Start: 2021-08-30 | End: 2021-08-31

## 2021-08-30 RX ORDER — RIVAROXABAN 15 MG-20MG
15 KIT ORAL
Refills: 0 | Status: DISCONTINUED | OUTPATIENT
Start: 2021-08-30 | End: 2021-09-01

## 2021-08-30 RX ORDER — VANCOMYCIN HCL 1 G
1000 VIAL (EA) INTRAVENOUS EVERY 24 HOURS
Refills: 0 | Status: DISCONTINUED | OUTPATIENT
Start: 2021-08-30 | End: 2021-09-01

## 2021-08-30 RX ORDER — RIVAROXABAN 15 MG-20MG
1 KIT ORAL
Qty: 0 | Refills: 0 | DISCHARGE

## 2021-08-30 RX ORDER — DEXTROSE 50 % IN WATER 50 %
12.5 SYRINGE (ML) INTRAVENOUS ONCE
Refills: 0 | Status: DISCONTINUED | OUTPATIENT
Start: 2021-08-30 | End: 2021-08-31

## 2021-08-30 RX ORDER — SODIUM CHLORIDE 9 MG/ML
1000 INJECTION, SOLUTION INTRAVENOUS
Refills: 0 | Status: COMPLETED | OUTPATIENT
Start: 2021-08-30 | End: 2021-08-30

## 2021-08-30 RX ADMIN — RIVAROXABAN 15 MILLIGRAM(S): KIT at 17:52

## 2021-08-30 RX ADMIN — ERTAPENEM SODIUM 1000 MILLIGRAM(S): 1 INJECTION, POWDER, LYOPHILIZED, FOR SOLUTION INTRAMUSCULAR; INTRAVENOUS at 09:57

## 2021-08-30 RX ADMIN — Medication 250 MILLIGRAM(S): at 17:19

## 2021-08-30 RX ADMIN — SODIUM CHLORIDE 1000 MILLILITER(S): 9 INJECTION, SOLUTION INTRAVENOUS at 13:00

## 2021-08-30 RX ADMIN — Medication 0: at 17:37

## 2021-08-30 NOTE — H&P ADULT - ASSESSMENT
82 y/o female w/ h/o dementia, HTN, afib, DMT2 (non-insulin dependent) c/o concerns of sepsis, need for PICC line, and left heel unstageable ulcer.  80 y/o female with h/o progressive dementia (bedbound,) HTN, a-fib (on xarelto,) DMT2 (non-insulin dependent,) and L heel ulcer (on IV PICC Abx) was BIBA from home s/p pulled out her picc line, found to be hypotensive and hypothermic in ED, admitted to medicine for Sepsis and Need for PICC line re-insertion.

## 2021-08-30 NOTE — CONSULT NOTE ADULT - ASSESSMENT
79 y/o F patient presents w/ L foot heel wound   - Patient seen and evaluated   - Afebrile, no leukocytosis  - GAGE:  L plantar heel wound with fibrotic base, probe to bone, no malodor, no tracking, no bogginess, no periwound erythema, no pus or drainage, L foot lateral malleoli wound to SubQ  - Pt admitted due to PICC line being pulled out, eval for sepsis before re-insertion of PICC  - Pod plan local wound care with Santyl  - Wound care instructions detailed in orders  - Continue Z-flows to offload L heel   - Stable for discharge pending PICC placement  - Discussed w/ attending

## 2021-08-30 NOTE — H&P ADULT - COMMENTS
Limited ROS d/t pt's progressive dementia, confusion, and inability to follow commands. ROS obtained from pt's son (Jose Merino).

## 2021-08-30 NOTE — ED PROVIDER NOTE - ATTENDING CONTRIBUTION TO CARE
Attending Statement: I have reviewed and agree with all pertinent clinical information, including history and physical exam and agree with treatment plan of the PA, except as noted.  80F with dementia, HTN, CVA, Afib on Xarelto, DM.  Recent hospitalization at OSH January 2021 for possible stroke and while at rehab developed sacral decub which resolved and left heel decub which became infected.  No overt OM, however, wound is fairly deep and there is still malodor suggesting active infection.  Unable to tolerate MRI.  Suspect acute exacerbation of chronic OM was discharged w a PICC line sent in for "pulling out her PICC line today" pt unable to give hx.  Chart reviewed   vss pleasant confused nontoxic. no work of breathing. right heal deep 4 x2 ulcer and two stage two ulcer in sacrum. soft nt abdomen.  plan labs, IR called to replace PICC line.

## 2021-08-30 NOTE — H&P ADULT - NSICDXPASTMEDICALHX_GEN_ALL_CORE_FT
PAST MEDICAL HISTORY:  Benign Hypertension     Dementia     Diabetes Mellitus      PAST MEDICAL HISTORY:  Benign Hypertension     Chronic atrial fibrillation     Dementia     Diabetes Mellitus     Non healing left heel wound

## 2021-08-30 NOTE — ED ADULT TRIAGE NOTE - MEANS OF ARRIVAL
September 19, 2018      Lina Panchal  [id number] [facility] [id number]        To Whom It May Concern,       Jany Panchal's son Elbert Salinas attended clinic here on Sep 19, 2018 for evaluation of fever and rash. Please excuse her work days missed due to her son's illness and taking care of him at home.    If you have questions or concerns, please call the clinic at the number listed above.    Sincerely,         Kari Arenas PA-C     stretcher

## 2021-08-30 NOTE — H&P ADULT - NSHPSOCIALHISTORY_GEN_ALL_CORE
Pt lives at home w/ son. Pt is bedbound d/t progressive dementia. Diet is pureed d/t progressive dementia and poor dentition. No h/o dysphagia. Pt's son denies smoking history and illicit drug use. Pt's son reports prior moderate alcohol use.

## 2021-08-30 NOTE — H&P ADULT - GENERAL GENITOURINARY SYMPTOMS
Pt's son reports hematuria about 1 and 1/2 months ago but resolved w/ changing of medication/hematuria/incontinence

## 2021-08-30 NOTE — CONSULT NOTE ADULT - ASSESSMENT
Assessment:  The patient is an 81 year old female with past medical history of progressive dementia, HTN, a-fib, and DMT2 (non-insulin dependent) who presented for pulled out PICC line. Unable to obtain accurate ROS as patient dariel  poor historian and unable to answer questions due to progressive dementia who presented to [facility] s/p pulling out PICC line and left foot wound evaluation. ID was consulted after wound cultures from 8/6/21 was positive for polymicrobial infection. She was admitted for initiation of antibiotics and concern for possible left foot osteomyelitis. ID was consulted for antibiotic recommendations.      Plan:  # Left heel wound due to polymicrobial infection  - c/w IV ertapenem and IV vancomcyin  - f/u BCx x 2 and PICC line cultures  - COVID-19 PCR pending  - maintain contact precautions  - monitor fever curve  - rest of management as per primary team      Juan Antonio Walker MD PGY-5  Fellow, Infectious Diseases   Pager: [phone]  If no response, after 5pm and on weekends: Call [phone]   Assessment:  The patient is an 81 year old female with past medical history of progressive dementia, HTN, a-fib, and DMT2 (non-insulin dependent) who presented for pulled out PICC line. Unable to obtain accurate ROS as patient dariel  poor historian and unable to answer questions due to progressive dementia who presented to [facility] s/p pulling out PICC line and left foot wound evaluation. ID was consulted after wound cultures from 8/6/21 was positive for polymicrobial infection. She was admitted for initiation of antibiotics and concern for possible left foot osteomyelitis. ID was consulted for antibiotic recommendations.      Plan:  # Left heel wound due to polymicrobial infection r/o osteomyelitis   - c/w IV ertapenem and IV vancomcyin  - f/u BCx x 2 and PICC line cultures  - COVID-19 PCR pending  - maintain contact precautions  - monitor fever curve  - wound care  - rest of management as per primary team      Juan Antonio Walker MD PGY-5  Fellow, Infectious Diseases   Pager: [phone]  If no response, after 5pm and on weekends: Call [phone]

## 2021-08-30 NOTE — ED ADULT NURSE NOTE - OBJECTIVE STATEMENT
Pt presenting to room 20 AxOx1- oriented to self, arriving from nursing home after pulling out left single lumen PICC line. PMH dementia, HTN, DM. On arrival to ED pt's breathing is even and unlabored. Palor/diaphoresis not noted. Pt denies any physical complaints. No active bleeding noted. Stage 2 pressure ulcer noted on sacral area as well as 4x2 stage 4 to left heel. VSS and as noted. MD at bedside for eval. Will continue to monitor.

## 2021-08-30 NOTE — H&P ADULT - PROBLEM SELECTOR PLAN 1
-restarted IV Ertapenem and Vancomycin  -F/u Bld Cx  -F/u PICC Cath Tip culture  -House ID consulted -restarted IV Ertapenem and Vancomycin  -F/u Bld Cx, UA, Urine Cx  -F/u PICC Cath Tip culture  -House ID consulted  -Monitor VS Q4h  -Goals of care discussed with pt's son Jose: expressed he wants everything to be done for his mother including CPR and Intubation if needs.

## 2021-08-30 NOTE — H&P ADULT - NSHPPOAPRESSUREULCER_GEN_ALL_CORE
Pt had stage 2 pressure ulcer on buttocks. Pt had 4x2 unstageable pressure ulcer draining moderate amount of pus from wound on left heel./yes yes

## 2021-08-30 NOTE — ED ADULT NURSE NOTE - TEMPLATE
HPI:   Garcia Croft is a 48year old female who presents for a complete physical exam. Symptoms: denies discharge, itching, burning or dysuria. Patient complains of having issues with swallowing and at times has pain when she talks to long.  But sh MG Oral Tab Take 1-2 tablets every 4-6 hours as needed for pain. Disp: 40 tablet Rfl: 0   Magnesium 100 MG Oral Tab Take by mouth.  Disp:  Rfl:    HYDROcodone-acetaminophen (NORCO) 5-325 MG Oral Tab Take 1-2 tablets by mouth every 6 (six) hours as needed fo Alejandro Arthur MD at [facility] MAIN OR   • TUBAL LIGATION        Family History   Problem Relation Age of Onset   • Heart Disorder Father    • Hypertension Father    • Lipids Mother    • Obesity Mother       Social History:   Social History    Tobacco Use discharge,cervix is pink,no adnexal masses or tenderness, PAP was done   RECTAL:deferred  MUSCULOSKELETAL: back is not tender,FROM of the back  EXTREMITIES: no cyanosis, clubbing or edema  NEURO: Oriented times three,cranial nerves are intact,motor and sen General

## 2021-08-30 NOTE — ED PROVIDER NOTE - PHYSICAL EXAMINATION
Gen: Well appearing in NAD  Head: NC/AT  Neck: trachea midline  Resp:  No distress  Ext: no deformities  Neuro:  demented at baseline appears non focal  Skin:  Warm and dry as visualized  Psych:  Normal affect and mood     LUE: + picc line partially pulled out - sutrures intact, no active bleeding.

## 2021-08-30 NOTE — H&P ADULT - PROBLEM SELECTOR PLAN 2
-IR for PICC line placement -IR for PICC line placement ordered by ED, meanwhile IV Abx infusing via peripheral IV.

## 2021-08-30 NOTE — ED PROVIDER NOTE - OBJECTIVE STATEMENT
80 F PMHx of dementia, HTN, CVA, Afib on Xarelto, DM presenting from nursing home for pulling out picc line. Pt. had picc placed on 8/18/21 at [facility] during admission for possible OM (was receiving vancomycin and ertapenem) - as per triage note patient pulled out majority of picc line from arm. Pt. resting comfortably - poor historian - unable to aid in history.

## 2021-08-30 NOTE — CONSULT NOTE ADULT - ATTENDING COMMENTS
81 year old with dm with heal ulcer   Prior cx with esbl E coli, enterobacter,MRSA    Infected heel  - probable OM  - continue vancomycin to cover mrsa  - continue ertapenem 1g daily  - would pursue with 6 weeks via PICC until about 9/16  - weekly - cbc, BMP, esr, crp, vancomycin trough fax results to [phone]    Wound care    Close monitoring and control of glucose    Follow up with podiatry and Dr Bolden as an outpatient.    Will sign off. Call ID service with questions

## 2021-08-30 NOTE — CONSULT NOTE ADULT - SUBJECTIVE AND OBJECTIVE BOX
Patient is a 81y old  Female who presents with a chief complaint of left heel wound    HPI:  80 F PMHx of dementia, HTN, CVA, Afib on Xarelto, DM presenting from nursing home for pulling out picc line. Pt. had picc placed on 8/18/21 at [facility] during admission for possible OM (was receiving vancomycin and ertapenem) - as per triage note patient pulled out majority of picc line from arm. Pt. resting comfortably - poor historian - unable to aid in history.    PAST MEDICAL & SURGICAL HISTORY:  Diabetes Mellitus    Benign Hypertension    Dementia    No significant past surgical history        MEDICATIONS  (STANDING):  collagenase Ointment 1 Application(s) Topical daily  collagenase Ointment 1 Application(s) Topical daily  dextrose 40% Gel 15 Gram(s) Oral once  dextrose 5%. 1000 milliLiter(s) (50 mL/Hr) IV Continuous <Continuous>  dextrose 5%. 1000 milliLiter(s) (100 mL/Hr) IV Continuous <Continuous>  dextrose 50% Injectable 25 Gram(s) IV Push once  dextrose 50% Injectable 12.5 Gram(s) IV Push once  dextrose 50% Injectable 25 Gram(s) IV Push once  glucagon  Injectable 1 milliGRAM(s) IntraMuscular once  insulin lispro (ADMELOG) corrective regimen sliding scale   SubCutaneous three times a day before meals  insulin lispro (ADMELOG) corrective regimen sliding scale   SubCutaneous at bedtime  rivaroxaban 15 milliGRAM(s) Oral with dinner  vancomycin  IVPB 1000 milliGRAM(s) IV Intermittent every 24 hours    MEDICATIONS  (PRN):  acetaminophen   Tablet .. 650 milliGRAM(s) Oral every 6 hours PRN Temp greater or equal to 38C (100.4F), Mild Pain (1 - 3), Moderate Pain (4 - 6)      Allergies    No Known Allergies    Intolerances        VITALS:    Vital Signs Last 24 Hrs  T(C): 36.3 (30 Aug 2021 13:10), Max: 36.4 (30 Aug 2021 08:04)  T(F): 97.3 (30 Aug 2021 13:10), Max: 97.5 (30 Aug 2021 08:04)  HR: 80 (30 Aug 2021 14:46) (73 - 92)  BP: 120/84 (30 Aug 2021 14:46) (82/49 - 139/81)  BP(mean): 59 (30 Aug 2021 12:24) (59 - 59)  RR: 18 (30 Aug 2021 14:46) (14 - 18)  SpO2: 100% (30 Aug 2021 14:46) (100% - 100%)    LABS:                          11.6   5.58  )-----------( 184      ( 30 Aug 2021 09:36 )             33.4       08-30    134<L>  |  103  |  7   ----------------------------<  83  5.0   |  23  |  0.76    Ca    8.6      30 Aug 2021 09:36    TPro  6.1  /  Alb  2.6<L>  /  TBili  0.6  /  DBili  x   /  AST  32  /  ALT  14  /  AlkPhos  160<H>  08-30      CAPILLARY BLOOD GLUCOSE          PT/INR - ( 30 Aug 2021 09:36 )   PT: 15.6 sec;   INR: 1.39 ratio         PTT - ( 30 Aug 2021 09:36 )  PTT:39.5 sec    LOWER EXTREMITY PHYSICAL EXAM:  Vascular: DP 1/4 B/L, PT non-palpable b/l, CFT <3 seconds B/L, Temperature gradient warm to cool, B/L.   Neuro: Epicritic sensation diminished to the level of ankle, B/L.  Musculoskeletal/Ortho: unremarkable  Skin: L plantar heel wound with fibrotic base, probe to bone, no malodor, no tracking, no bogginess, no periwound erythema, no pus or drainage, L foot lateral malleoli wound to SubQ    RADIOLOGY & ADDITIONAL STUDIES:    
Patient is an 81 year old female who presents with a chief complaint of left foot wound. (30 Aug 2021 14:30)    HPI:  The patient is an 81 year old female with past medical history of progressive dementia, HTN, a-fib, and DMT2 (non-insulin dependent) who presented for pulled out PICC line. Unable to obtain accurate ROS as patient dariel  poor historian and unable to answer questions due to progressive dementia. As per chart review, history was obtained from son. As per son, she was at home and pulled out her PICC line from her arm last night. She was hospitalized in July at [facility] for OM after wound care nurse recommended treatment at the hospital. PICC line was started in mid August during last hospitalization to treat wound with both vancomycin and ertapenem. Her son also reports bed sores on patient's buttocks, which are currently being treated at home. She does not have fever, chills, cough, congestion, n/v/d/c, melena, hematochezia, and mood changes. Patient is bedbound with poor dentition.     Labs were unremarkable. Wound culture from 8/6/21 showed Enterobacter aerogenes, ESBL E. coli, MRSA, and Bacteroides fragilis. CXR was negative for focal consolidations. She had PICC line placed on 8/18/21. She was started on intravenous vancomycin and intravenous ertapenem. COVID-19 PCR, BCx x 2, and PICC line culture are in process. ID was consulted for suspected osteomyelitis.     prior hospital charts reviewed [x]  primary team notes reviewed [x]  other consultant notes reviewed [x]    PAST MEDICAL & SURGICAL HISTORY:  Diabetes Mellitus  Benign Hypertension  Dementia  No significant past surgical history        Allergies  No Known Allergies    ANTIMICROBIALS (past 90 days)  MEDICATIONS  (STANDING):    ertapenem Injectable   1000 milliGRAM(s) IntraMuscular (08-30-21 @ 09:57)        vancomycin  IVPB 1000 every 24 hours    OTHER MEDS: MEDICATIONS  (STANDING):  acetaminophen   Tablet .. 650 every 6 hours PRN  dextrose 40% Gel 15 once  dextrose 50% Injectable 25 once  dextrose 50% Injectable 12.5 once  dextrose 50% Injectable 25 once  glucagon  Injectable 1 once  insulin lispro (ADMELOG) corrective regimen sliding scale  three times a day before meals  insulin lispro (ADMELOG) corrective regimen sliding scale  at bedtime  rivaroxaban 15 with dinner    SOCIAL HISTORY:   unable to obtain    FAMILY HISTORY: unable to obtain      REVIEW OF SYSTEMS  [x] ROS unobtainable because:  history of dementia   [x] All other systems negative except as noted below:	    Constitutional:  [ ] fever [ ] chills  [ ] weight loss  [ ] weakness  Skin:  [ ] rash [ ] phlebitis	  Eyes: [ ] icterus [ ] pain  [ ] discharge	  ENMT: [ ] sore throat  [ ] thrush [ ] ulcers [ ] exudates  Respiratory: [ ] dyspnea [ ] hemoptysis [ ] cough [ ] sputum	  Cardiovascular:  [ ] chest pain [ ] palpitations [ ] edema	  Gastrointestinal:  [ ] nausea [ ] vomiting [ ] diarrhea [ ] constipation [ ] pain	  Genitourinary:  [ ] dysuria [ ] frequency [ ] hematuria [ ] discharge [ ] flank pain  [ ] incontinence  Musculoskeletal:  [ ] myalgias [ ] arthralgias [ ] arthritis  [ ] back pain  Neurological:  [ ] headache [ ] seizures  [x] confusion/altered mental status  Psychiatric:  [ ] anxiety [ ] depression	  Hematology/Lymphatics:  [ ] lymphadenopathy  Endocrine:  [ ] adrenal [ ] thyroid  Allergic/Immunologic:	 [ ] transplant [ ] seasonal    Vital Signs Last 24 Hrs  T(F): 97.3 (08-30-21 @ 13:10), Max: 97.5 (08-30-21 @ 08:04)  Vital Signs Last 24 Hrs  HR: 80 (08-30-21 @ 14:46) (73 - 92)  BP: 120/84 (08-30-21 @ 14:46) (82/49 - 139/81)  RR: 18 (08-30-21 @ 14:46)  SpO2: 100% (08-30-21 @ 14:46) (100% - 100%)  Wt(kg): --    PHYSICAL EXAM:  Constitutional: non-toxic, no distress  HEAD/EYES: anicteric, no conjunctival injection  ENT:  supple, no thrush  Cardiovascular:   normal S1, S2, no murmur, no edema  Respiratory:  clear BS bilaterally, no wheezes, no rales  GI:  soft, non-tender, normal bowel sounds  : no CVA tenderness  Musculoskeletal:  no synovitis, decreased ROM due to weakness   Neurologic: awake and alert, appears confused   Skin:   + left heel wound w/o surrounding erythema, edema, or purulence, + sacral ulcer   Heme/Onc: no lymphadenopathy   Psychiatric:  awake, alert,                          11.6   5.58  )-----------( 184      ( 30 Aug 2021 09:36 )             33.4   08-30    134<L>  |  103  |  7   ----------------------------<  83  5.0   |  23  |  0.76    Ca    8.6      30 Aug 2021 09:36    TPro  6.1  /  Alb  2.6<L>  /  TBili  0.6  /  DBili  x   /  AST  32  /  ALT  14  /  AlkPhos  160<H>  08-30    MICROBIOLOGY:    COVID-19 PCR in process    BCx x 2 in process     PICC line culture pending    Prior cultures    WCx 8/6/21: Enterobacter aerogenes, MRSA, ESBL E. coli, and Bacteroides fragilis       RADIOLOGY:    < from: Xray Chest 1 View- PORTABLE-Urgent (Xray Chest 1 View- PORTABLE-Urgent .) (08.30.21 @ 12:26) >  IMPRESSION:  No focal lung consolidation.    Question 1.4 cm right apical nodular opacity versus superimposition of normal structures. A repeat image can be obtained to see if this finding persists.    < end of copied text >

## 2021-08-30 NOTE — H&P ADULT - PROBLEM SELECTOR PLAN 3
-restarted IV Ertapenem and Vancomycin  -dressing with collagenase  -Podiatry consulted -restarted IV Ertapenem and Vancomycin  -dressing with collagenase  -Podiatry consulted  -Nutritional consult ordered to recommend proper diet for efficient wound healing.

## 2021-08-30 NOTE — H&P ADULT - SKIN COMMENTS
Pt has stage 2 pressure ulcer on buttocks. Pt also has 4x2 unstageable pressure ulcer draining moderate pus on left heel. Stage 2 sacral pressure ulcer. Also has about 4x2cm Left heel non-healing wound draining pus.

## 2021-08-30 NOTE — H&P ADULT - HISTORY OF PRESENT ILLNESS
80 y/o female with h/o progressive dementia, HTN, a-fib, and DMT2 (non-insulin dependent) c/o pulling out picc line. Pt poor historian and unable to answer questions d/t progressive dementia. History was obtained from son (Jose Merino). As per son, pt was at home and pulled out picc line from arm last night. Pt was hospitalized in July at [facility] for OM after wound care nurse recommended treatment at the hospital. PICC line was started in mid August during last hospitalization to treat wound w/ vancomycin and ertapenem. Pt's son also reports bed sores on pt's buttocks, which are currently being treated at home. Pt's son denies fever, chills, cough, congestion, n/v/d/c, melena, hematochezia, and mood changes.   Pt's son reports that pt is bedbound and currently eating puréed diet d/t poor dentition. Reports no h/o dysphagia.   82 y/o female with h/o progressive dementia (bedbound,) HTN, a-fib (on xarelto,) DMT2 (non-insulin dependent,) and L heel ulcer (on IV PICC Abx) was BIBA from home with s/p pulling out picc line. Pt poor historian and unable to answer questions d/t progressive dementia. All history was obtained from son (Jose Merino). As per son, pt was at home and pulled out picc line from arm last night. Pt was hospitalized in July at [facility] for OM of L heel after wound care nurse recommended treatment at the hospital. PICC line was started in mid August during last hospitalization to treat wound w/ vancomycin and ertapenem. Pt's son also reports bed sores on pt's buttocks, which are currently being treated at home.  No reported fever, chills, cough, congestion, n/v/d/c, melena, hematochezia, or mood changes.   Pt's son reports that pt is bedbound and currently eating puréed diet d/t poor dentition. Reports no h/o dysphagia.     In ED pt found to be hypotensive and hypothermic.

## 2021-08-30 NOTE — ED ADULT TRIAGE NOTE - CHIEF COMPLAINT QUOTE
Patient hx of dementia pulled out PICC this AM. no active bleeding at this time. on blood thinner xarelto

## 2021-08-30 NOTE — ED ADULT NURSE NOTE - NS ED NURSE RECORD ANOTHER HT AND WT
No Ftsg Text: The defect edges were debeveled with a #15 scalpel blade.  Given the location of the defect, shape of the defect and the proximity to free margins a full thickness skin graft was deemed most appropriate.  Using a sterile surgical marker, the primary defect shape was transferred to the donor site. The area thus outlined was incised deep to adipose tissue with a #15 scalpel blade.  The harvested graft was then trimmed of adipose tissue until only dermis and epidermis was left.  The secondary defect was closed with buried 4-0 Vicryl pursestring subcutaneous suture(s). The skin graft was then placed in the primary defect and oriented appropriately.

## 2021-08-30 NOTE — H&P ADULT - NSHPLABSRESULTS_GEN_ALL_CORE
11.6   5.58  )-----------( 184      ( 30 Aug 2021 09:36 )             33.4     08-30    134<L>  |  103  |  7   ----------------------------<  83  5.0   |  23  |  0.76    Ca    8.6      30 Aug 2021 09:36    TPro  6.1  /  Alb  2.6<L>  /  TBili  0.6  /  DBili  x   /  AST  32  /  ALT  14  /  AlkPhos  160<H>  08-30        PT/INR - ( 30 Aug 2021 09:36 )   PT: 15.6 sec;   INR: 1.39 ratio         PTT - ( 30 Aug 2021 09:36 )  PTT:39.5 sec  LIVER FUNCTIONS - ( 30 Aug 2021 09:36 )  Alb: 2.6 g/dL / Pro: 6.1 g/dL / ALK PHOS: 160 U/L / ALT: 14 U/L / AST: 32 U/L / GGT: x           CAPILLARY BLOOD GLUCOSE        I&O's Detail EKG: Afib @ 80bpm QTC 425ms            11.6   5.58  )-----------( 184      ( 30 Aug 2021 09:36 )             33.4     08-30    134<L>  |  103  |  7   ----------------------------<  83  5.0   |  23  |  0.76    Ca    8.6      30 Aug 2021 09:36    TPro  6.1  /  Alb  2.6<L>  /  TBili  0.6  /  DBili  x   /  AST  32  /  ALT  14  /  AlkPhos  160<H>  08-30        PT/INR - ( 30 Aug 2021 09:36 )   PT: 15.6 sec;   INR: 1.39 ratio         PTT - ( 30 Aug 2021 09:36 )  PTT:39.5 sec  LIVER FUNCTIONS - ( 30 Aug 2021 09:36 )  Alb: 2.6 g/dL / Pro: 6.1 g/dL / ALK PHOS: 160 U/L / ALT: 14 U/L / AST: 32 U/L / GGT: x

## 2021-08-30 NOTE — H&P ADULT - ATTENDING COMMENTS
pt seen and examined by me  s/p removal of PICC placed for suspected OM of L heel to be completed 9/16  pt initially hypotensive, hypothermic, concern for sepsis raised; pan cx, ID, podiatry eval  wound care eval for sacral decub  nutrition eval for severe prot benja malnutrition to aid in wound healing  currently alert, confused; appears non toxic  exam and plan as above per NP Angela's note

## 2021-08-31 LAB
A1C WITH ESTIMATED AVERAGE GLUCOSE RESULT: 5.6 % — SIGNIFICANT CHANGE UP (ref 4–5.6)
ANION GAP SERPL CALC-SCNC: 12 MMOL/L — SIGNIFICANT CHANGE UP (ref 7–14)
BUN SERPL-MCNC: 7 MG/DL — SIGNIFICANT CHANGE UP (ref 7–23)
CALCIUM SERPL-MCNC: 8.9 MG/DL — SIGNIFICANT CHANGE UP (ref 8.4–10.5)
CHLORIDE SERPL-SCNC: 101 MMOL/L — SIGNIFICANT CHANGE UP (ref 98–107)
CO2 SERPL-SCNC: 25 MMOL/L — SIGNIFICANT CHANGE UP (ref 22–31)
COVID-19 SPIKE DOMAIN AB INTERP: POSITIVE
COVID-19 SPIKE DOMAIN ANTIBODY RESULT: 35.9 U/ML — HIGH
CREAT SERPL-MCNC: 0.69 MG/DL — SIGNIFICANT CHANGE UP (ref 0.5–1.3)
CULTURE RESULTS: SIGNIFICANT CHANGE UP
ESTIMATED AVERAGE GLUCOSE: 114 — SIGNIFICANT CHANGE UP
GLUCOSE BLDC GLUCOMTR-MCNC: 78 MG/DL — SIGNIFICANT CHANGE UP (ref 70–99)
GLUCOSE BLDC GLUCOMTR-MCNC: 85 MG/DL — SIGNIFICANT CHANGE UP (ref 70–99)
GLUCOSE BLDC GLUCOMTR-MCNC: 89 MG/DL — SIGNIFICANT CHANGE UP (ref 70–99)
GLUCOSE SERPL-MCNC: 80 MG/DL — SIGNIFICANT CHANGE UP (ref 70–99)
HCT VFR BLD CALC: 34.4 % — LOW (ref 34.5–45)
HGB BLD-MCNC: 12.1 G/DL — SIGNIFICANT CHANGE UP (ref 11.5–15.5)
MCHC RBC-ENTMCNC: 28.4 PG — SIGNIFICANT CHANGE UP (ref 27–34)
MCHC RBC-ENTMCNC: 35.2 GM/DL — SIGNIFICANT CHANGE UP (ref 32–36)
MCV RBC AUTO: 80.8 FL — SIGNIFICANT CHANGE UP (ref 80–100)
NRBC # BLD: 0 /100 WBCS — SIGNIFICANT CHANGE UP
NRBC # FLD: 0 K/UL — SIGNIFICANT CHANGE UP
PLATELET # BLD AUTO: 228 K/UL — SIGNIFICANT CHANGE UP (ref 150–400)
POTASSIUM SERPL-MCNC: 3.2 MMOL/L — LOW (ref 3.5–5.3)
POTASSIUM SERPL-SCNC: 3.2 MMOL/L — LOW (ref 3.5–5.3)
RBC # BLD: 4.26 M/UL — SIGNIFICANT CHANGE UP (ref 3.8–5.2)
RBC # FLD: 15.4 % — HIGH (ref 10.3–14.5)
SARS-COV-2 IGG+IGM SERPL QL IA: 35.9 U/ML — HIGH
SARS-COV-2 IGG+IGM SERPL QL IA: POSITIVE
SODIUM SERPL-SCNC: 138 MMOL/L — SIGNIFICANT CHANGE UP (ref 135–145)
SPECIMEN SOURCE: SIGNIFICANT CHANGE UP
VANCOMYCIN TROUGH SERPL-MCNC: 18.8 UG/ML — SIGNIFICANT CHANGE UP (ref 10–20)
WBC # BLD: 6.19 K/UL — SIGNIFICANT CHANGE UP (ref 3.8–10.5)
WBC # FLD AUTO: 6.19 K/UL — SIGNIFICANT CHANGE UP (ref 3.8–10.5)

## 2021-08-31 PROCEDURE — 99233 SBSQ HOSP IP/OBS HIGH 50: CPT

## 2021-08-31 RX ORDER — POTASSIUM CHLORIDE 20 MEQ
40 PACKET (EA) ORAL ONCE
Refills: 0 | Status: COMPLETED | OUTPATIENT
Start: 2021-08-31 | End: 2021-08-31

## 2021-08-31 RX ORDER — POTASSIUM CHLORIDE 20 MEQ
40 PACKET (EA) ORAL ONCE
Refills: 0 | Status: DISCONTINUED | OUTPATIENT
Start: 2021-08-31 | End: 2021-08-31

## 2021-08-31 RX ADMIN — RIVAROXABAN 15 MILLIGRAM(S): KIT at 20:21

## 2021-08-31 RX ADMIN — Medication 40 MILLIEQUIVALENT(S): at 20:21

## 2021-08-31 RX ADMIN — ERTAPENEM SODIUM 120 MILLIGRAM(S): 1 INJECTION, POWDER, LYOPHILIZED, FOR SOLUTION INTRAMUSCULAR; INTRAVENOUS at 01:19

## 2021-08-31 RX ADMIN — Medication 250 MILLIGRAM(S): at 19:02

## 2021-08-31 RX ADMIN — Medication 1 APPLICATION(S): at 14:06

## 2021-08-31 NOTE — ADVANCED PRACTICE NURSE CONSULT - REASON FOR CONSULT
Patient seen on skin care rounds after wound care referral received for assessment of skin impairment and recommendations of topical management of sacral hypopigmentation. Patient H/O of dementia, HTN, CVA, Afib on Xarelto, DM who presented to ED for ulceration of left heel, followed by Podiatry services.

## 2021-08-31 NOTE — ADVANCED PRACTICE NURSE CONSULT - ASSESSMENT
Patient disoriented, unable to state name when asked, bedbound, restless/combative when attempting to turn, incontinent of urine and stool. Left heel wound followed by Podiatry services.    Perineal area extending to bilateral buttocks and sacral/gluteal fold with moisture/Incontinence associated dermatitis presenting with chronic hyperpigmentation and scattered areas of hypopigmentation noted on bilateral inner buttocks- unable to be seen in natural anatomical position, mirroring effect noted. Increased moisture noted. No suspicion of candidiasis.

## 2021-08-31 NOTE — ADVANCED PRACTICE NURSE CONSULT - RECOMMEDATIONS
Sacral fold to inner buttock: Apply Liquid barrier film. Daily.    Continue low air loss bed therapy, continue heel elevation, continue to turn & reposition q2h, continue moisture management with barrier creams & single breathable pad, continue measures to decrease friction/shear/pressure.     Please call wound care service line is further assistance is needed ([phone]).

## 2021-09-01 ENCOUNTER — TRANSCRIPTION ENCOUNTER (OUTPATIENT)
Age: 81
End: 2021-09-01

## 2021-09-01 VITALS
SYSTOLIC BLOOD PRESSURE: 126 MMHG | DIASTOLIC BLOOD PRESSURE: 86 MMHG | RESPIRATION RATE: 18 BRPM | TEMPERATURE: 98 F | HEART RATE: 90 BPM | OXYGEN SATURATION: 98 %

## 2021-09-01 LAB
ANION GAP SERPL CALC-SCNC: 11 MMOL/L — SIGNIFICANT CHANGE UP (ref 7–14)
BUN SERPL-MCNC: 6 MG/DL — LOW (ref 7–23)
CALCIUM SERPL-MCNC: 8.4 MG/DL — SIGNIFICANT CHANGE UP (ref 8.4–10.5)
CHLORIDE SERPL-SCNC: 107 MMOL/L — SIGNIFICANT CHANGE UP (ref 98–107)
CO2 SERPL-SCNC: 23 MMOL/L — SIGNIFICANT CHANGE UP (ref 22–31)
CREAT SERPL-MCNC: 0.65 MG/DL — SIGNIFICANT CHANGE UP (ref 0.5–1.3)
GLUCOSE SERPL-MCNC: 63 MG/DL — LOW (ref 70–99)
HCT VFR BLD CALC: 29.4 % — LOW (ref 34.5–45)
HGB BLD-MCNC: 10.4 G/DL — LOW (ref 11.5–15.5)
MCHC RBC-ENTMCNC: 29.1 PG — SIGNIFICANT CHANGE UP (ref 27–34)
MCHC RBC-ENTMCNC: 35.4 GM/DL — SIGNIFICANT CHANGE UP (ref 32–36)
MCV RBC AUTO: 82.1 FL — SIGNIFICANT CHANGE UP (ref 80–100)
NRBC # BLD: 0 /100 WBCS — SIGNIFICANT CHANGE UP
NRBC # FLD: 0 K/UL — SIGNIFICANT CHANGE UP
PLATELET # BLD AUTO: 194 K/UL — SIGNIFICANT CHANGE UP (ref 150–400)
POTASSIUM SERPL-MCNC: 3.9 MMOL/L — SIGNIFICANT CHANGE UP (ref 3.5–5.3)
POTASSIUM SERPL-SCNC: 3.9 MMOL/L — SIGNIFICANT CHANGE UP (ref 3.5–5.3)
RBC # BLD: 3.58 M/UL — LOW (ref 3.8–5.2)
RBC # FLD: 15.4 % — HIGH (ref 10.3–14.5)
SODIUM SERPL-SCNC: 141 MMOL/L — SIGNIFICANT CHANGE UP (ref 135–145)
WBC # BLD: 4.94 K/UL — SIGNIFICANT CHANGE UP (ref 3.8–10.5)
WBC # FLD AUTO: 4.94 K/UL — SIGNIFICANT CHANGE UP (ref 3.8–10.5)

## 2021-09-01 PROCEDURE — 36573 INSJ PICC RS&I 5 YR+: CPT

## 2021-09-01 PROCEDURE — 99239 HOSP IP/OBS DSCHRG MGMT >30: CPT

## 2021-09-01 RX ORDER — METFORMIN HYDROCHLORIDE 850 MG/1
1 TABLET ORAL
Qty: 0 | Refills: 0 | DISCHARGE

## 2021-09-01 RX ADMIN — Medication 1 APPLICATION(S): at 12:40

## 2021-09-01 RX ADMIN — ERTAPENEM SODIUM 120 MILLIGRAM(S): 1 INJECTION, POWDER, LYOPHILIZED, FOR SOLUTION INTRAMUSCULAR; INTRAVENOUS at 01:58

## 2021-09-01 RX ADMIN — RIVAROXABAN 15 MILLIGRAM(S): KIT at 17:49

## 2021-09-01 RX ADMIN — Medication 250 MILLIGRAM(S): at 17:48

## 2021-09-01 NOTE — DISCHARGE NOTE PROVIDER - NSDCMRMEDTOKEN_GEN_ALL_CORE_FT
collagenase 250 units/g topical ointment: Apply topically to affected area once a day   ertapenem 1g intravenous injection through 9/16/2021. Dx: L heel OM. Please send weekly - cbc, BMP, esr, crp, vancomycin trough fax results to [phone]: 1 gram(s) intravenous once a day   metFORMIN 500 mg oral tablet: 1 tab(s) orally 2 times a day (Last filled Peggy/2021 x 1 month supply)  metoprolol tartrate 25 mg oral tablet: 1 tab(s) orally 3 times a day (Last filled Peggy/2021 x 1 month supply)  rivaroxaban 15 mg oral tablet: 1 tab(s) orally once a day (in the evening) (Last filled Peggy/2021 x 1 month supply)  vancomycin 1 g intravenous injection once daily through 9/16/2021, Dx: Left heel OM. Please sent weekly - cbc, BMP, esr, crp, vancomycin trough fax results to [phone]: 1 gram(s) intravenous once a day    collagenase 250 units/g topical ointment: Apply topically to affected area once a day   ertapenem 1g intravenous injection through 9/16/2021. Dx: L heel OM. Please send weekly - cbc, BMP, esr, crp, vancomycin trough fax results to [phone]: 1 gram(s) intravenous once a day   metFORMIN 500 mg oral tablet: 1 tab(s) orally once a day  metoprolol tartrate 25 mg oral tablet: 1 tab(s) orally 3 times a day (Last filled Peggy/2021 x 1 month supply)  rivaroxaban 15 mg oral tablet: 1 tab(s) orally once a day (in the evening) (Last filled Peggy/2021 x 1 month supply)  vancomycin 1 g intravenous injection once daily through 9/16/2021, Dx: Left heel OM. Please sent weekly - cbc, BMP, esr, crp, vancomycin trough fax results to [phone]: 1 gram(s) intravenous once a day

## 2021-09-01 NOTE — DISCHARGE NOTE NURSING/CASE MANAGEMENT/SOCIAL WORK - NSDCFUADDAPPT_GEN_ALL_CORE_FT
Cont IV antibiotics via PICC until about 9/16 to complete 6 week course  - weekly - cbc, BMP, esr, crp, vancomycin trough fax results to [phone]    Follow up with ID Dr. Bolden and Podiatry on discharge.

## 2021-09-01 NOTE — PROGRESS NOTE ADULT - SUBJECTIVE AND OBJECTIVE BOX
Patient is a 81y old  Female who presents with a chief complaint of Pulled out PICC Line (01 Sep 2021 11:12)       INTERVAL HPI/OVERNIGHT EVENTS:  Patient seen and evaluated at bedside.  Pt is resting comfortable in NAD. Denies N/V/F/C.     Allergies    No Known Allergies    Intolerances        Vital Signs Last 24 Hrs  T(C): 36.6 (01 Sep 2021 06:00), Max: 36.7 (31 Aug 2021 13:44)  T(F): 97.8 (01 Sep 2021 06:00), Max: 98 (31 Aug 2021 13:44)  HR: 97 (01 Sep 2021 06:00) (97 - 97)  BP: 140/68 (01 Sep 2021 06:00) (126/88 - 149/74)  BP(mean): --  RR: 17 (01 Sep 2021 06:00) (17 - 18)  SpO2: 98% (01 Sep 2021 06:00) (97% - 100%)    LABS:                        10.4   4.94  )-----------( 194      ( 01 Sep 2021 07:24 )             29.4     09-01    141  |  107  |  6<L>  ----------------------------<  63<L>  3.9   |  23  |  0.65    Ca    8.4      01 Sep 2021 07:24        Urinalysis Basic - ( 30 Aug 2021 17:24 )    Color: Yellow / Appearance: Slightly Turbid / S.013 / pH: x  Gluc: x / Ketone: Trace  / Bili: Negative / Urobili: <2 mg/dL   Blood: x / Protein: Trace / Nitrite: Negative   Leuk Esterase: Negative / RBC: 5 /HPF / WBC 7 /HPF   Sq Epi: x / Non Sq Epi: 1 /HPF / Bacteria: Negative      CAPILLARY BLOOD GLUCOSE      POCT Blood Glucose.: 78 mg/dL (31 Aug 2021 22:17)  POCT Blood Glucose.: 89 mg/dL (31 Aug 2021 16:39)      Lower Extremity Physical Exam:  Vascular: DP 1/4 B/L, PT non-palpable b/l, CFT <3 seconds B/L, Temperature gradient warm to cool, B/L.   Neuro: Epicritic sensation diminished to the level of ankle, B/L.  Musculoskeletal/Ortho: unremarkable  Skin: L plantar heel wound with fibrotic base, probe to bone, no malodor, no tracking, no bogginess, no periwound erythema, no pus or drainage, L foot lateral malleoli wound to SubQ    RADIOLOGY & ADDITIONAL TESTS:  
Fiorella Nguyễn  [facility]  Pager #[phone]    Patient is a 81y old  Female who presents with a chief complaint of Pulled out PICC Line (30 Aug 2021 16:25)      SUBJECTIVE / OVERNIGHT EVENTS: Patient seen and examined at bedside. Patient reports having lower back pain and states that it is hard for her to get out of bed.     ADDITIONAL REVIEW OF SYSTEMS:    MEDICATIONS  (STANDING):  collagenase Ointment 1 Application(s) Topical daily  collagenase Ointment 1 Application(s) Topical daily  dextrose 40% Gel 15 Gram(s) Oral once  dextrose 5%. 1000 milliLiter(s) (50 mL/Hr) IV Continuous <Continuous>  dextrose 5%. 1000 milliLiter(s) (100 mL/Hr) IV Continuous <Continuous>  dextrose 50% Injectable 25 Gram(s) IV Push once  dextrose 50% Injectable 12.5 Gram(s) IV Push once  dextrose 50% Injectable 25 Gram(s) IV Push once  ertapenem  IVPB 1000 milliGRAM(s) IV Intermittent every 24 hours  glucagon  Injectable 1 milliGRAM(s) IntraMuscular once  insulin lispro (ADMELOG) corrective regimen sliding scale   SubCutaneous three times a day before meals  insulin lispro (ADMELOG) corrective regimen sliding scale   SubCutaneous at bedtime  potassium chloride   Powder 40 milliEquivalent(s) Oral once  rivaroxaban 15 milliGRAM(s) Oral with dinner  vancomycin  IVPB 1000 milliGRAM(s) IV Intermittent every 24 hours    MEDICATIONS  (PRN):  acetaminophen   Tablet .. 650 milliGRAM(s) Oral every 6 hours PRN Temp greater or equal to 38C (100.4F), Mild Pain (1 - 3), Moderate Pain (4 - 6)      CAPILLARY BLOOD GLUCOSE      POCT Blood Glucose.: 85 mg/dL (31 Aug 2021 11:56)  POCT Blood Glucose.: 87 mg/dL (30 Aug 2021 22:39)  POCT Blood Glucose.: 72 mg/dL (30 Aug 2021 18:14)  POCT Blood Glucose.: 70 mg/dL (30 Aug 2021 17:29)    I&O's Summary      PHYSICAL EXAM:    Vital Signs Last 24 Hrs  T(C): 36.1 (31 Aug 2021 05:45), Max: 36.3 (30 Aug 2021 16:52)  T(F): 97 (31 Aug 2021 05:45), Max: 97.3 (30 Aug 2021 16:52)  HR: 89 (31 Aug 2021 05:45) (80 - 91)  BP: 130/82 (31 Aug 2021 05:45) (120/84 - 149/93)  BP(mean): --  RR: 18 (31 Aug 2021 05:45) (18 - 19)  SpO2: 100% (31 Aug 2021 05:45) (96% - 100%)    CONSTITUTIONAL: NAD, well-developed, elderly female  EYES: Conjunctiva and sclera clear  ENMT: Moist oral mucosa, no pharyngeal injection or exudates; normal dentition  RESPIRATORY: Normal respiratory effort; lungs are clear to auscultation bilaterally  CARDIOVASCULAR: Regular rate and rhythm, normal S1 and S2, no murmur/rub/gallop; No lower extremity edema  ABDOMEN: Nontender to palpation, normoactive bowel sounds  MUSCULOSKELETAL: No clubbing or cyanosis of digits  PSYCH: A+O to person  NEUROLOGY: No focal deficits  SKIN: No rashes; no palpable lesions    LABS:                        12.1   6.19  )-----------( 228      ( 31 Aug 2021 07:03 )             34.4     08-    138  |  101  |  7   ----------------------------<  80  3.2<L>   |  25  |  0.69    Ca    8.9      31 Aug 2021 07:03    TPro  6.1  /  Alb  2.6<L>  /  TBili  0.6  /  DBili  x   /  AST  32  /  ALT  14  /  AlkPhos  160<H>  08-30    PT/INR - ( 30 Aug 2021 09:36 )   PT: 15.6 sec;   INR: 1.39 ratio         PTT - ( 30 Aug 2021 09:36 )  PTT:39.5 sec      Urinalysis Basic - ( 30 Aug 2021 17:24 )    Color: Yellow / Appearance: Slightly Turbid / S.013 / pH: x  Gluc: x / Ketone: Trace  / Bili: Negative / Urobili: <2 mg/dL   Blood: x / Protein: Trace / Nitrite: Negative   Leuk Esterase: Negative / RBC: 5 /HPF / WBC 7 /HPF   Sq Epi: x / Non Sq Epi: 1 /HPF / Bacteria: Negative        Culture - Blood (collected 30 Aug 2021 12:11)  Source: .Blood Blood  Preliminary Report (31 Aug 2021 13:02):    No growth to date.    Culture - Blood (collected 30 Aug 2021 12:11)  Source: .Blood Blood-Venous  Preliminary Report (31 Aug 2021 13:02):    No growth to date.        RADIOLOGY & ADDITIONAL TESTS:    < from: Xray Foot AP + Lateral + Oblique, Left (. @ 17:14) >  IMPRESSION:  Redemonstrated focal partial-thickness posterior heel soft tissue ulceration. No tracking gas collections beyond the ulcer margins and no gross radiographic evidence for underlying osteomyelitis.    No fractures ordislocations.    Slightly hypertrophic degenerative osteophyte formation along dorsal midfoot articular margins. Preserved remaining visualized joint spaces and no joint margin erosions.    Trace calcaneal enthesopathic change.    No discrete suspicious lytic or blastic lesions.    Results Reviewed: Yes  Imaging Personally Reviewed:  Electrocardiogram Personally Reviewed:    COORDINATION OF CARE:  Care Discussed with Consultants/Other Providers [Y/N]:  Prior or Outpatient Records Reviewed [Y/N]:  
Fiorella Nguyễn  [facility]  Pager #[phone]    Patient is a 81y old  Female who presents with a chief complaint of Pulled out PICC Line (01 Sep 2021 12:44)      SUBJECTIVE / OVERNIGHT EVENTS: Patient seen and examined at bedside. Patient holding up objects on her bedside tray and looking at them. Patient stated that she was hungry and writer fed her some yogurt.    ADDITIONAL REVIEW OF SYSTEMS:    MEDICATIONS  (STANDING):  collagenase Ointment 1 Application(s) Topical daily  collagenase Ointment 1 Application(s) Topical daily  ertapenem  IVPB 1000 milliGRAM(s) IV Intermittent every 24 hours  rivaroxaban 15 milliGRAM(s) Oral with dinner  vancomycin  IVPB 1000 milliGRAM(s) IV Intermittent every 24 hours    MEDICATIONS  (PRN):  acetaminophen   Tablet .. 650 milliGRAM(s) Oral every 6 hours PRN Temp greater or equal to 38C (100.4F), Mild Pain (1 - 3), Moderate Pain (4 - 6)      CAPILLARY BLOOD GLUCOSE      POCT Blood Glucose.: 78 mg/dL (31 Aug 2021 22:17)  POCT Blood Glucose.: 89 mg/dL (31 Aug 2021 16:39)    I&O's Summary      PHYSICAL EXAM:    Vital Signs Last 24 Hrs  T(C): 36.6 (01 Sep 2021 06:00), Max: 36.6 (01 Sep 2021 06:00)  T(F): 97.8 (01 Sep 2021 06:00), Max: 97.8 (01 Sep 2021 06:00)  HR: 97 (01 Sep 2021 06:00) (97 - 97)  BP: 140/68 (01 Sep 2021 06:00) (126/88 - 140/68)  BP(mean): --  RR: 17 (01 Sep 2021 06:00) (17 - 17)  SpO2: 98% (01 Sep 2021 06:00) (97% - 98%)    CONSTITUTIONAL: NAD, well-developed, elderly female  EYES: Conjunctiva and sclera clear  ENMT: Moist oral mucosa  RESPIRATORY: Normal respiratory effort; lungs are clear to auscultation bilaterally  CARDIOVASCULAR: Irregular rate and rhythm, normal S1 and S2, no murmur/rub/gallop; No lower extremity edema  ABDOMEN: Nontender to palpation, normoactive bowel sounds  MUSCULOSKELETAL: No clubbing or cyanosis of digits  PSYCH: A+O to person  NEUROLOGY: No focal deficits  SKIN: No rashes; no palpable lesions    LABS:                        10.4   4.94  )-----------( 194      ( 01 Sep 2021 07:24 )             29.4     09    141  |  107  |  6<L>  ----------------------------<  63<L>  3.9   |  23  |  0.65    Ca    8.4      01 Sep 2021 07:24      Urinalysis Basic - ( 30 Aug 2021 17:24 )    Color: Yellow / Appearance: Slightly Turbid / S.013 / pH: x  Gluc: x / Ketone: Trace  / Bili: Negative / Urobili: <2 mg/dL   Blood: x / Protein: Trace / Nitrite: Negative   Leuk Esterase: Negative / RBC: 5 /HPF / WBC 7 /HPF   Sq Epi: x / Non Sq Epi: 1 /HPF / Bacteria: Negative    Culture - Urine (collected 30 Aug 2021 17:08)  Source: Catheterized Catheterized  Final Report (31 Aug 2021 19:30):    <10,000 CFU/mL Normal Urogenital Kiki    Culture - Catheter (collected 30 Aug 2021 14:20)  Source: .Catheter PICC Tip  Preliminary Report (31 Aug 2021 13:47):    No growth    Culture - Blood (collected 30 Aug 2021 12:11)  Source: .Blood Blood  Preliminary Report (31 Aug 2021 13:02):    No growth to date.    Culture - Blood (collected 30 Aug 2021 12:11)  Source: .Blood Blood-Venous  Preliminary Report (31 Aug 2021 13:02):    No growth to date.        RADIOLOGY & ADDITIONAL TESTS: No new imaging  Results Reviewed: Yes  Imaging Personally Reviewed:  Electrocardiogram Personally Reviewed:    COORDINATION OF CARE:  Care Discussed with Consultants/Other Providers [Y/N]:  Prior or Outpatient Records Reviewed [Y/N]:

## 2021-09-01 NOTE — PROGRESS NOTE ADULT - PROBLEM SELECTOR PROBLEM 1
Needs peripherally inserted central catheter (PICC)
Needs peripherally inserted central catheter (PICC)

## 2021-09-01 NOTE — PHYSICAL THERAPY INITIAL EVALUATION ADULT - GENERAL OBSERVATIONS, REHAB EVAL
Patient was received & left supine with head of bed elevated, bed alarm activated & call bell in reach.

## 2021-09-01 NOTE — DISCHARGE NOTE PROVIDER - CARE PROVIDERS DIRECT ADDRESSES
[email or web address]

## 2021-09-01 NOTE — PHYSICAL THERAPY INITIAL EVALUATION ADULT - ADDITIONAL COMMENTS
Unable to obtain prior level of function.  As per chart & RN, patient is "bedbound."  However, patient appears to be more functional, during evaluation. RN notified & awaiting new activity order, if appropriate.

## 2021-09-01 NOTE — DISCHARGE NOTE NURSING/CASE MANAGEMENT/SOCIAL WORK - NSSCNAMETXT_GEN_ALL_CORE
[facility].  Nurse to visit on the day after discharge.  Other appropriate services to be arranged thereafter.   IV infusion through 9/16/2021 through [place] [phone].

## 2021-09-01 NOTE — DISCHARGE NOTE PROVIDER - CARE PROVIDER_API CALL
Mimi Bolden)  Infectious Disease; Internal Medicine  [address] [place] [id number]  Phone: [phone]  Fax: [phone]  Follow Up Time:     Grant Suarez (DPM)  Surgery  [address] [place] [address]  Phone: [phone]  Fax: [phone]  Follow Up Time:

## 2021-09-01 NOTE — DISCHARGE NOTE PROVIDER - NSDCCPCAREPLAN_GEN_ALL_CORE_FT
PRINCIPAL DISCHARGE DIAGNOSIS  Diagnosis: Status post PICC central line placement  Assessment and Plan of Treatment:       SECONDARY DISCHARGE DIAGNOSES  Diagnosis: Non healing left heel wound  Assessment and Plan of Treatment:      PRINCIPAL DISCHARGE DIAGNOSIS  Diagnosis: Status post PICC central line placement  Assessment and Plan of Treatment: New PICC line placed on 9/1. Continue IV antibiotics as instructed through 9/16/21. Follow up with infectious disease and podiatry.      SECONDARY DISCHARGE DIAGNOSES  Diagnosis: Non healing left heel wound  Assessment and Plan of Treatment: Follow up with infectious disease and podiatry. Please continue Vancomycin and Ertapenem once daily as prescibed thorugh 9/16    Diagnosis: Chronic atrial fibrillation  Assessment and Plan of Treatment: Continue xarelto and metoprolol - follow up with your cardiologist.    Diagnosis: DM2 (diabetes mellitus, type 2)  Assessment and Plan of Treatment: Your A1C is 5.6% which is very low for your age , please resume metformin 500mg but frequency was decreased to once per day. Follow up with your PCP for monitoring.    Diagnosis: HTN (hypertension)  Assessment and Plan of Treatment: Continue Metoprolol. Low sodium and fat diet, continue anti-hypertensive medications, and follow up with primary care physician.

## 2021-09-01 NOTE — PROGRESS NOTE ADULT - PROBLEM SELECTOR PLAN 3
[id number] Score =5  Continue Xarelto  Maintain fall and bleeding precautions
[id number] Score =5  Continue Xarelto  Maintain fall and bleeding precautions

## 2021-09-01 NOTE — PROGRESS NOTE ADULT - PROBLEM SELECTOR PLAN 7
No additional prophylaxis needed, pt on Xarelto  PT eval ordered
No additional prophylaxis needed, pt on Xarelto.  PT eval ordered

## 2021-09-01 NOTE — DIETITIAN INITIAL EVALUATION ADULT. - DIET TYPE
No Carb Prosource (1 pkg = 15 gms Protein) Qty per day: 2/supplement (specify)/dysphagia 1, pureed, thin liquids

## 2021-09-01 NOTE — CHART NOTE - NSCHARTNOTEFT_GEN_A_CORE
Pre-Interventional Radiology Procedure Note    Patient Age: 81  Patient Gender: F  Procedure: PICC line placement  Diagnosis/Indication: L heel ulcer and OM  Interventional Radiology Attending Physician: Unknown  Ordering Attending Physician: Dr. Nguyễn  Pertinent Medical History: hx progressive dementia (bedbound), HTN, Afib on xarelto, DM, L heel ulcer on IV ertapenem and Vanco, presents from home after pulling out her PICC line. Blood cx 8/30 no growth to date.   Pertinent labs:                      10.4   4.94  )-----------( 194      ( 01 Sep 2021 07:24 )             29.4       09-01    141  |  107  |  6<L>  ----------------------------<  63<L>  3.9   |  23  |  0.65    Ca    8.4      01 Sep 2021 07:24      Patient and Family Aware ? Yes    Contact Kimber Muse PA-C                     [facility] [place] x [phone]

## 2021-09-01 NOTE — PROGRESS NOTE ADULT - PROBLEM SELECTOR PLAN 6
Maintain Fall and Aspiration precautions, frequent re-orientation
Maintain Fall and Aspiration precautions

## 2021-09-01 NOTE — PROGRESS NOTE ADULT - PROBLEM SELECTOR PLAN 8
Wound Care consult ordered  Turn and position as per nursing routine
Wound Care consult ordered  Turn and position as per nursing routine

## 2021-09-01 NOTE — PROGRESS NOTE ADULT - PROBLEM SELECTOR PLAN 1
Patient will need reinsertion of double lumen PICC after blood cultures are clear at 48h  -IR consulted, will place PICC line today  -F/u blood cultures, NGTD  -C/w vancomycin and ertapenem through 9/16  -Patient's son Jose requesting to take patient home despite prior episode of patient pulling out PICC line
Patient will need reinsertion of double lumen PICC after blood cultures are clear at 48h  -IR consulted  -F/u blood cultures  -C/w vancomycin and ertapenem through 9/16

## 2021-09-01 NOTE — PROGRESS NOTE ADULT - PROBLEM SELECTOR PLAN 4
Monitor BP daily  DASH diet  C/w metoprolol tartrate 25mg PO BID on discharge
Monitor BP daily  DASH diet  Resume metoprolol tartrate 25mg PO BID

## 2021-09-01 NOTE — PROGRESS NOTE ADULT - PROBLEM SELECTOR PLAN 2
C/w IV Ertapenem and Vancomycin  -dressing with collagenase  -Podiatry consulted, appreciate recs  -Wound care recs appreciated
C/w IV Ertapenem and Vancomycin  -dressing with collagenase  -Podiatry consulted, appreciate recs  -Nutritional consult ordered to recommend proper diet for efficient wound healing

## 2021-09-01 NOTE — DISCHARGE NOTE PROVIDER - HOSPITAL COURSE
81F with h/o progressive dementia (bedbound,) HTN, a-fib (on xarelto,) DMT2, and L heel ulcer (on IV PICC Abx) was BIBA from home after pulling out her PICC line, found to be hypotensive and hypothermic in ED, admitted to medicine to r/o Sepsis and need for PICC line re-insertion. Blood cx 8/30 NGTD. Cultures from PICC line tip without growth. Plan for PICC line re-inserion with IR today. C/w vancomycin and ertapenem through 9/16 and dressing changes with Santyl as per Podiatry.    Discussed case with Dr. Nguyễn, pt is cleared for discharge home after PICC line re-insertion.     81F with h/o progressive dementia (bedbound,) HTN, a-fib (on xarelto,) DMT2, and L heel ulcer (on IV PICC Abx) was BIBA from home after pulling out her PICC line, found to be hypotensive and hypothermic in ED, admitted to medicine to r/o Sepsis and need for PICC line re-insertion. Blood cx 8/30 NGTD. Cultures from PICC line tip without growth. Plan for PICC line re-inserion with IR today. C/w vancomycin and ertapenem through 9/16 and dressing changes with Santyl as per Podiatry. A1C 5.6, will reduce dose metformin to once per day.    Discussed case with Dr. Nguyễn, pt is cleared for discharge home after PICC line re-insertion.

## 2021-09-01 NOTE — PROGRESS NOTE ADULT - NSPROGADDITIONALINFOA_GEN_ALL_CORE
Plan for PICC placement by IR today  Will give vancomycin early prior to discharge  CM to set up transport at 7 PM  Home infusion services reinstated for tomorrow 9/2  Above plan d/w patient's son Jose who is in agreement

## 2021-09-01 NOTE — PROGRESS NOTE ADULT - PROBLEM SELECTOR PLAN 9
-C/w IV Ertapenem and Vancomycin through 9/16  -F/u Bld Cx  -UA neg, f/u urine culture  -House ID consulted, appreciate recs  -Goals of care discussed with pt's son Jose: expressed he wants everything to be done for his mother including CPR and Intubation if needed
-C/w IV Ertapenem and Vancomycin through 9/16  -F/u Bld Cx  -UA neg, f/u urine culture  -House ID consulted, appreciate recs  -Goals of care discussed with pt's son Jose: expressed he wants everything to be done for his mother including CPR and Intubation if needed

## 2021-09-01 NOTE — PROGRESS NOTE ADULT - ASSESSMENT
81F with h/o progressive dementia (bedbound,) HTN, a-fib (on xarelto,) DMT2, and L heel ulcer (on IV PICC Abx) was BIBA from home after pulling out her PICC line, found to be hypotensive and hypothermic in ED, admitted to medicine to r/o Sepsis and need for PICC line re-insertion. 
79 y/o F patient presents with left heel wound    - Afebrile, no leukocytosis  - GAGE:  L plantar heel wound with fibrotic base, probe to bone, no malodor, no tracking, no bogginess, no periwound erythema, no pus or drainage, L foot lateral malleoli wound to SubQ  - Pt admitted due to PICC line being pulled out, eval for sepsis before re-insertion of PICC  - Pod plan local wound care with Santyl  - Wound care instructions detailed in orders  - Re-ordered Z-flows  - Stable for discharge pending PICC placement  - Seen with attending
81F with h/o progressive dementia (bedbound,) HTN, a-fib (on xarelto,) DMT2, and L heel ulcer (on IV PICC Abx) was BIBA from home after pulling out her PICC line, found to be hypotensive and hypothermic in ED, admitted to medicine to r/o Sepsis and need for PICC line re-insertion.

## 2021-09-01 NOTE — DIETITIAN INITIAL EVALUATION ADULT. - ADD RECOMMEND
1. Encourage & assist Pt with meals; Monitor PO diet tolerance; Honor food preferences;          2. Add Multivitamins with minerals 1 tab daily for micronutrient coverage;        3. Monitor labs, hydration status; 1. Encourage & assist Pt with meals; Monitor PO diet tolerance; Honor food preferences;          2. Add Multivitamins with minerals 1 tab daily for micronutrient coverage;        3. Obtain Pt's accurate height & weight;     4. Monitor labs, hydration status;

## 2021-09-01 NOTE — DISCHARGE NOTE NURSING/CASE MANAGEMENT/SOCIAL WORK - PATIENT PORTAL LINK FT
You can access the FollowMyHealth Patient Portal offered by [facility] by registering at the following website: [email or web address]. By joining [employer]’s FollowMyHealth portal, you will also be able to view your health information using other applications (apps) compatible with our system.

## 2021-09-01 NOTE — DIETITIAN INITIAL EVALUATION ADULT. - PROBLEM SELECTOR PLAN 3
-restarted IV Ertapenem and Vancomycin  -dressing with collagenase  -Podiatry consulted  -Nutritional consult ordered to recommend proper diet for efficient wound healing.

## 2021-09-01 NOTE — DISCHARGE NOTE NURSING/CASE MANAGEMENT/SOCIAL WORK - NSDCPEFALRISK_GEN_ALL_CORE
For information on Fall & injury Prevention, visit [email or web address]

## 2021-09-01 NOTE — DISCHARGE NOTE PROVIDER - NSDCFUADDAPPT_GEN_ALL_CORE_FT
Cont IV antibiotics via PICC until about 9/16 to complete 6 week course  - weekly - cbc, BMP, esr, crp, vancomycin trough fax results to [phone]    Follow up with ID Dr. Bolden and Podiatry on discharge.

## 2021-09-01 NOTE — DIETITIAN INITIAL EVALUATION ADULT. - PROBLEM SELECTOR PLAN 1
-restarted IV Ertapenem and Vancomycin  -F/u Bld Cx, UA, Urine Cx  -F/u PICC Cath Tip culture  -House ID consulted  -Monitor VS Q4h  -Goals of care discussed with pt's son Jose: expressed he wants everything to be done for his mother including CPR and Intubation if needs.

## 2021-09-01 NOTE — DIETITIAN INITIAL EVALUATION ADULT. - OTHER INFO
Pt 82 yo female with progressive dementia (bedbound,) HTN, a-fib, DMT2, and L heel ulcer - per chart review.     At time of visit, Pt awake, appears disoriented/confused. Nurse not sure about Pt's PO intake/appetite at present. No report of nausea, vomiting or diarrhea @ this time. Pt with pressure injury: L heel, non-healing, per flow sheet. Will recommend to add prosource - 2x daily, to diet rx. Will recommend to add Multivitamins with minerals 1 tab daily for micronutrient coverage. Of note Pt's weights: 68 kg (8/30), 59.5 kg (8/7/21, per RDN note) -->> weight gain of 8.5 kg in <1 month. ??question accuracy?? Obtain Pt's  accurate height & weight. Of note Pt's height: 70" (8/30/21), 65" (8/7/21) ??question accuracy?? Case discussed with nurse. RDN remains available.   Of note, plan for Pt to be discharged today. Pt 80 yo female with progressive dementia (bedbound,) HTN, a-fib, DMT2, and L heel ulcer - per chart review.     At time of visit, Pt awake, appears disoriented/confused. Nurse not sure about Pt's PO intake/appetite at present. No report of nausea, vomiting or diarrhea @ this time. Pt with pressure injury: L heel, non-healing, per flow sheet. Will recommend to add prosource - 2x daily, to diet rx. Will recommend to add Multivitamins with minerals 1 tab daily for micronutrient coverage. Of note Pt's weights: 68 kg (8/30), 59.5 kg (8/7/21, per RDN note) -->> weight gain of 8.5 kg in <1 month. ??question accuracy?? Obtain Pt's  accurate height & weight. Case discussed with nurse. RDN remains available.   Of note, plan for Pt to be discharged today.

## 2021-09-01 NOTE — PROGRESS NOTE ADULT - PROBLEM SELECTOR PLAN 5
A1c 5.6%  Will d/c FS, FS during previous hospitalization were almost all <150  Holding home oral diabetic medications
A1c 5.6%  D/max FS, FS during previous hospitalization were almost all <150  Holding home oral diabetic medications  Will decrease metformin to 500mg qd from BID upon discharge as A1c is at goal

## 2021-09-01 NOTE — DISCHARGE NOTE PROVIDER - EXTENDED VTE YES NO FOR MLM ENOXAPARIN
1st attempt for enrollment call.  No answer, left voicemail.      Last 5 Patient Entered Readings                                      Current 30 Day Average: 133/79     Recent Readings 8/22/2019 8/21/2019    SBP (mmHg) 141 125    DBP (mmHg) 81 77    Pulse 69 100            
Digital Medicine Enrollment Call    Introduced Ms. Juanita Walton to Digital Medicine.     Discussed program expectations and requirements.    Introduced digital medicine care team.     Reviewed the importance of self-monitoring for digital medicine participation.     Reviewed that the Digital Medicine team is not available for emergencies and instructed the patient to call 911 or [facility] ([phone] or [phone]) if one arises.          Last 5 Patient Entered Readings                                      Current 30 Day Average: 131/76     Recent Readings 9/5/2019 9/5/2019 9/3/2019 9/2/2019 9/2/2019    SBP (mmHg) 164 158 138 128 148    DBP (mmHg) 94 84 77 75 79    Pulse 68 78 63 67 66            
,

## 2021-09-04 LAB
CULTURE RESULTS: NO GROWTH — SIGNIFICANT CHANGE UP
CULTURE RESULTS: SIGNIFICANT CHANGE UP
CULTURE RESULTS: SIGNIFICANT CHANGE UP
SPECIMEN SOURCE: SIGNIFICANT CHANGE UP

## 2021-09-11 ENCOUNTER — NON-APPOINTMENT (OUTPATIENT)
Age: 81
End: 2021-09-11

## 2021-11-10 ENCOUNTER — INPATIENT (INPATIENT)
Facility: HOSPITAL | Age: 81
LOS: 0 days | End: 2021-11-11
Attending: INTERNAL MEDICINE | Admitting: INTERNAL MEDICINE
Payer: MEDICARE

## 2021-11-10 VITALS
DIASTOLIC BLOOD PRESSURE: 34 MMHG | RESPIRATION RATE: 14 BRPM | WEIGHT: 115.08 LBS | HEART RATE: 106 BPM | SYSTOLIC BLOOD PRESSURE: 60 MMHG | HEIGHT: 63 IN | TEMPERATURE: 99 F

## 2021-11-10 DIAGNOSIS — E86.0 DEHYDRATION: ICD-10-CM

## 2021-11-10 DIAGNOSIS — A41.50 GRAM-NEGATIVE SEPSIS, UNSPECIFIED: ICD-10-CM

## 2021-11-10 DIAGNOSIS — I10 ESSENTIAL (PRIMARY) HYPERTENSION: ICD-10-CM

## 2021-11-10 DIAGNOSIS — E11.9 TYPE 2 DIABETES MELLITUS WITHOUT COMPLICATIONS: ICD-10-CM

## 2021-11-10 DIAGNOSIS — I47.1 SUPRAVENTRICULAR TACHYCARDIA: ICD-10-CM

## 2021-11-10 DIAGNOSIS — Z74.01 BED CONFINEMENT STATUS: ICD-10-CM

## 2021-11-10 DIAGNOSIS — L89.154 PRESSURE ULCER OF SACRAL REGION, STAGE 4: ICD-10-CM

## 2021-11-10 DIAGNOSIS — R57.0 CARDIOGENIC SHOCK: ICD-10-CM

## 2021-11-10 DIAGNOSIS — E87.2 ACIDOSIS: ICD-10-CM

## 2021-11-10 DIAGNOSIS — L89.619 PRESSURE ULCER OF RIGHT HEEL, UNSPECIFIED STAGE: ICD-10-CM

## 2021-11-10 DIAGNOSIS — I21.4 NON-ST ELEVATION (NSTEMI) MYOCARDIAL INFARCTION: ICD-10-CM

## 2021-11-10 DIAGNOSIS — Z51.5 ENCOUNTER FOR PALLIATIVE CARE: ICD-10-CM

## 2021-11-10 DIAGNOSIS — R65.21 SEVERE SEPSIS WITH SEPTIC SHOCK: ICD-10-CM

## 2021-11-10 DIAGNOSIS — Z79.01 LONG TERM (CURRENT) USE OF ANTICOAGULANTS: ICD-10-CM

## 2021-11-10 DIAGNOSIS — I48.91 UNSPECIFIED ATRIAL FIBRILLATION: ICD-10-CM

## 2021-11-10 DIAGNOSIS — E78.5 HYPERLIPIDEMIA, UNSPECIFIED: ICD-10-CM

## 2021-11-10 DIAGNOSIS — L89.629 PRESSURE ULCER OF LEFT HEEL, UNSPECIFIED STAGE: ICD-10-CM

## 2021-11-10 DIAGNOSIS — Z66 DO NOT RESUSCITATE: ICD-10-CM

## 2021-11-10 DIAGNOSIS — A41.9 SEPSIS, UNSPECIFIED ORGANISM: ICD-10-CM

## 2021-11-10 DIAGNOSIS — F03.90 UNSPECIFIED DEMENTIA WITHOUT BEHAVIORAL DISTURBANCE: ICD-10-CM

## 2021-11-10 LAB
ALBUMIN SERPL ELPH-MCNC: 1.2 G/DL — LOW (ref 3.3–5)
ALP SERPL-CCNC: 681 U/L — HIGH (ref 40–120)
ALT FLD-CCNC: 133 U/L — HIGH (ref 12–78)
ANION GAP SERPL CALC-SCNC: 16 MMOL/L — SIGNIFICANT CHANGE UP (ref 5–17)
ANISOCYTOSIS BLD QL: SLIGHT — SIGNIFICANT CHANGE UP
APPEARANCE UR: ABNORMAL
APTT BLD: 29 SEC — SIGNIFICANT CHANGE UP (ref 27.5–35.5)
AST SERPL-CCNC: 370 U/L — HIGH (ref 15–37)
BACTERIA # UR AUTO: ABNORMAL
BASOPHILS # BLD AUTO: 0 K/UL — SIGNIFICANT CHANGE UP (ref 0–0.2)
BASOPHILS NFR BLD AUTO: 0 % — SIGNIFICANT CHANGE UP (ref 0–2)
BILIRUB SERPL-MCNC: 2.1 MG/DL — HIGH (ref 0.2–1.2)
BILIRUB UR-MCNC: NEGATIVE — SIGNIFICANT CHANGE UP
BUN SERPL-MCNC: 48 MG/DL — HIGH (ref 7–23)
CALCIUM SERPL-MCNC: 7.1 MG/DL — LOW (ref 8.5–10.1)
CHLORIDE SERPL-SCNC: 126 MMOL/L — HIGH (ref 96–108)
CO2 SERPL-SCNC: 17 MMOL/L — LOW (ref 22–31)
COD CRY URNS QL: ABNORMAL
COLOR SPEC: YELLOW — SIGNIFICANT CHANGE UP
CREAT SERPL-MCNC: 2.84 MG/DL — HIGH (ref 0.5–1.3)
DIFF PNL FLD: ABNORMAL
EOSINOPHIL # BLD AUTO: 0 K/UL — SIGNIFICANT CHANGE UP (ref 0–0.5)
EOSINOPHIL NFR BLD AUTO: 0 % — SIGNIFICANT CHANGE UP (ref 0–6)
EPI CELLS # UR: SIGNIFICANT CHANGE UP
FLUAV AG NPH QL: SIGNIFICANT CHANGE UP
FLUBV AG NPH QL: SIGNIFICANT CHANGE UP
GLUCOSE BLDC GLUCOMTR-MCNC: 21 MG/DL — CRITICAL LOW (ref 70–99)
GLUCOSE BLDC GLUCOMTR-MCNC: 40 MG/DL — CRITICAL LOW (ref 70–99)
GLUCOSE BLDC GLUCOMTR-MCNC: 414 MG/DL — HIGH (ref 70–99)
GLUCOSE BLDC GLUCOMTR-MCNC: 60 MG/DL — LOW (ref 70–99)
GLUCOSE SERPL-MCNC: 448 MG/DL — HIGH (ref 70–99)
GLUCOSE UR QL: NEGATIVE MG/DL — SIGNIFICANT CHANGE UP
GRAM STN FLD: SIGNIFICANT CHANGE UP
HCT VFR BLD CALC: 26.4 % — LOW (ref 34.5–45)
HGB BLD-MCNC: 8.8 G/DL — LOW (ref 11.5–15.5)
HYALINE CASTS # UR AUTO: ABNORMAL /LPF
HYPOCHROMIA BLD QL: SIGNIFICANT CHANGE UP
INR BLD: 2.02 RATIO — HIGH (ref 0.88–1.16)
KETONES UR-MCNC: ABNORMAL
LACTATE SERPL-SCNC: 17.2 MMOL/L — CRITICAL HIGH (ref 0.7–2)
LACTATE SERPL-SCNC: 8.6 MMOL/L — CRITICAL HIGH (ref 0.7–2)
LEUKOCYTE ESTERASE UR-ACNC: ABNORMAL
LYMPHOCYTES # BLD AUTO: 0.73 K/UL — LOW (ref 1–3.3)
LYMPHOCYTES # BLD AUTO: 6 % — LOW (ref 13–44)
MACROCYTES BLD QL: SLIGHT — SIGNIFICANT CHANGE UP
MANUAL SMEAR VERIFICATION: SIGNIFICANT CHANGE UP
MCHC RBC-ENTMCNC: 28.9 PG — SIGNIFICANT CHANGE UP (ref 27–34)
MCHC RBC-ENTMCNC: 33.3 GM/DL — SIGNIFICANT CHANGE UP (ref 32–36)
MCV RBC AUTO: 86.8 FL — SIGNIFICANT CHANGE UP (ref 80–100)
METAMYELOCYTES # FLD: 4 % — HIGH (ref 0–0)
METHOD TYPE: SIGNIFICANT CHANGE UP
MICROCYTES BLD QL: SLIGHT — SIGNIFICANT CHANGE UP
MONOCYTES # BLD AUTO: 0.24 K/UL — SIGNIFICANT CHANGE UP (ref 0–0.9)
MONOCYTES NFR BLD AUTO: 2 % — SIGNIFICANT CHANGE UP (ref 2–14)
MYELOCYTES NFR BLD: 1 % — HIGH (ref 0–0)
NEUTROPHILS # BLD AUTO: 10.52 K/UL — HIGH (ref 1.8–7.4)
NEUTROPHILS NFR BLD AUTO: 68 % — SIGNIFICANT CHANGE UP (ref 43–77)
NEUTS BAND # BLD: 18 % — HIGH (ref 0–8)
NEUTS VAC BLD QL SMEAR: SIGNIFICANT CHANGE UP
NITRITE UR-MCNC: NEGATIVE — SIGNIFICANT CHANGE UP
NRBC # BLD: 3 /100 — HIGH (ref 0–0)
NRBC # BLD: SIGNIFICANT CHANGE UP /100 WBCS (ref 0–0)
PH UR: 7 — SIGNIFICANT CHANGE UP (ref 5–8)
PLAT MORPH BLD: NORMAL — SIGNIFICANT CHANGE UP
PLATELET # BLD AUTO: 178 K/UL — SIGNIFICANT CHANGE UP (ref 150–400)
POLYCHROMASIA BLD QL SMEAR: SLIGHT — SIGNIFICANT CHANGE UP
POTASSIUM SERPL-MCNC: 4.4 MMOL/L — SIGNIFICANT CHANGE UP (ref 3.5–5.3)
POTASSIUM SERPL-SCNC: 4.4 MMOL/L — SIGNIFICANT CHANGE UP (ref 3.5–5.3)
PROMYELOCYTES # FLD: 1 % — HIGH (ref 0–0)
PROT SERPL-MCNC: 5.5 GM/DL — LOW (ref 6–8.3)
PROT UR-MCNC: 100 MG/DL
PROTHROM AB SERPL-ACNC: 22.7 SEC — HIGH (ref 10.6–13.6)
RBC # BLD: 3.04 M/UL — LOW (ref 3.8–5.2)
RBC # FLD: 15.6 % — HIGH (ref 10.3–14.5)
RBC BLD AUTO: ABNORMAL
RBC CASTS # UR COMP ASSIST: ABNORMAL /HPF (ref 0–4)
SARS-COV-2 RNA SPEC QL NAA+PROBE: SIGNIFICANT CHANGE UP
SCHISTOCYTES BLD QL AUTO: SLIGHT — SIGNIFICANT CHANGE UP
SODIUM SERPL-SCNC: 159 MMOL/L — HIGH (ref 135–145)
SP GR SPEC: 1.01 — SIGNIFICANT CHANGE UP (ref 1.01–1.02)
SPECIMEN SOURCE: SIGNIFICANT CHANGE UP
TARGETS BLD QL SMEAR: SLIGHT — SIGNIFICANT CHANGE UP
TOXIC GRANULES BLD QL SMEAR: PRESENT — SIGNIFICANT CHANGE UP
TROPONIN I, HIGH SENSITIVITY RESULT: 3019.4 NG/L — HIGH
UROBILINOGEN FLD QL: 1 MG/DL
WBC # BLD: 12.23 K/UL — HIGH (ref 3.8–10.5)
WBC # FLD AUTO: 12.23 K/UL — HIGH (ref 3.8–10.5)
WBC UR QL: ABNORMAL

## 2021-11-10 PROCEDURE — 99223 1ST HOSP IP/OBS HIGH 75: CPT

## 2021-11-10 PROCEDURE — 99291 CRITICAL CARE FIRST HOUR: CPT

## 2021-11-10 PROCEDURE — 93010 ELECTROCARDIOGRAM REPORT: CPT

## 2021-11-10 PROCEDURE — 99497 ADVNCD CARE PLAN 30 MIN: CPT

## 2021-11-10 PROCEDURE — 71045 X-RAY EXAM CHEST 1 VIEW: CPT | Mod: 26

## 2021-11-10 RX ORDER — SODIUM CHLORIDE 9 MG/ML
1600 INJECTION INTRAMUSCULAR; INTRAVENOUS; SUBCUTANEOUS ONCE
Refills: 0 | Status: COMPLETED | OUTPATIENT
Start: 2021-11-10 | End: 2021-11-10

## 2021-11-10 RX ORDER — HEPARIN SODIUM 5000 [USP'U]/ML
INJECTION INTRAVENOUS; SUBCUTANEOUS
Qty: 25000 | Refills: 0 | Status: DISCONTINUED | OUTPATIENT
Start: 2021-11-10 | End: 2021-11-10

## 2021-11-10 RX ORDER — INSULIN LISPRO 100/ML
VIAL (ML) SUBCUTANEOUS EVERY 4 HOURS
Refills: 0 | Status: DISCONTINUED | OUTPATIENT
Start: 2021-11-10 | End: 2021-11-10

## 2021-11-10 RX ORDER — SODIUM CHLORIDE 9 MG/ML
1000 INJECTION, SOLUTION INTRAVENOUS
Refills: 0 | Status: DISCONTINUED | OUTPATIENT
Start: 2021-11-10 | End: 2021-11-10

## 2021-11-10 RX ORDER — HEPARIN SODIUM 5000 [USP'U]/ML
3200 INJECTION INTRAVENOUS; SUBCUTANEOUS ONCE
Refills: 0 | Status: COMPLETED | OUTPATIENT
Start: 2021-11-10 | End: 2021-11-10

## 2021-11-10 RX ORDER — SODIUM CHLORIDE 9 MG/ML
2000 INJECTION, SOLUTION INTRAVENOUS ONCE
Refills: 0 | Status: COMPLETED | OUTPATIENT
Start: 2021-11-10 | End: 2021-11-10

## 2021-11-10 RX ORDER — DEXTROSE 50 % IN WATER 50 %
25 SYRINGE (ML) INTRAVENOUS ONCE
Refills: 0 | Status: DISCONTINUED | OUTPATIENT
Start: 2021-11-10 | End: 2021-11-10

## 2021-11-10 RX ORDER — DEXTROSE 50 % IN WATER 50 %
12.5 SYRINGE (ML) INTRAVENOUS ONCE
Refills: 0 | Status: DISCONTINUED | OUTPATIENT
Start: 2021-11-10 | End: 2021-11-10

## 2021-11-10 RX ORDER — INFLUENZA VIRUS VACCINE 15; 15; 15; 15 UG/.5ML; UG/.5ML; UG/.5ML; UG/.5ML
0.7 SUSPENSION INTRAMUSCULAR ONCE
Refills: 0 | Status: DISCONTINUED | OUTPATIENT
Start: 2021-11-10 | End: 2021-11-11

## 2021-11-10 RX ORDER — GLUCAGON INJECTION, SOLUTION 0.5 MG/.1ML
1 INJECTION, SOLUTION SUBCUTANEOUS ONCE
Refills: 0 | Status: DISCONTINUED | OUTPATIENT
Start: 2021-11-10 | End: 2021-11-10

## 2021-11-10 RX ORDER — VANCOMYCIN HCL 1 G
1000 VIAL (EA) INTRAVENOUS ONCE
Refills: 0 | Status: COMPLETED | OUTPATIENT
Start: 2021-11-10 | End: 2021-11-10

## 2021-11-10 RX ORDER — HEPARIN SODIUM 5000 [USP'U]/ML
3200 INJECTION INTRAVENOUS; SUBCUTANEOUS EVERY 6 HOURS
Refills: 0 | Status: DISCONTINUED | OUTPATIENT
Start: 2021-11-10 | End: 2021-11-10

## 2021-11-10 RX ORDER — MORPHINE SULFATE 50 MG/1
2 CAPSULE, EXTENDED RELEASE ORAL
Refills: 0 | Status: DISCONTINUED | OUTPATIENT
Start: 2021-11-10 | End: 2021-11-11

## 2021-11-10 RX ORDER — DEXTROSE 50 % IN WATER 50 %
15 SYRINGE (ML) INTRAVENOUS ONCE
Refills: 0 | Status: DISCONTINUED | OUTPATIENT
Start: 2021-11-10 | End: 2021-11-10

## 2021-11-10 RX ORDER — ACETAMINOPHEN 500 MG
650 TABLET ORAL ONCE
Refills: 0 | Status: COMPLETED | OUTPATIENT
Start: 2021-11-10 | End: 2021-11-10

## 2021-11-10 RX ORDER — PIPERACILLIN AND TAZOBACTAM 4; .5 G/20ML; G/20ML
3.38 INJECTION, POWDER, LYOPHILIZED, FOR SOLUTION INTRAVENOUS ONCE
Refills: 0 | Status: COMPLETED | OUTPATIENT
Start: 2021-11-10 | End: 2021-11-10

## 2021-11-10 RX ADMIN — HEPARIN SODIUM 650 UNIT(S)/HR: 5000 INJECTION INTRAVENOUS; SUBCUTANEOUS at 16:10

## 2021-11-10 RX ADMIN — SODIUM CHLORIDE 1000 MILLILITER(S): 9 INJECTION, SOLUTION INTRAVENOUS at 14:38

## 2021-11-10 RX ADMIN — HEPARIN SODIUM 3200 UNIT(S): 5000 INJECTION INTRAVENOUS; SUBCUTANEOUS at 16:10

## 2021-11-10 RX ADMIN — PIPERACILLIN AND TAZOBACTAM 200 GRAM(S): 4; .5 INJECTION, POWDER, LYOPHILIZED, FOR SOLUTION INTRAVENOUS at 13:17

## 2021-11-10 RX ADMIN — SODIUM CHLORIDE 1600 MILLILITER(S): 9 INJECTION INTRAMUSCULAR; INTRAVENOUS; SUBCUTANEOUS at 13:00

## 2021-11-10 RX ADMIN — SODIUM CHLORIDE 2000 MILLILITER(S): 9 INJECTION, SOLUTION INTRAVENOUS at 16:38

## 2021-11-10 RX ADMIN — Medication 250 MILLIGRAM(S): at 13:46

## 2021-11-10 RX ADMIN — PIPERACILLIN AND TAZOBACTAM 3.38 GRAM(S): 4; .5 INJECTION, POWDER, LYOPHILIZED, FOR SOLUTION INTRAVENOUS at 13:47

## 2021-11-10 RX ADMIN — Medication 650 MILLIGRAM(S): at 13:21

## 2021-11-10 RX ADMIN — Medication 650 MILLIGRAM(S): at 13:38

## 2021-11-10 RX ADMIN — MORPHINE SULFATE 2 MILLIGRAM(S): 50 CAPSULE, EXTENDED RELEASE ORAL at 23:01

## 2021-11-10 RX ADMIN — Medication 1000 MILLIGRAM(S): at 14:46

## 2021-11-10 RX ADMIN — SODIUM CHLORIDE 1600 MILLILITER(S): 9 INJECTION INTRAMUSCULAR; INTRAVENOUS; SUBCUTANEOUS at 14:00

## 2021-11-10 NOTE — GOALS OF CARE CONVERSATION - ADVANCED CARE PLANNING - CONVERSATION DETAILS
I discussed with family regarding Mrs. Lewis condition. At this time she is with multiorgan failure (renal/liver/encephalopathy/elevated troponins) and continues to decline in vital signs and lactate worsening. I rediscussed the concept of pressors administered through a central line. She has a very poor prognosis and overall survival is poor at this time. They asked regarding midodrine which was previously offered however she is NPO at this time and unable to eat. They asked about pressors peripherally which I advised against due to possible limb loss/ischemia/phlebitis. Family is in understanding of her condition and wanted to be sure they had maximized all non-invasive forms of medical therapy. At this time they are in agreement for comfort measures only. I explained the role of morphine in the event that she were to be tachypneic which they understand. Will place order for morphine, will discontinue /monitor, will hold blood draws and vitals at this time. Family will take turns with patient overnight.

## 2021-11-10 NOTE — ED ADULT NURSE NOTE - CHIEF COMPLAINT QUOTE
BIBA- from home, more lethargic and lips twisted to side around 10am  EMS found in rapid afib Cardioverted 100JX1, 150JX1, EMS   Bed bound/Contracted/PU heels, sacrum/coccyx, ankle  HX DM, HTN, Afib, HLD

## 2021-11-10 NOTE — ED PROVIDER NOTE - CARE PLAN
1 Principal Discharge DX:	Hypotension  Secondary Diagnosis:	Sepsis  Secondary Diagnosis:	Elevated troponin level  Secondary Diagnosis:	Rapid atrial fibrillation

## 2021-11-10 NOTE — ED PROVIDER NOTE - ENMT, MLM
Airway patent, Nasal mucosa clear. Mouth with normal mucosa. Throat has no vesicles, no oropharyngeal exudates and uvula is midline. Only lower dentition noted

## 2021-11-10 NOTE — H&P ADULT - ASSESSMENT
80F with dementia, HTN, CVA, Afib on Xarelto, DM.  Recent hospitalization at OSH January 2021 for possible stroke and while at rehab developed sacral decub which resolved and left heel decub which became infected. Recent admission to [facility] for Infected heel. She presented to ER with unresponsiveness. Food to have A fib with RVR with hypotension. Pt was cardio-verted X 2, ZAKI remains low. Seen and evaluated by ICU, As per ICU attending’s discussion with family is now DNR/DNI, no pressor. Pt was found to have elevated trop I and LA. Pt is seen and examined at bedside. Remains hypotensive, SBP: ~ 70-80.  Pt was started on heparin drip by ER.    NSTEMI:  Trop I > 3000  Pt was stated on heparin drip  However pt is hypotensive and family doesn’t want pressor  Will start ASA/ statin if can take PO  Trend trop  2D echo if stabilizes    A fib with RVR:  S/P Cardioversion  HR improved , BP remains stable  Started on heparin   2D echo if stabilizes    Hypotension:  Cardiogenic shock vs septic shock  2D echo  S/P 4 L NS  No pressor per family  Continue IVF  Empiric Abx  Follow up Clx  Trend LA    Lactic acidosis:  - 2/2 above  - IVF  - Trend lab    DM:  - Hb A1c   - Insulin SS    Discussed with son at bedside, and other son on phone.   Family still undecided about blood draw. Will continue for now. Discussed about comfort care.  Family wants to monitor her overnight and decide in am.

## 2021-11-10 NOTE — H&P ADULT - NSTOBACCOSCREENHP_GEN_A_CS
C/O bilateral hand pain for several days.  Admitted for same 4 days ago, went home yesterday.  
Patient declined to answer

## 2021-11-10 NOTE — ED ADULT NURSE NOTE - NSIMPLEMENTINTERV_GEN_ALL_ED
Implemented All Fall with Harm Risk Interventions:  [place] to call system. Call bell, personal items and telephone within reach. Instruct patient to call for assistance. Room bathroom lighting operational. Non-slip footwear when patient is off stretcher. Physically safe environment: no spills, clutter or unnecessary equipment. Stretcher in lowest position, wheels locked, appropriate side rails in place. Provide visual cue, wrist band, yellow gown, etc. Monitor gait and stability. Monitor for mental status changes and reorient to person, place, and time. Review medications for side effects contributing to fall risk. Reinforce activity limits and safety measures with patient and family. Provide visual clues: red socks.

## 2021-11-10 NOTE — ED PROVIDER NOTE - OBJECTIVE STATEMENT
80 yo F w/PMH of dementia (bed bound), HTN (on Cardizem and metoprolol), HLD, DM, a-fib (on Xarelto) presents to the ED BIBEMS for unresponsiveness. EMS arrived on scene and noted pt to be unresponsive and in rapid a-fib, pt cardioverted twice with return to normal sinus rhythm. Family reports pt has not been eating or drinking lately.

## 2021-11-10 NOTE — ED ADULT NURSE REASSESSMENT NOTE - NS ED NURSE REASSESS COMMENT FT1
Dr. Escalera at bedside speaking with family, discussing goals of care, pt's quality of life. Emotional support provided to family.
received report from JOEY Aburto. pt on DNR/DNI, MoLst form has been signed and secured. as per RN family doesn't want aggressive treatment. pt for admission, waiting for room.
Pt endorsed to [facility]  repeat FS 60 additional D50 given .9ns bolus given

## 2021-11-10 NOTE — ED ADULT NURSE NOTE - NURSING MUSC EXTREMITY LIMITED ROM
2/2 contractions/left upper extremity/right upper extremity/left lower extremity/right lower extremity

## 2021-11-10 NOTE — H&P ADULT - HISTORY OF PRESENT ILLNESS
80F with dementia, HTN, CVA, Afib on Xarelto, DM.  Recent hospitalization at OSH January 2021 for possible stroke and while at rehab developed sacral decub which resolved and left heel decub which became infected. Recent admission to [facility] for Infected heel. She presented to ER with unresponsiveness. Food to have A fib with RVR with hypotension. Pt was cardio-verted X 2, ZAKI remains low. Seen and evaluated by ICU, As per ICU attending’s discussion with family is now DNR/DNI, no pressor. Pt was found to have elevated trop I and LA. Pt is seen and examined at bedside. Remains hypotensive, SBP: ~ 70-80.  Pt was started on heparin drip by ER.

## 2021-11-10 NOTE — ED PROVIDER NOTE - CLINICAL SUMMARY MEDICAL DECISION MAKING FREE TEXT BOX
Discussed with son who states would like pt be DNR status given dementia and baseline level of health. Discussed with son who states would like pt be DNR status given dementia and baseline level of health status. Pt with lactic acidosis, severe dehydration and possible sepsis - treated ICU consulted and trop elevation noted - cardiology consulted- will place on heparin

## 2021-11-10 NOTE — H&P ADULT - NSHPPHYSICALEXAM_GEN_ALL_CORE
GENERAL: NAD, minimally responsive  HEAD:  Atraumatic, Normocephalic  EYES: PERRLA  ENMT: No tonsillar erythema, exudates, or enlargement; Moist mucous membranes, Good dentition, No lesions  NECK: Supple, No JVD, Normal thyroid  NERVOUS SYSTEM:  minimally responsive  CHEST/LUNG: Clear to percussion bilaterally; No rales, rhonchi, wheezing, or rubs  HEART: Irregular, rate normal   ABDOMEN: Soft, Nontender, Nondistended; Bowel sounds present  EXTREMITIES:  2+ Peripheral Pulses, No clubbing, cyanosis, or edema  LYMPH: No lymphadenopathy noted

## 2021-11-10 NOTE — ED ADULT TRIAGE NOTE - CHIEF COMPLAINT QUOTE
BIBA- from home, more lethargic and lips twisted to side around 10am  EMS found in rapid afib Cardioverted 100JX1, 150JX1, EMS   Bed bound/Contracted/PU heels, sacrum/coccyx, ankle  HX DM, HTN, Afib, HLD BIBA- from home, more lethargic and lips twisted to side around 10am  EMS found in rapid afib Cardioverted 100JX1, 150JX1, EMS , Left FA#20g  Bed bound/Contracted/PU heels, sacrum/coccyx, ankle  HX DM, HTN, Afib, HLD BIBA- from home, more lethargic and lips twisted to side around 10am  EMS found in rapid afib 180s, Cardioverted 100JX1, 150JX1, EMS , Left FA#20g  Bed bound/Contracted/PU heels, sacrum/coccyx, ankle  HX DM, HTN, Afib, HLD

## 2021-11-10 NOTE — H&P ADULT - NSHPLABSRESULTS_GEN_ALL_CORE
8.8    12.23 )-----------( 178      ( 10 Nov 2021 12:49 )             26.4     11-10    159<H>  |  126<H>  |  48<H>  ----------------------------<  448<H>  4.4   |  17<L>  |  2.84<H>    Ca    7.1<L>      10 Nov 2021 12:49    TPro  5.5<L>  /  Alb  1.2<L>  /  TBili  2.1<H>  /  DBili  x   /  AST  370<H>  /  ALT  133<H>  /  AlkPhos  681<H>  11-10    PT/INR - ( 10 Nov 2021 12:49 )   PT: 22.7 sec;   INR: 2.02 ratio         PTT - ( 10 Nov 2021 12:49 )  PTT:29.0 sec  Urinalysis Basic - ( 10 Nov 2021 15:42 )    Color: Yellow / Appearance: very cloudy / S.010 / pH: x  Gluc: x / Ketone: Trace  / Bili: Negative / Urobili: 1 mg/dL   Blood: x / Protein: 100 mg/dL / Nitrite: Negative   Leuk Esterase: Moderate / RBC: 6-10 /HPF / WBC 11-25   Sq Epi: x / Non Sq Epi: Few / Bacteria: Many

## 2021-11-10 NOTE — CONSULT NOTE ADULT - SUBJECTIVE AND OBJECTIVE BOX
CHIEF COMPLAINT:  Patient is a 81y old  Female who presents with a chief complaint of Shock (10 Nov 2021 21:08)      HPI:  80F with dementia, HTN, CVA, Afib on Xarelto, DM.  Recent hospitalization at OSH 2021 for possible stroke and while at rehab developed sacral decub which resolved and left heel decub which became infected. Recent admission to [facility] for Infected heel. She presented to ER with unresponsiveness. Had A fib with RVR with hypotension. Pt was cardioverted X 2, BP remains low. Seen and evaluated by ICU, As per ICU attending' s discussion with family is now DNR/DNI, no pressor. Pt was found to have elevated trop I. Remains hypotensive, SBP: ~ 70-80.  Pt was started on heparin drip by ER.    ALLERGIES:  No Known Allergies    Home Medications:  ertapenem 1 g injection: 1 gram(s) intravenous once a day (24 Aug 2021 21:33)  lactobacillus acidophilus oral tablet: 1 tab(s) orally 3 times a day (24 Aug 2021 21:33)  Lopressor 50 mg oral tablet: 1 tab(s) orally 2 times a day (24 Aug 2021 21:33)  melatonin 3 mg oral tablet: 1 tab(s) orally once a day (at bedtime) (24 Aug 2021 21:33)  vancomycin 1 g intravenous injection: 1 gram(s) intravenous once a day (24 Aug 2021 21:33)  Xarelto 15 mg oral tablet: 1 tab(s) orally once a day (in the evening) (24 Aug 2021 21:33)    PAST MEDICAL & SURGICAL HISTORY:  Type 2 diabetes mellitus    Atrial fibrillation    Dementia          FAMILY HISTORY:  n/a    SOCIAL HISTORY:  no tobacco use    REVIEW OF SYSTEMS:  unable    PHYSICAL EXAM:  Vital Signs:  Vital Signs Last 24 Hrs  T(C): 36.2 (10 Nov 2021 22:25), Max: 37.4 (10 Nov 2021 12:09)  T(F): 97.2 (10 Nov 2021 22:25), Max: 99.3 (10 Nov 2021 12:09)  HR: 74 (10 Nov 2021 22:25) (74 - 107)  BP: 59/42 (10 Nov 2021 22:25) (59/42 - 96/61)  BP(mean): 47 (10 Nov 2021 22:25) (47 - 47)  RR: 16 (10 Nov 2021 22:25) (14 - 27)  SpO2: 54% (10 Nov 2021 22:25) (54% - 100%)    Tele: SR, pSVT    Constitutional: no deformities and no acute distress. Bed-bound.  Eyes: the conjunctiva exhibited no abnormalities and the eyelids demonstrated no xanthelasmas.   HEENT: normal oral mucosa, no oral pallor and no oral cyanosis.   Neck: normal jugular venous A waves present, normal jugular venous V waves present and no jugular venous jorgensen A waves.   Pulmonary: no respiratory distress, normal respiratory rhythm and effort, no accessory muscle use.  Cardiovascular: heart rate and rhythm were normal, normal S1 and S2 and no murmur.  Abdomen: soft, non-tender  Musculoskeletal: the gait could not be assessed.  Extremities: no clubbing of the fingernails, no localized cyanosis.  Skin: normal skin color and pigmentation, no rash, no venous stasis.      LABORATORY:                          8.8    12.23 )-----------( 178      ( 10 Nov 2021 12:49 )             26.4     11-10    159<H>  |  126<H>  |  48<H>  ----------------------------<  448<H>  4.4   |  17<L>  |  2.84<H>    Ca    7.1<L>      10 Nov 2021 12:49    TPro  5.5<L>  /  Alb  1.2<L>  /  TBili  2.1<H>  /  DBili  x   /  AST  370<H>  /  ALT  133<H>  /  AlkPhos  681<H>  11-10          CAPILLARY BLOOD GLUCOSE  POCT Blood Glucose.: 414 mg/dL (10 Nov 2021 18:55)  POCT Blood Glucose.: 60 mg/dL (10 Nov 2021 12:31)  POCT Blood Glucose.: 40 mg/dL (10 Nov 2021 12:13)  POCT Blood Glucose.: 21 mg/dL (10 Nov 2021 12:11)    LIVER FUNCTIONS - ( 10 Nov 2021 12:49 )  Alb: 1.2 g/dL / Pro: 5.5 gm/dL / ALK PHOS: 681 U/L / ALT: 133 U/L / AST: 370 U/L / GGT: x           PT/INR - ( 10 Nov 2021 12:49 )   PT: 22.7 sec;   INR: 2.02 ratio         PTT - ( 10 Nov 2021 12:49 )  PTT:29.0 sec  Urinalysis Basic - ( 10 Nov 2021 15:42 )    Color: Yellow / Appearance: very cloudy / S.010 / pH: x  Gluc: x / Ketone: Trace  / Bili: Negative / Urobili: 1 mg/dL   Blood: x / Protein: 100 mg/dL / Nitrite: Negative   Leuk Esterase: Moderate / RBC: 6-10 /HPF / WBC 11-25   Sq Epi: x / Non Sq Epi: Few / Bacteria: Many    Troponin I, High Sensitivity Result: 3019.4    IMAGING:  < from: Xray Chest 1 View-PORTABLE IMMEDIATE (11.10.21 @ 13:13) >  IMPRESSION: Heart enlargement again noted.    < end of copied text >    ASSESSMENT:  80F with dementia, HTN, CVA, Afib on Xarelto, DM.  Recent hospitalization at OSH 2021 for possible stroke and while at rehab developed sacral decub which resolved and left heel decub which became infected. Recent admission to [facility] for Infected heel. She presented to ER with unresponsiveness. Had A fib with RVR with hypotension. Pt was cardioverted X 2, BP remains low. Seen and evaluated by ICU, As per ICU attending' s discussion with family is now DNR/DNI, no pressor. Pt was found to have elevated trop I. Remains hypotensive, SBP: ~ 70-80.  Pt was started on heparin drip by ER. Troponin elevated perhaps on basis of cardioversions and/or demand ischemia.    PLAN:     Pt is moribund with hypotension, pSVT and minimal consciousness with advanced medical conditions. Pt made DNR/DNI.  Pain relief and comfort care measures. No aggressive cardiac interventions planned. D/w pt's family at bedside. Signing off.      Justin Melendez MD, FACC, BRIAN, CAREN, FACP  Director, Heart Failure Services  [facility]  , Department of Cardiology  [facility]

## 2021-11-10 NOTE — ED ADULT NURSE NOTE - OBJECTIVE STATEMENT
Pt BIBEMS for unresponsiveness at home, found to be in rapid Afib. Pt was cardioverted en route to ED X2 with successful conversion to NSR. Per family, pt has had poor PO intake X4 days. Pt noted to have upper and lower extremity contractions and noted to have decubitus to bilateral heels, sacrum/coccyx, bilateral ankles. FSG 21 in ED, amp of D50 given X3. Pt hypotensive (70-80 sys) in ED. 1600 ml NS bolus infusing.

## 2021-11-10 NOTE — PROVIDER CONTACT NOTE (OTHER) - ACTION/TREATMENT ORDERED:
LR @ 75ml/hr gentle hydration as per Dr. Oneil. LR @ 75ml/hr gentle hydration to be ordered as per Dr. Oneil.

## 2021-11-11 VITALS — SYSTOLIC BLOOD PRESSURE: 52 MMHG | DIASTOLIC BLOOD PRESSURE: 40 MMHG

## 2021-11-11 LAB
-  K. PNEUMONIAE GROUP: SIGNIFICANT CHANGE UP
COVID-19 NUCLEOCAPSID GAM AB INTERP: NEGATIVE — SIGNIFICANT CHANGE UP
COVID-19 NUCLEOCAPSID TOTAL GAM ANTIBODY RESULT: 0.08 INDEX — SIGNIFICANT CHANGE UP
COVID-19 SPIKE DOMAIN AB INTERP: POSITIVE
COVID-19 SPIKE DOMAIN ANTIBODY RESULT: 36 U/ML — HIGH
GRAM STN FLD: SIGNIFICANT CHANGE UP
HCT VFR BLD CALC: 30.7 % — LOW (ref 34.5–45)
HGB BLD-MCNC: 9.7 G/DL — LOW (ref 11.5–15.5)
MCHC RBC-ENTMCNC: 28 PG — SIGNIFICANT CHANGE UP (ref 27–34)
MCHC RBC-ENTMCNC: 31.6 GM/DL — LOW (ref 32–36)
MCV RBC AUTO: 88.5 FL — SIGNIFICANT CHANGE UP (ref 80–100)
NRBC # BLD: 0 /100 WBCS — SIGNIFICANT CHANGE UP (ref 0–0)
PLATELET # BLD AUTO: 179 K/UL — SIGNIFICANT CHANGE UP (ref 150–400)
RBC # BLD: 3.47 M/UL — LOW (ref 3.8–5.2)
RBC # FLD: 16.2 % — HIGH (ref 10.3–14.5)
SARS-COV-2 IGG+IGM SERPL QL IA: 0.08 INDEX — SIGNIFICANT CHANGE UP
SARS-COV-2 IGG+IGM SERPL QL IA: 36 U/ML — HIGH
SARS-COV-2 IGG+IGM SERPL QL IA: NEGATIVE — SIGNIFICANT CHANGE UP
SARS-COV-2 IGG+IGM SERPL QL IA: POSITIVE
SPECIMEN SOURCE: SIGNIFICANT CHANGE UP
WBC # BLD: 27.34 K/UL — HIGH (ref 3.8–10.5)
WBC # FLD AUTO: 27.34 K/UL — HIGH (ref 3.8–10.5)

## 2021-11-11 PROCEDURE — 99239 HOSP IP/OBS DSCHRG MGMT >30: CPT

## 2021-11-11 RX ORDER — MORPHINE SULFATE 50 MG/1
2 CAPSULE, EXTENDED RELEASE ORAL ONCE
Refills: 0 | Status: DISCONTINUED | OUTPATIENT
Start: 2021-11-11 | End: 2021-11-11

## 2021-11-11 RX ADMIN — MORPHINE SULFATE 2 MILLIGRAM(S): 50 CAPSULE, EXTENDED RELEASE ORAL at 10:30

## 2021-11-11 RX ADMIN — MORPHINE SULFATE 2 MILLIGRAM(S): 50 CAPSULE, EXTENDED RELEASE ORAL at 15:17

## 2021-11-11 RX ADMIN — MORPHINE SULFATE 2 MILLIGRAM(S): 50 CAPSULE, EXTENDED RELEASE ORAL at 01:20

## 2021-11-11 RX ADMIN — MORPHINE SULFATE 2 MILLIGRAM(S): 50 CAPSULE, EXTENDED RELEASE ORAL at 15:52

## 2021-11-11 RX ADMIN — MORPHINE SULFATE 2 MILLIGRAM(S): 50 CAPSULE, EXTENDED RELEASE ORAL at 09:37

## 2021-11-11 RX ADMIN — MORPHINE SULFATE 2 MILLIGRAM(S): 50 CAPSULE, EXTENDED RELEASE ORAL at 17:21

## 2021-11-11 RX ADMIN — MORPHINE SULFATE 2 MILLIGRAM(S): 50 CAPSULE, EXTENDED RELEASE ORAL at 13:16

## 2021-11-11 RX ADMIN — MORPHINE SULFATE 2 MILLIGRAM(S): 50 CAPSULE, EXTENDED RELEASE ORAL at 04:05

## 2021-11-11 RX ADMIN — MORPHINE SULFATE 2 MILLIGRAM(S): 50 CAPSULE, EXTENDED RELEASE ORAL at 16:57

## 2021-11-11 NOTE — DISCHARGE NOTE FOR THE EXPIRED PATIENT - SECONDARY DIAGNOSIS.
Cardiogenic shock Elevated troponin level Rapid atrial fibrillation Sepsis Lactic acidosis Type 2 diabetes mellitus

## 2021-11-11 NOTE — PROGRESS NOTE ADULT - SUBJECTIVE AND OBJECTIVE BOX
Patient is a 81y old  Female who presents with a chief complaint of Shock (10 Nov 2021 22:57)    INTERVAL HPI/OVERNIGHT EVENTS:  Pt was seen and examined.  Pt now comfort care.    MEDICATIONS  (STANDING):  influenza  Vaccine (HIGH DOSE) 0.7 milliLiter(s) IntraMuscular once    MEDICATIONS  (PRN):  morphine  - Injectable 2 milliGRAM(s) IV Push every 2 hours PRN air hunger or shortness of breath      Allergies  No Known Allergies        Vital Signs Last 24 Hrs  T(C): 36.2 (10 Nov 2021 22:25), Max: 36.7 (10 Nov 2021 16:00)  T(F): 97.2 (10 Nov 2021 22:25), Max: 98 (10 Nov 2021 16:00)  HR: 74 (10 Nov 2021 22:25) (74 - 107)  BP: 52/40 (2021 11:50) (52/40 - 96/61)  BP(mean): 47 (10 Nov 2021 22:25) (47 - 47)  RR: 16 (10 Nov 2021 22:25) (16 - 23)  SpO2: 54% (10 Nov 2021 22:25) (54% - 100%)      PHYSICAL EXAM:  GENERAL: NAD, minimally responsive  HEAD: Atraumatic   EYES: PERRLA  NERVOUS SYSTEM: Minimally responsive   CHEST/LUNG: Diminished, occasional agonal breathing   HEART: irregular, rate normal, hypotensive  ABDOMEN: soft, non etnder  EXTREMITIES:  no edema      LABS:                        9.7    27.34 )-----------( 179      ( 2021 06:17 )             30.7     11-10    159<H>  |  126<H>  |  48<H>  ----------------------------<  448<H>  4.4   |  17<L>  |  2.84<H>    Ca    7.1<L>      10 Nov 2021 12:49    TPro  5.5<L>  /  Alb  1.2<L>  /  TBili  2.1<H>  /  DBili  x   /  AST  370<H>  /  ALT  133<H>  /  AlkPhos  681<H>  11-10    PT/INR - ( 10 Nov 2021 12:49 )   PT: 22.7 sec;   INR: 2.02 ratio         PTT - ( 10 Nov 2021 12:49 )  PTT:29.0 sec  Urinalysis Basic - ( 10 Nov 2021 15:42 )    Color: Yellow / Appearance: very cloudy / S.010 / pH: x  Gluc: x / Ketone: Trace  / Bili: Negative / Urobili: 1 mg/dL   Blood: x / Protein: 100 mg/dL / Nitrite: Negative   Leuk Esterase: Moderate / RBC: 6-10 /HPF / WBC 11-25   Sq Epi: x / Non Sq Epi: Few / Bacteria: Many      CAPILLARY BLOOD GLUCOSE      POCT Blood Glucose.: 414 mg/dL (10 Nov 2021 18:55)      Culture - Blood (collected 10 Nov 2021 14:34)  Source: .Blood Blood-Peripheral  Gram Stain (prelim) (2021 01:32):    Growth in aerobic bottle: Gram Negative Rods    Growth in anaerobic bottle: Gram Variable Rods  Preliminary Report (2021 01:33):    Growth in aerobic bottle: Gram Negative Rods    Growth in anaerobic bottle: Gram Variable Rods    ***Blood Panel PCR results on this specimen are available    approximately 3 hours after the Gram stain result.***    Gram stain, PCR, and/or culture results may not always    correspond due to difference in methodologies.    ************************************************************    This PCR assay was performed by multiplex PCR. This    Assay tests for 66 bacterial and resistance gene targets.    Please refer to the [facility] test directory    at [email or web address] for details.  Organism: Blood Culture PCR (10 Nov 2021 23:48)  Organism: Blood Culture PCR (10 Nov 2021 23:48)    Culture - Blood (collected 10 Nov 2021 14:34)  Source: .Blood Blood-Peripheral  Gram Stain (prelim) (2021 01:40):    Growth in aerobic bottle: Gram Variable Rods    Growth in anaerobic bottle: Gram Variable Rods  Preliminary Report (2021 06:03):    Growth in aerobic bottle: Gram Variable Rods    Growth in anaerobic bottle: Gram Variable Rods    **BCID performed. No targets detected.**    ***Blood Panel PCR results on this specimen are available    approximately 3 hours after the Gram stain result.***    Gram stain, PCR, and/or culture results may not always    correspond due to difference in methodologies.    ************************************************************    This PCR assay was performed by multiplex PCR. This    Assay tests for 66 bacterial and resistance gene targets.    Please refer to the [facility] test directory    at [email or web address] for details.      RADIOLOGY & ADDITIONAL TESTS:    Imaging Personally Reviewed:  [ ] YES  [ ] NO    Consultant(s) Notes Reviewed:  [ ] YES  [ ] NO    Care Discussed with Consultants/Other Providers [ ] YES  [ ] NO

## 2021-11-11 NOTE — PROVIDER CONTACT NOTE (CRITICAL VALUE NOTIFICATION) - TEST AND RESULT REPORTED:
gram neg. rods in aerobic bottle. gram variable rods in anaerobic bottle. gram variable robs in anaerobic and aerobic bottles
lactate 8.6
trop 301.4

## 2021-11-11 NOTE — PROGRESS NOTE ADULT - ASSESSMENT
80F with dementia, HTN, CVA, Afib on Xarelto, DM.  Recent hospitalization at OSH January 2021 for possible stroke and while at rehab developed sacral decub which resolved and left heel decub which became infected. Recent admission to [facility] for Infected heel. She presented to ER with unresponsiveness. Food to have A fib with RVR with hypotension. Pt was cardio-verted X 2, ZAKI remains low. Seen and evaluated by ICU, As per ICU attending’s discussion with family is now DNR/DNI, no pressor. Pt was found to have elevated trop I and LA. Pt is seen and examined at bedside. Remains hypotensive, SBP: ~ 70-80.  Pt was started on heparin drip by ER.  Septic sock, Gram neg bacteremia  Cardiogenic shock  NSTEMI  A fib with RVR  Lactic acidosis  DM  Discussed with family at bedside.  Pt is DNR/I, no more blood draw.  DC tele  Heparin drip stopped, Abx stopped  Comfort care only. remains profoundly hypotensive, minimally responsive.  Morphine ordered  Hospice eval requested        Family still undecided about blood draw. Will continue for now. Discussed about comfort care.  Family wants to monitor her overnight and decide in am.

## 2021-11-11 NOTE — DISCHARGE NOTE FOR THE EXPIRED PATIENT - HOSPITAL COURSE
This is an 79yo F with dementia, HTN, CVA, Afib on Xarelto, DM.  Recent hospitalization at OSH January 2021 for possible stroke and while at rehab developed sacral decub which resolved and left heel decub which became infected. Recent admission to [facility] for Infected heel. She presented to ER with unresponsiveness. Food to have A fib with RVR with hypotension. Pt was cardio-verted X 2. Pt was found to have elevated trop I and LA. Seen and evaluated by ICU, poor prognosis, family opted for DNR/DNI, no pressors.  Pt placed on comfort care measures and hospice eval was requested.  Septic sock, Gram neg bacteremia  Cardiogenic shock  NSTEMI  A fib with RVR  Lactic acidosis  DM  Discussed with family at bedside.  Pt is DNR/I, no more blood draw.  DC tele  Heparin drip stopped, Abx stopped  Comfort care only. remains profoundly hypotensive, minimally responsive.  Morphine ordered  Hospice eval requested

## 2021-11-12 LAB
-  AMIKACIN: SIGNIFICANT CHANGE UP
-  AMPICILLIN/SULBACTAM: SIGNIFICANT CHANGE UP
-  AMPICILLIN: SIGNIFICANT CHANGE UP
-  AZTREONAM: SIGNIFICANT CHANGE UP
-  CEFAZOLIN: SIGNIFICANT CHANGE UP
-  CEFEPIME: SIGNIFICANT CHANGE UP
-  CEFOXITIN: SIGNIFICANT CHANGE UP
-  CEFTRIAXONE: SIGNIFICANT CHANGE UP
-  CIPROFLOXACIN: SIGNIFICANT CHANGE UP
-  ERTAPENEM: SIGNIFICANT CHANGE UP
-  GENTAMICIN: SIGNIFICANT CHANGE UP
-  IMIPENEM: SIGNIFICANT CHANGE UP
-  LEVOFLOXACIN: SIGNIFICANT CHANGE UP
-  MEROPENEM: SIGNIFICANT CHANGE UP
-  PIPERACILLIN/TAZOBACTAM: SIGNIFICANT CHANGE UP
-  TOBRAMYCIN: SIGNIFICANT CHANGE UP
-  TRIMETHOPRIM/SULFAMETHOXAZOLE: SIGNIFICANT CHANGE UP
CULTURE RESULTS: SIGNIFICANT CHANGE UP
GRAM STN FLD: SIGNIFICANT CHANGE UP
GRAM STN FLD: SIGNIFICANT CHANGE UP
METHOD TYPE: SIGNIFICANT CHANGE UP
ORGANISM # SPEC MICROSCOPIC CNT: SIGNIFICANT CHANGE UP
SPECIMEN SOURCE: SIGNIFICANT CHANGE UP

## 2021-11-17 NOTE — PROGRESS NOTE ADULT - PROBLEM SELECTOR PROBLEM 2
Patient Transferred to: [facility]  Handoff Report Given to: rn   Hypothermia

## 2022-02-22 NOTE — CHART NOTE - NSCHARTNOTESELECT_GEN_ALL_CORE
MAR Transfer Note/Event Note details… Alert & oriented; no sensory, motor or coordination deficits, normal reflexes

## 2022-03-28 PROBLEM — S91.302A UNSPECIFIED OPEN WOUND, LEFT FOOT, INITIAL ENCOUNTER: Chronic | Status: ACTIVE | Noted: 2021-08-30

## 2022-03-28 PROBLEM — I48.20 CHRONIC ATRIAL FIBRILLATION, UNSPECIFIED: Chronic | Status: ACTIVE | Noted: 2021-08-30

## 2022-03-28 PROBLEM — F03.90 UNSPECIFIED DEMENTIA, UNSPECIFIED SEVERITY, WITHOUT BEHAVIORAL DISTURBANCE, PSYCHOTIC DISTURBANCE, MOOD DISTURBANCE, AND ANXIETY: Chronic | Status: ACTIVE | Noted: 2021-08-30

## 2022-03-28 RX ORDER — METOPROLOL TARTRATE 50 MG
25 TABLET ORAL
Qty: 0 | Refills: 0 | DISCHARGE

## 2022-03-28 RX ORDER — METFORMIN HYDROCHLORIDE 850 MG/1
1 TABLET ORAL
Qty: 0 | Refills: 0 | DISCHARGE

## 2022-03-28 RX ORDER — METOPROLOL TARTRATE 50 MG
1 TABLET ORAL
Qty: 0 | Refills: 0 | DISCHARGE

## 2022-03-28 RX ORDER — ACETAMINOPHEN 500 MG
2 TABLET ORAL
Qty: 0 | Refills: 0 | DISCHARGE

## 2022-03-28 RX ORDER — FONDAPARINUX SODIUM 2.5 MG/.5ML
1 INJECTION, SOLUTION SUBCUTANEOUS
Qty: 0 | Refills: 0 | DISCHARGE

## 2022-03-28 RX ORDER — LACTOBACILLUS ACIDOPHILUS 100MM CELL
1 CAPSULE ORAL
Qty: 0 | Refills: 0 | DISCHARGE

## 2022-03-28 RX ORDER — RIVAROXABAN 15 MG-20MG
1 KIT ORAL
Qty: 0 | Refills: 0 | DISCHARGE

## 2022-03-28 RX ORDER — ERTAPENEM SODIUM 1 G/1
1 INJECTION, POWDER, LYOPHILIZED, FOR SOLUTION INTRAMUSCULAR; INTRAVENOUS
Qty: 0 | Refills: 0 | DISCHARGE

## 2022-03-28 RX ORDER — VANCOMYCIN HCL 1 G
1 VIAL (EA) INTRAVENOUS
Qty: 0 | Refills: 0 | DISCHARGE

## 2022-11-29 NOTE — PATIENT PROFILE ADULT - FUNCTIONAL SCREEN CURRENT LEVEL: COMMUNICATION, MLM
EmPATH Unit - Psychiatric Consultation  [facility] Emergency Department    Elzbieta Ivan MRN: [id number]   Age: 80 year old YOB: 1942     History     Chief Complaint   Patient presents with     Psychiatric Evaluation     HPI  Elzbieta Ivan is a 80 year old female with history notable for major neurocognitive disorder further complicated by a depressive episode and suicidal ideation.  She is currently under observation status on the EmPATH unit following her initial examination by LENORA Arnold yesterday.  Following that meeting, the dose of Cymbalta was reduced while noting the higher dose had not provided additional benefits.  Mirtazapine was also added for additional augmentation of Cymbalta while providing antidepressant and antianxiety treatment.  Overnight, there were no acute issues.  On reassessment today, the patient was pleasant and social throughout our meeting.  She would often repeat comments or questions.  She explains that she is also hard of hearing and has tried numerous hearing aids in the past without satisfaction.  She has questions regarding her medication and treatment plan.  She interprets responding well without side effects.  She recalls sleeping well last night.  Her appetite has been adequate today.  She complained of mild anxiety however appeared comfortable.  She did not reference suicidal thoughts.  There was no indication of psychosis or homicidal ideation.  She was agreeable with the plan of care as outlined.    Past Medical History  Past Medical History:   Diagnosis Date     Anxiety      Arrhythmia     hx of SVT     Arthritis      Dysphagia      Fibromyalgia      Gastroesophageal reflux disease      Hashimoto's disease      Hyperlipidemia      Malignant neoplasm of cervix (H)      Migraine      Non-toxic nodular goiter      Osteoporosis      Polyp of vagina      Sleep apnea      Past Surgical History:   Procedure Laterality Date     APPENDECTOMY         SECTION       EYE SURGERY      Bilat blephplsty     GYN SURGERY      hysterectomy with single oophrectomy     LAMINECTOMY LUMBAR ONE LEVEL Bilateral 2/4/2020    Procedure: LUMBAR 5 LAMINECTOMY BILATERALLY FOR EXCISION OF A LARGE SYNOVIAL CYST;  Surgeon: Antwan Burkett MD;  Location:  OR     ORTHOPEDIC SURGERY      knee arthroscopy     PUNCTURE ASPIRATION ABSCESS/HEMATOMA/CYST       REVERSE ARTHROPLASTY SHOULDER Left 8/30/2018    Procedure: REVERSE ARTHROPLASTY SHOULDER;  LEFT REVERSE TOTAL SHOULDER ARTHROPLASTY;  Surgeon: Con Tucker MD;  Location:  OR     TONSILLECTOMY       ALPRAZolam (XANAX PO)  aspirin-acetaminophen-caffeine (EXCEDRIN EXTRA STRENGTH) 250-250-65 MG per tablet  cholecalciferol (VITAMIN D3) 1000 units (25 mcg) capsule  DICLOFENAC PO  DULoxetine (CYMBALTA) 30 MG capsule  DULoxetine (CYMBALTA) 60 MG capsule  ipratropium (ATROVENT) 0.06 % nasal spray  Omeprazole (PRILOSEC PO)  polyethylene glycol 0.4%- propylene glycol 0.3% (SYSTANE ULTRA) 0.4-0.3 % SOLN ophthalmic solution  simvastatin (ZOCOR) 40 MG tablet      Allergies   Allergen Reactions     Cat Hair Extract      Scopolamine Other (See Comments)     Hallucianations     Family History  No family history on file.  Social History   Social History     Tobacco Use     Smoking status: Never     Smokeless tobacco: Never   Substance Use Topics     Alcohol use: No     Drug use: No      Past medical history, past surgical history, medications, allergies, family history, and social history were reviewed with the patient. No additional pertinent items.       Review of Systems  A complete review of systems was performed with pertinent positives and negatives noted in the HPI, and all other systems negative.    Physical Examination   BP: 122/63  Pulse: 68  Temp: 97.4  F (36.3  C)  Resp: 18  Height: 152.4 cm (5')  Weight: 53.7 kg (118 lb 6.4 oz)  SpO2: 97 %    Physical Exam  General: Appears stated age.   Neuro: Alert and fully  oriented. Extremities appear to demonstrate normal strength on visual inspection.   Integumentary/Skin: no rash visualized, normal color    Psychiatric Examination   Appearance: awake, alert  Attitude:  cooperative  Eye Contact:  fair  Mood:  better  Affect:  appropriate and in normal range  Speech:  clear, coherent  Psychomotor Behavior:  no evidence of tardive dyskinesia, dystonia, or tics  Thought Process:  linear and repetitive at times  Associations:  no loose associations  Thought Content:  no evidence of suicidal ideation or homicidal ideation and no evidence of psychotic thought  Insight:  partial  Judgement:  fair  Oriented to:  time, person, and place  Attention Span and Concentration:  fair  Recent and Remote Memory:  poor  Language: able to name/identify objects without impairment  Fund of Knowledge: intact with awareness of current and past events    ED Course        Labs Ordered and Resulted from Time of ED Arrival to Time of ED Departure   COVID-19 VIRUS (CORONAVIRUS) BY PCR - Normal       Result Value    SARS CoV2 PCR Negative         Assessments & Plan (with Medical Decision Making)   Patient presenting with worsening mood and suicidal ideation in the context of worsening symptoms associated with her neurocognitive disorder. Nursing notes reviewed noting no acute issues.     I have reviewed the assessment completed by the [facility].     Preliminary diagnosis:    ICD-10-CM    1. Suicidal ideation  R45.851       2. Depression, unspecified depression type  F32.A       3. Major neurocognitive disorder (H)  F03.90     r/o Alzheimer's dementia      4. Anxiety  F41.9            Treatment Plan:  -Monitoring response to the lower dose of Cymbalta at 90 mg daily  -Continue the starter dose of mirtazapine 7.5 mg nightly and consider increasing to 15 mg once tolerability has been established  -Continue Seroquel as needed for reduction of anxiety as an alternative to Xanax  -Continue observation status for 1 more  night as we monitor her response to the treatment plan.    --  Ulisses Burnett MD   [facility] EMERGENCY DEPT  EmPATH Unit  11/28/2022      Ulisses Burnett MD  11/29/22 [id number]     pt confused, very minimally verbal/2 = difficulty understanding (not related to language barrier)

## 2023-02-13 NOTE — ED PROVIDER NOTE - ST/T WAVE
Patient presents today for annual medicare wellness visit.    Medication Reconciliation Complete    Dunia Kathleen LPN  2/13/2023 2:43 PM   n

## 2023-02-18 NOTE — BH CONSULTATION LIAISON ASSESSMENT NOTE - NSBHHPIREASONCL_PSY_A_CORE
Patient with altered mental status following syncopal/seizure-like episode during which time patient was nonverbal but following commands  Patient now back to baseline mental status per mother at bedside      Plan:  CT head negative  UDS negative, no metabolic abnormalities appreciated on labs  Check B12, TSH, ammonia, and syphilits screen  Check MRI brain given the findings of LV thrombus med management

## 2023-03-31 NOTE — BH CONSULTATION LIAISON ASSESSMENT NOTE - CURRENT MEDICATION
MEDICATIONS  (STANDING):  collagenase Ointment 1 Application(s) Topical daily  Dakins Solution - 1/2 Strength 1 Application(s) Topical Once  Dakins Solution - 1/4 Strength 1 Application(s) Topical daily  dextrose 40% Gel 15 Gram(s) Oral once  dextrose 5%. 1000 milliLiter(s) (50 mL/Hr) IV Continuous <Continuous>  dextrose 5%. 1000 milliLiter(s) (100 mL/Hr) IV Continuous <Continuous>  dextrose 50% Injectable 25 Gram(s) IV Push once  dextrose 50% Injectable 12.5 Gram(s) IV Push once  dextrose 50% Injectable 25 Gram(s) IV Push once  ertapenem  IVPB 1000 milliGRAM(s) IV Intermittent every 24 hours  glucagon  Injectable 1 milliGRAM(s) IntraMuscular once  insulin lispro (ADMELOG) corrective regimen sliding scale   SubCutaneous three times a day before meals  metoprolol tartrate 25 milliGRAM(s) Oral three times a day  rivaroxaban 15 milliGRAM(s) Oral with dinner  sodium chloride 0.45%. 1000 milliLiter(s) (50 mL/Hr) IV Continuous <Continuous>  vancomycin  IVPB 750 milliGRAM(s) IV Intermittent daily    MEDICATIONS  (PRN):  acetaminophen   Tablet .. 650 milliGRAM(s) Oral every 6 hours PRN Temp greater or equal to 38.5C (101.3F), Mild Pain (1 - 3)  aluminum hydroxide/magnesium hydroxide/simethicone Suspension 30 milliLiter(s) Oral every 4 hours PRN Dyspepsia  melatonin 3 milliGRAM(s) Oral at bedtime PRN Insomnia  OLANZapine Injectable 1.25 milliGRAM(s) IntraMuscular every 6 hours PRN Agitation  OLANZapine Injectable 1.25 milliGRAM(s) IntraMuscular once PRN Agitation before MRI  
Severe protein calorie malnutrition in context of chronic illness

## 2023-07-18 NOTE — PROGRESS NOTE ADULT - PROBLEM SELECTOR PLAN 7
ordered   Pt with longstanding history of hypertension  -c/w metoprolol 25mg tid  -holding diltiazem for now

## 2023-08-31 NOTE — ED PROVIDER NOTE - IV ALTEPLASE EXCL REL HIDDEN
Pt to infusion center for rituxan infusion. 5 attempts made to establish IV access without success. LM with rheumatology office to make them aware & find out how to proceed.
show

## 2023-10-24 NOTE — PROGRESS NOTE ADULT - PROBLEM SELECTOR PLAN 9
Letter mailed to pt     - c/w lopressor 12.5 mg tid - A1C 7.8 (1/23/21) with home metformin 500mg qd  - c/w low sliding scale

## 2023-12-05 NOTE — ED ADULT NURSE NOTE - CAS TRG GENERAL NORM CIRC DET
Called / pt mail box not set up and not able to leave message  Called twice.       Strong peripheral pulses

## 2024-01-11 NOTE — ED ADULT TRIAGE NOTE - ARRIVAL FROM
HISTORY OF PRESENT ILLNESS:  Kamila is a 45 year old female who presents for:  1. UNCONTROLLED Type 2 diabetes mellitus with other specified complication, unspecified whether long term insulin use (CMS/HCC): optimize with RX/ lifestyle changes    2. Essential hypertension    3. Severe obesity (BMI 35.0-39.9) with comorbidity (CMD):improving with RX/lifestyle changes: longterm goal:  BMI <30    4. Medication management    5. Exercise counseling    6. Nutritional counseling      Was out of Ozempic and at times unable to take it for some time in 2023.    Today's A1C 9.1.  back on Ozempic 0.5mg with Metformin witth decreased appetite as expected.    +walking 50+mins daily and working on healthier po.    Above chronic conditions are stable with RX/lifestyle changes that I manage.    She is taking her medications without any problems.  She has not had any endocrine concerns.  She has not had any cardiovascular or pulmonary symptoms.      Past Medical History:   Diagnosis Date    AR (allergic rhinitis) 06/04/2014    ASCUS on Pap smear     neg HPV: repeat 2015.     Atypical chest pain     CHRISTINA III (cervical intraepithelial neoplasia grade III) with severe dysplasia s/p LEEP 2013: pap q 6 months 06/04/2014    h/o leep for CHRISTINA 3;    Encounter for birth control pills maintenance 04/05/2013    Excessive bleeding in premenopausal period/fibroids: gyne 03/08/2016    High cholesterol     Hypertension     Mammogram abnormal 02/06/2018    needs short interval f/u mammo due 8/2018    Need for prophylactic vaccination with combined diphtheria-tetanus-pertussis (DTP) vaccine     Prediabetes     Seasonal allergies 11/03/2011    Type 2 diabetes mellitus (CMD)     Uterine fibroids: uterus 12 cm: gyne ref 03/08/2016    Vitamin D deficiency     Yeast vaginitis, recurrent 06/04/2014       SOCIAL HISTORY:   Social History     Tobacco Use    Smoking status: Never     Passive exposure: Never    Smokeless tobacco: Never   Substance Use Topics     Alcohol use: No     Alcohol/week: 0.0 standard drinks of alcohol       ALLERGIES:  No Known Allergies    Current Outpatient Medications   Medication Sig    Semaglutide, 1 MG/DOSE, 4 MG/3ML Solution Pen-injector Inject 1 mg into the skin every 7 days. Indications: Type 2 Diabetes    amLODIPine (NORVASC) 5 MG tablet Take 1 tablet by mouth daily.    metFORMIN (GLUCOPHAGE-XR) 500 MG 24 hr tablet Take 2 tablets by mouth daily (with breakfast).    electrolyte/PEG 3350 (NULYTELY) 420 g solution Take as directed on Colonoscopy prep letter    Semaglutide, 2 MG/DOSE, (Ozempic, 2 MG/DOSE,) 8 MG/3ML Solution Pen-injector Inject 2 mg into the skin every 7 days.    atorvastatin (LIPITOR) 20 MG tablet Take 1 tablet by mouth daily.    triamcinolone (ARISTOCORT) 0.1 % ointment Apply topically 2 times daily. Up to 10 days, then take 1 week break between usage    naproxen (NAPROSYN) 500 MG tablet TAKE 1 TABLET BY MOUTH TWICE DAILY    DISPENSE ACV GUMMY.  MVI.  ELDEBERY.  VIT C.    OneTouch Verio test strip TEST BLOOD SUGAR 3 TIMES PER WEEK AS DIRECTED    OneTouch Delica Lancets 33G Misc Use to monitor blood sugars 3 times per week    cyclobenzaprine (FLEXERIL) 10 MG tablet Take 1 tablet by mouth 2 times daily as needed for Muscle spasms.    lidocaine (Lidoderm) 5 % patch Place 1 patch onto the skin every 24 hours. Remove patch 12 hours after applying    fluconazole (DIFLUCAN) 150 MG tablet Take 1 tablet by mouth as directed. Take 1 tab now.  Take 2nd tab 72 hours after first dose.    docusate sodium (COLACE) 100 MG capsule Take 1 capsule by mouth 2 times daily.    Cholecalciferol (VITAMIN D) 2000 UNITS tablet Take 2,000 Units by mouth daily.     No current facility-administered medications for this visit.       PHYSICAL EXAMINATION:  VITAL SIGNS:  Visit Vitals  /72   Pulse 63   Ht 5' 8\" (1.727 m)   Wt 107.2 kg (236 lb 6.4 oz)   LMP 06/06/2016   SpO2 100%   BMI 35.94 kg/m²     GENERAL:  Well-developed, well-nourished female, in  Home no acute distress.  She is alert and oriented x 3 with a normal affect.   HEENT:  Extraocular muscles are intact.  Sclerae are anicteric.  Conjunctivae are clear. Nares are without rhinorrhea.  Mucous membranes are moist.   CARDIOVASCULAR:  Regular rate and rhythm without murmurs, rubs or gallops.   LUNGS:  Clear to auscultation bilaterally with good aeration and normal respiratory effort, without retractions appreciated.  LOWER EXTREMITIES:  Without edema.      Previous PNs/studies/Labs per Epic reviewed and discussed    ASSESSMENT:    1. UNCONTROLLED Type 2 diabetes mellitus with other specified complication, unspecified whether long term insulin use (CMS/[facility]): optimize with RX/ lifestyle changes    2. Essential hypertension    3. Severe obesity (BMI 35.0-39.9) with comorbidity (CMD):improving with RX/lifestyle changes: longterm goal:  BMI <30    4. Medication management    5. Exercise counseling    6. Nutritional counseling        Recent PHQ 2/9 Score    PHQ 2:  PHQ 2 Score Adult PHQ 2 Score Adult PHQ 2 Interpretation Little interest or pleasure in activity?   1/11/2024   3:55 PM 0 No further screening needed 0       PHQ 9:  PHQ 9 Score Adult PHQ 9 Score Adult PHQ 9 Interpretation   2/17/2022  12:25 PM 15 Moderately Severe Depression        DEPRESSION ASSESSMENT/PLAN:  Depression screening is negative no further plan needed.     PLAN:  Return if symptoms worsen or fail to improve, for CPE 4 MONTHS.    2.  FASTING LABS .  Follow up as needed, if symptoms worsen, or fail to improve.   Orders Placed This Encounter    POCT Glycohemoglobin Analyzer    Semaglutide, 1 MG/DOSE, 4 MG/3ML Solution Pen-injector    amLODIPine (NORVASC) 5 MG tablet    metFORMIN (GLUCOPHAGE-XR) 500 MG 24 hr tablet         MEDICAL DECISION MAKING:      Looking at above DX, TX plan:  R/B of TX option(s) and existing therapies:    Above chronic conditions are stable with RX/lifestyle changes that I and care team manage.     See orders and follow  up.    Will titrate Ozempic between appts.    CGM will optimize dm control.    As discussed in detail and based on:    Evaluation of this patient included preview of chart, review of the Medications, Allergies, Problem list, Past Medical History, Past Surgical History, Social History and Family History.      Social determinants of health are pertinent to MDM for this patient: see demographics and: ethnicity, SES/physical environment/zip code, health behaviors including healthcare literacy, healthcare access.    Previous germane PNs/studies/Labs per Epic were also reviewed and discussed    This evaluation included review of chart//completion of charting on day of encounter/counseling, on the day of this encounter and coordination of care and answering all of the patient's questions in detail.     Please follow up for symptoms that persist/worsen as discussed and 911 or ER for any severe symptoms.    Patient is instructed to email me to discuss test results via [facility] [employer] 48 hours after the tests are done unless otherwise requested or call if not received results in 2 weeks of them being completed.     Continue medicines per Epic as applicable.    Please see AVS/PATIENT INSTRUCTIONS for other details of the encounter.

## 2024-02-28 NOTE — ED ADULT NURSE NOTE - NURSING MUSC JOINTS
Body Location Override (Optional - Billing Will Still Be Based On Selected Body Map Location If Applicable): right medial upper back
Detail Level: Detailed
Add 40050 Cpt? (Important Note: In 2017 The Use Of 21349 Is Being Tracked By Cms To Determine Future Global Period Reimbursement For Global Periods): yes
no pain, swelling or deformity of joints

## 2024-04-08 NOTE — ED PROVIDER NOTE - TEMPLATE, MLM
Armando at [facility] pharmacy stated they do have the RX for Novolog and it is covered. He states pt says she will continue to use her Fiasp until she runs out.   Cardiac

## 2024-08-15 NOTE — PROGRESS NOTE ADULT - ASSESSMENT
Clinical Documentation Clarification   Dear Dr. Moreno  Can the BMI of 44.80 be specified?    ( x ) Morbid Obesity    (  ) Other (please specify) ________________________________      Documentation from the medical record   Clinical Indicators   >08/13/24 Anesthesia preprocedure evaluation - (+) sleep apnea and CPAP  >08/13/24 cervical myelopathy presents for elective C4-C5 C5-C5 anterior fusion. Pt had procedure this morning and tolerated well  >08/13/24 Hospitalist - Cervical Myelopathy sp anterior fusion. PT /OT   Wt 261 lb (118.4 kg)  BMI 44.80 kg/m²    Risk Factors  Advanced age, Asthma, SLE, history of sleep apnea     Treatment Nursing care.             Use of terms such as suspected, possible, or probable (associated with a specific diagnosis that is being evaluated, monitored, or treated as if it exists) are acceptable and can be coded in the inpatient setting, when documented at the time of discharge.   Please add any additional documentation to your progress note and continue to document this through discharge.  If you have any questions, please contact Clinical : Norma Jiang RN /BSN @ [phone] or email [email or web address]  Thank You!                                               THIS FORM IS A PERMANENT PART OF THE MEDICAL RECORD       Pt is an 80 woman PMHx of dementia, HTN, CVA, Afib on Xarelto, DM who presented to ED for ulceration of left heel c/f OM. Wound culture grew few MRSA. Negative blood cultures, no leukocytosis, and vital signs remaining WNL decrease likelihood of systemic infection.

## 2025-06-11 NOTE — PATIENT PROFILE ADULT - NSPROHMDIABETMGMTSTRAT_GEN_A_NUR
pt confused unable to given info
Pt complaining of abdominal pain x 1 day. hx of diverticulitis. also complaining of worsening SOB, hx of COPD